# Patient Record
Sex: FEMALE | Race: WHITE | Employment: UNEMPLOYED | URBAN - METROPOLITAN AREA
[De-identification: names, ages, dates, MRNs, and addresses within clinical notes are randomized per-mention and may not be internally consistent; named-entity substitution may affect disease eponyms.]

---

## 2017-03-17 ENCOUNTER — HOSPITAL ENCOUNTER (EMERGENCY)
Facility: CLINIC | Age: 47
Discharge: LEFT BEFORE DISCHARGE | End: 2017-03-17
Admitting: FAMILY MEDICINE
Payer: OTHER GOVERNMENT

## 2017-03-17 VITALS
TEMPERATURE: 97.2 F | SYSTOLIC BLOOD PRESSURE: 109 MMHG | HEIGHT: 64 IN | WEIGHT: 130 LBS | OXYGEN SATURATION: 100 % | DIASTOLIC BLOOD PRESSURE: 72 MMHG | BODY MASS INDEX: 22.2 KG/M2 | RESPIRATION RATE: 20 BRPM

## 2017-03-17 PROCEDURE — 40000268 ZZH STATISTIC NO CHARGES

## 2017-07-19 ENCOUNTER — OFFICE VISIT (OUTPATIENT)
Dept: BEHAVIORAL HEALTH | Facility: CLINIC | Age: 47
End: 2017-07-19
Payer: OTHER GOVERNMENT

## 2017-07-19 DIAGNOSIS — F33.0 MAJOR DEPRESSIVE DISORDER, RECURRENT EPISODE, MILD (H): ICD-10-CM

## 2017-07-19 DIAGNOSIS — F43.10 PTSD (POST-TRAUMATIC STRESS DISORDER): Primary | ICD-10-CM

## 2017-07-19 PROCEDURE — 90791 PSYCH DIAGNOSTIC EVALUATION: CPT | Performed by: MARRIAGE & FAMILY THERAPIST

## 2017-07-19 ASSESSMENT — ANXIETY QUESTIONNAIRES
GAD7 TOTAL SCORE: 17
1. FEELING NERVOUS, ANXIOUS, OR ON EDGE: NEARLY EVERY DAY
5. BEING SO RESTLESS THAT IT IS HARD TO SIT STILL: SEVERAL DAYS
3. WORRYING TOO MUCH ABOUT DIFFERENT THINGS: NEARLY EVERY DAY
6. BECOMING EASILY ANNOYED OR IRRITABLE: MORE THAN HALF THE DAYS
2. NOT BEING ABLE TO STOP OR CONTROL WORRYING: NEARLY EVERY DAY
7. FEELING AFRAID AS IF SOMETHING AWFUL MIGHT HAPPEN: NEARLY EVERY DAY

## 2017-07-19 ASSESSMENT — PATIENT HEALTH QUESTIONNAIRE - PHQ9: 5. POOR APPETITE OR OVEREATING: MORE THAN HALF THE DAYS
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
## 2017-07-19 NOTE — MR AVS SNAPSHOT
"              After Visit Summary   2017    Magnolia Leyva    MRN: [id number]           Patient Information     Date Of Birth          1970        Visit Information        Provider Department      2017 2:00 PM Ketty Celis LMFT [facility]        Today's Diagnoses     PTSD (post-traumatic stress disorder)    -  1    Major depressive disorder, recurrent episode, mild (H)           Follow-ups after your visit        Who to contact     If you have questions or need follow up information about today's clinic visit or your schedule please contact [facility] directly at [phone].  Normal or non-critical lab and imaging results will be communicated to you by [email or web address], letter or phone within 4 business days after the clinic has received the results. If you do not hear from us within 7 days, please contact the clinic through [email or web address] or phone. If you have a critical or abnormal lab result, we will notify you by phone as soon as possible.  Submit refill requests through Kymab or call your pharmacy and they will forward the refill request to us. Please allow 3 business days for your refill to be completed.          Additional Information About Your Visit        MyChart Information     Kymab lets you send messages to your doctor, view your test results, renew your prescriptions, schedule appointments and more. To sign up, go to [email or web address] . Click on \"Log in\" on the left side of the screen, which will take you to the Welcome page. Then click on \"Sign up Now\" on the right side of the page.     You will be asked to enter the access code listed below, as well as some personal information. Please follow the directions to create your username and password.     Your access code is: [email or web address]  Expires: 10/19/2017  1:20 PM     Your access code will  in 90 days. If you need help or a new code, please call your [facility] or " [phone].        Care EveryWhere ID     This is your Care EveryWhere ID. This could be used by other organizations to access your [facility] medical records  [id number]         Blood Pressure from Last 3 Encounters:   07/25/17 110/66   07/21/17 112/60   03/17/17 109/72    Weight from Last 3 Encounters:   07/25/17 58.5 kg (129 lb)   07/21/17 59.9 kg (132 lb)   03/17/17 59 kg (130 lb)              Today, you had the following     No orders found for display       Primary Care Provider Office Phone # Fax #    Yudelka ANNETTA [facility] [phone]       [facility]  [facility]   [place] [address]        Equal Access to Services     VIDA LOPEZ : Hadii aurora moreirao Sogladis, waaxda luqadaha, qaybta kaalmada adeegyada, bernadette philip hayrusty castillo . So [facility] [phone].    ATENCIÓN: Si habla español, tiene a delcid disposición servicios gratuitos de asistencia lingüística. Llame al [phone].    We comply with applicable federal civil rights laws and [place] laws. We do not discriminate on the basis of race, color, national origin, age, disability sex, sexual orientation or gender identity.            Thank you!     Thank you for choosing [facility]  for your care. Our goal is always to provide you with excellent care. Hearing back from our patients is one way we can continue to improve our services. Please take a few minutes to complete the written survey that you may receive in the mail after your visit with us. Thank you!             Your Updated Medication List - Protect others around you: Learn how to safely use, store and throw away your medicines at www.disposemymeds.org.          This list is accurate as of: 7/19/17 11:59 PM.  Always use your most recent med list.                   Brand Name Dispense Instructions for use Diagnosis    ABILIFY 2 MG tablet   Generic drug:  ARIPiprazole      Take 15 mg by mouth daily        gabapentin 600 MG  tablet    NEURONTIN     Take 600 mg by mouth 3 times daily        ibuprofen 200 MG tablet    ADVIL/MOTRIN     Take 3 tablets (600 mg) by mouth every 6 hours as needed for pain        lamoTRIgine 200 MG tablet    LaMICtal     Take 200 mg by mouth daily        levothyroxine 100 MCG tablet    SYNTHROID/LEVOTHROID     Take 100 mcg by mouth daily        multivitamin, therapeutic with minerals Tabs tablet     100 tablet    Take 1 tablet by mouth daily    Substance abuse

## 2017-07-20 ASSESSMENT — PATIENT HEALTH QUESTIONNAIRE - PHQ9: SUM OF ALL RESPONSES TO PHQ QUESTIONS 1-9: 14

## 2017-07-20 ASSESSMENT — ANXIETY QUESTIONNAIRES: GAD7 TOTAL SCORE: 17

## 2017-07-21 ENCOUNTER — OFFICE VISIT (OUTPATIENT)
Dept: FAMILY MEDICINE | Facility: CLINIC | Age: 47
End: 2017-07-21

## 2017-07-21 VITALS
SYSTOLIC BLOOD PRESSURE: 112 MMHG | TEMPERATURE: 98.2 F | DIASTOLIC BLOOD PRESSURE: 60 MMHG | HEART RATE: 80 BPM | BODY MASS INDEX: 22.66 KG/M2 | WEIGHT: 132 LBS

## 2017-07-21 DIAGNOSIS — Z91.89 HX OF DRUG OVERDOSE: ICD-10-CM

## 2017-07-21 DIAGNOSIS — R41.840 DIFFICULTY CONCENTRATING: ICD-10-CM

## 2017-07-21 DIAGNOSIS — F43.23 ADJUSTMENT DISORDER WITH MIXED ANXIETY AND DEPRESSED MOOD: Primary | ICD-10-CM

## 2017-07-21 DIAGNOSIS — Z31.9 PATIENT DESIRES PREGNANCY: ICD-10-CM

## 2017-07-21 PROCEDURE — 99214 OFFICE O/P EST MOD 30 MIN: CPT | Performed by: NURSE PRACTITIONER

## 2017-07-21 NOTE — NURSING NOTE
"Chief Complaint   Patient presents with     Referral     asking for referral regarding ADD       Initial There were no vitals taken for this visit. Estimated body mass index is 22.31 kg/(m^2) as calculated from the following:    Height as of 3/17/17: 5' 4\" (1.626 m).    Weight as of 3/17/17: 130 lb (59 kg).  Medication Reconciliation: complete  "
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
## 2017-07-21 NOTE — MR AVS SNAPSHOT
After Visit Summary   7/21/2017    Magnolia Leyva    MRN: [id number]           Patient Information     Date Of Birth          1970        Visit Information        Provider Department      7/21/2017 11:15 AM Yudelka Hartman APRN [facility]        Today's Diagnoses     Adjustment disorder with mixed anxiety and depressed mood    -  1    Hx of drug overdose        Patient desires pregnancy        Difficulty concentrating           Follow-ups after your visit        Additional Services     INFERTILITY/AI REFERRAL       Your provider has referred you to: [facility] [place] [phone]   [email or web address]    Please be aware that coverage of these services is subject to the terms and limitations of your health insurance plan.  Call member services at your health plan with any benefit or coverage questions.      Please bring the following with you to your appointment:    (1) Any X-Rays, CTs or MRIs which have been performed.  Contact the facility where they were done to arrange for  prior to your scheduled appointment.    (2) List of current medications   (3) This referral request   (4) Any documents/labs given to you for this referral            MENTAL HEALTH REFERRAL       Your provider has referred you to: [facility]: [facility]  [phone]   [email or web address]   *Referral from [facility] Primary Care Provider required - Consultation Model - medication management & future refills will be returned to [facility] PCP upon completion of evaluation  *Please call to schedule an appointment.    All scheduling is subject to the client's specific insurance plan & benefits, provider/location availability, and provider clinical specialities.  Please arrive 15 minutes early for your first appointment and bring your completed  paperwork.    Please be aware that coverage of these services is subject to the terms and limitations of your health insurance plan.  Call member services at your health plan with any benefit or coverage questions.            MENTAL HEALTH REFERRAL       Your provider has referred you to: [facility]: [facility] - Counseling (Individual/Couples/Family) - Would like a female    All scheduling is subject to the client's specific insurance plan & benefits, provider/location availability, and provider clinical specialities.  Please arrive 15 minutes early for your first appointment and bring your completed paperwork.    Please be aware that coverage of these services is subject to the terms and limitations of your health insurance plan.  Call member services at your health plan with any benefit or coverage questions.            MENTAL HEALTH REFERRAL       Your provider has referred you to: [facility]    All scheduling is subject to the client's specific insurance plan & benefits, provider/location availability, and provider clinical specialities.  Please arrive 15 minutes early for your first appointment and bring your completed paperwork.    Please be aware that coverage of these services is subject to the terms and limitations of your health insurance plan.  Call member services at your health plan with any benefit or coverage questions.                  Who to contact     If you have questions or need follow up information about today's clinic visit or your schedule please contact [facility] directly at [phone].  Normal or non-critical lab and imaging results will be communicated to you by MyChart, letter or phone within 4 business days after the clinic has received the results. If you do not hear from us within 7 days, please contact the clinic through MyChart or phone. If you have a critical or abnormal lab result, we will notify you by phone as soon as possible.  Submit refill  "requests through [employer] or call your pharmacy and they will forward the refill request to us. Please allow 3 business days for your refill to be completed.          Additional Information About Your Visit        The [employer] Information     [employer] lets you send messages to your doctor, view your test results, renew your prescriptions, schedule appointments and more. To sign up, go to [email or web address] . Click on \"Log in\" on the left side of the screen, which will take you to the Welcome page. Then click on \"Sign up Now\" on the right side of the page.     You will be asked to enter the access code listed below, as well as some personal information. Please follow the directions to create your username and password.     Your access code is: [email or web address]  Expires: 10/19/2017  1:20 PM     Your access code will  in 90 days. If you need help or a new code, please call your [facility] clinic or [phone].        Care EveryWhere ID     This is your Care EveryWhere ID. This could be used by other organizations to access your [facility] medical records  [id number]        Your Vitals Were     Pulse Temperature BMI (Body Mass Index)             80 98.2  F (36.8  C) (Tympanic) 22.66 kg/m2          Blood Pressure from Last 3 Encounters:   17 112/60   17 109/72   11/10/16 102/58    Weight from Last 3 Encounters:   17 132 lb (59.9 kg)   17 130 lb (59 kg)   11/10/16 139 lb 11.2 oz (63.4 kg)              We Performed the Following     INFERTILITY/AI REFERRAL     MENTAL HEALTH REFERRAL     MENTAL HEALTH REFERRAL     MENTAL HEALTH REFERRAL        Primary Care Provider Office Phone # Fax #    ANNETTA Palmer [phone]        [facility]  [address] [place] [address]        Equal Access to Services     VIDA LOPEZ AH: Noel rodriguez Sogladis, waaxda luqadaha, qaybta kaalmada adeegyaciro, bernadette snow. So [facility] " [phone].    ATENCIÓN: Si judy artis, tiene a delcid disposición servicios gratuitos de asistencia lingüística. Bryan quinonez [phone].    We comply with applicable federal civil rights laws and [place] laws. We do not discriminate on the basis of race, color, national origin, age, disability sex, sexual orientation or gender identity.            Thank you!     Thank you for choosing [facility]  for your care. Our goal is always to provide you with excellent care. Hearing back from our patients is one way we can continue to improve our services. Please take a few minutes to complete the written survey that you may receive in the mail after your visit with us. Thank you!             Your Updated Medication List - Protect others around you: Learn how to safely use, store and throw away your medicines at www.disposemymeds.org.          This list is accurate as of: 7/21/17  1:20 PM.  Always use your most recent med list.                   Brand Name Dispense Instructions for use Diagnosis    ABILIFY 2 MG tablet   Generic drug:  ARIPiprazole      Take 15 mg by mouth daily        gabapentin 600 MG tablet    NEURONTIN     Take 600 mg by mouth 3 times daily        ibuprofen 200 MG tablet    ADVIL/MOTRIN     Take 3 tablets (600 mg) by mouth every 6 hours as needed for pain        lamoTRIgine 200 MG tablet    LaMICtal     Take 200 mg by mouth daily        levothyroxine 100 MCG tablet    SYNTHROID/LEVOTHROID     Take 100 mcg by mouth daily        multivitamin, therapeutic with minerals Tabs tablet     100 tablet    Take 1 tablet by mouth daily    Substance abuse

## 2017-07-24 NOTE — PROGRESS NOTES
"  SUBJECTIVE:                                                    Magnolia Leyva is a 47 year old female who presents to clinic today for the following health issues:      Concern - Fatigue- trouble staying awake  Onset: about 3 months    Description:   Having a hard time staying awake on her way home after working her 3rd shift job    Intensity: 0/10    Progression of Symptoms:  same and constant    Accompanying Signs & Symptoms:  Pt states she is coherent at the time, has had to pull over to sleep which does not really help    Previous history of similar problem:   none    Precipitating factors:   Worsened by: nothing    Alleviating factors:  Improved by: nothing    Therapies Tried and outcome: caffeine, sour candy, corn nuts      Patient decline hx of Drug use, hx of Stimulant use, denies alcohol use, drinks Socially.  She was seen by her Provider on 7/22, notes was reviewed.    PROBLEMS TO ADD ON...    Problem list and histories reviewed & adjusted, as indicated.  Additional history: as documented    Patient Active Problem List   Diagnosis     Adjustment disorder with mixed anxiety and depressed mood     Acquired hypothyroidism     CARDIOVASCULAR SCREENING; LDL GOAL LESS THAN 130     Bilateral carpal tunnel syndrome     Carpal tunnel syndrome of left wrist     Valium overdose     Overdose of Valium, intentional self-harm, subsequent encounter     Past Surgical History:   Procedure Laterality Date     ORTHOPEDIC SURGERY         Social History   Substance Use Topics     Smoking status: Never Smoker     Smokeless tobacco: Never Used     Alcohol use 0.0 oz/week     0 Standard drinks or equivalent per week      Comment: \"once every three months\"     Family History   Problem Relation Age of Onset     Depression/Anxiety Mother      Alcohol/Drug Father      Pancreatic CA         Current Outpatient Prescriptions   Medication Sig Dispense Refill     gabapentin (NEURONTIN) 600 MG tablet Take 600 mg by mouth " "3 times daily       levothyroxine (SYNTHROID,LEVOTHROID) 100 MCG tablet Take 100 mcg by mouth daily       lamoTRIgine (LAMICTAL) 200 MG tablet Take 200 mg by mouth daily       ARIPiprazole (ABILIFY) 2 MG tablet Take 15 mg by mouth daily        multivitamin, therapeutic with minerals (MULTI-VITAMIN) TABS Take 1 tablet by mouth daily 100 tablet 3     ibuprofen (ADVIL,MOTRIN) 200 MG tablet Take 3 tablets (600 mg) by mouth every 6 hours as needed for pain (Patient not taking: Reported on 7/25/2017)       Allergies   Allergen Reactions     Zoloft [Sertraline] Rash         Reviewed and updated as needed this visit by clinical staff     Reviewed and updated as needed this visit by Provider         ROS:  Constitutional, HEENT, cardiovascular, pulmonary, gi and gu systems are negative, except as otherwise noted.      OBJECTIVE:   /66  Pulse 82  Temp 99.4  F (37.4  C) (Temporal)  Resp 12  Ht 5' 4\" (1.626 m)  Wt 129 lb (58.5 kg)  BMI 22.14 kg/m2  Body mass index is 22.14 kg/(m^2).  GENERAL: healthy, alert and no distress  NECK: no adenopathy,   RESP: lungs clear to auscultation - no rales, rhonchi or wheezes  CV: regular rate and rhythm, normal S1 S2, no murmur,  No peripheral edema and peripheral pulses strong  ABDOMEN: soft, nontender,   MS: no gross musculoskeletal defects noted, no edema  NEURO: Normal strength and tone, mentation intact and speech normal  PSYCH: mentation appears normal, affect normal/bright      ASSESSMENT/PLAN:       ICD-10-CM    1. Excessive sleepiness G47.10 SLEEP EVALUATION & MANAGEMENT REFERRAL - ADULT   2. Insomnia, unspecified type G47.00      Patient was concerned with execsive sleepiness after work and wanted to start medication.  After reviewing her chart and past hx, I decline medication at this time.    I would like her focus on getting enough sleep after work and evaluated by sleep specialist, if this is going to be her new schedule, she needs to understand how to adjust with the " timing.    She was also advised to make appointment for her ADD/ADHD test. She said she has already set up appointment with Psychiatrist and Therapist and did not have time to set up appointment for ADD/ADHD testing.    I asked patient why she did not discuss this important concern at her clinic visit last week, she said, she wanted to focus on other things!    Patient was surprised when I mentioned her Drug screen test was positive for stimuulants last year, [place] was reviewed and no red flags were seen.    Emphasized strongly not to drive if sleepy, and if feel sleepy when start driving, need to pull over and take a nap, advised if she can, to take a few days off to get the testing and appointment taken care of and don't risk driving when tired.    The patient understood the rational for the diagnosis and treatment plan.   All questions were answered to best of my ability and the patient's satisfaction.  Risks, benefits and alternatives of treatments discussed. Plan agreed on.    Will call, return to clinic, or go to ED if worsening or symptoms not improving as discussed.  See patient instructions.       Patient Instructions   Do Not Drive if Sleepy, can take Nap before driving.  If feel sleepy while driving, need to pull over and rest.   Need to have sleep referral for further evaluation.  Needs to follow up with ADD/ADHD Testing.    What is Insomnia?  Do you have trouble falling asleep? Do you wake up often during the night? Or, maybe you wake up too early in the morning. You may be suffering from insomnia. Talk to your health care provider if it lasts longer than a few weeks and you feel tired most of the time.    What causes insomnia?  Some common causes of insomnia are:    Medical problems such as pain, depression, medication side effects, or trouble breathing    Circadian rhythm disorder, a shift in the body s normal 24-hour activity cycle    Lifestyle factors such as a changing sleep schedule, lack of  exercise, or too much caffeine    Sleep settings such as a poor mattress, noise, or a room that s too hot or too cold    Stress such as problems at work, money worries, or family events  Talk to your health care provider  Describe your sleeping problems to your health care provider. Try to keep a daily sleep diary for a couple weeks. Write down the time you go to bed, the time you wake up, and anything that seems to affect your sleep. Your health care provider can work with you to develop a treatment plan. You may need to learn good sleeping habits and make some lifestyle changes. If you have any medical problems, these may need to be treated first.  Date Last Reviewed: 7/18/2015 2000-2017 [employer]. [address] [place] [address], [place] [address]. All rights reserved. This information is not intended as a substitute for professional medical care. Always follow your healthcare professional's instructions.        Insomnia  Insomnia is repeated difficulty going to sleep or staying asleep, or both. Whether you have insomnia is not defined by a specific amount of sleep. Different people need different amounts of sleep, and you may need more or less sleep at different times of your life.  There are 3 major types of insomnia:  short-term, chronic, and  other.   Short-term, or acute insomnia lasts less than 3 months.  The symptoms are temporary and can be linked directly to a stressor, such as the death of a loved one, financial problems, or a new physical problem.  Short-term insomnia stops when the stressor resolves or the person adapts to its presence.  Chronic insomnia occurs at least 3 times a week and lasts longer than 3 months.  Chronic insomnia can occur when either the cause of the sleeping problem is not clear, or the insomnia does not get better when the stressor is resolved. A number of other criteria are also used to make the diagnosis of chronic insomnia.    Other insomnia  is the third type of  insomnia-related sleep disorders.  This description applies to people who have problems getting to sleep or staying asleep, but do not meet all of the factors that describe either short-term or chronic insomnia.    Many things cause insomnia. Different people may have different causes. It can be from an underlying medical or psychological condition, or lifestyle. It can also be primary insomnia, which means no cause can be found.  Causes of insomnia include:    Chronic medical problems- heart disease, gastrointestinal problems, hormonal changes, breathing problems    Anxiety    Stress    Depression    Pain    Work schedule    Sleep apnea    Illegal drugs    Certain medicines  Many different medidcines can affect your sleep, such as stimulants, caffeine, alcohol, some decongestants, and diet pills. Other medicines may include some types of blood pressure pills, steroids, asthma medicines, antihistamines, antidepressants, seizure medicines and statins. Not all of these will affect your sleep, and they shouldn t be stopped without talking to your doctor.  Symptoms of insomnia can include:    Lying awake for long periods at night before falling asleep    Waking up several times during the night    Waking up early in the morning and not being able to get back to sleep    Feeling tired and not refreshed by sleep    Not being able to function properly during the day and finding it hard to concentrate    Irritability    Tiredness and fatigue during the day  Home care  1. Review your medicines with your doctor or pharmacist to find out if they can cause insomnia. Not all medicines will affect your sleep, but they shouldn't be stopped without reviewing them with your doctor. There may be serious side effects and consequences from suddenly stopping your medicines. Not taking them may cause strokes, heart attacks, and many other problems.  2. Caffeine, smoking and alcohol also affect sleep. Limit your daily use and do not use  these before bedtime. Alcohol may make you sleepy at first, but as its effects wear off, you may awaken a few hours later and have trouble returning to sleep.  3. Do not exercise, eat or drink large amounts of liquid within 2 hours of your bedtime.  4. Improve your sleep habits. Have a fixed bed and wake-up time. Try to keep noise, light and heat in your bedroom at a comfortable level. Try using earplugs or eyeshades if needed.   5. Avoid watching TV in bed.  6. If you do not fall asleep within 30 minutes, try to relax by reading or listening to soft music.  7. Limit daytime napping to one 30 minute period, early in the day.  8. Get regular exercise. Find other ways to lessen your stress level.  9. If a medicine was prescribed to help reset your sleep patterns, take it as directed. Sleeping pills are intended for short-term use, only. If taken for too long, the effect wears off while the risk of physical addiction and psychological dependence increases.  Sleep diary  If the cause isn t obvious and it is not improving, try keeping a  sleep diary  for a couple of weeks. Include in it:    The time you go to bed    How long it takes to fall asleep    How many times you wake up    What time you wake up    Your meal times and what you eat    What time you drink alcohol    Your exercise habits and times  Follow-up care  Follow up with your healthcare provider, or as advised. If X-rays or CT scans were done, you will be notified if there is a change in the reading, especially if it affects treatment.  Call 911  Call 911 if any of these occur:    Trouble breathing    Confusion or trouble waking    Fainting or loss of consciousness    Rapid heart rate    New chest, arm, shoulder, neck or upper back pain    Trouble with speech or vision, weakness of an arm or leg    Trouble walking or talking, loss of balance, numbness or weakness in one side of your body, facial droop  When to seek medical advice  Call your healthcare  provider right away if any of these occur:    Extreme restlessness or irritability    Confusion or hallucinations (seeing or hearing things that are not there)    Anxiety, depression    Several days without sleeping  Date Last Reviewed: 11/19/2015 2000-2017 [employer]. [address] [place] [address], [place] [address]. All rights reserved. This information is not intended as a substitute for professional medical care. Always follow your healthcare professional's instructions.            Sheela Crawford MD  [facility]    Time: I spent 35 minutes of face- face time with patient greater than one half devoted to coordination of care for diagnosis and plan above.

## 2017-07-25 ENCOUNTER — OFFICE VISIT (OUTPATIENT)
Dept: FAMILY MEDICINE | Facility: OTHER | Age: 47
End: 2017-07-25

## 2017-07-25 VITALS
BODY MASS INDEX: 22.02 KG/M2 | HEART RATE: 82 BPM | RESPIRATION RATE: 12 BRPM | HEIGHT: 64 IN | DIASTOLIC BLOOD PRESSURE: 66 MMHG | WEIGHT: 129 LBS | SYSTOLIC BLOOD PRESSURE: 110 MMHG | TEMPERATURE: 99.4 F

## 2017-07-25 DIAGNOSIS — G47.00 INSOMNIA, UNSPECIFIED TYPE: ICD-10-CM

## 2017-07-25 DIAGNOSIS — G47.10 EXCESSIVE SLEEPINESS: Primary | ICD-10-CM

## 2017-07-25 PROCEDURE — 99214 OFFICE O/P EST MOD 30 MIN: CPT | Performed by: FAMILY MEDICINE

## 2017-07-25 ASSESSMENT — ANXIETY QUESTIONNAIRES
GAD7 TOTAL SCORE: 14
7. FEELING AFRAID AS IF SOMETHING AWFUL MIGHT HAPPEN: SEVERAL DAYS
3. WORRYING TOO MUCH ABOUT DIFFERENT THINGS: NEARLY EVERY DAY
5. BEING SO RESTLESS THAT IT IS HARD TO SIT STILL: SEVERAL DAYS
2. NOT BEING ABLE TO STOP OR CONTROL WORRYING: NEARLY EVERY DAY
1. FEELING NERVOUS, ANXIOUS, OR ON EDGE: NEARLY EVERY DAY
IF YOU CHECKED OFF ANY PROBLEMS ON THIS QUESTIONNAIRE, HOW DIFFICULT HAVE THESE PROBLEMS MADE IT FOR YOU TO DO YOUR WORK, TAKE CARE OF THINGS AT HOME, OR GET ALONG WITH OTHER PEOPLE: VERY DIFFICULT
6. BECOMING EASILY ANNOYED OR IRRITABLE: SEVERAL DAYS

## 2017-07-25 ASSESSMENT — PATIENT HEALTH QUESTIONNAIRE - PHQ9: 5. POOR APPETITE OR OVEREATING: MORE THAN HALF THE DAYS

## 2017-07-25 ASSESSMENT — PAIN SCALES - GENERAL: PAINLEVEL: NO PAIN (0)

## 2017-07-25 NOTE — MR AVS SNAPSHOT
After Visit Summary   7/25/2017    Magnolia Leyva    MRN: [id number]           Patient Information     Date Of Birth          1970        Visit Information        Provider Department      7/25/2017 10:50 AM Sheela Crawford MD [facility]'s Diagnoses     Excessive sleepiness    -  1    Insomnia, unspecified type          Care Instructions    Do Not Drive if Sleepy, can take Nap before driving.  If feel sleepy while driving, need to pull over and rest.   Need to have sleep referral for further evaluation.  Needs to follow up with ADD/ADHD Testing.    What is Insomnia?  Do you have trouble falling asleep? Do you wake up often during the night? Or, maybe you wake up too early in the morning. You may be suffering from insomnia. Talk to your health care provider if it lasts longer than a few weeks and you feel tired most of the time.    What causes insomnia?  Some common causes of insomnia are:    Medical problems such as pain, depression, medication side effects, or trouble breathing    Circadian rhythm disorder, a shift in the body s normal 24-hour activity cycle    Lifestyle factors such as a changing sleep schedule, lack of exercise, or too much caffeine    Sleep settings such as a poor mattress, noise, or a room that s too hot or too cold    Stress such as problems at work, money worries, or family events  Talk to your health care provider  Describe your sleeping problems to your health care provider. Try to keep a daily sleep diary for a couple weeks. Write down the time you go to bed, the time you wake up, and anything that seems to affect your sleep. Your health care provider can work with you to develop a treatment plan. You may need to learn good sleeping habits and make some lifestyle changes. If you have any medical problems, these may need to be treated first.  Date Last Reviewed: 7/18/2015 2000-2017 [employer] [address]  [place] [address]. All rights reserved. This information is not intended as a substitute for professional medical care. Always follow your healthcare professional's instructions.        Insomnia  Insomnia is repeated difficulty going to sleep or staying asleep, or both. Whether you have insomnia is not defined by a specific amount of sleep. Different people need different amounts of sleep, and you may need more or less sleep at different times of your life.  There are 3 major types of insomnia:  short-term, chronic, and  other.   Short-term, or acute insomnia lasts less than 3 months.  The symptoms are temporary and can be linked directly to a stressor, such as the death of a loved one, financial problems, or a new physical problem.  Short-term insomnia stops when the stressor resolves or the person adapts to its presence.  Chronic insomnia occurs at least 3 times a week and lasts longer than 3 months.  Chronic insomnia can occur when either the cause of the sleeping problem is not clear, or the insomnia does not get better when the stressor is resolved. A number of other criteria are also used to make the diagnosis of chronic insomnia.    Other insomnia  is the third type of insomnia-related sleep disorders.  This description applies to people who have problems getting to sleep or staying asleep, but do not meet all of the factors that describe either short-term or chronic insomnia.    Many things cause insomnia. Different people may have different causes. It can be from an underlying medical or psychological condition, or lifestyle. It can also be primary insomnia, which means no cause can be found.  Causes of insomnia include:    Chronic medical problems- heart disease, gastrointestinal problems, hormonal changes, breathing problems    Anxiety    Stress    Depression    Pain    Work schedule    Sleep apnea    Illegal drugs    Certain medicines  Many different medidcines can affect your sleep,  such as stimulants, caffeine, alcohol, some decongestants, and diet pills. Other medicines may include some types of blood pressure pills, steroids, asthma medicines, antihistamines, antidepressants, seizure medicines and statins. Not all of these will affect your sleep, and they shouldn t be stopped without talking to your doctor.  Symptoms of insomnia can include:    Lying awake for long periods at night before falling asleep    Waking up several times during the night    Waking up early in the morning and not being able to get back to sleep    Feeling tired and not refreshed by sleep    Not being able to function properly during the day and finding it hard to concentrate    Irritability    Tiredness and fatigue during the day  Home care  1. Review your medicines with your doctor or pharmacist to find out if they can cause insomnia. Not all medicines will affect your sleep, but they shouldn't be stopped without reviewing them with your doctor. There may be serious side effects and consequences from suddenly stopping your medicines. Not taking them may cause strokes, heart attacks, and many other problems.  2. Caffeine, smoking and alcohol also affect sleep. Limit your daily use and do not use these before bedtime. Alcohol may make you sleepy at first, but as its effects wear off, you may awaken a few hours later and have trouble returning to sleep.  3. Do not exercise, eat or drink large amounts of liquid within 2 hours of your bedtime.  4. Improve your sleep habits. Have a fixed bed and wake-up time. Try to keep noise, light and heat in your bedroom at a comfortable level. Try using earplugs or eyeshades if needed.   5. Avoid watching TV in bed.  6. If you do not fall asleep within 30 minutes, try to relax by reading or listening to soft music.  7. Limit daytime napping to one 30 minute period, early in the day.  8. Get regular exercise. Find other ways to lessen your stress level.  9. If a medicine was  prescribed to help reset your sleep patterns, take it as directed. Sleeping pills are intended for short-term use, only. If taken for too long, the effect wears off while the risk of physical addiction and psychological dependence increases.  Sleep diary  If the cause isn t obvious and it is not improving, try keeping a  sleep diary  for a couple of weeks. Include in it:    The time you go to bed    How long it takes to fall asleep    How many times you wake up    What time you wake up    Your meal times and what you eat    What time you drink alcohol    Your exercise habits and times  Follow-up care  Follow up with your healthcare provider, or as advised. If X-rays or CT scans were done, you will be notified if there is a change in the reading, especially if it affects treatment.  Call 911  Call 911 if any of these occur:    Trouble breathing    Confusion or trouble waking    Fainting or loss of consciousness    Rapid heart rate    New chest, arm, shoulder, neck or upper back pain    Trouble with speech or vision, weakness of an arm or leg    Trouble walking or talking, loss of balance, numbness or weakness in one side of your body, facial droop  When to seek medical advice  Call your healthcare provider right away if any of these occur:    Extreme restlessness or irritability    Confusion or hallucinations (seeing or hearing things that are not there)    Anxiety, depression    Several days without sleeping  Date Last Reviewed: 11/19/2015 2000-2017 [employer]. [address] [place] [address]. All rights reserved. This information is not intended as a substitute for professional medical care. Always follow your healthcare professional's instructions.                Follow-ups after your visit        Additional Services     SLEEP EVALUATION & MANAGEMENT REFERRAL - ADULT       Please be aware that coverage of these services is subject to the terms and limitations of your health insurance  plan.  Call member services at your health plan with any benefit or coverage questions.      Please set up Appointment with in the next 2 to 3 days.  Thank you.  Electronically signed:  Sheela Crawford M.D.      Please bring the following to your appointment:    >>   List of current medications   >>   This referral request   >>   Any documents/labs given to you for this referral    [facility]   [phone]  (Age 13 if over 100 lbs and up)                  Your next 10 appointments already scheduled     Aug 01, 2017  1:15 PM CDT   New Visit with ANNETTA Sandhu [facility] ([facility])    [address] [address] [phone]            Aug 07, 2017  2:00 PM CDT   New Visit with JULISSA Haro   [facility] ([facility])    [address] [address] [phone]              Future tests that were ordered for you today     Open Future Orders        Priority Expected Expires Ordered    SLEEP EVALUATION & MANAGEMENT REFERRAL - ADULT Routine  7/25/2018 7/25/2017            Who to contact     If you have questions or need follow up information about today's clinic visit or your schedule please contact [facility] directly at [phone].  Normal or non-critical lab and imaging results will be communicated to you by MyChart, letter or phone within 4 business days after the clinic has received the results. If you do not hear from us within 7 days, please contact the clinic through [employer] or phone. If you have a critical or abnormal lab result, we will notify you by phone as soon as possible.  Submit refill requests through [employer] or call your pharmacy and they will forward the refill request to us. Please allow 3 business days for your refill to be completed.          Additional Information About Your Visit        [employer] Information      "[employer] lets you send messages to your doctor, view your test results, renew your prescriptions, schedule appointments and more. To sign up, go to [email or web address] . Click on \"Log in\" on the left side of the screen, which will take you to the Welcome page. Then click on \"Sign up Now\" on the right side of the page.     You will be asked to enter the access code listed below, as well as some personal information. Please follow the directions to create your username and password.     Your access code is: [email or web address]  Expires: 10/19/2017  1:20 PM     Your access code will  in 90 days. If you need help or a new code, please call your [facility] clinic or [phone].        Care EveryWhere ID     This is your [facility] EveryWhere ID. This could be used by other organizations to access your [facility] medical records  [id number]        Your Vitals Were     Pulse Temperature Respirations Height BMI (Body Mass Index)       82 99.4  F (37.4  C) (Temporal) 12 5' 4\" (1.626 m) 22.14 kg/m2        Blood Pressure from Last 3 Encounters:   17 110/66   17 112/60   17 109/72    Weight from Last 3 Encounters:   17 129 lb (58.5 kg)   17 132 lb (59.9 kg)   17 130 lb (59 kg)              We Performed the Following     DEPRESSION ACTION PLAN (DAP)        Primary Care Provider Office Phone # Fax #    ANNETTA Palmer [phone]       [facility]  [facility]   [place] [address]        Equal Access to Services     [facility] : Hadii aurora Cuellar, waakthyda luqadaha, qaybta kaalmada leah, bernadette snow. So [facility] [phone].    ATENCIÓN: Si habla español, tiene a delcid disposición servicios gratuitos de asistencia lingüística. Llame al [phone].    We comply with applicable federal civil rights laws and [place] laws. We do not discriminate on the basis of race, color, national origin, age, disability sex, " sexual orientation or gender identity.            Thank you!     Thank you for choosing [facility]  for your care. Our goal is always to provide you with excellent care. Hearing back from our patients is one way we can continue to improve our services. Please take a few minutes to complete the written survey that you may receive in the mail after your visit with us. Thank you!             Your Updated Medication List - Protect others around you: Learn how to safely use, store and throw away your medicines at www.disposemymeds.org.          This list is accurate as of: 7/25/17 11:38 AM.  Always use your most recent med list.                   Brand Name Dispense Instructions for use Diagnosis    ABILIFY 2 MG tablet   Generic drug:  ARIPiprazole      Take 15 mg by mouth daily        gabapentin 600 MG tablet    NEURONTIN     Take 600 mg by mouth 3 times daily        ibuprofen 200 MG tablet    ADVIL/MOTRIN     Take 3 tablets (600 mg) by mouth every 6 hours as needed for pain        lamoTRIgine 200 MG tablet    LaMICtal     Take 200 mg by mouth daily        levothyroxine 100 MCG tablet    SYNTHROID/LEVOTHROID     Take 100 mcg by mouth daily        multivitamin, therapeutic with minerals Tabs tablet     100 tablet    Take 1 tablet by mouth daily    Substance abuse

## 2017-07-25 NOTE — NURSING NOTE
"Chief Complaint   Patient presents with     Fatigue     Panel Management     pap, mychart, samia, dap        Initial /66  Pulse 82  Temp 99.4  F (37.4  C) (Temporal)  Resp 12  Ht 5' 4\" (1.626 m)  Wt 129 lb (58.5 kg)  BMI 22.14 kg/m2 Estimated body mass index is 22.14 kg/(m^2) as calculated from the following:    Height as of this encounter: 5' 4\" (1.626 m).    Weight as of this encounter: 129 lb (58.5 kg).  Medication Reconciliation: complete     Belén Haney CMA (AAMA)      "

## 2017-07-25 NOTE — PATIENT INSTRUCTIONS
Do Not Drive if Sleepy, can take Nap before driving.  If feel sleepy while driving, need to pull over and rest.   Need to have sleep referral for further evaluation.  Needs to follow up with ADD/ADHD Testing.    What is Insomnia?  Do you have trouble falling asleep? Do you wake up often during the night? Or, maybe you wake up too early in the morning. You may be suffering from insomnia. Talk to your health care provider if it lasts longer than a few weeks and you feel tired most of the time.    What causes insomnia?  Some common causes of insomnia are:    Medical problems such as pain, depression, medication side effects, or trouble breathing    Circadian rhythm disorder, a shift in the body s normal 24-hour activity cycle    Lifestyle factors such as a changing sleep schedule, lack of exercise, or too much caffeine    Sleep settings such as a poor mattress, noise, or a room that s too hot or too cold    Stress such as problems at work, money worries, or family events  Talk to your health care provider  Describe your sleeping problems to your health care provider. Try to keep a daily sleep diary for a couple weeks. Write down the time you go to bed, the time you wake up, and anything that seems to affect your sleep. Your health care provider can work with you to develop a treatment plan. You may need to learn good sleeping habits and make some lifestyle changes. If you have any medical problems, these may need to be treated first.  Date Last Reviewed: 7/18/2015 2000-2017 [employer]. [address] [place], [place] [id number]. All rights reserved. This information is not intended as a substitute for professional medical care. Always follow your healthcare professional's instructions.        Insomnia  Insomnia is repeated difficulty going to sleep or staying asleep, or both. Whether you have insomnia is not defined by a specific amount of sleep. Different people need different amounts of sleep, and  you may need more or less sleep at different times of your life.  There are 3 major types of insomnia:  short-term, chronic, and  other.   Short-term, or acute insomnia lasts less than 3 months.  The symptoms are temporary and can be linked directly to a stressor, such as the death of a loved one, financial problems, or a new physical problem.  Short-term insomnia stops when the stressor resolves or the person adapts to its presence.  Chronic insomnia occurs at least 3 times a week and lasts longer than 3 months.  Chronic insomnia can occur when either the cause of the sleeping problem is not clear, or the insomnia does not get better when the stressor is resolved. A number of other criteria are also used to make the diagnosis of chronic insomnia.    Other insomnia  is the third type of insomnia-related sleep disorders.  This description applies to people who have problems getting to sleep or staying asleep, but do not meet all of the factors that describe either short-term or chronic insomnia.    Many things cause insomnia. Different people may have different causes. It can be from an underlying medical or psychological condition, or lifestyle. It can also be primary insomnia, which means no cause can be found.  Causes of insomnia include:    Chronic medical problems- heart disease, gastrointestinal problems, hormonal changes, breathing problems    Anxiety    Stress    Depression    Pain    Work schedule    Sleep apnea    Illegal drugs    Certain medicines  Many different medidcines can affect your sleep, such as stimulants, caffeine, alcohol, some decongestants, and diet pills. Other medicines may include some types of blood pressure pills, steroids, asthma medicines, antihistamines, antidepressants, seizure medicines and statins. Not all of these will affect your sleep, and they shouldn t be stopped without talking to your doctor.  Symptoms of insomnia can include:    Lying awake for long periods at night before  falling asleep    Waking up several times during the night    Waking up early in the morning and not being able to get back to sleep    Feeling tired and not refreshed by sleep    Not being able to function properly during the day and finding it hard to concentrate    Irritability    Tiredness and fatigue during the day  Home care  1. Review your medicines with your doctor or pharmacist to find out if they can cause insomnia. Not all medicines will affect your sleep, but they shouldn't be stopped without reviewing them with your doctor. There may be serious side effects and consequences from suddenly stopping your medicines. Not taking them may cause strokes, heart attacks, and many other problems.  2. Caffeine, smoking and alcohol also affect sleep. Limit your daily use and do not use these before bedtime. Alcohol may make you sleepy at first, but as its effects wear off, you may awaken a few hours later and have trouble returning to sleep.  3. Do not exercise, eat or drink large amounts of liquid within 2 hours of your bedtime.  4. Improve your sleep habits. Have a fixed bed and wake-up time. Try to keep noise, light and heat in your bedroom at a comfortable level. Try using earplugs or eyeshades if needed.   5. Avoid watching TV in bed.  6. If you do not fall asleep within 30 minutes, try to relax by reading or listening to soft music.  7. Limit daytime napping to one 30 minute period, early in the day.  8. Get regular exercise. Find other ways to lessen your stress level.  9. If a medicine was prescribed to help reset your sleep patterns, take it as directed. Sleeping pills are intended for short-term use, only. If taken for too long, the effect wears off while the risk of physical addiction and psychological dependence increases.  Sleep diary  If the cause isn t obvious and it is not improving, try keeping a  sleep diary  for a couple of weeks. Include in it:    The time you go to bed    How long it takes to  fall asleep    How many times you wake up    What time you wake up    Your meal times and what you eat    What time you drink alcohol    Your exercise habits and times  Follow-up care  Follow up with your healthcare provider, or as advised. If X-rays or CT scans were done, you will be notified if there is a change in the reading, especially if it affects treatment.  Call 911  Call 911 if any of these occur:    Trouble breathing    Confusion or trouble waking    Fainting or loss of consciousness    Rapid heart rate    New chest, arm, shoulder, neck or upper back pain    Trouble with speech or vision, weakness of an arm or leg    Trouble walking or talking, loss of balance, numbness or weakness in one side of your body, facial droop  When to seek medical advice  Call your healthcare provider right away if any of these occur:    Extreme restlessness or irritability    Confusion or hallucinations (seeing or hearing things that are not there)    Anxiety, depression    Several days without sleeping  Date Last Reviewed: 11/19/2015 2000-2017 [employer]. [address] [place] [address], [place] [address]. All rights reserved. This information is not intended as a substitute for professional medical care. Always follow your healthcare professional's instructions.

## 2017-07-25 NOTE — LETTER
My Depression Action Plan  Name: Magnolia Leyva   Date of Birth 1970  Date: 7/24/2017    My doctor: Yudelka Hartman   My clinic: [facility]  [address] [facility] [address] [phone]          GREEN    ZONE   Good Control    What it looks like:     Things are going generally well. You have normal up s and down s. You may even feel depressed from time to time, but bad moods usually last less than a day.   What you need to do:  1. Continue to care for yourself (see self care plan)  2. Check your depression survival kit and update it as needed  3. Follow your physician s recommendations including any medication.  4. Do not stop taking medication unless you consult with your physician first.           YELLOW         ZONE Getting Worse    What it looks like:     Depression is starting to interfere with your life.     It may be hard to get out of bed; you may be starting to isolate yourself from others.    Symptoms of depression are starting to last most all day and this has happened for several days.     You may have suicidal thoughts but they are not constant.   What you need to do:     1. Call your care team, your response to treatment will improve if you keep your care team informed of your progress. Yellow periods are signs an adjustment may need to be made.     2. Continue your self-care, even if you have to fake it!    3. Talk to someone in your support network    4. Open up your depression survival kit           RED    ZONE Medical Alert - Get Help    What it looks like:     Depression is seriously interfering with your life.     You may experience these or other symptoms: You can t get out of bed most days, can t work or engage in other necessary activities, you have trouble taking care of basic hygiene, or basic responsibilities, thoughts of suicide or death that will not go away, self-injurious behavior.     What you need to do:  1. Call your  care team and request a same-day appointment. If they are not available (weekends or after hours) call your local crisis line, emergency room or 911.      Electronically signed by: Shanna Josue, July 24, 2017    Depression Self Care Plan / Survival Kit    Self-Care for Depression  Here s the deal. Your body and mind are really not as separate as most people think.  What you do and think affects how you feel and how you feel influences what you do and think. This means if you do things that people who feel good do, it will help you feel better.  Sometimes this is all it takes.  There is also a place for medication and therapy depending on how severe your depression is, so be sure to consult with your medical provider and/ or Behavioral Health Consultant if your symptoms are worsening or not improving.     In order to better manage my stress, I will:    Exercise  Get some form of exercise, every day. This will help reduce pain and release endorphins, the  feel good  chemicals in your brain. This is almost as good as taking antidepressants!  This is not the same as joining a gym and then never going! (they count on that by the way ) It can be as simple as just going for a walk or doing some gardening, anything that will get you moving.      Hygiene   Maintain good hygiene (Get out of bed in the morning, Make your bed, Brush your teeth, Take a shower, and Get dressed like you were going to work, even if you are unemployed).  If your clothes don't fit try to get ones that do.    Diet  I will strive to eat foods that are good for me, drink plenty of water, and avoid excessive sugar, caffeine, alcohol, and other mood-altering substances.  Some foods that are helpful in depression are: complex carbohydrates, B vitamins, flaxseed, fish or fish oil, fresh fruits and vegetables.    Psychotherapy  I agree to participate in Individual Therapy (if recommended).    Medication  If prescribed medications, I agree to take them.   Missing doses can result in serious side effects.  I understand that drinking alcohol, or other illicit drug use, may cause potential side effects.  I will not stop my medication abruptly without first discussing it with my provider.    Staying Connected With Others  I will stay in touch with my friends, family members, and my primary care provider/team.    Use your imagination  Be creative.  We all have a creative side; it doesn t matter if it s oil painting, sand castles, or mud pies! This will also kick up the endorphins.    Witness Beauty  (AKA stop and smell the roses) Take a look outside, even in mid-winter. Notice colors, textures. Watch the squirrels and birds.     Service to others  Be of service to others.  There is always someone else in need.  By helping others we can  get out of ourselves  and remember the really important things.  This also provides opportunities for practicing all the other parts of the program.    Humor  Laugh and be silly!  Adjust your TV habits for less news and crime-drama and more comedy.    Control your stress  Try breathing deep, massage therapy, biofeedback, and meditation. Find time to relax each day.     My support system    Clinic Contact:  Phone number:    Contact 1:  Phone number:    Contact 2:  Phone number:    Anabaptism/:  Phone number:    Therapist:  Phone number:    Local crisis center:    Phone number:    Other community support:  Phone number:

## 2017-07-26 ASSESSMENT — ANXIETY QUESTIONNAIRES: GAD7 TOTAL SCORE: 14

## 2017-07-26 ASSESSMENT — PATIENT HEALTH QUESTIONNAIRE - PHQ9: SUM OF ALL RESPONSES TO PHQ QUESTIONS 1-9: 13

## 2017-08-07 PROBLEM — F43.10 PTSD (POST-TRAUMATIC STRESS DISORDER): Status: ACTIVE | Noted: 2017-07-19

## 2017-08-07 PROBLEM — F43.10 PTSD (POST-TRAUMATIC STRESS DISORDER): Status: ACTIVE | Noted: 2017-08-07

## 2017-08-07 PROBLEM — F33.0 MAJOR DEPRESSIVE DISORDER, RECURRENT EPISODE, MILD (H): Status: ACTIVE | Noted: 2017-07-19

## 2017-08-07 PROBLEM — F33.0 MAJOR DEPRESSIVE DISORDER, RECURRENT EPISODE, MILD (H): Status: ACTIVE | Noted: 2017-08-07

## 2017-08-11 ENCOUNTER — TRANSFERRED RECORDS (OUTPATIENT)
Dept: HEALTH INFORMATION MANAGEMENT | Facility: CLINIC | Age: 47
End: 2017-08-11

## 2017-10-05 DIAGNOSIS — E03.9 ACQUIRED HYPOTHYROIDISM: ICD-10-CM

## 2017-10-05 LAB — TSH SERPL DL<=0.005 MIU/L-ACNC: 3.57 MU/L (ref 0.4–4)

## 2017-10-05 PROCEDURE — 84443 ASSAY THYROID STIM HORMONE: CPT | Performed by: FAMILY MEDICINE

## 2017-10-05 PROCEDURE — 36415 COLL VENOUS BLD VENIPUNCTURE: CPT | Performed by: FAMILY MEDICINE

## 2017-10-06 ENCOUNTER — TELEPHONE (OUTPATIENT)
Dept: FAMILY MEDICINE | Facility: CLINIC | Age: 47
End: 2017-10-06

## 2017-10-06 NOTE — TELEPHONE ENCOUNTER
Reason for Call:  Request for results:    Name of test or procedure: Lab    Date of test of procedure: 10.5    Location of the test or procedure: [facility] to leave the result message on voice mail or with a family member? YES    Phone number Patient can be reached at:  Home number on file [phone] (home)    Additional comments:     Call taken on 10/6/2017 at 7:47 AM by Clemencia James

## 2017-10-06 NOTE — TELEPHONE ENCOUNTER
Please let her know the TSH is 3.57, which is within normal range. Continue same dose of levothyroxine

## 2017-10-09 ENCOUNTER — TRANSFERRED RECORDS (OUTPATIENT)
Dept: HEALTH INFORMATION MANAGEMENT | Facility: CLINIC | Age: 47
End: 2017-10-09

## 2017-11-02 ENCOUNTER — OFFICE VISIT (OUTPATIENT)
Dept: PSYCHIATRY | Facility: CLINIC | Age: 47
End: 2017-11-02
Payer: OTHER GOVERNMENT

## 2017-11-02 VITALS
DIASTOLIC BLOOD PRESSURE: 87 MMHG | WEIGHT: 135 LBS | HEART RATE: 106 BPM | BODY MASS INDEX: 23.05 KG/M2 | TEMPERATURE: 98.2 F | SYSTOLIC BLOOD PRESSURE: 131 MMHG | HEIGHT: 64 IN | RESPIRATION RATE: 16 BRPM

## 2017-11-02 DIAGNOSIS — F43.10 PTSD (POST-TRAUMATIC STRESS DISORDER): Primary | ICD-10-CM

## 2017-11-02 DIAGNOSIS — F33.1 MAJOR DEPRESSIVE DISORDER, RECURRENT EPISODE, MODERATE (H): ICD-10-CM

## 2017-11-02 DIAGNOSIS — F41.1 GAD (GENERALIZED ANXIETY DISORDER): ICD-10-CM

## 2017-11-02 DIAGNOSIS — F40.01 PANIC DISORDER WITH AGORAPHOBIA: ICD-10-CM

## 2017-11-02 PROCEDURE — 90792 PSYCH DIAG EVAL W/MED SRVCS: CPT | Performed by: CLINICAL NURSE SPECIALIST

## 2017-11-02 RX ORDER — GABAPENTIN 400 MG/1
400 CAPSULE ORAL 3 TIMES DAILY
Qty: 90 CAPSULE | Refills: 1 | Status: SHIPPED | OUTPATIENT
Start: 2017-11-02 | End: 2018-01-02 | Stop reason: DRUGHIGH

## 2017-11-02 RX ORDER — ARIPIPRAZOLE 15 MG/1
15 TABLET ORAL AT BEDTIME
Qty: 30 TABLET | Refills: 1 | Status: SHIPPED | OUTPATIENT
Start: 2017-11-02 | End: 2018-01-15 | Stop reason: DRUGHIGH

## 2017-11-02 RX ORDER — DULOXETIN HYDROCHLORIDE 30 MG/1
30 CAPSULE, DELAYED RELEASE ORAL DAILY
Qty: 30 CAPSULE | Refills: 1 | Status: SHIPPED | OUTPATIENT
Start: 2017-11-02 | End: 2018-01-02

## 2017-11-02 ASSESSMENT — ANXIETY QUESTIONNAIRES
7. FEELING AFRAID AS IF SOMETHING AWFUL MIGHT HAPPEN: NEARLY EVERY DAY
IF YOU CHECKED OFF ANY PROBLEMS ON THIS QUESTIONNAIRE, HOW DIFFICULT HAVE THESE PROBLEMS MADE IT FOR YOU TO DO YOUR WORK, TAKE CARE OF THINGS AT HOME, OR GET ALONG WITH OTHER PEOPLE: EXTREMELY DIFFICULT
3. WORRYING TOO MUCH ABOUT DIFFERENT THINGS: NEARLY EVERY DAY
6. BECOMING EASILY ANNOYED OR IRRITABLE: NEARLY EVERY DAY
2. NOT BEING ABLE TO STOP OR CONTROL WORRYING: NEARLY EVERY DAY
5. BEING SO RESTLESS THAT IT IS HARD TO SIT STILL: SEVERAL DAYS
GAD7 TOTAL SCORE: 18
1. FEELING NERVOUS, ANXIOUS, OR ON EDGE: NEARLY EVERY DAY

## 2017-11-02 ASSESSMENT — PATIENT HEALTH QUESTIONNAIRE - PHQ9
5. POOR APPETITE OR OVEREATING: MORE THAN HALF THE DAYS
SUM OF ALL RESPONSES TO PHQ QUESTIONS 1-9: 19

## 2017-11-02 NOTE — NURSING NOTE
"Chief Complaint   Patient presents with     Consult       Initial /87  Pulse 106  Temp 98.2  F (36.8  C) (Tympanic)  Resp 16  Ht 5' 4\" (1.626 m)  Wt 135 lb (61.2 kg)  LMP 10/31/2017  BMI 23.17 kg/m2 Estimated body mass index is 23.17 kg/(m^2) as calculated from the following:    Height as of this encounter: 5' 4\" (1.626 m).    Weight as of this encounter: 135 lb (61.2 kg).    Medication Reconciliation:  complete    Anastasia Tipton CMA (AAMA)  "
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
## 2017-11-02 NOTE — MR AVS SNAPSHOT
After Visit Summary   11/2/2017    Magnolia Leyva    MRN: [id number]           Patient Information     Date Of Birth          1970        Visit Information        Provider Department      11/2/2017 7:45 AM Maria Fernanda Coon APRN [facility]        Today's Diagnoses     PTSD (post-traumatic stress disorder)    -  1    Major depressive disorder, recurrent episode, moderate (H)          Care Instructions    Treatment Plan:  Continue lamotrigine (Lamictal) 200 mg daily, aripiprazole (Abilify) 15 mg daily, decrease to gabapentin 400 mg three times daily.     Begin duloxetine (Cymbalta) 30 mg daily.     Schedule trauma therapy.     Follow up in one month.     - Recommend patient discuss medications with their pharmacist. Risks and benefits of medications discussed, including side effect profile.   - Safety plan was reviewed; to the ER as needed or call after hours crisis line; [phone]  - Education and counseling was done regarding use of medications, psychotherapy options  - Call [phone] for appointment or to speak to a nurse.   -Office hours: Monday through Thursday 8:00 am to 4:30 pm; Friday 8:00 am to Noon.   - Patient was given a copy of this Treatment Plan today.           Follow-ups after your visit        Your next 10 appointments already scheduled     Nov 21, 2017  5:00 PM CST   PHYSICAL with ANNETTA Palmer [facility] ([facility])    [address] [place] [id number] [phone]              Who to contact     If you have questions or need follow up information about today's clinic visit or your schedule please contact [facility] directly at [phone].  Normal or non-critical lab and imaging results will be communicated to you by MyChart, letter or phone within 4 business days after the clinic has received the results. If you do not hear from us within 7 days, please  "contact the clinic through [email or web address] or phone. If you have a critical or abnormal lab result, we will notify you by phone as soon as possible.  Submit refill requests through [email or web address] or call your pharmacy and they will forward the refill request to us. Please allow 3 business days for your refill to be completed.          Additional Information About Your Visit        [employer] Information     [email or web address] gives you secure access to your electronic health record. If you see a primary care provider, you can also send messages to your care team and make appointments. If you have questions, please call your primary care clinic.  If you do not have a primary care provider, please call [phone] and they will assist you.        Care EveryWhere ID     This is your Care EveryWhere ID. This could be used by other organizations to access your [place] medical records  [id number]        Your Vitals Were     Pulse Temperature Respirations Height Last Period BMI (Body Mass Index)    106 98.2  F (36.8  C) (Tympanic) 16 5' 4\" (1.626 m) 10/31/2017 23.17 kg/m2       Blood Pressure from Last 3 Encounters:   11/02/17 131/87   07/25/17 110/66   07/21/17 112/60    Weight from Last 3 Encounters:   11/02/17 135 lb (61.2 kg)   07/25/17 129 lb (58.5 kg)   07/21/17 132 lb (59.9 kg)              Today, you had the following     No orders found for display         Today's Medication Changes          These changes are accurate as of: 11/2/17  9:04 AM.  If you have any questions, ask your nurse or doctor.               Start taking these medicines.        Dose/Directions    DULoxetine 30 MG EC capsule   Commonly known as:  CYMBALTA   Used for:  Major depressive disorder, recurrent episode, moderate (H), PTSD (post-traumatic stress disorder)        Dose:  30 mg   Take 1 capsule (30 mg) by mouth daily   Quantity:  30 capsule   Refills:  1       gabapentin 400 MG capsule   Commonly known as:  NEURONTIN   Used for:  PTSD (post-traumatic stress " disorder)   Replaces:  gabapentin 600 MG tablet        Dose:  400 mg   Take 1 capsule (400 mg) by mouth 3 times daily   Quantity:  90 capsule   Refills:  1         These medicines have changed or have updated prescriptions.        Dose/Directions    ARIPiprazole 15 MG tablet   Commonly known as:  ABILIFY   This may have changed:    - medication strength  - when to take this   Used for:  Major depressive disorder, recurrent episode, moderate (H)        Dose:  15 mg   Take 1 tablet (15 mg) by mouth At Bedtime   Quantity:  30 tablet   Refills:  1         Stop taking these medicines if you haven't already. Please contact your care team if you have questions.     gabapentin 600 MG tablet   Commonly known as:  NEURONTIN   Replaced by:  gabapentin 400 MG capsule                Where to get your medicines      These medications were sent to Jennifer [address] [place] [address] [place] - [address]  [address], [facility] [address]     Phone:  [phone]     ARIPiprazole 15 MG tablet    DULoxetine 30 MG EC capsule    gabapentin 400 MG capsule                Primary Care Provider Office Phone # Fax #    Yudelka Hartman, APRN [facility] [phone] [address] [facility]   [facility] [place] [address]        Equal Access to Services     [facility] : Hadii aurora rodriguez Sogladis, waaxda luzara, qaybta kaalshauna lynn, bernadette castillo . So [facility] [phone].    ATENCIÓN: Si habla español, tiene a delcid disposición servicios gratuitos de asistencia lingüística. [facility] [phone].    We comply with applicable federal civil rights laws and [place] laws. We do not discriminate on the basis of race, color, national origin, age, disability, sex, sexual orientation, or gender identity.            Thank you!     Thank you for choosing [facility]  for your care. Our goal is always to provide you with excellent care. Hearing back from our patients is one way we can continue to improve our services.  Please take a few minutes to complete the written survey that you may receive in the mail after your visit with us. Thank you!             Your Updated Medication List - Protect others around you: Learn how to safely use, store and throw away your medicines at www.disposemymeds.org.          This list is accurate as of: 11/2/17  9:04 AM.  Always use your most recent med list.                   Brand Name Dispense Instructions for use Diagnosis    ARIPiprazole 15 MG tablet    ABILIFY    30 tablet    Take 1 tablet (15 mg) by mouth At Bedtime    Major depressive disorder, recurrent episode, moderate (H)       DULoxetine 30 MG EC capsule    CYMBALTA    30 capsule    Take 1 capsule (30 mg) by mouth daily    Major depressive disorder, recurrent episode, moderate (H), PTSD (post-traumatic stress disorder)       gabapentin 400 MG capsule    NEURONTIN    90 capsule    Take 1 capsule (400 mg) by mouth 3 times daily    PTSD (post-traumatic stress disorder)       ibuprofen 200 MG tablet    ADVIL/MOTRIN     Take 3 tablets (600 mg) by mouth every 6 hours as needed for pain        lamoTRIgine 200 MG tablet    LaMICtal     Take 200 mg by mouth daily        levothyroxine 100 MCG tablet    SYNTHROID/LEVOTHROID     Take 100 mcg by mouth daily        multivitamin, therapeutic with minerals Tabs tablet     100 tablet    Take 1 tablet by mouth daily    Substance abuse

## 2017-11-03 ASSESSMENT — ANXIETY QUESTIONNAIRES: GAD7 TOTAL SCORE: 18

## 2017-11-24 DIAGNOSIS — E03.9 HYPOTHYROIDISM, UNSPECIFIED TYPE: Primary | ICD-10-CM

## 2017-11-24 RX ORDER — LEVOTHYROXINE SODIUM 100 UG/1
100 TABLET ORAL DAILY
Qty: 90 TABLET | Refills: 3 | Status: SHIPPED | OUTPATIENT
Start: 2017-11-24 | End: 2018-08-20

## 2017-11-24 NOTE — TELEPHONE ENCOUNTER
Reason for Call:  Medication or medication refill:    Do you use a [facility]?  Name of the pharmacy and phone number for the current request:  [facility] - [phone]    Name of the medication requested: levothyroxine    Other request: patient requesting medication for thyroid, she states that she called pharmacy and they don't have an order for it    Can we leave a detailed message on this number? YES    Phone number patient can be reached at: Home number on file [phone] (home)    Best Time: anytime    Call taken on 11/24/2017 at 8:35 AM by Sherie Manriquez

## 2017-12-03 ENCOUNTER — HEALTH MAINTENANCE LETTER (OUTPATIENT)
Age: 47
End: 2017-12-03

## 2017-12-14 ENCOUNTER — TELEPHONE (OUTPATIENT)
Dept: PSYCHIATRY | Facility: CLINIC | Age: 47
End: 2017-12-14

## 2017-12-14 NOTE — TELEPHONE ENCOUNTER
Writer attempted to call pt, No answer.  LVM for Pt to call writer back at [phone].    Estevan Alex RN

## 2017-12-14 NOTE — TELEPHONE ENCOUNTER
"Reason for call:  Other   Patient called regarding (reason for call): prescription  Additional comments: New med is making her \"eat everything in sight\" and \"has been edgy\".  Gabapentin was cut back but wondering if it can be increased again.  Please call.  Denied scheduling follow-up appointment.      Phone number to reach patient:  Home number on file [phone] (home)    Best Time:  anytime    Can we leave a detailed message on this number?  YES  "

## 2017-12-14 NOTE — TELEPHONE ENCOUNTER
Is she referring to the duloxetine (Cymbalta)? If so, she can discontinue and we can discuss options at an appointment. Thanks.

## 2017-12-15 NOTE — TELEPHONE ENCOUNTER
Writer attempted to call pt, No answer.  LVM for Pt to call writer back at [phone].    Estevan Alex RN

## 2017-12-15 NOTE — TELEPHONE ENCOUNTER
Writer called pt and informed to D/C Al[place]a.      Pt stated that she would.    Pt asked for a refill of her gabapentin.  Writer reviewed refills and Pt shouldn't be due until after the first of the year.      Writer asked how she has been taking her Gabapentin and Pt has been taking 1200mg three times per day.    (Note Pt was on 900mg daily previously)      Pt will be out of Gabapentin early.    Pt requesting Maria Fernanda to to increase her Gabapentin dose back to what is was previously.  Pt stated that she can't make a new Appt at this time because she started a new job in [place] and it's hard to make it to the office.    Maria Fernanda please review and advise.    Estevan Alex RN

## 2017-12-18 NOTE — TELEPHONE ENCOUNTER
Patient reported gabapentin was not effective and would need to be seen to make medication changes or adjustments as she has only been seen once.
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
## 2017-12-18 NOTE — TELEPHONE ENCOUNTER
Reason for call:  Other   Patient called regarding (reason for call): prescription  Additional comments: Patient scheduled for 1/24/17.  She needs her Gabapentin increased to her old dosage because it was decreased and she is feeling changes.      Phone number to reach patient:  Home number on file [phone] (home)    Best Time:  any    Can we leave a detailed message on this number?  YES

## 2017-12-18 NOTE — TELEPHONE ENCOUNTER
It's very difficult to effectively treat symptoms by phone and the fact that I've only met her once. I wouldn't increase the dose of gabapentin without seeing her so perhaps she would like to find a provider closer to where she lives or works.

## 2017-12-18 NOTE — TELEPHONE ENCOUNTER
Scheduling office left patient a voicemail to call back to schedule a med check with Maria Fernanda Coon, RN, MS, APRN.

## 2017-12-20 ENCOUNTER — TELEPHONE (OUTPATIENT)
Dept: FAMILY MEDICINE | Facility: CLINIC | Age: 47
End: 2017-12-20

## 2017-12-20 NOTE — TELEPHONE ENCOUNTER
"RN spoke to patient. She is home sick today and stated she's been missing work due to anxiety and just wants a \"simple dose increase.\"     RN reiterated Maria Fernanda's request for an office visit. Patient stated she \"can't miss work\" to attend appointments. Patient stated she \"can't get in till February.\" However, patient has been offered open appointments this week and next week. Patient declined those openings every time our staff have offered, including today, \"I can't miss work.\"     RN suggested that patient either schedule with Maria Fernanda or see her PCP in [place] if she is already home sick today and missing several days of work due to her anxiety.    Patient abruptly yelled \"Fuck you!\" She then hung up on this RN.     Will route to providers.   "

## 2017-12-20 NOTE — TELEPHONE ENCOUNTER
Suggesting patient meet with PCP is a reasonable option and it appears patient has made her decision.

## 2018-01-02 ENCOUNTER — TELEPHONE (OUTPATIENT)
Dept: FAMILY MEDICINE | Facility: CLINIC | Age: 48
End: 2018-01-02

## 2018-01-02 DIAGNOSIS — F43.23 ADJUSTMENT DISORDER WITH MIXED ANXIETY AND DEPRESSED MOOD: Primary | ICD-10-CM

## 2018-01-02 RX ORDER — GABAPENTIN 600 MG/1
600 TABLET ORAL 3 TIMES DAILY
Qty: 90 TABLET | Refills: 1 | Status: SHIPPED | OUTPATIENT
Start: 2018-01-02 | End: 2018-01-12

## 2018-01-02 NOTE — TELEPHONE ENCOUNTER
Reason for Call:  Same Day Appointment, Requested Provider:  Yessica Hartman NP    PCP: Yessica Hartman    Reason for visit: anxiety and depression    Duration of symptoms: ongoing    Have you been treated for this in the past? Yes    Additional comments: pt is not able to come in for an office visit but is wondering if she can do a phone visit with LB this afternoon.  She cannot see her psychiatrist for another month and really would like to speak with yessica today.  Please call to advise.  thanks    Can we leave a detailed message on this number? YES    Phone number patient can be reached at: Cell number on file:    Telephone Information:   Mobile [phone]       Best Time: any    Call taken on 1/2/2018 at 11:07 AM by Lorie Townsend

## 2018-01-02 NOTE — TELEPHONE ENCOUNTER
"I returned a call to Magnolia.  She does have an appointment January 22 with Maria Fernanda Coon for psychiatry management.  She states she is extremely anxious, and does not know really what to do until her upcoming appointment.  She apparently has recently started a job and cannot be taking time off work.  I did review her medications with her, and the fact that this is not my area of expertise.  She is on medications that I do not typically manage, and that is why I felt her needs were best met by psychiatry and put in the referral for her.  She is no longer taking Cymbalta, is on Abilify, Lamictal, gabapentin, and levothyroxine.  Her TSH was checked in October and was well within normal.  I do prescribe and manage gabapentin, and am willing to make an adjustment in that medication to see if that will help with her increased anxiety until she is seen by her provider.  I had suggested hydroxyzine, she states she has taken that and it \"knocks her out \".  Will increase her gabapentin to 600 mg 3 times daily.  She will follow-up with her  psychiatric nurse practitioner as scheduled in 3 weeks  "

## 2018-01-12 ENCOUNTER — TELEPHONE (OUTPATIENT)
Dept: FAMILY MEDICINE | Facility: CLINIC | Age: 48
End: 2018-01-12

## 2018-01-12 DIAGNOSIS — F43.23 ADJUSTMENT DISORDER WITH MIXED ANXIETY AND DEPRESSED MOOD: ICD-10-CM

## 2018-01-12 RX ORDER — GABAPENTIN 600 MG/1
600 TABLET ORAL 3 TIMES DAILY
Qty: 90 TABLET | Refills: 1 | Status: SHIPPED | OUTPATIENT
Start: 2018-01-12 | End: 2018-01-15 | Stop reason: DRUGHIGH

## 2018-01-12 NOTE — TELEPHONE ENCOUNTER
Reason for Call:  Medication or medication refill:    Do you use a [facility]?  Name of the pharmacy and phone number for the current request:  [facility] - [phone]    Name of the medication requested: Patient states she was out of town last week & left the RX (gabopentin) at a friends (elderly person who can't get out of the house so not sure if it'll be mailed to her), states insurance has approved for her to get another RX, please advise if a new RX can be sent.    Other request:     Can we leave a detailed message on this number? YES    Phone number patient can be reached at: Home number on file [phone] (home)    Best Time:     Call taken on 1/12/2018 at 7:48 AM by Clemencia James

## 2018-01-12 NOTE — TELEPHONE ENCOUNTER
Ok per Yudelka Hartman to fill this early. Message relayed to pharmacist at [facility]. She will notify patient. Sheron/MA

## 2018-01-12 NOTE — TELEPHONE ENCOUNTER
The pharmacy called and said they received this prescription but were never given the okay to fill early. She said that this has happened before with this patient so they want to make sure it is okay to fill. Please call them back as soon as possible. Their number is [phone]. Please advise.    Thank you  Rogers Davison  Patient Representative

## 2018-01-15 ENCOUNTER — OFFICE VISIT (OUTPATIENT)
Dept: PSYCHIATRY | Facility: CLINIC | Age: 48
End: 2018-01-15
Payer: OTHER GOVERNMENT

## 2018-01-15 VITALS
BODY MASS INDEX: 24 KG/M2 | SYSTOLIC BLOOD PRESSURE: 102 MMHG | HEART RATE: 88 BPM | DIASTOLIC BLOOD PRESSURE: 72 MMHG | TEMPERATURE: 98.9 F | WEIGHT: 139.8 LBS

## 2018-01-15 DIAGNOSIS — F43.10 PTSD (POST-TRAUMATIC STRESS DISORDER): Primary | ICD-10-CM

## 2018-01-15 DIAGNOSIS — F33.1 MAJOR DEPRESSIVE DISORDER, RECURRENT EPISODE, MODERATE (H): ICD-10-CM

## 2018-01-15 DIAGNOSIS — F41.1 GAD (GENERALIZED ANXIETY DISORDER): ICD-10-CM

## 2018-01-15 PROCEDURE — 99214 OFFICE O/P EST MOD 30 MIN: CPT | Performed by: CLINICAL NURSE SPECIALIST

## 2018-01-15 RX ORDER — VENLAFAXINE HYDROCHLORIDE 37.5 MG/1
CAPSULE, EXTENDED RELEASE ORAL
Qty: 46 CAPSULE | Refills: 0 | Status: SHIPPED | OUTPATIENT
Start: 2018-01-15 | End: 2018-03-02

## 2018-01-15 RX ORDER — LAMOTRIGINE 200 MG/1
200 TABLET ORAL DAILY
Qty: 60 TABLET | Refills: 3 | Status: SHIPPED | OUTPATIENT
Start: 2018-01-15 | End: 2018-06-11

## 2018-01-15 RX ORDER — ARIPIPRAZOLE 5 MG/1
TABLET ORAL
Qty: 30 TABLET | Refills: 0 | Status: SHIPPED | OUTPATIENT
Start: 2018-01-15 | End: 2018-02-05

## 2018-01-15 RX ORDER — GABAPENTIN 400 MG/1
800 CAPSULE ORAL 3 TIMES DAILY
Qty: 90 CAPSULE | Refills: 3 | Status: SHIPPED | OUTPATIENT
Start: 2018-01-15 | End: 2018-02-05 | Stop reason: DRUGHIGH

## 2018-01-15 ASSESSMENT — ANXIETY QUESTIONNAIRES
7. FEELING AFRAID AS IF SOMETHING AWFUL MIGHT HAPPEN: NEARLY EVERY DAY
4. TROUBLE RELAXING: NEARLY EVERY DAY
5. BEING SO RESTLESS THAT IT IS HARD TO SIT STILL: NEARLY EVERY DAY
2. NOT BEING ABLE TO STOP OR CONTROL WORRYING: NEARLY EVERY DAY
GAD7 TOTAL SCORE: 21
1. FEELING NERVOUS, ANXIOUS, OR ON EDGE: NEARLY EVERY DAY
6. BECOMING EASILY ANNOYED OR IRRITABLE: NEARLY EVERY DAY
3. WORRYING TOO MUCH ABOUT DIFFERENT THINGS: NEARLY EVERY DAY
IF YOU CHECKED OFF ANY PROBLEMS ON THIS QUESTIONNAIRE, HOW DIFFICULT HAVE THESE PROBLEMS MADE IT FOR YOU TO DO YOUR WORK, TAKE CARE OF THINGS AT HOME, OR GET ALONG WITH OTHER PEOPLE: VERY DIFFICULT

## 2018-01-15 ASSESSMENT — PATIENT HEALTH QUESTIONNAIRE - PHQ9: SUM OF ALL RESPONSES TO PHQ QUESTIONS 1-9: 15

## 2018-01-15 NOTE — MR AVS SNAPSHOT
After Visit Summary   1/15/2018    Magnolia Leyva    MRN: [id number]           Patient Information     Date Of Birth          1970        Visit Information        Provider Department      1/15/2018 8:15 AM Maria Fernanda Coon APRN [facility]        Today's Diagnoses     PTSD (post-traumatic stress disorder)    -  1    JULIEN (generalized anxiety disorder)        Major depressive disorder, recurrent episode, moderate (H)          Care Instructions    Treatment Plan:  Continue lamotrigine (Lamictal) 200 mg daily.     Reduce aripiprazole (Abilify) 5 mg daily for two weeks, then decrease to 2.5 mg daily for 2-4 weeks.     Increase to gabapentin 800 mg three time daily.     Begin venlafaxine (Effexor) XR 37.5 mg daily for two weeks, then increase to 75 mg daily. After taking venlafaxine (Effexor) XR 75 mg daily for two weeks, phone for increase to 150 mg daily if needed.     Follow up in one month.     - Recommend patient discuss medications with their pharmacist. Risks and benefits for medications were discussed including, but not limited to, side effects.   - Safety plan was reviewed; to the ER as needed or call after hours crisis line; [phone]  - Education and counseling was done regarding use of medications, psychotherapy options  - Call [phone] for appointment or to speak to a nurse.    -Office hours: Monday through Thursday 8:00 am to 4:30 pm.             Follow-ups after your visit        Who to contact     If you have questions or need follow up information about today's clinic visit or your schedule please contact [facility] directly at [phone].  Normal or non-critical lab and imaging results will be communicated to you by MyChart, letter or phone within 4 business days after the clinic has received the results. If you do not hear from us within 7 days, please contact the clinic through MyChart or phone. If you have a critical  or abnormal lab result, we will notify you by phone as soon as possible.  Submit refill requests through [employer] or call your pharmacy and they will forward the refill request to us. Please allow 3 business days for your refill to be completed.          Additional Information About Your Visit        Flaviarhart Information     [employer] gives you secure access to your electronic health record. If you see a primary care provider, you can also send messages to your care team and make appointments. If you have questions, please call your primary care clinic.  If you do not have a primary care provider, please call [phone] and they will assist you.        Care EveryWhere ID     This is your Care EveryWhere ID. This could be used by other organizations to access your [place] medical records  [id number]        Your Vitals Were     Pulse Temperature BMI (Body Mass Index)             88 98.9  F (37.2  C) (Tympanic) 24 kg/m2          Blood Pressure from Last 3 Encounters:   01/15/18 102/72   11/02/17 131/87   07/25/17 110/66    Weight from Last 3 Encounters:   01/15/18 139 lb 12.8 oz (63.4 kg)   11/02/17 135 lb (61.2 kg)   07/25/17 129 lb (58.5 kg)              Today, you had the following     No orders found for display         Today's Medication Changes          These changes are accurate as of: 1/15/18  9:01 AM.  If you have any questions, ask your nurse or doctor.               Start taking these medicines.        Dose/Directions    gabapentin 400 MG capsule   Commonly known as:  NEURONTIN   Used for:  PTSD (post-traumatic stress disorder), JLUIEN (generalized anxiety disorder)   Replaces:  gabapentin 600 MG tablet   Started by:  Maria Fernanda Coon APRN CNS        Dose:  800 mg   Take 2 capsules (800 mg) by mouth 3 times daily   Quantity:  90 capsule   Refills:  3       venlafaxine 37.5 MG 24 hr capsule   Commonly known as:  EFFEXOR-XR   Used for:  PTSD (post-traumatic stress disorder), JULIEN (generalized anxiety  disorder), Major depressive disorder, recurrent episode, moderate (H)   Started by:  Maria Fernanda Coon APRN CNS        Take 1 capsule daily for 14 days, then take 2 capsules daily.   Quantity:  46 capsule   Refills:  0         These medicines have changed or have updated prescriptions.        Dose/Directions    ARIPiprazole 5 MG tablet   Commonly known as:  ABILIFY   This may have changed:    - medication strength  - how much to take  - how to take this  - when to take this  - additional instructions   Used for:  JULIEN (generalized anxiety disorder), Major depressive disorder, recurrent episode, moderate (H)   Changed by:  Maria Fernanda Coon APRN CNS        Take one tab for two weeks, then decrease to half tab daily for 2-4 weeks, then discontinue   Quantity:  30 tablet   Refills:  0         Stop taking these medicines if you haven't already. Please contact your care team if you have questions.     gabapentin 600 MG tablet   Commonly known as:  NEURONTIN   Replaced by:  gabapentin 400 MG capsule   Stopped by:  Maria Fernanda Coon APRN CNS           ibuprofen 200 MG tablet   Commonly known as:  ADVIL/MOTRIN   Stopped by:  Maria Fernanda Coon APRN CNS                Where to get your medicines      These medications were sent to [address] - [address]  [address], [facility] [address]     Phone:  [phone]     ARIPiprazole 5 MG tablet    gabapentin 400 MG capsule    lamoTRIgine 200 MG tablet    venlafaxine 37.5 MG 24 hr capsule                Primary Care Provider Office Phone # Fax #    ANNETTA [facility] [phone]       [address]   [place] [address]        Equal Access to Services     [facility]: Hadii aurora rodriguez Sogladis, waaxda luqadaha, qaybta kaalmada adeegyaciro, bernadette castillo . [facility] [phone].    ATENCIÓN: Si habla español, tiene a delcid disposición servicios gratuitos de asistencia  lingüísticaJuan Manuel Adams al [phone].    We comply with applicable federal civil rights laws and [place] laws. We do not discriminate on the basis of race, color, national origin, age, disability, sex, sexual orientation, or gender identity.            Thank you!     Thank you for choosing [facility]  for your care. Our goal is always to provide you with excellent care. Hearing back from our patients is one way we can continue to improve our services. Please take a few minutes to complete the written survey that you may receive in the mail after your visit with us. Thank you!             Your Updated Medication List - Protect others around you: Learn how to safely use, store and throw away your medicines at www.disposemymeds.org.          This list is accurate as of: 1/15/18  9:01 AM.  Always use your most recent med list.                   Brand Name Dispense Instructions for use Diagnosis    ARIPiprazole 5 MG tablet    ABILIFY    30 tablet    Take one tab for two weeks, then decrease to half tab daily for 2-4 weeks, then discontinue    JULIEN (generalized anxiety disorder), Major depressive disorder, recurrent episode, moderate (H)       gabapentin 400 MG capsule    NEURONTIN    90 capsule    Take 2 capsules (800 mg) by mouth 3 times daily    PTSD (post-traumatic stress disorder), JULIEN (generalized anxiety disorder)       lamoTRIgine 200 MG tablet    LaMICtal    60 tablet    Take 1 tablet (200 mg) by mouth daily    PTSD (post-traumatic stress disorder), JULIEN (generalized anxiety disorder), Major depressive disorder, recurrent episode, moderate (H)       levothyroxine 100 MCG tablet    SYNTHROID/LEVOTHROID    90 tablet    Take 1 tablet (100 mcg) by mouth daily    Hypothyroidism, unspecified type       multivitamin, therapeutic with minerals Tabs tablet     100 tablet    Take 1 tablet by mouth daily    Substance abuse       venlafaxine 37.5 MG 24 hr capsule    EFFEXOR-XR    46 capsule    Take 1 capsule  daily for 14 days, then take 2 capsules daily.    PTSD (post-traumatic stress disorder), JULIEN (generalized anxiety disorder), Major depressive disorder, recurrent episode, moderate (H)

## 2018-01-15 NOTE — PROGRESS NOTES
Psychiatric Progress Note    Name: Magnolia Leyva  Date: 1/15/2018  Length of Visit: Spent 30 minutes face to face with this patient, where at least 50 % of this time was spent in counseling on/or about trauma.     MRN: [id number]      Current Outpatient Prescriptions   Medication Sig     gabapentin (NEURONTIN) 600 MG tablet Take 1 tablet (600 mg) by mouth 3 times daily     levothyroxine (SYNTHROID/LEVOTHROID) 100 MCG tablet Take 1 tablet (100 mcg) by mouth daily     ARIPiprazole (ABILIFY) 15 MG tablet Take 1 tablet (15 mg) by mouth At Bedtime     lamoTRIgine (LAMICTAL) 200 MG tablet Take 200 mg by mouth daily     multivitamin, therapeutic with minerals (MULTI-VITAMIN) TABS Take 1 tablet by mouth daily     No current facility-administered medications for this visit.        Therapist:  None    PHQ-9:  PHQ-9 score:    PHQ-9 SCORE 11/2/2017   Total Score -   Total Score 19       JULIEN-7:  JULIEN-7 SCORE 7/19/2017 7/25/2017 11/2/2017   Total Score - - -   Total Score 17 14 18           Interim History:  Patient returns for follow up from initial appointment 11-2-2017. Duloxetine (Cymbalta) 30 mg daily was started and  lamotrigine (Lamictal) 200 mg daily, aripiprazole (Abilify) 15 mg daily, decrease to gabapentin 400 mg three times daily were continued. Patient met with PCP and gabapentin was increased to 600 mg three times daily.     Patient reports duloxetine (Cymbalta) caused increased anxiety and weight gain. Patient reports venlafaxine (Effexor) was effective in 2010, but lost insurance and had to discontinue. Plan to try the venlafaxine (Effexor) again.  Patient reduced to aripiprazole (Abilify) 7.5 mg daily. Patient reports anxiety is primary concern. We discussed anxiety in the context of PTSD and the importance of establishing trauma therapy. Patient verbalizes understanding.       Past Medical History:   Diagnosis Date     Anxiety      Depressive disorder      Hypothyroid      Suicidal  "ideation 7/19/2015       10 point ROS is negative except for those listed above.     Vital Signs:   /72 (BP Location: Right arm, Patient Position: Sitting, Cuff Size: Adult Regular)  Pulse 88  Temp 98.9  F (37.2  C) (Tympanic)  Wt 139 lb 12.8 oz (63.4 kg)  BMI 24 kg/m2      Mental Status Assessment:  Appearance:  Well groomed     Behavior/relationship to examiner/demeanor:  Cooperative, engaged and pleasant  Motor activity:  Normal  Gait:  Normal   Speech:  Normal in volume, articulation, coherence   Mood (subjective report):  \"Not good\"  Affect (objective appearance):  Mood congruent  Thought Process (Associations):  Logical, linear and goal directed  Thought content:  No evidence of suicidal or homicidal ideation,          no overt psychosis and                    patient does not appear to be responding to internal stimuli  Oriented to person, place, date/time   Attention Span and concentration: Intact   Memory:  Short-term memory intact and Long-term memory; Intact  Language:  Fluent   Fund of Knowledge/Intelligence:  Average  Use of language: Intact   Abstraction:  Normal  Insight:  Adequate  Judgment:  Adequate for safety    DSM DIAGNOSIS:  296.32 (F33.1) Major Depressive Disorder, Recurrent Episode, Moderate _ and With anxious distress  300.01 (F41.0) Panic Disorder  300.02 (F41.1) Generalized Anxiety Disorder  309.81 (F43.10) Posttraumatic Stress Disorder (includes Posttraumatic Stress Disorder for Children 6 Years and Younger)  Without dissociative symptoms  301.83 (F60.3) Borderline Personality Disorder    Assessment:  Patient did not tolerate duloxetine (Cymbalta) and will retry venlafaxine (Effexor) which was helpful in the past until patient lost insurance. Lamotrigine (Lamictal) is helpful for irritability. Plan to continue aripiprazole (Abilify) reduction to discontinue.     Patient has not established with a trauma therapist and this will be the primary treatment. Patient is driving a very " long distance and has difficulty getting to appointments making management difficult. Patient was given resources closer to her home/work.     Medication side effects and alternatives were reviewed.     Treatment Plan:  Continue lamotrigine (Lamictal) 200 mg daily.     Reduce aripiprazole (Abilify) 5 mg daily for two weeks, then decrease to 2.5 mg daily for 2-4 weeks.     Increase to gabapentin 800 mg three time daily.     Begin venlafaxine (Effexor) XR 37.5 mg daily for two weeks, then increase to 75 mg daily. After taking venlafaxine (Effexor) XR 75 mg daily for two weeks, phone for increase to 150 mg daily if needed.     Follow up in one month.     - Recommend patient discuss medications with their pharmacist. Risks and benefits for medications were discussed including, but not limited to, side effects.   - Safety plan was reviewed; to the ER as needed or call after hours crisis line; [phone]  - Education and counseling was done regarding use of medications, psychotherapy options  - Call [phone] for appointment or to speak to a nurse.    -Office hours: Monday through Thursday 8:00 am to 4:30 pm.   - Patient received a copy of this Treatment Plan today.    Patient will continue to be seen for ongoing consultation and stabilization.      Signed:  Maria Fernanda Coon, RN, MS, CNS-BC

## 2018-01-15 NOTE — PATIENT INSTRUCTIONS
Treatment Plan:  Continue lamotrigine (Lamictal) 200 mg daily.     Reduce aripiprazole (Abilify) 5 mg daily for two weeks, then decrease to 2.5 mg daily for 2-4 weeks.     Increase to gabapentin 800 mg three time daily.     Begin venlafaxine (Effexor) XR 37.5 mg daily for two weeks, then increase to 75 mg daily. After taking venlafaxine (Effexor) XR 75 mg daily for two weeks, phone for increase to 150 mg daily if needed.     Follow up in one month.     - Recommend patient discuss medications with their pharmacist. Risks and benefits for medications were discussed including, but not limited to, side effects.   - Safety plan was reviewed; to the ER as needed or call after hours crisis line; [phone]  - Education and counseling was done regarding use of medications, psychotherapy options  - Call [phone] for appointment or to speak to a nurse.    -Office hours: Monday through Thursday 8:00 am to 4:30 pm.

## 2018-01-15 NOTE — NURSING NOTE
"Chief Complaint   Patient presents with     Recheck Medication       Initial /72 (BP Location: Right arm, Patient Position: Sitting, Cuff Size: Adult Regular)  Pulse 88  Temp 98.9  F (37.2  C) (Tympanic)  Wt 139 lb 12.8 oz (63.4 kg)  BMI 24 kg/m2 Estimated body mass index is 24 kg/(m^2) as calculated from the following:    Height as of 11/2/17: 5' 4\" (1.626 m).    Weight as of this encounter: 139 lb 12.8 oz (63.4 kg).  Medication Reconciliation: complete    "

## 2018-01-16 ASSESSMENT — ANXIETY QUESTIONNAIRES: GAD7 TOTAL SCORE: 21

## 2018-01-19 ENCOUNTER — TELEPHONE (OUTPATIENT)
Dept: PSYCHIATRY | Facility: CLINIC | Age: 48
End: 2018-01-19

## 2018-01-19 NOTE — TELEPHONE ENCOUNTER
"Reason for call:  Other   Patient called regarding (reason for call): Symptom  Additional comments: Patient saw Maria Fernanda on Monday and she increased her Gabapentin.  Patient is experiencing extreme anxiety and reports being \"a mess\".  She does not wish to schedule and appointment at this time ,but would like a call back from a nurse to discuss her symptoms.  She is wondering if she needs to increase the gabapentin further.       Phone number to reach patient:  Home number on file [phone] (home)    Best Time:  any    Can we leave a detailed message on this number?  YES  "

## 2018-01-19 NOTE — TELEPHONE ENCOUNTER
Writer attempted to call pt, No answer.  LVM for Pt to call writer back at [phone].      LVM that Gabapentin takes a while to build up in system.  Writer recommended giving the medication time to work.      Writer requested a call back if she needs to talk more.    Will forward to Maria Fernanda as FYI.      Estevan Alex RN

## 2018-01-22 NOTE — TELEPHONE ENCOUNTER
Patient had reported tolerating higher doses (1200 mg three times daily) of gabapentin in the past. Please ask that she give it more time, or she can reduce the dose back to previous. Thanks.

## 2018-01-23 NOTE — TELEPHONE ENCOUNTER
"Patient called back. Writer relayed message from provider. She reports that she's been on 800 mg for a week now. She is \"really suffering, like crawling out of (her) skin.\" Patient requested increase dose to 1200 mg TID. She doesn't think 800 mg will ever be effective and wants the higher dose, \"like (she) was on.\" Routing to provider.    "

## 2018-01-23 NOTE — TELEPHONE ENCOUNTER
Reason for call:  Other   Patient called regarding (reason for call): call back  Additional comments: Patient called back and reported that she is still feeling very uncomfortable. These symptoms are impacting her at work, and she is feeling very anxious.  She would like a call back to discuss some other options asap.       Phone number to reach patient:  Home number on file [phone] (home)    Best Time:  any    Can we leave a detailed message on this number?  YES

## 2018-01-24 NOTE — TELEPHONE ENCOUNTER
Detailed message left. Patient may call back with questions. I asked her to schedule a med check for about a month from her last visit.

## 2018-02-02 ENCOUNTER — TELEPHONE (OUTPATIENT)
Dept: PSYCHIATRY | Facility: CLINIC | Age: 48
End: 2018-02-02

## 2018-02-02 NOTE — TELEPHONE ENCOUNTER
Reason for call:  Other   Patient called regarding (reason for call): Symptoms  Additional comments: Patient is feeling very down and agitated.  She is currently on Effexor, Abilify, Gabapentin, and Lamotragine.  Maria Fernanda referred her to outside psychiatrist but she cannot get in to see them for two weeks, and she needs help now.  She mentioned she used to be on Welbutrin and could try that again, or wants to up her Gabapentin.  She was offered to schedule an appointment with Maria Fernanda, but she refused due to the inconvenience of how far away Maria Fernanda is.      Phone number to reach patient:  Home number on file [phone] (home)    Best Time:  any    Can we leave a detailed message on this number?  YES

## 2018-02-02 NOTE — TELEPHONE ENCOUNTER
RN spoke with Maria Fernanda Coon RN, MS, APRN about patient's request. Provider will not make any more medication adjustment without an appointment. She advised again that Ms. Leyva either follow up with primary care, if she doesn't feel like this clinic location in convenient, or transfer care to a new psychiatry provider. Per patient, she has an appointment in two weeks with a new psych provider. She will be advised to follow up with them with these questions. She will be advised to utilized the ER or 911 if necessary in the interim.     RN attempted outreach. Detailed voicemail left.

## 2018-02-05 ENCOUNTER — OFFICE VISIT (OUTPATIENT)
Dept: FAMILY MEDICINE | Facility: OTHER | Age: 48
End: 2018-02-05
Payer: COMMERCIAL

## 2018-02-05 VITALS
BODY MASS INDEX: 24.98 KG/M2 | HEIGHT: 63 IN | SYSTOLIC BLOOD PRESSURE: 102 MMHG | HEART RATE: 94 BPM | OXYGEN SATURATION: 99 % | RESPIRATION RATE: 18 BRPM | DIASTOLIC BLOOD PRESSURE: 80 MMHG | WEIGHT: 141 LBS | TEMPERATURE: 99.2 F

## 2018-02-05 DIAGNOSIS — F33.0 MAJOR DEPRESSIVE DISORDER, RECURRENT EPISODE, MILD (H): ICD-10-CM

## 2018-02-05 DIAGNOSIS — F43.23 ADJUSTMENT DISORDER WITH MIXED ANXIETY AND DEPRESSED MOOD: Primary | ICD-10-CM

## 2018-02-05 DIAGNOSIS — F43.10 PTSD (POST-TRAUMATIC STRESS DISORDER): ICD-10-CM

## 2018-02-05 PROCEDURE — 99214 OFFICE O/P EST MOD 30 MIN: CPT | Performed by: PHYSICIAN ASSISTANT

## 2018-02-05 RX ORDER — VENLAFAXINE HYDROCHLORIDE 150 MG/1
150 CAPSULE, EXTENDED RELEASE ORAL DAILY
Qty: 30 CAPSULE | Refills: 0 | Status: SHIPPED | OUTPATIENT
Start: 2018-02-05 | End: 2018-03-02

## 2018-02-05 RX ORDER — GABAPENTIN 400 MG/1
CAPSULE ORAL
Qty: 240 CAPSULE | Refills: 0 | Status: SHIPPED | OUTPATIENT
Start: 2018-02-05 | End: 2018-03-01

## 2018-02-05 ASSESSMENT — PATIENT HEALTH QUESTIONNAIRE - PHQ9
SUM OF ALL RESPONSES TO PHQ QUESTIONS 1-9: 17
SUM OF ALL RESPONSES TO PHQ QUESTIONS 1-9: 17

## 2018-02-05 ASSESSMENT — ANXIETY QUESTIONNAIRES
1. FEELING NERVOUS, ANXIOUS, OR ON EDGE: NEARLY EVERY DAY
3. WORRYING TOO MUCH ABOUT DIFFERENT THINGS: NEARLY EVERY DAY
GAD7 TOTAL SCORE: 19
7. FEELING AFRAID AS IF SOMETHING AWFUL MIGHT HAPPEN: MORE THAN HALF THE DAYS
6. BECOMING EASILY ANNOYED OR IRRITABLE: NEARLY EVERY DAY
GAD7 TOTAL SCORE: 19
7. FEELING AFRAID AS IF SOMETHING AWFUL MIGHT HAPPEN: MORE THAN HALF THE DAYS
GAD7 TOTAL SCORE: 19
5. BEING SO RESTLESS THAT IT IS HARD TO SIT STILL: NEARLY EVERY DAY
4. TROUBLE RELAXING: NEARLY EVERY DAY
2. NOT BEING ABLE TO STOP OR CONTROL WORRYING: MORE THAN HALF THE DAYS

## 2018-02-05 NOTE — PROGRESS NOTES
"  SUBJECTIVE:   Magnolia Leyva is a 47 year old female who presents to clinic today for the following health issues:      History of Present Illness     Depression & Anxiety Follow-up:     Depression/Anxiety:  Depression & Anxiety    Status since last visit::  Stable    Other associated symptoms of depression and anxiety::  None    Significant life event::  No    Current substance use::  None       Today's PHQ-9         PHQ-9 Total Score:     (P) 17   PHQ-9 Q9 Suicidal ideation:   (P) Not at all   Thoughts of suicide or self harm:      Self-harm Plan:        Self-harm Action:          Safety concerns for self or others:       JULIEN-7 Total Score: (P) 19      Answers for HPI/ROS submitted by the patient on 2/5/2018   JULIEN 7 TOTAL SCORE: 19  PHQ9 TOTAL SCORE: 17    Patient has a long history of major depressive disorder, anxiety, substance abuse and PTSD. She was seeing a psychiatrist in [place] for quite awhile and then transferred her care to Maria Fernanda Coon in [place]. She was started on Duloxetine in November which she did not tolerate due to increased anxiety and weight gain. This was in addition to Lamictal, Abilify, and gabapentin. She states she was previously on up to 1200 mg 3 times daily of gabapentin but this was decreased by Maria Fernanda Coon in November but has been slowly increased since then. She has also since been started on Effexor over the past month and has titrated up to 150 mg daily without noticeable benefit. She weaned off the Abilify as she can no longer afford it. Her most bothersome symptom continues to be anxiety which is severe at time, \"like I want to crawl out of my skin\". She states she has been on Wellbutrin in the past which was helpful and she also wants to increase her gabapentin. She denies any current thoughts of self harm but does have a history of Valium overodse in May 2016 with previous alcohol abuse. She states she has made a lot of changes over the past few years and " "will be re-established care for counseling/therapy in the near future.      Problem list and histories reviewed & adjusted, as indicated.  Additional history: none    ROS:  GENERAL: Denies fever, fatigue, weakness, weight gain, or weight loss.  CARDIOVASCULAR: Denies chest pain, shortness of breath, irregular heartbeats,  palpitations, or edema.  RESPIRATORY: Denies cough, hemoptysis, and shortness of breath.  NEUROLOGIC: Denies headache, fainting, dizziness, memory loss, numbness, tingling, or seizures.  PSYCHIATRIC: +Depression and anxiety. Denies thoughts of suicide.    OBJECTIVE:     /80 (BP Location: Right arm, Patient Position: Chair, Cuff Size: Adult Regular)  Pulse 94  Temp 99.2  F (37.3  C) (Temporal)  Resp 18  Ht 5' 3.39\" (1.61 m)  Wt 141 lb (64 kg)  SpO2 99%  BMI 24.67 kg/m2  Body mass index is 24.67 kg/(m^2).  GENERAL: healthy, alert and no distress  RESP: lungs clear to auscultation - no rales, rhonchi or wheezes  CV: regular rate and rhythm, normal S1 S2, no S3 or S4, no murmur, click or rub  PSYCH: mentation appears normal, affect normal/bright    ASSESSMENT/PLAN:       ICD-10-CM    1. Adjustment disorder with mixed anxiety and depressed mood F43.23 gabapentin (NEURONTIN) 400 MG capsule     venlafaxine (EFFEXOR-XR) 150 MG 24 hr capsule   2. Major depressive disorder, recurrent episode, mild (H) F33.0 venlafaxine (EFFEXOR-XR) 150 MG 24 hr capsule   3. PTSD (post-traumatic stress disorder) F43.10 venlafaxine (EFFEXOR-XR) 150 MG 24 hr capsule       I reviewed patient's history, especially over the past few years with an overdose attempt in May 2016. She has not had any suicidal ideation since that time and has tried numerous medications but anxiety still seems to be very prevalent.  I will continue with her current medications but will increase the gabapentin to 1200 mg in the morning and 800 mg in the afternoon and evening for 3-4 days then 1200 mg in the morning and evening with 800 in the " afternoon daily thereafter. She has follow up with a new psychiatrist in 2-3 weeks that is closer to her home and they can make additional changes as they see fit. I do not want to start Wellbutrin at this time as I do not want to make too many changes at once.  She denies current thoughts of self harm and I do not believe she is at any imminent risk for self harm.  I will provide medication refills for 1 month until she sees psychiatry and she will also be establishing care with a new therapist/counseling in the near future as well.        Raheel Barry PA-C  [facility]

## 2018-02-05 NOTE — MR AVS SNAPSHOT
After Visit Summary   2/5/2018    Magnolia Leyva    MRN: [id number]           Patient Information     Date Of Birth          1970        Visit Information        Provider Department      2/5/2018 6:15 PM Raheel Barry PA-C [facility]        Today's Diagnoses     Adjustment disorder with mixed anxiety and depressed mood    -  1    Major depressive disorder, recurrent episode, mild (H)        PTSD (post-traumatic stress disorder)          Care Instructions    Will refill the Effexor 150 mg.  Will increase the dose of gabapentin to 3 capsules in the morning with 2 in the afternoon and evening for 3-4 days then 3 capsules in the morning and evenings with 2 capsules in the afternoons.  You can discuss further medication changes with the new psychiatrist.           Follow-ups after your visit        Who to contact     If you have questions or need follow up information about today's clinic visit or your schedule please contact [facility] directly at [phone].  Normal or non-critical lab and imaging results will be communicated to you by [employer], letter or phone within 4 business days after the clinic has received the results. If you do not hear from us within 7 days, please contact the clinic through [employer] or phone. If you have a critical or abnormal lab result, we will notify you by phone as soon as possible.  Submit refill requests through [employer] or call your pharmacy and they will forward the refill request to us. Please allow 3 business days for your refill to be completed.          Additional Information About Your Visit        MyChart Information     [employer] gives you secure access to your electronic health record. If you see a primary care provider, you can also send messages to your care team and make appointments. If you have questions, please call your primary care clinic.  If you do not have a primary care provider, please call  "[phone] and they will assist you.        Care EveryWhere ID     This is your Care EveryWhere ID. This could be used by other organizations to access your [place] medical records  [id number]        Your Vitals Were     Pulse Temperature Respirations Height Pulse Oximetry BMI (Body Mass Index)    94 99.2  F (37.3  C) (Temporal) 18 5' 3.39\" (1.61 m) 99% 24.67 kg/m2       Blood Pressure from Last 3 Encounters:   02/05/18 102/80   01/15/18 102/72   11/02/17 131/87    Weight from Last 3 Encounters:   02/05/18 141 lb (64 kg)   01/15/18 139 lb 12.8 oz (63.4 kg)   11/02/17 135 lb (61.2 kg)              Today, you had the following     No orders found for display         Today's Medication Changes          These changes are accurate as of 2/5/18  6:40 PM.  If you have any questions, ask your nurse or doctor.               These medicines have changed or have updated prescriptions.        Dose/Directions    * gabapentin 400 MG capsule   Commonly known as:  NEURONTIN   This may have changed:  Another medication with the same name was added. Make sure you understand how and when to take each.   Used for:  PTSD (post-traumatic stress disorder), JULIEN (generalized anxiety disorder)   Changed by:  Raheel Barry PA-C        Dose:  800 mg   Take 2 capsules (800 mg) by mouth 3 times daily   Quantity:  90 capsule   Refills:  3       * gabapentin 400 MG capsule   Commonly known as:  NEURONTIN   This may have changed:  You were already taking a medication with the same name, and this prescription was added. Make sure you understand how and when to take each.   Used for:  Adjustment disorder with mixed anxiety and depressed mood   Changed by:  Raheel Barry PA-C        Take 3 capsules in the morning, 2 capsules in the afternoon, and 3 capsules in the evening   Quantity:  240 capsule   Refills:  0       * venlafaxine 37.5 MG 24 hr capsule   Commonly known as:  EFFEXOR-XR   This may have changed:  Another medication " with the same name was added. Make sure you understand how and when to take each.   Used for:  PTSD (post-traumatic stress disorder), JULIEN (generalized anxiety disorder), Major depressive disorder, recurrent episode, moderate (H)   Changed by:  Raheel Barry PA-C        Take 1 capsule daily for 14 days, then take 2 capsules daily.   Quantity:  46 capsule   Refills:  0       * venlafaxine 150 MG 24 hr capsule   Commonly known as:  EFFEXOR-XR   This may have changed:  You were already taking a medication with the same name, and this prescription was added. Make sure you understand how and when to take each.   Used for:  PTSD (post-traumatic stress disorder), Major depressive disorder, recurrent episode, mild (H), Adjustment disorder with mixed anxiety and depressed mood   Changed by:  Raheel Barry PA-C        Dose:  150 mg   Take 1 capsule (150 mg) by mouth daily   Quantity:  30 capsule   Refills:  0       * Notice:  This list has 4 medication(s) that are the same as other medications prescribed for you. Read the directions carefully, and ask your doctor or other care provider to review them with you.      Stop taking these medicines if you haven't already. Please contact your care team if you have questions.     ARIPiprazole 5 MG tablet   Commonly known as:  ABILIFY   Stopped by:  Raheel Barry PA-C                Where to get your medicines      These medications were sent to [facility] [address]  [address] [facility] [address]     Phone:  [phone]     gabapentin 400 MG capsule    venlafaxine 150 MG 24 hr capsule                Primary Care Provider Office Phone # Fax #    ANNETTA [facility] [phone] [address] [place] [address]        Equal Access to Services     [facility]: Noel Cuellar, dalia diallo, mitzi lynn, bernadette snow. So [facility]  [phone].    ATENCIÓN: Si judy artis, tiene a delcid disposición servicios gratuitos de asistencia lingüística. Bryan quinonez [phone].    We comply with applicable federal civil rights laws and [place] laws. We do not discriminate on the basis of race, color, national origin, age, disability, sex, sexual orientation, or gender identity.            Thank you!     Thank you for choosing [facility]  for your care. Our goal is always to provide you with excellent care. Hearing back from our patients is one way we can continue to improve our services. Please take a few minutes to complete the written survey that you may receive in the mail after your visit with us. Thank you!             Your Updated Medication List - Protect others around you: Learn how to safely use, store and throw away your medicines at www.disposemymeds.org.          This list is accurate as of 2/5/18  6:40 PM.  Always use your most recent med list.                   Brand Name Dispense Instructions for use Diagnosis    * gabapentin 400 MG capsule    NEURONTIN    90 capsule    Take 2 capsules (800 mg) by mouth 3 times daily    PTSD (post-traumatic stress disorder), JULIEN (generalized anxiety disorder)       * gabapentin 400 MG capsule    NEURONTIN    240 capsule    Take 3 capsules in the morning, 2 capsules in the afternoon, and 3 capsules in the evening    Adjustment disorder with mixed anxiety and depressed mood       lamoTRIgine 200 MG tablet    LaMICtal    60 tablet    Take 1 tablet (200 mg) by mouth daily    PTSD (post-traumatic stress disorder), JULIEN (generalized anxiety disorder), Major depressive disorder, recurrent episode, moderate (H)       levothyroxine 100 MCG tablet    SYNTHROID/LEVOTHROID    90 tablet    Take 1 tablet (100 mcg) by mouth daily    Hypothyroidism, unspecified type       multivitamin, therapeutic with minerals Tabs tablet     100 tablet    Take 1 tablet by mouth daily    Substance abuse       *  venlafaxine 37.5 MG 24 hr capsule    EFFEXOR-XR    46 capsule    Take 1 capsule daily for 14 days, then take 2 capsules daily.    PTSD (post-traumatic stress disorder), JULIEN (generalized anxiety disorder), Major depressive disorder, recurrent episode, moderate (H)       * venlafaxine 150 MG 24 hr capsule    EFFEXOR-XR    30 capsule    Take 1 capsule (150 mg) by mouth daily    PTSD (post-traumatic stress disorder), Major depressive disorder, recurrent episode, mild (H), Adjustment disorder with mixed anxiety and depressed mood       * Notice:  This list has 4 medication(s) that are the same as other medications prescribed for you. Read the directions carefully, and ask your doctor or other care provider to review them with you.

## 2018-02-06 ASSESSMENT — ANXIETY QUESTIONNAIRES: GAD7 TOTAL SCORE: 19

## 2018-02-06 ASSESSMENT — PATIENT HEALTH QUESTIONNAIRE - PHQ9: SUM OF ALL RESPONSES TO PHQ QUESTIONS 1-9: 17

## 2018-02-06 NOTE — NURSING NOTE
"Chief Complaint   Patient presents with     Anxiety       Initial /80 (BP Location: Right arm, Patient Position: Chair, Cuff Size: Adult Regular)  Pulse 94  Temp 99.2  F (37.3  C) (Temporal)  Resp 18  Ht 5' 3.39\" (1.61 m)  Wt 141 lb (64 kg)  SpO2 99%  BMI 24.67 kg/m2 Estimated body mass index is 24.67 kg/(m^2) as calculated from the following:    Height as of this encounter: 5' 3.39\" (1.61 m).    Weight as of this encounter: 141 lb (64 kg).  Medication Reconciliation: complete     Allegra Matt CMA      "

## 2018-02-12 ENCOUNTER — TELEPHONE (OUTPATIENT)
Dept: FAMILY MEDICINE | Facility: OTHER | Age: 48
End: 2018-02-12

## 2018-02-12 DIAGNOSIS — F33.0 MAJOR DEPRESSIVE DISORDER, RECURRENT EPISODE, MILD (H): Primary | ICD-10-CM

## 2018-02-12 DIAGNOSIS — F43.23 ADJUSTMENT DISORDER WITH MIXED ANXIETY AND DEPRESSED MOOD: ICD-10-CM

## 2018-02-12 NOTE — TELEPHONE ENCOUNTER
Reason for Call:  Medication or medication refill:    Do you use a [facility]?  Name of the pharmacy and phone number for the current request:  [facility] [place] - [phone]    Name of the medication requested: Wellbutrin    Other request: Pt was seen last week. She would like to go ahead and start this now. Please call when done, OK to leave detailed message.     Can we leave a detailed message on this number? YES    Phone number patient can be reached at: Home number on file [phone] (home)    Best Time: any     Call taken on 2/12/2018 at 11:50 AM by Hilda Cid

## 2018-02-12 NOTE — TELEPHONE ENCOUNTER
Pt called back. She states that she was told to call you if the Gabapentin increase does not work. She is not noticing a difference. She states that the Wellbutrin has worked for her in the past. She has an appt with the psychiatrist next week, but has to r/s so she does not know how far out that appt will be. She just started a ne job, so it is hard to get time off. Please call.   Thank you,  Hilda Cid- Pt Rep.

## 2018-02-12 NOTE — TELEPHONE ENCOUNTER
I would recommend she wait until she seems the new psychiatrist before making further medication changes since we just increased the gabapentin last week.    Raheel Barry PA-C

## 2018-02-13 RX ORDER — BUPROPION HYDROCHLORIDE 150 MG/1
150 TABLET ORAL EVERY MORNING
Qty: 30 TABLET | Refills: 1 | Status: SHIPPED | OUTPATIENT
Start: 2018-02-13 | End: 2018-06-11

## 2018-02-13 NOTE — TELEPHONE ENCOUNTER
Will send a 30 day supply of Wellbutrin  mg to her pharmacy in [place]. If she has any side effects, she should contact the clinic.    Raheel Barry PA-C

## 2018-03-01 DIAGNOSIS — F43.10 PTSD (POST-TRAUMATIC STRESS DISORDER): ICD-10-CM

## 2018-03-01 DIAGNOSIS — F33.0 MAJOR DEPRESSIVE DISORDER, RECURRENT EPISODE, MILD (H): ICD-10-CM

## 2018-03-01 DIAGNOSIS — F43.23 ADJUSTMENT DISORDER WITH MIXED ANXIETY AND DEPRESSED MOOD: ICD-10-CM

## 2018-03-01 RX ORDER — GABAPENTIN 400 MG/1
CAPSULE ORAL
Qty: 240 CAPSULE | Refills: 0 | Status: SHIPPED | OUTPATIENT
Start: 2018-03-01 | End: 2018-04-12

## 2018-03-01 NOTE — TELEPHONE ENCOUNTER
"gabapentin (NEURONTIN) 400 MG capsule      Last Written Prescription Date:  02/05/2018  Last Fill Quantity: 240,   # refills: 0  Last Office Visit: 02/05/2018  Future Office visit:       Routing refill request to provider for review/approval because:  Drug not on the [facility], U[facility] or [facility] refill protocol or controlled substance    Per OV 02/05/2018, \"I will provide medication refills for 1 month until she sees psychiatry and she will also be establishing care with a new therapist/counseling in the near future as well.\"     Trudi Headley, RN, BSN     "

## 2018-03-01 NOTE — TELEPHONE ENCOUNTER
Reason for Call:  Medication or medication refill:    Do you use a [facility]?  Name of the pharmacy and phone number for the current request:  [facility] [place] - [phone]    Name of the medication requested: Gabapentin     Other request: pt is out     Can we leave a detailed message on this number? YES    Phone number patient can be reached at: Home number on file [phone] (home)    Best Time: any     Call taken on 3/1/2018 at 9:23 AM by Hilda Cid

## 2018-03-02 RX ORDER — VENLAFAXINE HYDROCHLORIDE 150 MG/1
150 CAPSULE, EXTENDED RELEASE ORAL DAILY
Qty: 30 CAPSULE | Refills: 0 | Status: SHIPPED | OUTPATIENT
Start: 2018-03-02 | End: 2018-06-11

## 2018-03-02 NOTE — TELEPHONE ENCOUNTER
Pt returned call,relayed message below. Pt states scheduled with psychiatry on march 15th. Please advise

## 2018-03-02 NOTE — TELEPHONE ENCOUNTER
Called and informed patient.    Annie Lyn, [facility]  March 2, 2018

## 2018-04-12 DIAGNOSIS — F43.23 ADJUSTMENT DISORDER WITH MIXED ANXIETY AND DEPRESSED MOOD: ICD-10-CM

## 2018-04-12 RX ORDER — GABAPENTIN 400 MG/1
CAPSULE ORAL
Qty: 240 CAPSULE | Refills: 0 | Status: SHIPPED | OUTPATIENT
Start: 2018-04-12 | End: 2018-05-14

## 2018-04-12 NOTE — TELEPHONE ENCOUNTER
Reason for Call:  Medication or medication refill:    Do you use a [facility]?  Name of the pharmacy and phone number for the current request:  [facility]    Name of the medication requested: gabapentin (NEURONTIN) 400 MG capsule    Other request: pt states needs refill of medication please advise and contact pt with questions. Pt states was supposed to see psychiatrist but states psychiatrist cancelled on pt and is scheduled to see psychiatrist may 3rd so needs medication until appt.     Can we leave a detailed message on this number? YES    Phone number patient can be reached at: Home number on file [phone] (home)    Best Time: ANY    Call taken on 4/12/2018 at 12:23 PM by Marycarmen Green

## 2018-04-23 ENCOUNTER — TELEPHONE (OUTPATIENT)
Dept: PSYCHIATRY | Facility: CLINIC | Age: 48
End: 2018-04-23

## 2018-04-23 NOTE — TELEPHONE ENCOUNTER
RN left voicemail with request to call back and schedule a med check with Maria Fernanda Coon, RN, MS, APRN.

## 2018-05-07 ENCOUNTER — TELEPHONE (OUTPATIENT)
Dept: FAMILY MEDICINE | Facility: CLINIC | Age: 48
End: 2018-05-07

## 2018-05-07 NOTE — TELEPHONE ENCOUNTER
Patient is due for a PHQ-9.  Index start date:3/02/2018  Index end date:7/02/2018    Please call patient. Pt is active on [employer]. I will send PHQ-9 via [employer] and postpone encounter. Skylar Canela CMA (AAMA)

## 2018-05-10 ENCOUNTER — TELEPHONE (OUTPATIENT)
Dept: FAMILY MEDICINE | Facility: OTHER | Age: 48
End: 2018-05-10

## 2018-05-10 NOTE — TELEPHONE ENCOUNTER
Reason for Call:  Other prescription    Detailed comments: Sandra calling from [facility] [place] and wants to give an FYI that pt is getting medication gabapentin (NEURONTIN) 400 MG capsule from other doctors as well. Sandra states to check . Please advise and if any further questions contact Sandra at [phone]    Phone Number Patient can be reached at: Home number on file [phone] (home)    Best Time: ANY    Can we leave a detailed message on this number? YES    Call taken on 5/10/2018 at 9:32 AM by Marycarmen Green

## 2018-05-14 ENCOUNTER — TELEPHONE (OUTPATIENT)
Dept: FAMILY MEDICINE | Facility: OTHER | Age: 48
End: 2018-05-14

## 2018-05-14 DIAGNOSIS — F43.23 ADJUSTMENT DISORDER WITH MIXED ANXIETY AND DEPRESSED MOOD: ICD-10-CM

## 2018-05-14 RX ORDER — GABAPENTIN 400 MG/1
400 CAPSULE ORAL 3 TIMES DAILY
Qty: 90 CAPSULE | Refills: 0 | Status: SHIPPED | OUTPATIENT
Start: 2018-05-14 | End: 2018-06-11

## 2018-05-14 NOTE — TELEPHONE ENCOUNTER
Please let patient know I have sent a garcía refill (as she is due because last rx was 1 month ago from psychiatry), but in the future should follow with 1 provider for controlled substances. Should also make appointment with MARIA C to discuss further refills.     Toby Hyde PA-C  [facility]

## 2018-05-14 NOTE — TELEPHONE ENCOUNTER
[place] ([place] reviewed:  reviewed- recommend provider review  Last fourrefills for gabapentin (NEURONTIN) 400 MG capsule  04/17/2018 for  240 tabs by MARIA C filled at [facility]  03/31/2018 for  90 tabs by Ankur Royal filled at [facility] in [place]  03/08/2018 for  240 tabs by MARIA C filled at [facility]  02/28/2018 for  90 tabs by Ankur Royal filled at [facility] in [place]    [place]:  [email or web address]  Trudi Headley RN, BSN

## 2018-05-14 NOTE — TELEPHONE ENCOUNTER
Spoke to patient and advised her of message below. She was very thankful for this garcía fill. Scheduled patient with MARIA C for future refills.    Shanna Josue MA

## 2018-05-14 NOTE — TELEPHONE ENCOUNTER
Ankur Royal is NP at [facility] in [place]. Per last refill, was seeing psychiatrist on May 3rd.     Please call and clarify dosing with patient and inform should be getting from one provider. Does she want this from  or her psychiatrist? As below, we see she is only getting #90 from Ankur Royal, was a dose adjustment made at her appointment on 5/3?    Toby Hyde PA-C  [facility]

## 2018-05-14 NOTE — TELEPHONE ENCOUNTER
Patient called clinic. She takes 400mg 3x a day. Would like from [facility] not psychiatrist. No adjustments were made on 5/3 to medication.

## 2018-05-14 NOTE — TELEPHONE ENCOUNTER
[facility] phone call message- patient requests medication or medication refill:    If this is a refill request, has the caller requested the refill from the pharmacy already? No  Name of the pharmacy and phone number for the current request:  [facility]    Name of the medication requested: Gabapentin    Other request:     OK to leave the result message on voice mail or with a family member? NO  Call taken on 5/14/2018 at 8:57 AM by Lorie Mott

## 2018-05-14 NOTE — TELEPHONE ENCOUNTER
Reason for Call:  Other prescription    Detailed comments: Pt calling, stating that her rx for gabapentin was sent to the wrong pharmacy. It needs to go to [facility] in [place] [place] (the [facility] on [address]) Please advise.     Phone Number Patient can be reached at: Home number on file [phone] (home)    Best Time: any     Can we leave a detailed message on this number? YES    Call taken on 5/14/2018 at 5:25 PM by Aaliyah Melgar

## 2018-05-14 NOTE — TELEPHONE ENCOUNTER
LM for patient to return call to clinic when call is returned please see note below.     Shanna Josue MA

## 2018-05-14 NOTE — TELEPHONE ENCOUNTER
"Gabapentin    Last Written Prescription Date:  04/12/2018  Last Fill Quantity: 240,   # refills: 0  Last Office Visit: 02/05/2018  Future Office visit:       Routing refill request to provider for review/approval because:  Drug not on the [facility], UMP or  Health refill protocol or controlled substance  Per other encounter dated 05/10/2018: \"Sandra calling from [facility] and wants to give an FYI that pt is getting medication gabapentin (NEURONTIN) 400 MG capsule from other doctors as well. Sandra states to check . Please advise and if any further questions contact Sandra at [phone]\"    "

## 2018-05-15 RX ORDER — GABAPENTIN 400 MG/1
CAPSULE ORAL
Qty: 150 CAPSULE | Refills: 0 | Status: SHIPPED | OUTPATIENT
Start: 2018-05-15 | End: 2018-06-08

## 2018-05-15 NOTE — TELEPHONE ENCOUNTER
LM for patient to return phone call to clinic about message below.  Roopa Dillon CMA ([facility])

## 2018-05-15 NOTE — TELEPHONE ENCOUNTER
Patient returned call to clinic and is stating this is the right medication but is not the right dosing. She states she is to be taking it: 3 capsules in the am, 2 capsules in the afternoon and 3 in the evening. And wont have enough of the garcía fill that was prescribed 5/14 to last until the appointment. And previous Rx sent 04/12/2018- reflected the above dosing. Routing to covering provider and MARIA C.     Shanna Josue MA

## 2018-05-15 NOTE — TELEPHONE ENCOUNTER
Scheduled with MARIA C on 6/11/18 (just less than 4 weeks away)     Per previous message, says takes 1 tablet (400 mg) three times a day. I sent #90, this is a 1 month supply and should last until her appointment.       Toby Hyde PA-C  [facility]

## 2018-05-15 NOTE — TELEPHONE ENCOUNTER
I called [facility] to explain that the script they received on [place] was sent to the wrong pharmacy -  [facility]. Was closed for the day.      The RX is for Gabapentin.  ( see note )      Patient would like this resolved ASAP in the morning.      It may need to be re-faxed to [facility] on [address] - patient stated that she called [facility] and they hadn't received it.

## 2018-05-16 NOTE — TELEPHONE ENCOUNTER
[facility] pharmacy called back - when I answered the phone it was silent. Unsure what they need. Can call them later.  Allegra Matt, CMA

## 2018-05-16 NOTE — TELEPHONE ENCOUNTER
Left detailed message informing patient that [facility] [address] [place]- Rx was cancelled at [facility]. Advised her that this is the remaining amount to get her until her appointment with LEVY Josue MA

## 2018-05-30 NOTE — TELEPHONE ENCOUNTER
I have attempted to call the pt to update a PHQ-9. I left message for pt to call back. I will call back another time. Skylar Canela CMA ([facility])

## 2018-06-01 NOTE — TELEPHONE ENCOUNTER
"Pt has appt scheduled for 6/11/2018. I have put \"Give PHQ-9\" in the appt note and will postpone encounter. Skylar Canela CMA ([facility])    "

## 2018-06-06 NOTE — PROGRESS NOTES
SUBJECTIVE:   Magnolia Leyva is a 48 year old female who presents to clinic today for the following health issues:      History of Present Illness     Depression & Anxiety Follow-up:     Depression/Anxiety:  Depression & Anxiety    Status since last visit::  Stable    Other associated symptoms of depression and anxiety::  None    Significant life event::  YES    Current substance use::  None       Today's PHQ-9         PHQ-9 Total Score:         PHQ-9 Q9 Suicidal ideation:       Thoughts of suicide or self harm:      Self-harm Plan:        Self-harm Action:          Safety concerns for self or others:       JULIEN-7 Total Score: (P) 9    Answers for HPI/ROS submitted by the patient on 6/11/2018   JULIEN 7 TOTAL SCORE: 9  If you checked off any problems, how difficult have these problems made it for you to do your work, take care of things at home, or get along with other people?: Somewhat difficult  PHQ9 TOTAL SCORE: 3      She was seen by psychiatry twice and they started her on Wellbutrin and stopped the Effexor. She has been doing much better since then with stable anxiety and depression. She has had some financial difficulties lately and had to file for bankruptcy but otherwise things are going very well. She denies any thoughts of self harm. She would like to maintain her mental health care with a primary care provider here at the clinic since her symptoms are under better control. She is requesting updated labs today including her thyroid.     Problem list and histories reviewed & adjusted, as indicated.  Additional history: none    ROS:  GENERAL: Denies fever, fatigue, weakness, weight gain, or weight loss  CARDIOVASCULAR: Denies chest pain, shortness of breath, irregular heartbeats, palpitations, or edema.  RESPIRATORY: Denies cough, hemoptysis, and shortness of breath.  NEUROLOGIC: Denies headache, fainting, dizziness, memory loss, numbness, tingling, or seizures.  PSYCHIATRIC: +Stable anxiety  and depression. Denies mood swings and thoughts of suicide.    OBJECTIVE:     /68  Pulse 75  Temp 98  F (36.7  C) (Temporal)  Resp 16  Wt 140 lb (63.5 kg)  SpO2 100%  BMI 24.5 kg/m2  Body mass index is 24.5 kg/(m^2).  GENERAL: healthy, alert and no distress  RESP: lungs clear to auscultation - no rales, rhonchi or wheezes  CV: regular rate and rhythm, normal S1 S2, no S3 or S4, no murmur, click or rub  PSYCH: mentation appears normal, affect normal/bright    ASSESSMENT/PLAN:       ICD-10-CM    1. PTSD (post-traumatic stress disorder) F43.10 lamoTRIgine (LAMICTAL) 200 MG tablet     buPROPion (WELLBUTRIN XL) 300 MG 24 hr tablet     CBC with platelets     Comprehensive metabolic panel (BMP + Alb, Alk Phos, ALT, AST, Total. Bili, TP)     Lamotrigine Level   2. Major depressive disorder, recurrent episode, moderate (H) F33.1 lamoTRIgine (LAMICTAL) 200 MG tablet     buPROPion (WELLBUTRIN XL) 300 MG 24 hr tablet     CBC with platelets     Comprehensive metabolic panel (BMP + Alb, Alk Phos, ALT, AST, Total. Bili, TP)     Lamotrigine Level   3. JULIEN (generalized anxiety disorder) F41.1 lamoTRIgine (LAMICTAL) 200 MG tablet     buPROPion (WELLBUTRIN XL) 300 MG 24 hr tablet     CBC with platelets     Comprehensive metabolic panel (BMP + Alb, Alk Phos, ALT, AST, Total. Bili, TP)     Lamotrigine Level   4. Adjustment disorder with mixed anxiety and depressed mood F43.23 gabapentin (NEURONTIN) 400 MG capsule   5. Breast cancer screening Z12.31 *MA Screening Digital Bilateral   6. Acquired hypothyroidism E03.9 TSH with free T4 reflex         1-4. Anxiety and depression have been much improved since starting Wellbutrin. Will continue this along with gabapentin and Lamictal and I will take over all of her medications. Will order updated labs today and plan on follow up in 6 months. If she has any worsening symptoms before then, she will contact the clinic.    5. Updated mammogram ordered.    6. Updated TSH ordered.     She  refused a PAP today but will reschedule in the near future.     Raheel Barry PA-C  [facility]

## 2018-06-08 DIAGNOSIS — F43.23 ADJUSTMENT DISORDER WITH MIXED ANXIETY AND DEPRESSED MOOD: ICD-10-CM

## 2018-06-08 RX ORDER — GABAPENTIN 400 MG/1
CAPSULE ORAL
Qty: 150 CAPSULE | Refills: 0 | Status: SHIPPED | OUTPATIENT
Start: 2018-06-08 | End: 2018-06-11

## 2018-06-08 NOTE — TELEPHONE ENCOUNTER
gabapentin      Last Written Prescription Date:  5/15/18  Last Fill Quantity: 150,   # refills: 0  Last Office Visit: 2/5/18  Future Office visit:    Next 5 appointments (look out 90 days)     Jun 11, 2018  6:15 PM CDT   Office Visit with Raheel Barry PA-C   [facility]    [address] [place] [id number]   [phone]                   Routing refill request to provider for review/approval because:  Drug not on the [facility], [facility] or  Health refill protocol or controlled substance

## 2018-06-11 ENCOUNTER — TELEPHONE (OUTPATIENT)
Dept: FAMILY MEDICINE | Facility: OTHER | Age: 48
End: 2018-06-11

## 2018-06-11 ENCOUNTER — OFFICE VISIT (OUTPATIENT)
Dept: FAMILY MEDICINE | Facility: OTHER | Age: 48
End: 2018-06-11
Payer: COMMERCIAL

## 2018-06-11 VITALS
DIASTOLIC BLOOD PRESSURE: 68 MMHG | TEMPERATURE: 98 F | WEIGHT: 140 LBS | RESPIRATION RATE: 16 BRPM | BODY MASS INDEX: 24.5 KG/M2 | OXYGEN SATURATION: 100 % | HEART RATE: 75 BPM | SYSTOLIC BLOOD PRESSURE: 100 MMHG

## 2018-06-11 DIAGNOSIS — F41.1 GAD (GENERALIZED ANXIETY DISORDER): ICD-10-CM

## 2018-06-11 DIAGNOSIS — F43.10 PTSD (POST-TRAUMATIC STRESS DISORDER): Primary | ICD-10-CM

## 2018-06-11 DIAGNOSIS — F43.23 ADJUSTMENT DISORDER WITH MIXED ANXIETY AND DEPRESSED MOOD: ICD-10-CM

## 2018-06-11 DIAGNOSIS — Z12.39 BREAST CANCER SCREENING: ICD-10-CM

## 2018-06-11 DIAGNOSIS — F33.1 MAJOR DEPRESSIVE DISORDER, RECURRENT EPISODE, MODERATE (H): ICD-10-CM

## 2018-06-11 DIAGNOSIS — E03.9 ACQUIRED HYPOTHYROIDISM: ICD-10-CM

## 2018-06-11 DIAGNOSIS — F43.10 PTSD (POST-TRAUMATIC STRESS DISORDER): ICD-10-CM

## 2018-06-11 LAB
ERYTHROCYTE [DISTWIDTH] IN BLOOD BY AUTOMATED COUNT: 12.4 % (ref 10–15)
HCT VFR BLD AUTO: 36.9 % (ref 35–47)
HGB BLD-MCNC: 11.9 G/DL (ref 11.7–15.7)
MCH RBC QN AUTO: 31.3 PG (ref 26.5–33)
MCHC RBC AUTO-ENTMCNC: 32.2 G/DL (ref 31.5–36.5)
MCV RBC AUTO: 97 FL (ref 78–100)
PLATELET # BLD AUTO: 363 10E9/L (ref 150–450)
RBC # BLD AUTO: 3.8 10E12/L (ref 3.8–5.2)
WBC # BLD AUTO: 6.3 10E9/L (ref 4–11)

## 2018-06-11 PROCEDURE — 85027 COMPLETE CBC AUTOMATED: CPT | Performed by: PHYSICIAN ASSISTANT

## 2018-06-11 PROCEDURE — 36415 COLL VENOUS BLD VENIPUNCTURE: CPT | Performed by: PHYSICIAN ASSISTANT

## 2018-06-11 PROCEDURE — 84443 ASSAY THYROID STIM HORMONE: CPT | Performed by: PHYSICIAN ASSISTANT

## 2018-06-11 PROCEDURE — 99214 OFFICE O/P EST MOD 30 MIN: CPT | Performed by: PHYSICIAN ASSISTANT

## 2018-06-11 PROCEDURE — 99000 SPECIMEN HANDLING OFFICE-LAB: CPT | Performed by: PHYSICIAN ASSISTANT

## 2018-06-11 PROCEDURE — 80053 COMPREHEN METABOLIC PANEL: CPT | Performed by: PHYSICIAN ASSISTANT

## 2018-06-11 PROCEDURE — 80175 DRUG SCREEN QUAN LAMOTRIGINE: CPT | Mod: 90 | Performed by: PHYSICIAN ASSISTANT

## 2018-06-11 RX ORDER — BUPROPION HYDROCHLORIDE 300 MG/1
300 TABLET ORAL EVERY MORNING
Qty: 90 TABLET | Refills: 1 | Status: SHIPPED | OUTPATIENT
Start: 2018-06-11 | End: 2018-06-11

## 2018-06-11 RX ORDER — GABAPENTIN 400 MG/1
CAPSULE ORAL
Qty: 240 CAPSULE | Refills: 5 | Status: SHIPPED | OUTPATIENT
Start: 2018-06-11 | End: 2019-01-04

## 2018-06-11 RX ORDER — BUPROPION HYDROCHLORIDE 300 MG/1
300 TABLET ORAL EVERY MORNING
Qty: 90 TABLET | Refills: 1 | Status: SHIPPED | OUTPATIENT
Start: 2018-06-11 | End: 2018-08-20

## 2018-06-11 RX ORDER — LAMOTRIGINE 200 MG/1
200 TABLET ORAL DAILY
Qty: 90 TABLET | Refills: 1 | Status: SHIPPED | OUTPATIENT
Start: 2018-06-11 | End: 2018-06-11

## 2018-06-11 RX ORDER — LAMOTRIGINE 200 MG/1
200 TABLET ORAL DAILY
Qty: 90 TABLET | Refills: 1 | Status: SHIPPED | OUTPATIENT
Start: 2018-06-11 | End: 2018-08-20

## 2018-06-11 RX ORDER — GABAPENTIN 400 MG/1
CAPSULE ORAL
Qty: 240 CAPSULE | Refills: 5 | Status: SHIPPED | OUTPATIENT
Start: 2018-06-11 | End: 2018-06-11

## 2018-06-11 ASSESSMENT — ANXIETY QUESTIONNAIRES
5. BEING SO RESTLESS THAT IT IS HARD TO SIT STILL: NOT AT ALL
GAD7 TOTAL SCORE: 9
7. FEELING AFRAID AS IF SOMETHING AWFUL MIGHT HAPPEN: SEVERAL DAYS
3. WORRYING TOO MUCH ABOUT DIFFERENT THINGS: MORE THAN HALF THE DAYS
GAD7 TOTAL SCORE: 9
7. FEELING AFRAID AS IF SOMETHING AWFUL MIGHT HAPPEN: SEVERAL DAYS
4. TROUBLE RELAXING: SEVERAL DAYS
6. BECOMING EASILY ANNOYED OR IRRITABLE: SEVERAL DAYS
2. NOT BEING ABLE TO STOP OR CONTROL WORRYING: NEARLY EVERY DAY
GAD7 TOTAL SCORE: 9
1. FEELING NERVOUS, ANXIOUS, OR ON EDGE: SEVERAL DAYS

## 2018-06-11 ASSESSMENT — PATIENT HEALTH QUESTIONNAIRE - PHQ9
SUM OF ALL RESPONSES TO PHQ QUESTIONS 1-9: 3
SUM OF ALL RESPONSES TO PHQ QUESTIONS 1-9: 3
10. IF YOU CHECKED OFF ANY PROBLEMS, HOW DIFFICULT HAVE THESE PROBLEMS MADE IT FOR YOU TO DO YOUR WORK, TAKE CARE OF THINGS AT HOME, OR GET ALONG WITH OTHER PEOPLE: SOMEWHAT DIFFICULT

## 2018-06-11 NOTE — PATIENT INSTRUCTIONS
Continue current medications.  Will order updated labs today.  Schedule a PAP at your convenience. If you would like to see a female provider, that is fine.    Follow up in 6 months for a recheck.

## 2018-06-11 NOTE — NURSING NOTE
"Chief Complaint   Patient presents with     Depression     Thyroid Disease     Panel Management     pap        Initial /68  Pulse 75  Temp 98  F (36.7  C) (Temporal)  Resp 16  Wt 140 lb (63.5 kg)  SpO2 100%  BMI 24.5 kg/m2 Estimated body mass index is 24.5 kg/(m^2) as calculated from the following:    Height as of 2/5/18: 5' 3.39\" (1.61 m).    Weight as of this encounter: 140 lb (63.5 kg).  Medication Reconciliation: complete    Allegra Matt, TIP      "

## 2018-06-11 NOTE — MR AVS SNAPSHOT
After Visit Summary   6/11/2018    Magnolia Leyva    MRN: [id number]           Patient Information     Date Of Birth          1970        Visit Information        Provider Department      6/11/2018 6:15 PM Rhaeel Barry PA-C [facility]        Today's Diagnoses     PTSD (post-traumatic stress disorder)    -  1    Major depressive disorder, recurrent episode, moderate (H)        JULIEN (generalized anxiety disorder)        Breast cancer screening        Adjustment disorder with mixed anxiety and depressed mood        Acquired hypothyroidism          Care Instructions    Continue current medications.  Will order updated labs today.  Schedule a PAP at your convenience. If you would like to see a female provider, that is fine.    Follow up in 6 months for a recheck.           Follow-ups after your visit        Follow-up notes from your care team     Return in about 6 months (around 12/11/2018) for Routine Visit.      Future tests that were ordered for you today     Open Future Orders        Priority Expected Expires Ordered    *MA Screening Digital Bilateral Routine  6/11/2019 6/11/2018            Who to contact     If you have questions or need follow up information about today's clinic visit or your schedule please contact [facility] directly at [phone].  Normal or non-critical lab and imaging results will be communicated to you by MyChart, letter or phone within 4 business days after the clinic has received the results. If you do not hear from us within 7 days, please contact the clinic through MyChart or phone. If you have a critical or abnormal lab result, we will notify you by phone as soon as possible.  Submit refill requests through [employer] or call your pharmacy and they will forward the refill request to us. Please allow 3 business days for your refill to be completed.          Additional Information About Your Visit        MyChart  Information     [email or web address] gives you secure access to your electronic health record. If you see a primary care provider, you can also send messages to your care team and make appointments. If you have questions, please call your primary care clinic.  If you do not have a primary care provider, please call [phone] and they will assist you.        Care EveryWhere ID     This is your Care EveryWhere ID. This could be used by other organizations to access your [facility] medical records  [id number]        Your Vitals Were     Pulse Temperature Respirations Pulse Oximetry BMI (Body Mass Index)       75 98  F (36.7  C) (Temporal) 16 100% 24.5 kg/m2        Blood Pressure from Last 3 Encounters:   06/11/18 100/68   02/05/18 102/80   01/15/18 102/72    Weight from Last 3 Encounters:   06/11/18 140 lb (63.5 kg)   02/05/18 141 lb (64 kg)   01/15/18 139 lb 12.8 oz (63.4 kg)              We Performed the Following     CBC with platelets     Comprehensive metabolic panel (BMP + Alb, Alk Phos, ALT, AST, Total. Bili, TP)     Lamotrigine Level     TSH with free T4 reflex          Today's Medication Changes          These changes are accurate as of 6/11/18  6:46 PM.  If you have any questions, ask your nurse or doctor.               These medicines have changed or have updated prescriptions.        Dose/Directions    buPROPion 300 MG 24 hr tablet   Commonly known as:  WELLBUTRIN XL   This may have changed:    - medication strength  - how much to take   Used for:  JULIEN (generalized anxiety disorder), Major depressive disorder, recurrent episode, moderate (H), PTSD (post-traumatic stress disorder)   Changed by:  Raheel Barry PA-C        Dose:  300 mg   Take 1 tablet (300 mg) by mouth every morning   Quantity:  90 tablet   Refills:  1       gabapentin 400 MG capsule   Commonly known as:  NEURONTIN   This may have changed:  Another medication with the same name was removed. Continue taking this medication, and follow the  directions you see here.   Used for:  Adjustment disorder with mixed anxiety and depressed mood   Changed by:  Raheel Barry PA-C        Take 3 capsules in the morning, 2 capsules in the afternoon, and 3 capsules in the evening   Quantity:  240 capsule   Refills:  5         Stop taking these medicines if you haven't already. Please contact your care team if you have questions.     venlafaxine 150 MG 24 hr capsule   Commonly known as:  EFFEXOR-XR   Stopped by:  Raheel Barry PA-C                Where to get your medicines      These medications [place] [facility] [place] [address]  [address], [facility] [address]     Phone:  [phone]     buPROPion 300 MG 24 hr tablet    gabapentin 400 MG capsule    lamoTRIgine 200 MG tablet                Primary Care Provider Office Phone # Fax #    Yudelka Cohen [place], ANNETTA [facility] [phone]       [address] [place] [address]        Equal Access to Services     [facility] : Hadii aurora ku hadasho Soomaali, waaxda luqadaha, qaybta kaalmada adeegyada, waxay tedin hayscarlettn owen castillo . So [facility] [phone].    ATENCIÓN: Si laurala espnamita, tiene a delcid disposición servicios gratuitos de asistencia lingüística. Carlaame al [phone].    We comply with applicable federal civil rights laws and [place] laws. We do not discriminate on the basis of race, color, national origin, age, disability, sex, sexual orientation, or gender identity.            Thank you!     Thank you for choosing [facility]  for your care. Our goal is always to provide you with excellent care. Hearing back from our patients is one way we can continue to improve our services. Please take a few minutes to complete the written survey that you may receive in the mail after your visit with us. Thank you!             Your Updated Medication List - Protect others around you: Learn how to safely use, store and throw away your  medicines at www.disposemymeds.org.          This list is accurate as of 6/11/18  6:46 PM.  Always use your most recent med list.                   Brand Name Dispense Instructions for use Diagnosis    buPROPion 300 MG 24 hr tablet    WELLBUTRIN XL    90 tablet    Take 1 tablet (300 mg) by mouth every morning    JULIEN (generalized anxiety disorder), Major depressive disorder, recurrent episode, moderate (H), PTSD (post-traumatic stress disorder)       gabapentin 400 MG capsule    NEURONTIN    240 capsule    Take 3 capsules in the morning, 2 capsules in the afternoon, and 3 capsules in the evening    Adjustment disorder with mixed anxiety and depressed mood       lamoTRIgine 200 MG tablet    LaMICtal    90 tablet    Take 1 tablet (200 mg) by mouth daily    PTSD (post-traumatic stress disorder), JULIEN (generalized anxiety disorder), Major depressive disorder, recurrent episode, moderate (H)       levothyroxine 100 MCG tablet    SYNTHROID/LEVOTHROID    90 tablet    Take 1 tablet (100 mcg) by mouth daily    Hypothyroidism, unspecified type       multivitamin, therapeutic with minerals Tabs tablet     100 tablet    Take 1 tablet by mouth daily    Substance abuse

## 2018-06-12 LAB
ALBUMIN SERPL-MCNC: 3.6 G/DL (ref 3.4–5)
ALP SERPL-CCNC: 65 U/L (ref 40–150)
ALT SERPL W P-5'-P-CCNC: 23 U/L (ref 0–50)
ANION GAP SERPL CALCULATED.3IONS-SCNC: 6 MMOL/L (ref 3–14)
AST SERPL W P-5'-P-CCNC: 19 U/L (ref 0–45)
BILIRUB SERPL-MCNC: 0.1 MG/DL (ref 0.2–1.3)
BUN SERPL-MCNC: 9 MG/DL (ref 7–30)
CALCIUM SERPL-MCNC: 8 MG/DL (ref 8.5–10.1)
CHLORIDE SERPL-SCNC: 104 MMOL/L (ref 94–109)
CO2 SERPL-SCNC: 28 MMOL/L (ref 20–32)
CREAT SERPL-MCNC: 0.75 MG/DL (ref 0.52–1.04)
GFR SERPL CREATININE-BSD FRML MDRD: 83 ML/MIN/1.7M2
GLUCOSE SERPL-MCNC: 91 MG/DL (ref 70–99)
POTASSIUM SERPL-SCNC: 4.6 MMOL/L (ref 3.4–5.3)
PROT SERPL-MCNC: 6.5 G/DL (ref 6.8–8.8)
SODIUM SERPL-SCNC: 138 MMOL/L (ref 133–144)
TSH SERPL DL<=0.005 MIU/L-ACNC: 1.86 MU/L (ref 0.4–4)

## 2018-06-12 ASSESSMENT — ANXIETY QUESTIONNAIRES: GAD7 TOTAL SCORE: 9

## 2018-06-12 ASSESSMENT — PATIENT HEALTH QUESTIONNAIRE - PHQ9: SUM OF ALL RESPONSES TO PHQ QUESTIONS 1-9: 3

## 2018-06-12 NOTE — TELEPHONE ENCOUNTER
Patient unable to  RX at [facility], [facility] to [facility]. Please contact [facility] in AM to cancel   Wellbutrin, Gabapentin, and Lamictal RX.     ANNETTA Stern CNP

## 2018-06-13 LAB — LAMOTRIGINE SERPL-MCNC: 3.9 UG/ML (ref 2.5–15)

## 2018-06-22 ENCOUNTER — TELEPHONE (OUTPATIENT)
Dept: FAMILY MEDICINE | Facility: OTHER | Age: 48
End: 2018-06-22

## 2018-06-22 NOTE — TELEPHONE ENCOUNTER
Reason for Call:  Other medication question     Detailed comments: Pt feels that the 350 mg daily of the Wellbutrin may not be enough. She states she feels lethargic. The medication is helping, she is just wondering if the dosage needs to be bumped up? Informed her she may need an appt, but she states she just saw you last week so she requested a message be sent. Please call.     Phone Number Patient can be reached at: Home number on file [phone] (home)    Best Time: any     Can we leave a detailed message on this number? YES    Call taken on 6/22/2018 at 4:33 PM by Hilda Cid

## 2018-06-22 NOTE — TELEPHONE ENCOUNTER
I called patient and she states the Wellbutrin is working great for her but she has just noticed some slightly increased fatigue lately, kind of what she was feeling previously so is wondering if she can go up to a higher dose of Wellbutrin. I told her I do not often go above 300 mg but 450 mg can be prescribed for depression. I recommended she see how the summer goes and try to stay active and outdoors more and if symptoms are still there, can increase to 450. She is in agreement.    Raheel Barry PA-C

## 2018-07-13 ENCOUNTER — TELEPHONE (OUTPATIENT)
Dept: FAMILY MEDICINE | Facility: OTHER | Age: 48
End: 2018-07-13

## 2018-07-13 NOTE — TELEPHONE ENCOUNTER
"Appears Gabapentin was sent under [facility] 06/11/2018 due to, \"Patient unable to  RX at [facility], [facility]. Please contact [facility] in AM to cancel   Wellbutrin, Gabapentin, and Lamictal RX. Summer Lutz, APRN CNP\"    Patient was seen in clinic by JM this date. Will route to . Trudi Headley, RN, BSN     "

## 2018-07-13 NOTE — TELEPHONE ENCOUNTER
Message from pharmacy:  Attn: /Pharmacy shopping  FYI-patient has been filling gabapentin 400mg caps at multiple pharmacies from multiple prescribers.  In the past 60 days she has received 690 caps at 3 pharmacies.  If looking on  leave first name as Maryjane

## 2018-08-20 ENCOUNTER — TELEPHONE (OUTPATIENT)
Dept: FAMILY MEDICINE | Facility: OTHER | Age: 48
End: 2018-08-20

## 2018-08-20 DIAGNOSIS — F33.1 MAJOR DEPRESSIVE DISORDER, RECURRENT EPISODE, MODERATE (H): ICD-10-CM

## 2018-08-20 DIAGNOSIS — F41.1 GAD (GENERALIZED ANXIETY DISORDER): ICD-10-CM

## 2018-08-20 DIAGNOSIS — E03.9 HYPOTHYROIDISM, UNSPECIFIED TYPE: ICD-10-CM

## 2018-08-20 DIAGNOSIS — F43.10 PTSD (POST-TRAUMATIC STRESS DISORDER): ICD-10-CM

## 2018-08-20 RX ORDER — BUPROPION HYDROCHLORIDE 150 MG/1
150 TABLET ORAL EVERY MORNING
Qty: 90 TABLET | Refills: 1 | Status: SHIPPED | OUTPATIENT
Start: 2018-08-20 | End: 2018-08-24

## 2018-08-20 RX ORDER — LAMOTRIGINE 200 MG/1
200 TABLET ORAL DAILY
Qty: 90 TABLET | Refills: 1 | Status: SHIPPED | OUTPATIENT
Start: 2018-08-20 | End: 2019-01-04

## 2018-08-20 RX ORDER — BUPROPION HYDROCHLORIDE 300 MG/1
300 TABLET ORAL EVERY MORNING
Qty: 90 TABLET | Refills: 1 | Status: SHIPPED | OUTPATIENT
Start: 2018-08-20 | End: 2018-08-24

## 2018-08-20 RX ORDER — LEVOTHYROXINE SODIUM 100 UG/1
100 TABLET ORAL DAILY
Qty: 90 TABLET | Refills: 3 | Status: SHIPPED | OUTPATIENT
Start: 2018-08-20 | End: 2019-06-27

## 2018-08-20 NOTE — TELEPHONE ENCOUNTER
LM for patient to return phone call to clinic about message below.  Roopa Dillon CMA ([facility])

## 2018-08-20 NOTE — TELEPHONE ENCOUNTER
She shouldn't be on Abilify anymore as this was stopped 6 months ago. I will refill the other 3 and will increase Wellbutrin to 450 mg but not sure if they will be able to fill them all early before the end of the month since she will not be due for refills but she can try. Thanks.Needs f/u in 3 months. Aubrey for phone visit.     Raheel Barry PA-C

## 2018-08-20 NOTE — TELEPHONE ENCOUNTER
Reason for Call:  Medication or medication refill:    Do you use a [facility]?  Name of the pharmacy and phone number for the current request:  [facility] - [phone]    Name of the medication requested: Levothyroxine,Wellbutrin,Abilify,Lamotrigine    Other request: pt states is going to be losing insurance at the end of the  Month and wondering if [facility] will prescribe these scripts early before she loses insurance. Please advise   Pt wants 90 day supply. Pt also states discussed with J[facility] about increasing Wellbutrin and would like to increase dosage.    Can we leave a detailed message on this number? YES    Phone number patient can be reached at: Home number on file [phone] (home)    Best Time: ANY    Call taken on 8/20/2018 at 12:32 PM by Marycarmen Green

## 2018-08-21 NOTE — TELEPHONE ENCOUNTER
LM for patient to return phone call to clinic about message below.  Roopa Dillon CMA ([facility])

## 2018-08-21 NOTE — TELEPHONE ENCOUNTER
"Spoke to patient, she states that she never stopped taking the Abilify and that this is the medication that \"holds her together\" this was initially prescribed by a psychologist but she states that  has filled this before as well. She is requesting that he fill this again. She lost her job last week and is having a hard time. Will route to  to review     Priscilla Magallanes CMA    "

## 2018-08-21 NOTE — TELEPHONE ENCOUNTER
This was discontinued by Maria Fernanda Coon in January. What does is she currently taking?    Raheel Barry PA-C

## 2018-08-22 RX ORDER — ARIPIPRAZOLE 5 MG/1
TABLET ORAL
Qty: 90 TABLET | Refills: 1 | Status: SHIPPED | OUTPATIENT
Start: 2018-08-22 | End: 2019-02-13

## 2018-08-22 NOTE — TELEPHONE ENCOUNTER
Left message for patient to return call to clinic. When call is returned please see JMs note below.     Shanna Josue MA

## 2018-08-24 ENCOUNTER — TELEPHONE (OUTPATIENT)
Dept: FAMILY MEDICINE | Facility: OTHER | Age: 48
End: 2018-08-24

## 2018-08-24 DIAGNOSIS — F43.10 PTSD (POST-TRAUMATIC STRESS DISORDER): ICD-10-CM

## 2018-08-24 DIAGNOSIS — F41.1 GAD (GENERALIZED ANXIETY DISORDER): ICD-10-CM

## 2018-08-24 DIAGNOSIS — F33.1 MAJOR DEPRESSIVE DISORDER, RECURRENT EPISODE, MODERATE (H): ICD-10-CM

## 2018-08-24 RX ORDER — BUPROPION HYDROCHLORIDE 150 MG/1
150 TABLET ORAL EVERY MORNING
Qty: 90 TABLET | Refills: 1 | Status: SHIPPED | OUTPATIENT
Start: 2018-08-24 | End: 2019-02-13

## 2018-08-24 RX ORDER — BUPROPION HYDROCHLORIDE 300 MG/1
300 TABLET ORAL EVERY MORNING
Qty: 90 TABLET | Refills: 1 | Status: SHIPPED | OUTPATIENT
Start: 2018-08-24 | End: 2019-02-13

## 2018-08-24 NOTE — TELEPHONE ENCOUNTER
Reason for Call:  Patient is on Wellbutrin. She is almost out, the original was 30 qty sent to [facility]   Detailed comments: Wellbutrin has to be 90 day supply and sent to [facility] for her insurance to cover this.     Phone Number Patient can be reached at: Home number on file [phone] (home)    Best Time: any    Can we leave a detailed message on this number? YES    Call taken on 8/24/2018 at 8:15 AM by Anastasia Mercedes

## 2018-08-24 NOTE — TELEPHONE ENCOUNTER
Tried calling patient again. Tried both numbers listed in contacts of call. Unable to reach.   Murphy Graham MA  August 24, 2018

## 2018-08-24 NOTE — TELEPHONE ENCOUNTER
Attempted to reach patient and inform them of the message below.  Not accepting calls at this time and unable to leave a message.  Please try again later.  Mason De Oliveira, CMA

## 2018-08-24 NOTE — TELEPHONE ENCOUNTER
"Per 08/20 phone encounter, MARIA C writes: \"I will refill the other 3 and will increase Wellbutrin to 450 mg but not sure if they will be able to fill them all early before the end of the month since she will not be due for refills but she can try. Thanks.Needs f/u in 3 months. Okay for phone visit. \"  Please review/advise on 90 day med refill.  "

## 2018-08-27 NOTE — TELEPHONE ENCOUNTER
Attempted to reach patient for 3rd attempt, patients phone is not accepting calls at this time.   Sending a letter.  Alicia Walton MA

## 2018-10-23 ENCOUNTER — TELEPHONE (OUTPATIENT)
Dept: FAMILY MEDICINE | Facility: OTHER | Age: 48
End: 2018-10-23

## 2018-10-23 NOTE — TELEPHONE ENCOUNTER
See if she would be willing to do a phone visit tomorrow at 11:00 am to discuss things further.    Raheel Barry PA-C

## 2018-10-23 NOTE — TELEPHONE ENCOUNTER
Reason for Call:  Other call back and prescription    Detailed comments: Patient called clinic asking for Meir Barry to give her a call back to discuss  Recent life-altering events have increased her anxiety and depression and she's looking to make some med changes but would like a call first. Please give patient a call back.     Phone Number Patient can be reached at: Home number on file [phone] (home)    Best Time: any    Can we leave a detailed message on this number? YES    Call taken on 10/23/2018 at 8:39 AM by Rand Walton

## 2018-10-23 NOTE — TELEPHONE ENCOUNTER
Per last phone encounter 8/24/18 we sent a letter and zuly stated on 8/20/18 he wanted to see her in three months. Please review advise.   Alicia Walton MA

## 2018-10-24 ENCOUNTER — VIRTUAL VISIT (OUTPATIENT)
Dept: FAMILY MEDICINE | Facility: OTHER | Age: 48
End: 2018-10-24
Payer: OTHER GOVERNMENT

## 2018-10-24 DIAGNOSIS — F43.23 ADJUSTMENT DISORDER WITH MIXED ANXIETY AND DEPRESSED MOOD: Primary | ICD-10-CM

## 2018-10-24 DIAGNOSIS — F41.1 GAD (GENERALIZED ANXIETY DISORDER): ICD-10-CM

## 2018-10-24 DIAGNOSIS — F43.10 PTSD (POST-TRAUMATIC STRESS DISORDER): ICD-10-CM

## 2018-10-24 DIAGNOSIS — F33.1 MAJOR DEPRESSIVE DISORDER, RECURRENT EPISODE, MODERATE (H): ICD-10-CM

## 2018-10-24 PROCEDURE — 98966 PH1 ASSMT&MGMT NQHP 5-10: CPT | Performed by: PHYSICIAN ASSISTANT

## 2018-10-24 RX ORDER — ARIPIPRAZOLE 10 MG/1
10 TABLET ORAL DAILY
Qty: 30 TABLET | Refills: 1 | Status: SHIPPED | OUTPATIENT
Start: 2018-10-24 | End: 2019-01-04

## 2018-10-24 ASSESSMENT — PATIENT HEALTH QUESTIONNAIRE - PHQ9: 5. POOR APPETITE OR OVEREATING: NEARLY EVERY DAY

## 2018-10-24 ASSESSMENT — ANXIETY QUESTIONNAIRES
2. NOT BEING ABLE TO STOP OR CONTROL WORRYING: NEARLY EVERY DAY
5. BEING SO RESTLESS THAT IT IS HARD TO SIT STILL: MORE THAN HALF THE DAYS
1. FEELING NERVOUS, ANXIOUS, OR ON EDGE: NEARLY EVERY DAY
IF YOU CHECKED OFF ANY PROBLEMS ON THIS QUESTIONNAIRE, HOW DIFFICULT HAVE THESE PROBLEMS MADE IT FOR YOU TO DO YOUR WORK, TAKE CARE OF THINGS AT HOME, OR GET ALONG WITH OTHER PEOPLE: NOT DIFFICULT AT ALL
GAD7 TOTAL SCORE: 18
6. BECOMING EASILY ANNOYED OR IRRITABLE: SEVERAL DAYS
7. FEELING AFRAID AS IF SOMETHING AWFUL MIGHT HAPPEN: NEARLY EVERY DAY
3. WORRYING TOO MUCH ABOUT DIFFERENT THINGS: NEARLY EVERY DAY

## 2018-10-24 NOTE — MR AVS SNAPSHOT
After Visit Summary   10/24/2018    Magnolia Leyva    MRN: [id number]           Patient Information     Date Of Birth          1970        Visit Information        Provider Department      10/24/2018 11:00 AM Raheel Barry PA-C [facility]        Today's Diagnoses     Adjustment disorder with mixed anxiety and depressed mood    -  1    Major depressive disorder, recurrent episode, moderate (H)        JULIEN (generalized anxiety disorder)        PTSD (post-traumatic stress disorder)           Follow-ups after your visit        Follow-up notes from your care team     Return if symptoms worsen or fail to improve.      Who to contact     If you have questions or need follow up information about today's clinic visit or your schedule please contact [facility] directly at [phone].  Normal or non-critical lab and imaging results will be communicated to you by [employer], letter or phone within 4 business days after the clinic has received the results. If you do not hear from us within 7 days, please contact the clinic through [employer] or phone. If you have a critical or abnormal lab result, we will notify you by phone as soon as possible.  Submit refill requests through "[employer]" or call your pharmacy and they will forward the refill request to us. Please allow 3 business days for your refill to be completed.          Additional Information About Your Visit        MyChart Information     "[employer] gives you secure access to your electronic health record. If you see a primary care provider, you can also send messages to your care team and make appointments. If you have questions, please call your primary care clinic.  If you do not have a primary care provider, please call [phone] and they will assist you.        Care EveryWhere ID     This is your Care EveryWhere ID. This could be used by other organizations to access your [facility]  records  [id number]         Blood Pressure from Last 3 Encounters:   06/11/18 100/68   02/05/18 102/80   01/15/18 102/72    Weight from Last 3 Encounters:   06/11/18 140 lb (63.5 kg)   02/05/18 141 lb (64 kg)   01/15/18 139 lb 12.8 oz (63.4 kg)              Today, you had the following     No orders found for display         Today's Medication Changes          These changes are accurate as of 10/24/18 11:27 AM.  If you have any questions, ask your nurse or doctor.               These medicines have changed or have updated prescriptions.        Dose/Directions    * ARIPiprazole 5 MG tablet   Commonly known as:  ABILIFY   This may have changed:  Another medication with the same name was added. Make sure you understand how and when to take each.   Used for:  PTSD (post-traumatic stress disorder), Major depressive disorder, recurrent episode, moderate (H), JULIEN (generalized anxiety disorder)   Changed by:  Raheel Barry PA-C        Take 1 tablet nightly   Quantity:  90 tablet   Refills:  1       * ARIPiprazole 10 MG tablet   Commonly known as:  ABILIFY   This may have changed:  You were already taking a medication with the same name, and this prescription was added. Make sure you understand how and when to take each.   Used for:  Adjustment disorder with mixed anxiety and depressed mood, Major depressive disorder, recurrent episode, moderate (H), JULIEN (generalized anxiety disorder)   Changed by:  Raheel Barry PA-C        Dose:  10 mg   Take 1 tablet (10 mg) by mouth daily   Quantity:  30 tablet   Refills:  1       * Notice:  This list has 2 medication(s) that are the same as other medications prescribed for you. Read the directions carefully, and ask your doctor or other care provider to review them with you.         Where to get your medicines      These medications were sent to [facility] - [place], [place] - [address]  [address] [place] [address]     Phone:   [phone]     ARIPiprazole 10 MG tablet                Primary Care Provider Office Phone # Fax #    Yudelka Hartman, APRN [facility] [phone]       [address] [place] [address]        Equal Access to Services     VIDA LOPEZ : Hadii aurora ku hadsaudo Soomaali, waaxda luqadaha, qaybta kaalmada adeegyada, waxnickolas idiin hayscarlettn adeamanda craig anna . So [facility] [phone].    ATENCIÓN: Si habla español, tiene a delcid disposición servicios gratuitos de asistencia lingüística. Llame al [phone].    We comply with applicable federal civil rights laws and [place] laws. We do not discriminate on the basis of race, color, national origin, age, disability, sex, sexual orientation, or gender identity.            Thank you!     Thank you for choosing [facility]  for your care. Our goal is always to provide you with excellent care. Hearing back from our patients is one way we can continue to improve our services. Please take a few minutes to complete the written survey that you may receive in the mail after your visit with us. Thank you!             Your Updated Medication List - Protect others around you: Learn how to safely use, store and throw away your medicines at www.disposemymeds.org.          This list is accurate as of 10/24/18 11:27 AM.  Always use your most recent med list.                   Brand Name Dispense Instructions for use Diagnosis    * ARIPiprazole 5 MG tablet    ABILIFY    90 tablet    Take 1 tablet nightly    PTSD (post-traumatic stress disorder), Major depressive disorder, recurrent episode, moderate (H), JULIEN (generalized anxiety disorder)       * ARIPiprazole 10 MG tablet    ABILIFY    30 tablet    Take 1 tablet (10 mg) by mouth daily    Adjustment disorder with mixed anxiety and depressed mood, Major depressive disorder, recurrent episode, moderate (H), JULIEN (generalized anxiety disorder)       * buPROPion 150 MG 24 hr tablet    WELLBUTRIN XL    90 tablet    Take 1  tablet (150 mg) by mouth every morning Along with the 300 mg tablet    JULIEN (generalized anxiety disorder), Major depressive disorder, recurrent episode, moderate (H)       * buPROPion 300 MG 24 hr tablet    WELLBUTRIN XL    90 tablet    Take 1 tablet (300 mg) by mouth every morning Along with the 150 mg tablet    JULIEN (generalized anxiety disorder), Major depressive disorder, recurrent episode, moderate (H), PTSD (post-traumatic stress disorder)       gabapentin 400 MG capsule    NEURONTIN    240 capsule    Take 3 capsules in the morning, 2 capsules in the afternoon, and 3 capsules in the evening    Adjustment disorder with mixed anxiety and depressed mood       lamoTRIgine 200 MG tablet    LaMICtal    90 tablet    Take 1 tablet (200 mg) by mouth daily    PTSD (post-traumatic stress disorder), JULIEN (generalized anxiety disorder), Major depressive disorder, recurrent episode, moderate (H)       levothyroxine 100 MCG tablet    SYNTHROID/LEVOTHROID    90 tablet    Take 1 tablet (100 mcg) by mouth daily    Hypothyroidism, unspecified type       multivitamin, therapeutic with minerals Tabs tablet     100 tablet    Take 1 tablet by mouth daily    Substance abuse (H)       * Notice:  This list has 4 medication(s) that are the same as other medications prescribed for you. Read the directions carefully, and ask your doctor or other care provider to review them with you.

## 2018-10-24 NOTE — PROGRESS NOTES
Magnolia Lyeva complains of    Chief Complaint   Patient presents with     Depression       I have reviewed and updated the patient's Past Medical History, Social History, Family History and Medication List.    ALLERGIES  Zoloft [sertraline]    Allegra Matt CMA  (MA signature)    PHQ9 Score: 15  GAD7 Score: 18    Additional provider notes: She has had a lot of recent changes/stressors in her life including breaking up with her boyfriend of 3 years, losing her job, and moving. She now lives in [place] and states she feels happy but still has a lot of stress and anxiety. She states the increase in Wellbutrin a few months ago has made a big difference but she is wondering if anything else can be done to further help calm down her anxiety levels. She denies any thoughts of self harm. She has been noticing a difficulty time achieving orgasm over the past year or so and wonders if this can be medication related.       Assessment/Plan:    ICD-10-CM    1. Adjustment disorder with mixed anxiety and depressed mood F43.23 ARIPiprazole (ABILIFY) 10 MG tablet   2. Major depressive disorder, recurrent episode, moderate (H) F33.1 ARIPiprazole (ABILIFY) 10 MG tablet   3. JULIEN (generalized anxiety disorder) F41.1 ARIPiprazole (ABILIFY) 10 MG tablet   4. PTSD (post-traumatic stress disorder) F43.10        She is on max dose of Wellbutrin and I do not want to increase the Lamictal but I think increasing the Abilify to 10 mg daily may help keep her mood/anxiety more stable. As far as the difficulty with orgasms, I told her that this is a common side effects of psych medications although the ones that she is on are less commonly associated with sexual dysfunction compared to SNRIs and SSRIs. I told her that her anxiety could also be causing her symptoms and it may not be medication related. She is establishing care with a new primary care provider in [place] in 1 month so I recommend she discuss this further with them  when she is seen. I can fill her medication until they take over her prescriptions but encouraged her to have a PCP up there closer to her home. She is in agreement with this plan.     I have reviewed the note as documented above.  This accurately captures the substance of my conversation with the patient.  Raheel Barry PA-C    Total time of call between patient and provider was 9 minutes     Raheel Barry PA-C

## 2018-10-25 ASSESSMENT — PATIENT HEALTH QUESTIONNAIRE - PHQ9: SUM OF ALL RESPONSES TO PHQ QUESTIONS 1-9: 15

## 2018-10-25 ASSESSMENT — ANXIETY QUESTIONNAIRES: GAD7 TOTAL SCORE: 18

## 2018-11-29 LAB — PAP-ABSTRACT: NORMAL

## 2018-12-05 ENCOUNTER — TRANSFERRED RECORDS (OUTPATIENT)
Dept: HEALTH INFORMATION MANAGEMENT | Facility: CLINIC | Age: 48
End: 2018-12-05

## 2018-12-05 LAB — HPV ABSTRACT: NORMAL

## 2019-01-04 DIAGNOSIS — F33.1 MAJOR DEPRESSIVE DISORDER, RECURRENT EPISODE, MODERATE (H): ICD-10-CM

## 2019-01-04 DIAGNOSIS — F43.23 ADJUSTMENT DISORDER WITH MIXED ANXIETY AND DEPRESSED MOOD: ICD-10-CM

## 2019-01-04 DIAGNOSIS — F43.10 PTSD (POST-TRAUMATIC STRESS DISORDER): ICD-10-CM

## 2019-01-04 DIAGNOSIS — F41.1 GAD (GENERALIZED ANXIETY DISORDER): ICD-10-CM

## 2019-01-04 RX ORDER — ARIPIPRAZOLE 10 MG/1
10 TABLET ORAL DAILY
Qty: 30 TABLET | Refills: 0 | Status: SHIPPED | OUTPATIENT
Start: 2019-01-04 | End: 2019-01-30

## 2019-01-04 RX ORDER — LAMOTRIGINE 200 MG/1
200 TABLET ORAL DAILY
Qty: 30 TABLET | Refills: 0 | Status: SHIPPED | OUTPATIENT
Start: 2019-01-04 | End: 2019-03-05

## 2019-01-04 RX ORDER — GABAPENTIN 400 MG/1
CAPSULE ORAL
Qty: 240 CAPSULE | Refills: 0 | Status: SHIPPED | OUTPATIENT
Start: 2019-01-04 | End: 2019-02-13

## 2019-01-04 NOTE — TELEPHONE ENCOUNTER
Reason for Call:  Medication or medication refill:    Do you use a [facility]?  Name of the pharmacy and phone number for the current request:  [facility]    Name of the medication requested: gabapentin (NEURONTIN) 400 MG capsule, AND lamoTRIgine (LAMICTAL) 200 MG tablet, AND ARIPiprazole (ABILIFY) 10 MG tablet    Other request: pt states did move but now moved back to the area and is going to continue seeing zuly gilliland. Pt states provider was aware she moved. Please call pt with questions or once prescriptions ready     Can we leave a detailed message on this number? YES    Phone number patient can be reached at: Other phone number:  [phone]    Best Time: ANY    Call taken on 1/4/2019 at 10:36 AM by Marycarmen Green

## 2019-01-04 NOTE — TELEPHONE ENCOUNTER
Will refill for 1 month and then she needs f/u since I have not seen her in 6 months. I also read her office note when she was seen in [place] and it sounds like she wants to get pregnant so we need to discuss her medications as some of these are not safe during pregnancy.    Raheel Barry PA-C

## 2019-01-04 NOTE — TELEPHONE ENCOUNTER
Unsure if you would like to see patient again or provide refills. See below.     Alma Rosa Cole, RN, BSN

## 2019-01-07 NOTE — TELEPHONE ENCOUNTER
Called and spoke with patient regarding message below.  Patient verbalized understanding.  Scheduled patient on 1/14/18 with MARIA C Dillon CMA (AAMA)

## 2019-01-30 DIAGNOSIS — F43.23 ADJUSTMENT DISORDER WITH MIXED ANXIETY AND DEPRESSED MOOD: ICD-10-CM

## 2019-01-30 DIAGNOSIS — F33.1 MAJOR DEPRESSIVE DISORDER, RECURRENT EPISODE, MODERATE (H): ICD-10-CM

## 2019-01-30 DIAGNOSIS — F41.1 GAD (GENERALIZED ANXIETY DISORDER): ICD-10-CM

## 2019-01-30 RX ORDER — ARIPIPRAZOLE 10 MG/1
10 TABLET ORAL DAILY
Qty: 30 TABLET | Refills: 0 | Status: SHIPPED | OUTPATIENT
Start: 2019-01-30 | End: 2019-03-05

## 2019-01-30 NOTE — TELEPHONE ENCOUNTER
Short refill sent but needs f/u for further refills, especially if she is trying to get pregnant as a lot of her medications are not safe during pregnancy. Thanks.    Raheel Barry PA-C

## 2019-01-30 NOTE — LETTER
[facility]  [address]   [facility] [address]  Phone: [phone]    02/01/19    Magnolia Gonzáles [place]  [address] [place] [address] [address]  [facility] [address]      Dear Magnolia,    We recently received a refill request for Abilify, we have sent a short refill for you but need to see you in clinic for a follow up appointment.    Please call us at [phone] to assist with scheduling an appointment or if you have any further questions or concerns.        Sincerely,      [facility]

## 2019-01-31 NOTE — TELEPHONE ENCOUNTER
Attempted to contact patient - phone number listed is no longer in service. [employer] message sent.  Allegra Matt, CMA

## 2019-02-11 DIAGNOSIS — F43.23 ADJUSTMENT DISORDER WITH MIXED ANXIETY AND DEPRESSED MOOD: ICD-10-CM

## 2019-02-11 RX ORDER — GABAPENTIN 400 MG/1
CAPSULE ORAL
Qty: 240 CAPSULE | Refills: 0 | OUTPATIENT
Start: 2019-02-11

## 2019-02-11 NOTE — TELEPHONE ENCOUNTER
Requested Prescriptions   Pending Prescriptions Disp Refills     gabapentin (NEURONTIN) 400 MG capsule 240 capsule 0     Sig: Take 3 capsules in the morning, 2 capsules in the afternoon, and 3 capsules in the evening    There is no refill protocol information for this order        gabapentin (NEURONTIN) 400 MG capsule      Last Written Prescription Date:  01/04/2019  Last Fill Quantity: 240,   # refills: 0  Last Office Visit: 10/24/2018  Future Office visit:       Routing refill request to provider for review/approval because:  Drug not on the G, P or [facility] refill protocol or controlled substance  Trudi Headley RN, BSN

## 2019-02-11 NOTE — LETTER
[facility]  [address]   [facility] [address]  Phone: [phone]    02/12/19    Magnolia Gonzáles [place]  [address] [place] [address] [address]  [facility] [address]      Dear Magnolia,     We have been trying to reach you via phone and states that the number has been changed or disconnected. We have received a refill request for your gabapentin and we need to see you in clinic for an office visit.     Please call us at [phone] to assist with scheduling.      Sincerely,      [facility]

## 2019-02-12 NOTE — TELEPHONE ENCOUNTER
Phone number listed has been changed or disconnected, "[employer] message sent.  Theresa Hartman MA

## 2019-02-13 ENCOUNTER — TELEPHONE (OUTPATIENT)
Dept: FAMILY MEDICINE | Facility: OTHER | Age: 49
End: 2019-02-13

## 2019-02-13 DIAGNOSIS — F33.1 MAJOR DEPRESSIVE DISORDER, RECURRENT EPISODE, MODERATE (H): ICD-10-CM

## 2019-02-13 DIAGNOSIS — F41.1 GAD (GENERALIZED ANXIETY DISORDER): ICD-10-CM

## 2019-02-13 DIAGNOSIS — F43.23 ADJUSTMENT DISORDER WITH MIXED ANXIETY AND DEPRESSED MOOD: ICD-10-CM

## 2019-02-13 DIAGNOSIS — F43.10 PTSD (POST-TRAUMATIC STRESS DISORDER): ICD-10-CM

## 2019-02-13 RX ORDER — BUPROPION HYDROCHLORIDE 300 MG/1
TABLET ORAL
Qty: 30 TABLET | Refills: 0 | OUTPATIENT
Start: 2019-02-13

## 2019-02-13 RX ORDER — BUPROPION HYDROCHLORIDE 300 MG/1
300 TABLET ORAL EVERY MORNING
Qty: 30 TABLET | Refills: 0 | Status: SHIPPED | OUTPATIENT
Start: 2019-02-13 | End: 2019-03-05

## 2019-02-13 RX ORDER — GABAPENTIN 400 MG/1
CAPSULE ORAL
Qty: 240 CAPSULE | Refills: 0 | Status: SHIPPED | OUTPATIENT
Start: 2019-02-13 | End: 2019-03-05

## 2019-02-13 RX ORDER — BUPROPION HYDROCHLORIDE 150 MG/1
150 TABLET ORAL EVERY MORNING
Qty: 30 TABLET | Refills: 0 | Status: SHIPPED | OUTPATIENT
Start: 2019-02-13 | End: 2019-03-05

## 2019-02-13 NOTE — TELEPHONE ENCOUNTER
"Magnolia Leyva is a 48 year old female who calls with back pain.    NURSING ASSESSMENT:  Description: Patient is having a \"flare\" of her low back pain  Onset/duration:  Yesterday  Precip. factors:  History of three herniated discs. 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   Improves/worsens symptoms:  ibuprofen  Last exam/Treatment:  On file  Allergies:   Allergies   Allergen Reactions     Zoloft [Sertraline] Rash       RECOMMENDED DISPOSITION:  See in 24 hours - Patient declines and appointment and is looking for medication over the phone. Advised a visit is necessary - she will be seen in [place] at 11. Advised she is due for a routine follow up with .   Will comply with recommendation: yes     If further questions/concerns or if Sx do not improve, worsen or new Sx develop, call your PCP or [facility] as soon as possible.    NOTES:  Disposition was determined by the first positive assessment question, therefore all previous assessment questions were negative.     Guideline used:  Telephone Triage Protocols for Nurses, Fifth Edition, Celine Cole, EDITH, BSN      "

## 2019-02-13 NOTE — TELEPHONE ENCOUNTER
Reason for Call:  Same Day Appointment, Requested Provider:  REID Arriaga     PCP: Raheel Barry    Reason for visit: low back pain, flare    Duration of symptoms: a few days    Have you been treated for this in the past? No    Additional comments: asking if Meir Barry would see her this am, as she has to be at work by 3:00.    Can we leave a detailed message on this number? YES    Phone number patient can be reached at: Cell number on file:    Telephone Information:   Mobile [phone]       Best Time: any time    Call taken on 2/13/2019 at 8:08 AM by Marina Carbajal

## 2019-02-13 NOTE — TELEPHONE ENCOUNTER
Patient called and is completely out of this medication and would like it filled today along with the Wellbutrin. Please call her once these have been sent to pharmacy so she can pick them up.

## 2019-02-13 NOTE — TELEPHONE ENCOUNTER
Meir -  She has an appt. On Monday with [facility] and is wondering if you can refill her Gabapentin and Wellbuterin as long as she has this appt.  She uses [facility] in [place].

## 2019-02-13 NOTE — TELEPHONE ENCOUNTER
"Requested Prescriptions   Pending Prescriptions Disp Refills     buPROPion (WELLBUTRIN XL) 300 MG 24 hr tablet [Pharmacy Med Name: BUPROPION XL 300MG TABLETS] 30 tablet 0     Sig: TAKE ONE TABLET BY MOUTH EVERY MORNING ALONG WITH A 150MG TABLET    [facility] Protocol Failed - 2/13/2019  9:24 AM       Failed - PHQ-9 score less than 5 in past 6 months    Please review last PHQ-9 score.   PHQ-9 SCORE 2/5/2018 6/11/2018 10/24/2018   PHQ-9 Total Score - - -   PHQ-9 Total Score MyChart 17 (Moderately severe depression) 3 (Minimal depression) -   PHQ-9 Total Score 17 3 15          Passed - Medication is Bupropion    If the medication is Bupropion (Wellbutrin), and the patient is taking for smoking cessation; OK to refill.         Passed - Medication is active on med list       Passed - Patient is age 18 or older       Passed - No active pregnancy on record       Passed - No positive pregnancy test in last 12 months       Passed - Recent (6 mo) or future (30 days) visit within the authorizing provider's specialty    Patient had office visit in the last 6 months or has a visit in the next 30 days with authorizing provider or within the authorizing provider's specialty.  See \"Patient Info\" tab in inbasket, or \"Choose Columns\" in Meds & Orders section of the refill encounter.            buPROPion (WELLBUTRIN XL) 300 MG 24 hr tablet  Patient transferred care to [facility].    buPROPion (WELLBUTRIN XL) 150 MG 24 hr tablet  Patient transferred care to [facility].    Trudi Headley RN, BSN       "

## 2019-02-14 NOTE — TELEPHONE ENCOUNTER
Reason for Call:  Other prescription    Detailed comments: pt had rx refilled yesterday and when she called the pharmacy her gabapentin wasn't there. She is out and needs it today, she works at [employer]. Please advise.     Phone Number Patient can be reached at: [phone]    Best Time: any     Can we leave a detailed message on this number? YES    Call taken on 2/14/2019 at 11:21 AM by Aaliyah Melgar

## 2019-02-14 NOTE — TELEPHONE ENCOUNTER
[facility] calling for a verbal on the Gabapentin. I gave them the verbal and they will get it ready for patient.  Allegra Matt, CMA

## 2019-02-25 ENCOUNTER — TELEPHONE (OUTPATIENT)
Dept: FAMILY MEDICINE | Facility: OTHER | Age: 49
End: 2019-02-25

## 2019-02-25 NOTE — TELEPHONE ENCOUNTER
Please abstract the following data from this visit with this patient into the appropriate field in Epic:    Pap smear done on this date: 11/29/2018 (approximately), by this group: [facility] , results in care everywhere.    Narcisa Holm     Panel Management Review      Patient has the following on her problem list:     Depression / Dysthymia review    Measure:  Needs PHQ-9 score of 4 or less during index window.  Administer PHQ-9 and if score is 5 or more, send encounter to provider for next steps.        PHQ-9 SCORE 2/5/2018 6/11/2018 10/24/2018   PHQ-9 Total Score - - -   PHQ-9 Total Score MyChart 17 (Moderately severe depression) 3 (Minimal depression) -   PHQ-9 Total Score 17 3 15       If PHQ-9 recheck is 5 or more, route to provider for next steps.    Patient is due for:  PHQ9      Composite cancer screening  Chart review shows that this patient is due/due soon for the following Pap Smear  Summary:    Patient is due/failing the following:   PAP    Action needed:   Patient needs office visit for PAP.    Type of outreach:    none per care everywhere UTD    Questions for provider review:    None                                                                                                                                    Narcisa Holm       Chart routed to Care Team .

## 2019-03-01 NOTE — PROGRESS NOTES
"  SUBJECTIVE:   Magnolia Leyva is a 48 year old female who presents to clinic today for the following health issues:      History of Present Illness     Depression & Anxiety Follow-up:     Depression/Anxiety:  Depression & Anxiety    Status since last visit::  Improved    Other associated symptoms of depression and anxiety::  None    Significant life event::  YES    Current substance use::  None       Today's PHQ-9         PHQ-9 Total Score:     (P) 2   PHQ-9 Q9 Suicidal ideation:   (P) Not at all   Thoughts of suicide or self harm:      Self-harm Plan:        Self-harm Action:          Safety concerns for self or others:       JULIEN-7 Total Score: (P) 4    Diet:  Regular (no restrictions)  Frequency of exercise:  1 day/week  Duration of exercise:  15-30 minutes  Taking medications regularly:  Yes  Medication side effects:  None  Additional concerns today:  No    Hypothyroidism Follow-up      Since last visit, patient describes the following symptoms: Weight stable, no hair loss, no skin changes, no constipation, no loose stools    Problem list and histories reviewed & adjusted, as indicated.  Additional history: as documented        Patient Active Problem List   Diagnosis     Adjustment disorder with mixed anxiety and depressed mood     Acquired hypothyroidism     Bilateral carpal tunnel syndrome     Carpal tunnel syndrome of left wrist     Valium overdose     Overdose of Valium, intentional self-harm, subsequent encounter     Major depressive disorder, recurrent episode, mild (H)     PTSD (post-traumatic stress disorder)     Past Surgical History:   Procedure Laterality Date     ORTHOPEDIC SURGERY         Social History     Tobacco Use     Smoking status: Never Smoker     Smokeless tobacco: Never Used   Substance Use Topics     Alcohol use: Yes     Alcohol/week: 0.0 oz     Comment: \"once every three months\"     Family History   Problem Relation Age of Onset     Depression/Anxiety Mother      " "Alcohol/Drug Father         Pancreatic CA         Current Outpatient Medications   Medication Sig Dispense Refill     ARIPiprazole (ABILIFY) 10 MG tablet Take 1 tablet (10 mg) by mouth daily 90 tablet 1     buPROPion (WELLBUTRIN XL) 150 MG 24 hr tablet Take 1 tablet (150 mg) by mouth every morning Along with the 300 mg tablet 90 tablet 1     buPROPion (WELLBUTRIN XL) 300 MG 24 hr tablet Take 1 tablet (300 mg) by mouth every morning Along with the 150 mg tablet 90 tablet 1     gabapentin (NEURONTIN) 400 MG capsule Take 3 capsules in the morning, 2 capsules in the afternoon, and 3 capsules in the evening 240 capsule 5     lamoTRIgine (LAMICTAL) 200 MG tablet Take 1 tablet (200 mg) by mouth daily 90 tablet 1     levothyroxine (SYNTHROID/LEVOTHROID) 100 MCG tablet Take 1 tablet (100 mcg) by mouth daily 90 tablet 3     multivitamin, therapeutic with minerals (MULTI-VITAMIN) TABS Take 1 tablet by mouth daily 100 tablet 3     Allergies   Allergen Reactions     Zoloft [Sertraline] Rash     BP Readings from Last 3 Encounters:   03/05/19 110/74   06/11/18 100/68   02/05/18 102/80    Wt Readings from Last 3 Encounters:   03/05/19 67.1 kg (148 lb)   06/11/18 63.5 kg (140 lb)   02/05/18 64 kg (141 lb)                  Labs reviewed in EPIC    ROS:  Constitutional, HEENT, cardiovascular, pulmonary, GI, , musculoskeletal, neuro, skin, endocrine and psych systems are negative, except as otherwise noted.    OBJECTIVE:     /74   Pulse 90   Temp 97.8  F (36.6  C) (Temporal)   Resp 16   Ht 1.61 m (5' 3.39\")   Wt 67.1 kg (148 lb)   LMP 02/28/2019 (Exact Date)   SpO2 100%   Breastfeeding? No   BMI 25.90 kg/m    Body mass index is 25.9 kg/m .   Physical Exam   Constitutional: She appears well-developed and well-nourished.   HENT:   Head: Normocephalic and atraumatic.   Neck: No thyromegaly present.   Cardiovascular: Normal rate, regular rhythm and normal heart sounds. Exam reveals no gallop and no friction rub.   No " murmur heard.  Pulmonary/Chest: Effort normal and breath sounds normal.   Psychiatric: She has a normal mood and affect. Her behavior is normal. Judgment and thought content normal.         Diagnostic Test Results:  none     ASSESSMENT/PLAN:     Problem List Items Addressed This Visit     Adjustment disorder with mixed anxiety and depressed mood    Relevant Medications    ARIPiprazole (ABILIFY) 10 MG tablet    gabapentin (NEURONTIN) 400 MG capsule    Acquired hypothyroidism     No current symptoms  Recheck TSH. Normal range  Continue levothyroxine at the same dose . Refill meds asneeded         Major depressive disorder, recurrent episode, mild (H)     Symptoms in remission. Well controlled on the current dose of lamictal, wellbutrin -450mg daily and abilify.   Check CBC and chemistries today  Refill meds  Recheck in 3 months         Relevant Medications    buPROPion (WELLBUTRIN XL) 300 MG 24 hr tablet    buPROPion (WELLBUTRIN XL) 150 MG 24 hr tablet    PTSD (post-traumatic stress disorder)    Relevant Medications    lamoTRIgine (LAMICTAL) 200 MG tablet    buPROPion (WELLBUTRIN XL) 300 MG 24 hr tablet    buPROPion (WELLBUTRIN XL) 150 MG 24 hr tablet    gabapentin (NEURONTIN) 400 MG capsule    Other Relevant Orders    CBC with platelets and differential (Completed)    Comprehensive metabolic panel (Completed)    TSH with free T4 reflex (Completed)      Other Visit Diagnoses     JULIEN (generalized anxiety disorder)    -  Primary    Relevant Medications    lamoTRIgine (LAMICTAL) 200 MG tablet    buPROPion (WELLBUTRIN XL) 300 MG 24 hr tablet    buPROPion (WELLBUTRIN XL) 150 MG 24 hr tablet    ARIPiprazole (ABILIFY) 10 MG tablet    gabapentin (NEURONTIN) 400 MG capsule    Other Relevant Orders    CBC with platelets and differential (Completed)    Comprehensive metabolic panel (Completed)    TSH with free T4 reflex (Completed)    Need for prophylactic vaccination and inoculation against influenza        Major depressive  disorder, recurrent episode, moderate (H)        Relevant Medications    lamoTRIgine (LAMICTAL) 200 MG tablet    buPROPion (WELLBUTRIN XL) 300 MG 24 hr tablet    buPROPion (WELLBUTRIN XL) 150 MG 24 hr tablet    ARIPiprazole (ABILIFY) 10 MG tablet    Hypothyroidism, unspecified type                 Olga Lidia Abdalla MD  [facility]  Answers for HPI/ROS submitted by the patient on 3/5/2019   Chronic problems general questions HPI Form  JULIEN 7 TOTAL SCORE: 4  If you checked off any problems, how difficult have these problems made it for you to do your work, take care of things at home, or get along with other people?: Somewhat difficult  PHQ9 TOTAL SCORE: 2

## 2019-03-05 ENCOUNTER — OFFICE VISIT (OUTPATIENT)
Dept: FAMILY MEDICINE | Facility: OTHER | Age: 49
End: 2019-03-05
Payer: OTHER GOVERNMENT

## 2019-03-05 VITALS
BODY MASS INDEX: 26.22 KG/M2 | HEIGHT: 63 IN | TEMPERATURE: 97.8 F | RESPIRATION RATE: 16 BRPM | SYSTOLIC BLOOD PRESSURE: 110 MMHG | DIASTOLIC BLOOD PRESSURE: 74 MMHG | WEIGHT: 148 LBS | OXYGEN SATURATION: 100 % | HEART RATE: 90 BPM

## 2019-03-05 DIAGNOSIS — E03.9 HYPOTHYROIDISM, UNSPECIFIED TYPE: ICD-10-CM

## 2019-03-05 DIAGNOSIS — F33.0 MAJOR DEPRESSIVE DISORDER, RECURRENT EPISODE, MILD (H): ICD-10-CM

## 2019-03-05 DIAGNOSIS — F41.1 GAD (GENERALIZED ANXIETY DISORDER): Primary | ICD-10-CM

## 2019-03-05 DIAGNOSIS — F33.1 MAJOR DEPRESSIVE DISORDER, RECURRENT EPISODE, MODERATE (H): ICD-10-CM

## 2019-03-05 DIAGNOSIS — E03.9 ACQUIRED HYPOTHYROIDISM: ICD-10-CM

## 2019-03-05 DIAGNOSIS — F43.10 PTSD (POST-TRAUMATIC STRESS DISORDER): ICD-10-CM

## 2019-03-05 DIAGNOSIS — F43.23 ADJUSTMENT DISORDER WITH MIXED ANXIETY AND DEPRESSED MOOD: ICD-10-CM

## 2019-03-05 DIAGNOSIS — Z23 NEED FOR PROPHYLACTIC VACCINATION AND INOCULATION AGAINST INFLUENZA: ICD-10-CM

## 2019-03-05 LAB
ALBUMIN SERPL-MCNC: 4 G/DL (ref 3.4–5)
ALP SERPL-CCNC: 60 U/L (ref 40–150)
ALT SERPL W P-5'-P-CCNC: 23 U/L (ref 0–50)
ANION GAP SERPL CALCULATED.3IONS-SCNC: 7 MMOL/L (ref 3–14)
AST SERPL W P-5'-P-CCNC: 17 U/L (ref 0–45)
BASOPHILS # BLD AUTO: 0 10E9/L (ref 0–0.2)
BASOPHILS NFR BLD AUTO: 0.5 %
BILIRUB SERPL-MCNC: 0.5 MG/DL (ref 0.2–1.3)
BUN SERPL-MCNC: 18 MG/DL (ref 7–30)
CALCIUM SERPL-MCNC: 8.2 MG/DL (ref 8.5–10.1)
CHLORIDE SERPL-SCNC: 105 MMOL/L (ref 94–109)
CO2 SERPL-SCNC: 27 MMOL/L (ref 20–32)
CREAT SERPL-MCNC: 0.66 MG/DL (ref 0.52–1.04)
DIFFERENTIAL METHOD BLD: ABNORMAL
EOSINOPHIL # BLD AUTO: 0.3 10E9/L (ref 0–0.7)
EOSINOPHIL NFR BLD AUTO: 3.8 %
ERYTHROCYTE [DISTWIDTH] IN BLOOD BY AUTOMATED COUNT: 12.9 % (ref 10–15)
GFR SERPL CREATININE-BSD FRML MDRD: >90 ML/MIN/{1.73_M2}
GLUCOSE SERPL-MCNC: 109 MG/DL (ref 70–99)
HCT VFR BLD AUTO: 40.4 % (ref 35–47)
HGB BLD-MCNC: 12.8 G/DL (ref 11.7–15.7)
LYMPHOCYTES # BLD AUTO: 1.8 10E9/L (ref 0.8–5.3)
LYMPHOCYTES NFR BLD AUTO: 24 %
MCH RBC QN AUTO: 31.9 PG (ref 26.5–33)
MCHC RBC AUTO-ENTMCNC: 31.7 G/DL (ref 31.5–36.5)
MCV RBC AUTO: 101 FL (ref 78–100)
MONOCYTES # BLD AUTO: 0.8 10E9/L (ref 0–1.3)
MONOCYTES NFR BLD AUTO: 10.8 %
NEUTROPHILS # BLD AUTO: 4.5 10E9/L (ref 1.6–8.3)
NEUTROPHILS NFR BLD AUTO: 60.9 %
PLATELET # BLD AUTO: 419 10E9/L (ref 150–450)
POTASSIUM SERPL-SCNC: 4.6 MMOL/L (ref 3.4–5.3)
PROT SERPL-MCNC: 7 G/DL (ref 6.8–8.8)
RBC # BLD AUTO: 4.01 10E12/L (ref 3.8–5.2)
SODIUM SERPL-SCNC: 139 MMOL/L (ref 133–144)
TSH SERPL DL<=0.005 MIU/L-ACNC: 1.29 MU/L (ref 0.4–4)
WBC # BLD AUTO: 7.4 10E9/L (ref 4–11)

## 2019-03-05 PROCEDURE — 80053 COMPREHEN METABOLIC PANEL: CPT | Performed by: FAMILY MEDICINE

## 2019-03-05 PROCEDURE — 99214 OFFICE O/P EST MOD 30 MIN: CPT | Performed by: FAMILY MEDICINE

## 2019-03-05 PROCEDURE — 80050 GENERAL HEALTH PANEL: CPT | Performed by: FAMILY MEDICINE

## 2019-03-05 RX ORDER — GABAPENTIN 400 MG/1
CAPSULE ORAL
Qty: 240 CAPSULE | Refills: 5 | Status: SHIPPED | OUTPATIENT
Start: 2019-03-05 | End: 2019-06-27

## 2019-03-05 RX ORDER — BUPROPION HYDROCHLORIDE 150 MG/1
150 TABLET ORAL EVERY MORNING
Qty: 90 TABLET | Refills: 1 | Status: SHIPPED | OUTPATIENT
Start: 2019-03-05 | End: 2019-06-27

## 2019-03-05 RX ORDER — ARIPIPRAZOLE 10 MG/1
10 TABLET ORAL DAILY
Qty: 90 TABLET | Refills: 1 | Status: SHIPPED | OUTPATIENT
Start: 2019-03-05 | End: 2019-06-27

## 2019-03-05 RX ORDER — BUPROPION HYDROCHLORIDE 300 MG/1
300 TABLET ORAL EVERY MORNING
Qty: 90 TABLET | Refills: 1 | Status: SHIPPED | OUTPATIENT
Start: 2019-03-05 | End: 2019-06-27

## 2019-03-05 RX ORDER — LAMOTRIGINE 200 MG/1
200 TABLET ORAL DAILY
Qty: 90 TABLET | Refills: 1 | Status: SHIPPED | OUTPATIENT
Start: 2019-03-05 | End: 2019-06-27

## 2019-03-05 ASSESSMENT — PAIN SCALES - GENERAL: PAINLEVEL: NO PAIN (0)

## 2019-03-05 ASSESSMENT — PATIENT HEALTH QUESTIONNAIRE - PHQ9
SUM OF ALL RESPONSES TO PHQ QUESTIONS 1-9: 2
SUM OF ALL RESPONSES TO PHQ QUESTIONS 1-9: 2
10. IF YOU CHECKED OFF ANY PROBLEMS, HOW DIFFICULT HAVE THESE PROBLEMS MADE IT FOR YOU TO DO YOUR WORK, TAKE CARE OF THINGS AT HOME, OR GET ALONG WITH OTHER PEOPLE: SOMEWHAT DIFFICULT

## 2019-03-05 ASSESSMENT — ANXIETY QUESTIONNAIRES
1. FEELING NERVOUS, ANXIOUS, OR ON EDGE: MORE THAN HALF THE DAYS
5. BEING SO RESTLESS THAT IT IS HARD TO SIT STILL: NOT AT ALL
3. WORRYING TOO MUCH ABOUT DIFFERENT THINGS: SEVERAL DAYS
GAD7 TOTAL SCORE: 4
4. TROUBLE RELAXING: NOT AT ALL
GAD7 TOTAL SCORE: 4
2. NOT BEING ABLE TO STOP OR CONTROL WORRYING: SEVERAL DAYS
7. FEELING AFRAID AS IF SOMETHING AWFUL MIGHT HAPPEN: NOT AT ALL
GAD7 TOTAL SCORE: 4
7. FEELING AFRAID AS IF SOMETHING AWFUL MIGHT HAPPEN: NOT AT ALL
6. BECOMING EASILY ANNOYED OR IRRITABLE: NOT AT ALL

## 2019-03-05 ASSESSMENT — MIFFLIN-ST. JEOR: SCORE: 1281.57

## 2019-03-06 ASSESSMENT — PATIENT HEALTH QUESTIONNAIRE - PHQ9: SUM OF ALL RESPONSES TO PHQ QUESTIONS 1-9: 2

## 2019-03-06 ASSESSMENT — ANXIETY QUESTIONNAIRES: GAD7 TOTAL SCORE: 4

## 2019-03-06 NOTE — ASSESSMENT & PLAN NOTE
Symptoms in remission. Well controlled on the current dose of lamictal, wellbutrin -450mg daily and abilify.   Check CBC and chemistries today  Refill meds  Recheck in 3 months

## 2019-03-06 NOTE — ASSESSMENT & PLAN NOTE
No current symptoms  Recheck TSH. Normal range  Continue levothyroxine at the same dose . Refill meds asneeded

## 2019-03-11 DIAGNOSIS — F43.10 PTSD (POST-TRAUMATIC STRESS DISORDER): ICD-10-CM

## 2019-03-11 DIAGNOSIS — F43.23 ADJUSTMENT DISORDER WITH MIXED ANXIETY AND DEPRESSED MOOD: ICD-10-CM

## 2019-03-11 DIAGNOSIS — F41.1 GAD (GENERALIZED ANXIETY DISORDER): ICD-10-CM

## 2019-03-11 DIAGNOSIS — F33.1 MAJOR DEPRESSIVE DISORDER, RECURRENT EPISODE, MODERATE (H): ICD-10-CM

## 2019-03-11 RX ORDER — BUPROPION HYDROCHLORIDE 300 MG/1
300 TABLET ORAL EVERY MORNING
Qty: 90 TABLET | Refills: 1 | Status: CANCELLED | OUTPATIENT
Start: 2019-03-11

## 2019-03-11 RX ORDER — GABAPENTIN 400 MG/1
CAPSULE ORAL
Qty: 240 CAPSULE | Refills: 5 | Status: CANCELLED | OUTPATIENT
Start: 2019-03-11

## 2019-03-11 RX ORDER — ARIPIPRAZOLE 10 MG/1
10 TABLET ORAL DAILY
Qty: 90 TABLET | Refills: 1 | Status: CANCELLED | OUTPATIENT
Start: 2019-03-11

## 2019-03-11 RX ORDER — BUPROPION HYDROCHLORIDE 150 MG/1
150 TABLET ORAL EVERY MORNING
Qty: 90 TABLET | Refills: 1 | Status: CANCELLED | OUTPATIENT
Start: 2019-03-11

## 2019-03-11 NOTE — TELEPHONE ENCOUNTER
Left message asking pt to return call. Barbara in [place] is going to have the prescriptions transferred from [facility].    Sera Dickinson, RN, BSN

## 2019-03-11 NOTE — TELEPHONE ENCOUNTER
These were sent to [facility] pharmacy on 03/05. Can you send these to [facility] and we can cancel them at ?

## 2019-03-11 NOTE — TELEPHONE ENCOUNTER
Reason for Call:  Medication or medication refill:    Do you use a [facility]?  Name of the pharmacy and phone number for the current request:  [facility]    Name of the medication requested: Abilify gabapentin and Wellbutrin    Other request: will need refill. States pharmacy faxed also    Can we leave a detailed message on this number? YES    Phone number patient can be reached at: Home number on file [phone] (home)    Best Time: any    Call taken on 3/11/2019 at 12:24 PM by Taylor Owusu

## 2019-06-27 ENCOUNTER — TELEPHONE (OUTPATIENT)
Dept: FAMILY MEDICINE | Facility: OTHER | Age: 49
End: 2019-06-27

## 2019-06-27 DIAGNOSIS — E03.9 HYPOTHYROIDISM, UNSPECIFIED TYPE: ICD-10-CM

## 2019-06-27 DIAGNOSIS — F43.10 PTSD (POST-TRAUMATIC STRESS DISORDER): ICD-10-CM

## 2019-06-27 DIAGNOSIS — F33.1 MAJOR DEPRESSIVE DISORDER, RECURRENT EPISODE, MODERATE (H): ICD-10-CM

## 2019-06-27 DIAGNOSIS — F43.23 ADJUSTMENT DISORDER WITH MIXED ANXIETY AND DEPRESSED MOOD: ICD-10-CM

## 2019-06-27 DIAGNOSIS — F41.1 GAD (GENERALIZED ANXIETY DISORDER): ICD-10-CM

## 2019-06-27 RX ORDER — BUPROPION HYDROCHLORIDE 300 MG/1
300 TABLET ORAL EVERY MORNING
Qty: 90 TABLET | Refills: 0 | Status: SHIPPED | OUTPATIENT
Start: 2019-06-27 | End: 2019-06-27

## 2019-06-27 RX ORDER — BUPROPION HYDROCHLORIDE 150 MG/1
150 TABLET ORAL EVERY MORNING
Qty: 90 TABLET | Refills: 0 | Status: SHIPPED | OUTPATIENT
Start: 2019-06-27 | End: 2019-06-27

## 2019-06-27 RX ORDER — GABAPENTIN 400 MG/1
CAPSULE ORAL
Qty: 240 CAPSULE | Refills: 0 | Status: SHIPPED | OUTPATIENT
Start: 2019-06-27 | End: 2019-11-13

## 2019-06-27 RX ORDER — BUPROPION HYDROCHLORIDE 300 MG/1
300 TABLET ORAL EVERY MORNING
Qty: 90 TABLET | Refills: 0 | Status: SHIPPED | OUTPATIENT
Start: 2019-06-27 | End: 2019-11-13

## 2019-06-27 RX ORDER — ARIPIPRAZOLE 10 MG/1
10 TABLET ORAL DAILY
Qty: 30 TABLET | Refills: 0 | Status: SHIPPED | OUTPATIENT
Start: 2019-06-27 | End: 2019-11-13

## 2019-06-27 RX ORDER — LEVOTHYROXINE SODIUM 100 UG/1
100 TABLET ORAL DAILY
Qty: 90 TABLET | Refills: 0 | Status: SHIPPED | OUTPATIENT
Start: 2019-06-27 | End: 2019-06-27

## 2019-06-27 RX ORDER — BUPROPION HYDROCHLORIDE 150 MG/1
150 TABLET ORAL EVERY MORNING
Qty: 90 TABLET | Refills: 0 | Status: SHIPPED | OUTPATIENT
Start: 2019-06-27 | End: 2019-11-13

## 2019-06-27 RX ORDER — LEVOTHYROXINE SODIUM 100 UG/1
100 TABLET ORAL DAILY
Qty: 90 TABLET | Refills: 0 | Status: SHIPPED | OUTPATIENT
Start: 2019-06-27 | End: 2019-09-23

## 2019-06-27 RX ORDER — LAMOTRIGINE 200 MG/1
200 TABLET ORAL DAILY
Qty: 30 TABLET | Refills: 0 | Status: SHIPPED | OUTPATIENT
Start: 2019-06-27 | End: 2019-11-13

## 2019-06-27 NOTE — TELEPHONE ENCOUNTER
Prescription approved per [facility] Refill Protocol.switching to mail order  Re-remaining to provider to review as not on protocol.      Carlos Miles RN, BSN

## 2019-06-27 NOTE — TELEPHONE ENCOUNTER
Reason for Call:  Medication or medication refill:    Do you use a [facility]?  Name of the pharmacy and phone number for the current request:  meds by mail     Name of the medication requested: Abilify lamotrigine gabapentin levothyroxine bupropion    Other request: will need refill    Can we leave a detailed message on this number? YES    Phone number patient can be reached at: Home number on file [phone] (home)    Best Time: any    Call taken on 6/27/2019 at 11:01 AM by Taylor Owusu

## 2019-06-27 NOTE — TELEPHONE ENCOUNTER
Left message for patient to return call. Did not leave detailed message as there was not a well defined voicemail. Please see message below and assist in scheduling OV  Priscilla Magallanes CMA

## 2019-09-23 DIAGNOSIS — E03.9 HYPOTHYROIDISM, UNSPECIFIED TYPE: ICD-10-CM

## 2019-09-24 RX ORDER — LEVOTHYROXINE SODIUM 100 UG/1
TABLET ORAL
Qty: 90 TABLET | Refills: 1 | Status: SHIPPED | OUTPATIENT
Start: 2019-09-24 | End: 2020-04-03

## 2019-09-24 NOTE — TELEPHONE ENCOUNTER
Pending Prescriptions:                       Disp   Refills    levothyroxine (SYNTHROID/LEVOTHROID) 100 *30 tab*4            Sig: TAKE ONE TABLET BY MOUTH ONCE DAILY    Prescription approved per [facility] Refill Protocol.      Jo Ann Sandoval, RN, BSN

## 2019-11-12 NOTE — PROGRESS NOTES
"Subjective     Magnolia Leyva is a 49 year old female who presents to clinic today for the following health issues:    History of Present Illness        Mental Health Follow-up:  Patient presents to follow-up on Depression & Anxiety.Patient's depression since last visit has been:  No change  The patient is not having other symptoms associated with depression.  Patient's anxiety since last visit has been:  No change  The patient is not having other symptoms associated with anxiety.  Any significant life events: No  Patient is feeling anxious or having panic attacks.  Patient has no concerns about alcohol or drug use.     Social History  Tobacco Use    Smoking status: Never Smoker    Smokeless tobacco: Never Used  Alcohol use: Yes    Alcohol/week: 0.0 standard drinks    Comment: \"once every three months\"  Drug use: No      Today's PHQ-9         PHQ-9 Total Score:     (P) 4   PHQ-9 Q9 Thoughts of better off dead/self-harm past 2 weeks :   (P) Not at all   Thoughts of suicide or self harm:      Self-harm Plan:        Self-harm Action:          Safety concerns for self or others:           Hypothyroidism:     Since last visit, patient describes the following symptoms::  Anxiety, Depression and Fatigue    She eats 0-1 servings of fruits and vegetables daily.She consumes 1 sweetened beverage(s) daily.She is missing 1 dose(s) of medications per week.  She is not taking prescribed medications regularly due to remembering to take.       Answers for HPI/ROS submitted by the patient on 11/13/2019   Chronic problems general questions HPI Form  If you checked off any problems, how difficult have these problems made it for you to do your work, take care of things at home, or get along with other people?: Not difficult at all  PHQ9 TOTAL SCORE: 4  JULIEN 7 TOTAL SCORE: 10    Feels like her mood is relatively stable, she is happy with the regimen of medication. She does feel fatigued but just attributes that to her " "thyroid and her mood.  She denies any side effects on the medication.      She has chronic back pain. She has taken naproxen 1 tablet most days for the pain.  She doesn't want to have an injection, did have one in the past.  She tried cyclobenzaprine but that didn't help she is wondering about any other options for muscle relaxer.     Patient Active Problem List   Diagnosis     Adjustment disorder with mixed anxiety and depressed mood     Acquired hypothyroidism     Bilateral carpal tunnel syndrome     Carpal tunnel syndrome of left wrist     Valium overdose     Overdose of Valium, intentional self-harm, subsequent encounter     Major depressive disorder, recurrent episode, mild (H)     PTSD (post-traumatic stress disorder)     Past Surgical History:   Procedure Laterality Date     ORTHOPEDIC SURGERY         Social History     Tobacco Use     Smoking status: Never Smoker     Smokeless tobacco: Never Used   Substance Use Topics     Alcohol use: Yes     Alcohol/week: 0.0 standard drinks     Comment: \"once every three months\"     Family History   Problem Relation Age of Onset     Depression/Anxiety Mother      Alcohol/Drug Father         Pancreatic CA         Current Outpatient Medications   Medication Sig Dispense Refill     ARIPiprazole (ABILIFY) 10 MG tablet Take 1.5 tablets (15 mg) by mouth daily 135 tablet 1     buPROPion (WELLBUTRIN XL) 150 MG 24 hr tablet Take 1 tablet (150 mg) by mouth every morning Along with the 300 mg tablet 90 tablet 1     buPROPion (WELLBUTRIN XL) 300 MG 24 hr tablet Take 1 tablet (300 mg) by mouth every morning Along with the 150 mg tablet 90 tablet 1     gabapentin (NEURONTIN) 400 MG capsule Take 3 capsules in the morning, 2 capsules in the afternoon, and 3 capsules in the evening 240 capsule 1     lamoTRIgine (LAMICTAL) 200 MG tablet Take 1 tablet (200 mg) by mouth daily 90 tablet 1     levothyroxine (SYNTHROID/LEVOTHROID) 100 MCG tablet TAKE ONE TABLET BY MOUTH ONCE DAILY 90 tablet 1 " "    methocarbamol (ROBAXIN) 500 MG tablet Take 1 tablet (500 mg) by mouth 3 times daily 90 tablet 1     multivitamin, therapeutic with minerals (MULTI-VITAMIN) TABS Take 1 tablet by mouth daily 100 tablet 3     naproxen (NAPROSYN) 500 MG tablet TAKE ONE TABLET BY MOUTH TWO TIMES A DAY WITH MEALS 180 tablet 2     Allergies   Allergen Reactions     Zoloft [Sertraline] Rash       Reviewed and updated as needed this visit by Provider         Review of Systems   ROS COMP: Constitutional, HEENT, cardiovascular, pulmonary, GI, , musculoskeletal, neuro, skin, endocrine and psych systems are negative, except as otherwise noted.      Objective    /76   Pulse 94   Temp 98.5  F (36.9  C) (Temporal)   Resp 14   Ht 1.605 m (5' 3.19\")   LMP 11/08/2019 (Exact Date)   SpO2 100%   BMI 26.06 kg/m    Body mass index is 26.06 kg/m .  Physical Exam   GENERAL: healthy, alert and no distress  RESP: lungs clear to auscultation - no rales, rhonchi or wheezes  CV: regular rate and rhythm, normal S1 S2, no S3 or S4, no murmur, click or rub, no peripheral edema and peripheral pulses strong  MS: no gross musculoskeletal defects noted, no edema  SKIN: no suspicious lesions or rashes  NEURO: Normal strength and tone, mentation intact and speech normal  PSYCH: mentation appears normal, affect normal/bright    Diagnostic Test Results:  Labs reviewed in Epic  Results for orders placed or performed in visit on 11/13/19 (from the past 24 hour(s))   TSH with free T4 reflex   Result Value Ref Range    TSH 2.73 0.40 - 4.00 mU/L   CBC with platelets differential   Result Value Ref Range    WBC 6.2 4.0 - 11.0 10e9/L    RBC Count 3.96 3.8 - 5.2 10e12/L    Hemoglobin 12.3 11.7 - 15.7 g/dL    Hematocrit 37.8 35.0 - 47.0 %    MCV 96 78 - 100 fl    MCH 31.1 26.5 - 33.0 pg    MCHC 32.5 31.5 - 36.5 g/dL    RDW 12.4 10.0 - 15.0 %    Platelet Count 404 150 - 450 10e9/L    % Neutrophils 52.3 %    % Lymphocytes 31.8 %    % Monocytes 12.1 %    % " Eosinophils 3.2 %    % Basophils 0.6 %    Absolute Neutrophil 3.2 1.6 - 8.3 10e9/L    Absolute Lymphocytes 2.0 0.8 - 5.3 10e9/L    Absolute Monocytes 0.8 0.0 - 1.3 10e9/L    Absolute Eosinophils 0.2 0.0 - 0.7 10e9/L    Absolute Basophils 0.0 0.0 - 0.2 10e9/L    Diff Method Automated Method    Basic metabolic panel   Result Value Ref Range    Sodium 139 133 - 144 mmol/L    Potassium 3.9 3.4 - 5.3 mmol/L    Chloride 103 94 - 109 mmol/L    Carbon Dioxide 31 20 - 32 mmol/L    Anion Gap 5 3 - 14 mmol/L    Glucose 64 (L) 70 - 99 mg/dL    Urea Nitrogen 12 7 - 30 mg/dL    Creatinine 0.67 0.52 - 1.04 mg/dL    GFR Estimate >90 >60 mL/min/[1.73_m2]    GFR Estimate If Black >90 >60 mL/min/[1.73_m2]    Calcium 8.6 8.5 - 10.1 mg/dL           Assessment & Plan       ICD-10-CM    1. Major depressive disorder, recurrent episode, mild (H) F33.0    2. PTSD (post-traumatic stress disorder) F43.10 lamoTRIgine (LAMICTAL) 200 MG tablet     gabapentin (NEURONTIN) 400 MG capsule     buPROPion (WELLBUTRIN XL) 300 MG 24 hr tablet   3. JULIEN (generalized anxiety disorder) F41.1 lamoTRIgine (LAMICTAL) 200 MG tablet     gabapentin (NEURONTIN) 400 MG capsule     buPROPion (WELLBUTRIN XL) 300 MG 24 hr tablet     buPROPion (WELLBUTRIN XL) 150 MG 24 hr tablet     ARIPiprazole (ABILIFY) 10 MG tablet     Lamotrigine Level     CBC with platelets differential     Basic metabolic panel   4. Major depressive disorder, recurrent episode, moderate (H) F33.1 lamoTRIgine (LAMICTAL) 200 MG tablet     buPROPion (WELLBUTRIN XL) 300 MG 24 hr tablet     buPROPion (WELLBUTRIN XL) 150 MG 24 hr tablet     ARIPiprazole (ABILIFY) 10 MG tablet     Lamotrigine Level     CBC with platelets differential     Basic metabolic panel   5. Adjustment disorder with mixed anxiety and depressed mood F43.23 gabapentin (NEURONTIN) 400 MG capsule     ARIPiprazole (ABILIFY) 10 MG tablet   6. Hypothyroidism, unspecified type E03.9 TSH with free T4 reflex   7. Chronic midline low back pain  without sciatica M54.5 naproxen (NAPROSYN) 500 MG tablet    G89.29 methocarbamol (ROBAXIN) 500 MG tablet   8. Visit for screening mammogram Z12.31 *MA Screening Digital Bilateral          Repeated patient's labs to rule out other causes for her fatigue such as hypothyroidism but luckily this was normal and will maintain her on current dose of levothyroxine.  No anemia concerns.  Her kidney function is stable, glucose was slightly low but patient was asymptomatic.  Her lamictal level is pending.     She has been doing well with the medication admits her depression could be better.  Will increase dose of Abilify to 15 mg daily.  Recheck in 4 weeks, sooner if any concerns.     Return in about 4 weeks (around 12/11/2019) for Medication Re-check.    Options for treatment and follow-up care were reviewed with the patient and/or guardian. Patient and/or guardian engaged in the decision making process and verbalized understanding of the options discussed and agreed with the final plan.    Simran Barreto PA-C  [facility]

## 2019-11-13 ENCOUNTER — OFFICE VISIT (OUTPATIENT)
Dept: FAMILY MEDICINE | Facility: OTHER | Age: 49
End: 2019-11-13
Payer: OTHER GOVERNMENT

## 2019-11-13 VITALS
DIASTOLIC BLOOD PRESSURE: 76 MMHG | BODY MASS INDEX: 26.06 KG/M2 | RESPIRATION RATE: 14 BRPM | HEIGHT: 63 IN | TEMPERATURE: 98.5 F | SYSTOLIC BLOOD PRESSURE: 114 MMHG | HEART RATE: 94 BPM | OXYGEN SATURATION: 100 %

## 2019-11-13 DIAGNOSIS — F33.1 MAJOR DEPRESSIVE DISORDER, RECURRENT EPISODE, MODERATE (H): ICD-10-CM

## 2019-11-13 DIAGNOSIS — F41.1 GAD (GENERALIZED ANXIETY DISORDER): ICD-10-CM

## 2019-11-13 DIAGNOSIS — Z12.31 VISIT FOR SCREENING MAMMOGRAM: ICD-10-CM

## 2019-11-13 DIAGNOSIS — F43.10 PTSD (POST-TRAUMATIC STRESS DISORDER): ICD-10-CM

## 2019-11-13 DIAGNOSIS — F43.23 ADJUSTMENT DISORDER WITH MIXED ANXIETY AND DEPRESSED MOOD: ICD-10-CM

## 2019-11-13 DIAGNOSIS — G89.29 CHRONIC MIDLINE LOW BACK PAIN WITHOUT SCIATICA: ICD-10-CM

## 2019-11-13 DIAGNOSIS — M54.50 CHRONIC MIDLINE LOW BACK PAIN WITHOUT SCIATICA: ICD-10-CM

## 2019-11-13 DIAGNOSIS — E03.9 HYPOTHYROIDISM, UNSPECIFIED TYPE: ICD-10-CM

## 2019-11-13 DIAGNOSIS — F33.0 MAJOR DEPRESSIVE DISORDER, RECURRENT EPISODE, MILD (H): Primary | ICD-10-CM

## 2019-11-13 LAB
ANION GAP SERPL CALCULATED.3IONS-SCNC: 5 MMOL/L (ref 3–14)
BASOPHILS # BLD AUTO: 0 10E9/L (ref 0–0.2)
BASOPHILS NFR BLD AUTO: 0.6 %
BUN SERPL-MCNC: 12 MG/DL (ref 7–30)
CALCIUM SERPL-MCNC: 8.6 MG/DL (ref 8.5–10.1)
CHLORIDE SERPL-SCNC: 103 MMOL/L (ref 94–109)
CO2 SERPL-SCNC: 31 MMOL/L (ref 20–32)
CREAT SERPL-MCNC: 0.67 MG/DL (ref 0.52–1.04)
DIFFERENTIAL METHOD BLD: NORMAL
EOSINOPHIL # BLD AUTO: 0.2 10E9/L (ref 0–0.7)
EOSINOPHIL NFR BLD AUTO: 3.2 %
ERYTHROCYTE [DISTWIDTH] IN BLOOD BY AUTOMATED COUNT: 12.4 % (ref 10–15)
GFR SERPL CREATININE-BSD FRML MDRD: >90 ML/MIN/{1.73_M2}
GLUCOSE SERPL-MCNC: 64 MG/DL (ref 70–99)
HCT VFR BLD AUTO: 37.8 % (ref 35–47)
HGB BLD-MCNC: 12.3 G/DL (ref 11.7–15.7)
LYMPHOCYTES # BLD AUTO: 2 10E9/L (ref 0.8–5.3)
LYMPHOCYTES NFR BLD AUTO: 31.8 %
MCH RBC QN AUTO: 31.1 PG (ref 26.5–33)
MCHC RBC AUTO-ENTMCNC: 32.5 G/DL (ref 31.5–36.5)
MCV RBC AUTO: 96 FL (ref 78–100)
MONOCYTES # BLD AUTO: 0.8 10E9/L (ref 0–1.3)
MONOCYTES NFR BLD AUTO: 12.1 %
NEUTROPHILS # BLD AUTO: 3.2 10E9/L (ref 1.6–8.3)
NEUTROPHILS NFR BLD AUTO: 52.3 %
PLATELET # BLD AUTO: 404 10E9/L (ref 150–450)
POTASSIUM SERPL-SCNC: 3.9 MMOL/L (ref 3.4–5.3)
RBC # BLD AUTO: 3.96 10E12/L (ref 3.8–5.2)
SODIUM SERPL-SCNC: 139 MMOL/L (ref 133–144)
TSH SERPL DL<=0.005 MIU/L-ACNC: 2.73 MU/L (ref 0.4–4)
WBC # BLD AUTO: 6.2 10E9/L (ref 4–11)

## 2019-11-13 PROCEDURE — 99214 OFFICE O/P EST MOD 30 MIN: CPT | Performed by: PHYSICIAN ASSISTANT

## 2019-11-13 PROCEDURE — 99000 SPECIMEN HANDLING OFFICE-LAB: CPT | Performed by: PHYSICIAN ASSISTANT

## 2019-11-13 PROCEDURE — 36415 COLL VENOUS BLD VENIPUNCTURE: CPT | Performed by: PHYSICIAN ASSISTANT

## 2019-11-13 PROCEDURE — 80048 BASIC METABOLIC PNL TOTAL CA: CPT | Performed by: PHYSICIAN ASSISTANT

## 2019-11-13 PROCEDURE — 80175 DRUG SCREEN QUAN LAMOTRIGINE: CPT | Mod: 90 | Performed by: PHYSICIAN ASSISTANT

## 2019-11-13 PROCEDURE — 85025 COMPLETE CBC W/AUTO DIFF WBC: CPT | Performed by: PHYSICIAN ASSISTANT

## 2019-11-13 PROCEDURE — 84443 ASSAY THYROID STIM HORMONE: CPT | Performed by: PHYSICIAN ASSISTANT

## 2019-11-13 RX ORDER — ARIPIPRAZOLE 10 MG/1
15 TABLET ORAL DAILY
Qty: 135 TABLET | Refills: 1 | Status: SHIPPED | OUTPATIENT
Start: 2019-11-13 | End: 2020-04-03 | Stop reason: DRUGHIGH

## 2019-11-13 RX ORDER — NAPROXEN 500 MG/1
TABLET ORAL
Refills: 2 | COMMUNITY
Start: 2019-02-13 | End: 2019-11-13

## 2019-11-13 RX ORDER — METHOCARBAMOL 500 MG/1
500 TABLET, FILM COATED ORAL 3 TIMES DAILY
Qty: 90 TABLET | Refills: 1 | Status: SHIPPED | OUTPATIENT
Start: 2019-11-13 | End: 2021-08-02

## 2019-11-13 RX ORDER — BUPROPION HYDROCHLORIDE 300 MG/1
300 TABLET ORAL EVERY MORNING
Qty: 90 TABLET | Refills: 1 | Status: SHIPPED | OUTPATIENT
Start: 2019-11-13 | End: 2020-06-08

## 2019-11-13 RX ORDER — BUPROPION HYDROCHLORIDE 150 MG/1
150 TABLET ORAL EVERY MORNING
Qty: 90 TABLET | Refills: 1 | Status: SHIPPED | OUTPATIENT
Start: 2019-11-13 | End: 2020-06-08

## 2019-11-13 RX ORDER — GABAPENTIN 400 MG/1
CAPSULE ORAL
Qty: 240 CAPSULE | Refills: 1 | Status: SHIPPED | OUTPATIENT
Start: 2019-11-13 | End: 2020-03-10

## 2019-11-13 RX ORDER — NAPROXEN 500 MG/1
TABLET ORAL
Qty: 180 TABLET | Refills: 2 | Status: SHIPPED | OUTPATIENT
Start: 2019-11-13 | End: 2022-06-27

## 2019-11-13 RX ORDER — LAMOTRIGINE 200 MG/1
200 TABLET ORAL DAILY
Qty: 90 TABLET | Refills: 1 | Status: SHIPPED | OUTPATIENT
Start: 2019-11-13 | End: 2020-04-03

## 2019-11-13 ASSESSMENT — ANXIETY QUESTIONNAIRES
GAD7 TOTAL SCORE: 10
7. FEELING AFRAID AS IF SOMETHING AWFUL MIGHT HAPPEN: SEVERAL DAYS
2. NOT BEING ABLE TO STOP OR CONTROL WORRYING: MORE THAN HALF THE DAYS
6. BECOMING EASILY ANNOYED OR IRRITABLE: SEVERAL DAYS
5. BEING SO RESTLESS THAT IT IS HARD TO SIT STILL: SEVERAL DAYS
GAD7 TOTAL SCORE: 10
3. WORRYING TOO MUCH ABOUT DIFFERENT THINGS: MORE THAN HALF THE DAYS
7. FEELING AFRAID AS IF SOMETHING AWFUL MIGHT HAPPEN: SEVERAL DAYS
4. TROUBLE RELAXING: SEVERAL DAYS
GAD7 TOTAL SCORE: 10
1. FEELING NERVOUS, ANXIOUS, OR ON EDGE: MORE THAN HALF THE DAYS

## 2019-11-13 ASSESSMENT — PATIENT HEALTH QUESTIONNAIRE - PHQ9
SUM OF ALL RESPONSES TO PHQ QUESTIONS 1-9: 4
SUM OF ALL RESPONSES TO PHQ QUESTIONS 1-9: 4
10. IF YOU CHECKED OFF ANY PROBLEMS, HOW DIFFICULT HAVE THESE PROBLEMS MADE IT FOR YOU TO DO YOUR WORK, TAKE CARE OF THINGS AT HOME, OR GET ALONG WITH OTHER PEOPLE: NOT DIFFICULT AT ALL

## 2019-11-13 NOTE — PATIENT INSTRUCTIONS
Mood:  - Continue Wellbutrin and Lamotrigine as normal.  - Start taking 1.5 tablets of the Abilify - 15 mg daily total.     Wrist:  - Start wearing brace at night.   - Ice the wrist in the evening.   - if getting worse we should see Ortho.     Back:  - Continue the Naproxen, make sure you're taking with food.   - Try Robaxin/Methocarbamol - this is a different Muscle relaxer, still do not drive on medication, and can be taken up to 3 times per day.     Bunion:  - [facility] Foot assessment  - Dr. Nunn inserts as well.

## 2019-11-14 ASSESSMENT — PATIENT HEALTH QUESTIONNAIRE - PHQ9: SUM OF ALL RESPONSES TO PHQ QUESTIONS 1-9: 4

## 2019-11-14 ASSESSMENT — ANXIETY QUESTIONNAIRES: GAD7 TOTAL SCORE: 10

## 2019-11-15 ENCOUNTER — TELEPHONE (OUTPATIENT)
Dept: FAMILY MEDICINE | Facility: OTHER | Age: 49
End: 2019-11-15

## 2019-11-15 LAB — LAMOTRIGINE SERPL-MCNC: 2.9 UG/ML (ref 2.5–15)

## 2019-11-15 NOTE — TELEPHONE ENCOUNTER
Reason for Call:  Request for results:    Name of test or procedure: lab results from 11/13/2019    Date of test of procedure: 11/13/19    Location of the test or procedure: [facility] to leave the result message on voice mail or with a family member? YES    Phone number Patient can be reached at:  Home number on file [phone] (home)    Additional comments:     Patient is aware that not all results are back yet. Would like a call as soon as they are.    Call taken on 11/15/2019 at 10:54 AM by Jeaneth Tran

## 2019-12-13 ENCOUNTER — TELEPHONE (OUTPATIENT)
Dept: FAMILY MEDICINE | Facility: OTHER | Age: 49
End: 2019-12-13

## 2019-12-13 NOTE — TELEPHONE ENCOUNTER
Left message for patient to return call. When call is returned, please assist with scheduling patient an OV.  Theresa Hartman MA
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
## 2020-01-13 ENCOUNTER — ANCILLARY PROCEDURE (OUTPATIENT)
Dept: GENERAL RADIOLOGY | Facility: OTHER | Age: 50
End: 2020-01-13
Attending: PHYSICIAN ASSISTANT
Payer: OTHER GOVERNMENT

## 2020-01-13 ENCOUNTER — OFFICE VISIT (OUTPATIENT)
Dept: FAMILY MEDICINE | Facility: OTHER | Age: 50
End: 2020-01-13
Payer: OTHER GOVERNMENT

## 2020-01-13 VITALS
WEIGHT: 143.2 LBS | DIASTOLIC BLOOD PRESSURE: 80 MMHG | HEIGHT: 63 IN | RESPIRATION RATE: 16 BRPM | BODY MASS INDEX: 25.37 KG/M2 | OXYGEN SATURATION: 99 % | SYSTOLIC BLOOD PRESSURE: 126 MMHG | HEART RATE: 94 BPM

## 2020-01-13 DIAGNOSIS — G47.00 INSOMNIA, UNSPECIFIED TYPE: ICD-10-CM

## 2020-01-13 DIAGNOSIS — G47.19 EXCESSIVE DAYTIME SLEEPINESS: ICD-10-CM

## 2020-01-13 DIAGNOSIS — G47.00 FREQUENT NOCTURNAL AWAKENING: ICD-10-CM

## 2020-01-13 DIAGNOSIS — M25.521 RIGHT ELBOW PAIN: ICD-10-CM

## 2020-01-13 DIAGNOSIS — M25.521 RIGHT ELBOW PAIN: Primary | ICD-10-CM

## 2020-01-13 PROCEDURE — 99214 OFFICE O/P EST MOD 30 MIN: CPT | Performed by: PHYSICIAN ASSISTANT

## 2020-01-13 PROCEDURE — 73070 X-RAY EXAM OF ELBOW: CPT | Mod: RT

## 2020-01-13 RX ORDER — ZOLPIDEM TARTRATE 5 MG/1
5 TABLET ORAL
Qty: 30 TABLET | Refills: 0 | Status: SHIPPED | OUTPATIENT
Start: 2020-01-13 | End: 2020-02-05

## 2020-01-13 ASSESSMENT — ANXIETY QUESTIONNAIRES
GAD7 TOTAL SCORE: 15
7. FEELING AFRAID AS IF SOMETHING AWFUL MIGHT HAPPEN: MORE THAN HALF THE DAYS
GAD7 TOTAL SCORE: 15
6. BECOMING EASILY ANNOYED OR IRRITABLE: SEVERAL DAYS
3. WORRYING TOO MUCH ABOUT DIFFERENT THINGS: NEARLY EVERY DAY
4. TROUBLE RELAXING: MORE THAN HALF THE DAYS
7. FEELING AFRAID AS IF SOMETHING AWFUL MIGHT HAPPEN: MORE THAN HALF THE DAYS
1. FEELING NERVOUS, ANXIOUS, OR ON EDGE: NEARLY EVERY DAY
GAD7 TOTAL SCORE: 15
2. NOT BEING ABLE TO STOP OR CONTROL WORRYING: NEARLY EVERY DAY
5. BEING SO RESTLESS THAT IT IS HARD TO SIT STILL: SEVERAL DAYS

## 2020-01-13 ASSESSMENT — PATIENT HEALTH QUESTIONNAIRE - PHQ9
SUM OF ALL RESPONSES TO PHQ QUESTIONS 1-9: 7
10. IF YOU CHECKED OFF ANY PROBLEMS, HOW DIFFICULT HAVE THESE PROBLEMS MADE IT FOR YOU TO DO YOUR WORK, TAKE CARE OF THINGS AT HOME, OR GET ALONG WITH OTHER PEOPLE: SOMEWHAT DIFFICULT
SUM OF ALL RESPONSES TO PHQ QUESTIONS 1-9: 7

## 2020-01-13 ASSESSMENT — MIFFLIN-ST. JEOR: SCORE: 1251.68

## 2020-01-13 NOTE — PATIENT INSTRUCTIONS
Elbow:  - We will call with x-ray results.   - Depending on results will have her either see Ortho or Sports medicine  - Ibuprofen 800 mg every 6-8 hours  - Ice over the area.   - Elbow brace if needed.     Sleep: [id number]  - We will get you scheduled with Sleep study for as soon as possible.   - Start Ambien, see risks below.   - Get at least 8 hours of sleep or 8  Hour window when taking the medication.   Patient Education     Zolpidem tablets  Brand Name: Ambien  What is this medicine?  ZOLPIDEM (zole PI dem) is used to treat insomnia. This medicine helps you to fall asleep and sleep through the night.  How should I use this medicine?  Take this medicine by mouth with a glass of water. Follow the directions on the prescription label. It is better to take this medicine on an empty stomach and only when you are ready for bed. Do not take your medicine more often than directed. If you have been taking this medicine for several weeks and suddenly stop taking it, you may get unpleasant withdrawal symptoms. Your doctor or health care professional may want to gradually reduce the dose. Do not stop taking this medicine on your own. Always follow your doctor or health care professional's advice.  A special MedGuide will be given to you by the pharmacist with each prescription and refill. Be sure to read this information carefully each time.  Talk to your pediatrician regarding the use of this medicine in children. Special care may be needed.  What side effects may I notice from receiving this medicine?  Side effects that you should report to your doctor or health care professional as soon as possible:    allergic reactions like skin rash, itching or hives, swelling of the face, lips, or tongue    breathing problems    changes in vision    confusion    depressed mood or other changes in moods or emotions    feeling faint or lightheaded, falls    hallucinations    loss of balance or coordination    loss of  memory    numbness or tingling of the tongue    restlessness, excitability, or feelings of anxiety or agitation    signs and symptoms of liver injury like dark yellow or brown urine; general ill feeling or flu-like symptoms; light-colored stools; loss of appetite; nausea; right upper belly pain; unusually weak or tired; yellowing of the eyes or skin    suicidal thoughts    unusual activities while not fully awake like driving, eating, making phone calls, or sexual activity  Side effects that usually do not require medical attention (report to your doctor or health care professional if they continue or are bothersome):    dizziness    drowsiness the day after you take this medicine    headache  What may interact with this medicine?      alcohol    antihistamines for allergy, cough and cold    certain medicines for anxiety or sleep    certain medicines for depression, like amitriptyline, fluoxetine, sertraline    certain medicines for fungal infections like ketoconazole and itraconazole    certain medicines for seizures like phenobarbital, primidone    ciprofloxacin    dietary supplements for sleep, like valerian or kava kava    general anesthetics like halothane, isoflurane, methoxyflurane, propofol    local anesthetics like lidocaine, pramoxine, tetracaine    medicines that relax muscles for surgery    narcotic medicines for pain    phenothiazines like chlorpromazine, mesoridazine, prochlorperazine, thioridazine    rifampin  What if I miss a dose?  This does not apply. This medicine should only be taken immediately before going to sleep. Do not take double or extra doses.  Where should I keep my medicine?  Keep out of the reach of children. This medicine can be abused. Keep your medicine in a safe place to protect it from theft. Do not share this medicine with anyone. Selling or giving away this medicine is dangerous and against the law.  This medicine may cause accidental overdose and death if taken by other  "adults, children, or pets. Mix any unused medicine with a substance like cat litter or coffee grounds. Then throw the medicine away in a sealed container like a sealed bag or a coffee can with a lid. Do not use the medicine after the expiration date.  Store at room temperature between 20 and 25 degrees C (68 and 77 degrees F).  What should I tell my health care provider before I take this medicine?  They need to know if you have any of these conditions:    depression    history of drug abuse or addiction    if you often drink alcohol    liver disease    lung or breathing disease    myasthenia gravis    sleep apnea    sleep-walking, driving, eating or other activity while not fully awake after taking a sleep medicine    suicidal thoughts, plans, or attempt; a previous suicide attempt by you or a family member    an unusual or allergic reaction to zolpidem, other medicines, foods, dyes, or preservatives    pregnant or trying to get pregnant    breast-feeding  What should I watch for while using this medicine?  Visit your doctor or health care professional for regular checks on your progress. Keep a regular sleep schedule by going to bed at about the same time each night. Avoid caffeine-containing drinks in the evening hours. When sleep medicines are used every night for more than a few weeks, they may stop working. Talk to your doctor if you still have trouble sleeping.  After taking this medicine for sleep, you may get up out of bed while not being fully awake and do an activity that you do not know you are doing. The next morning, you may have no memory of the event. Activities such as driving a car (\"sleep-driving\"), making and eating food, talking on the phone, sexual activity, and sleep-walking have been reported. Serious injuries or death have occurred in rare cases. Stop the medicine. Call your doctor right away if you find out you have done any of these activities. Do not take this medicine if you have used " alcohol that evening or before bed or taken another medicine for sleep. The risk of doing these sleep-related activities will be increased.  Wait for at least 8 hours after you take a dose before driving or doing other activities that require full mental alertness. Do not take this medicine unless you are able to stay in bed for a full night (7 to 8 hours) before you must be active again. You may have a decrease in mental alertness the day after use, even if you feel that you are fully awake. Tell your doctor if you will need to perform activities requiring full alertness, such as driving, the next day. Do not stand or sit up quickly after taking this medicine, especially if you are an older patient. This reduces the risk of dizzy or fainting spells.  If you or your family notice any changes in your behavior, such as new or worsening depression, thoughts of harming yourself, anxiety, other unusual or disturbing thoughts, or memory loss, call your doctor right away.  After you stop taking this medicine, you may have trouble falling asleep. This is called rebound insomnia. This problem usually goes away on its own after 1 or 2 nights.  NOTE:This sheet is a summary. It may not cover all possible information. If you have questions about this medicine, talk to your doctor, pharmacist, or health care provider. Copyright  2019 [employer]

## 2020-01-14 ENCOUNTER — OFFICE VISIT (OUTPATIENT)
Dept: ORTHOPEDICS | Facility: CLINIC | Age: 50
End: 2020-01-14
Payer: OTHER GOVERNMENT

## 2020-01-14 VITALS
BODY MASS INDEX: 25.37 KG/M2 | HEIGHT: 63 IN | SYSTOLIC BLOOD PRESSURE: 110 MMHG | WEIGHT: 143.2 LBS | DIASTOLIC BLOOD PRESSURE: 66 MMHG

## 2020-01-14 DIAGNOSIS — M77.01 MEDIAL EPICONDYLITIS OF RIGHT ELBOW: ICD-10-CM

## 2020-01-14 DIAGNOSIS — M77.11 LATERAL EPICONDYLITIS OF RIGHT ELBOW: Primary | ICD-10-CM

## 2020-01-14 PROCEDURE — 99243 OFF/OP CNSLTJ NEW/EST LOW 30: CPT | Performed by: PHYSICAL MEDICINE & REHABILITATION

## 2020-01-14 ASSESSMENT — PATIENT HEALTH QUESTIONNAIRE - PHQ9: SUM OF ALL RESPONSES TO PHQ QUESTIONS 1-9: 7

## 2020-01-14 ASSESSMENT — ANXIETY QUESTIONNAIRES: GAD7 TOTAL SCORE: 15

## 2020-01-14 ASSESSMENT — MIFFLIN-ST. JEOR: SCORE: 1251.69

## 2020-01-14 NOTE — LETTER
1/14/2020         RE: Magnolia Gonzáles  [address] [place] [address]        Dear Colleague,    Thank you for referring your patient, Magnolia Gonzáles, to the [facility]. Please see a copy of my visit note below.    Sports Medicine Clinic Visit    PCP: Raheel Barry    CC: Patient presents with:  Right Elbow - Pain      HPI:  Magnolia Gonzáles is a 49 year old female who is seen in consultation at the request of Simran Barreto PA-C.   She notes right elbow pain that began in October when she slipped in her socks and fell down wooden stairs onto her right side. She notes the pain has been worsening over the past month. She notes pain over the medial and lateral elbow.  She rates the pain at a  8/10 at its worst and a 5/10 currently.  Symptoms are relieved with nothing. Symptoms are worsened by lifting, gripping, pulling. She endorses numbness, tingling and weakness.   She denies swelling, popping and grinding.  Other treatment has included cold compresses and ibuprofen.       Review of Systems:  Musculoskeletal: as above  Remainder of review of systems is negative including constitutional, eyes, ENT, CV, pulmonary, GI, , endocrine, skin, hematologic, and neurologic except as noted in HPI or medical history.    History reviewed. No pertinent past surgical/medical/family/social history other than as mentioned in HPI.    Patient Active Problem List   Diagnosis     Adjustment disorder with mixed anxiety and depressed mood     Acquired hypothyroidism     Bilateral carpal tunnel syndrome     Carpal tunnel syndrome of left wrist     Valium overdose     Overdose of Valium, intentional self-harm, subsequent encounter     Major depressive disorder, recurrent episode, mild (H)     PTSD (post-traumatic stress disorder)     Past Medical History:   Diagnosis Date     Anxiety      Depressive disorder      Hypothyroid      Suicidal ideation 7/19/2015     Past Surgical History:  "  Procedure Laterality Date     ORTHOPEDIC SURGERY       Family History   Problem Relation Age of Onset     Depression/Anxiety Mother      Alcohol/Drug Father         Pancreatic CA     Social History     Socioeconomic History     Marital status: Single     Spouse name: Not on file     Number of children: Not on file     Years of education: Not on file     Highest education level: Not on file   Occupational History     Not on file   Social Needs     Financial resource strain: Not on file     Food insecurity:     Worry: Not on file     Inability: Not on file     Transportation needs:     Medical: Not on file     Non-medical: Not on file   Tobacco Use     Smoking status: Never Smoker     Smokeless tobacco: Never Used   Substance and Sexual Activity     Alcohol use: Yes     Alcohol/week: 0.0 standard drinks     Comment: \"once every three months\"     Drug use: No     Sexual activity: Yes     Partners: Male     Birth control/protection: None   Lifestyle     Physical activity:     Days per week: Not on file     Minutes per session: Not on file     Stress: Not on file   Relationships     Social connections:     Talks on phone: Not on file     Gets together: Not on file     Attends Hoahaoism service: Not on file     Active member of club or organization: Not on file     Attends meetings of clubs or organizations: Not on file     Relationship status: Not on file     Intimate partner violence:     Fear of current or ex partner: Not on file     Emotionally abused: Not on file     Physically abused: Not on file     Forced sexual activity: Not on file   Other Topics Concern     Parent/sibling w/ CABG, MI or angioplasty before 65F 55M? Not Asked   Social History Narrative     Not on file       She works doing  - she lifts up to 30 lbs repetitively    Current Outpatient Medications   Medication     ARIPiprazole (ABILIFY) 10 MG tablet     buPROPion (WELLBUTRIN XL) 150 MG 24 hr tablet     buPROPion (WELLBUTRIN XL) 300 MG " "24 hr tablet     gabapentin (NEURONTIN) 400 MG capsule     lamoTRIgine (LAMICTAL) 200 MG tablet     levothyroxine (SYNTHROID/LEVOTHROID) 100 MCG tablet     methocarbamol (ROBAXIN) 500 MG tablet     multivitamin, therapeutic with minerals (MULTI-VITAMIN) TABS     naproxen (NAPROSYN) 500 MG tablet     order for DME     zolpidem (AMBIEN) 5 MG tablet     No current facility-administered medications for this visit.      Allergies   Allergen Reactions     Zoloft [Sertraline] Rash         Objective:  /66   Ht 1.605 m (5' 3.19\")   Wt 65 kg (143 lb 3.2 oz)   BMI 25.21 kg/m       General: Alert and in no distress    Head: Normocephalic, atraumatic  Eyes: no scleral icterus or conjunctival erythema   Oropharynx:  Mucous membranes moist  Skin: no erythema, petechiae, or jaundice  CV: regular rhythm by palpation, 2+ distal pulses  Resp: normal respiratory effort without conversational dyspnea   Psych: normal mood and affect    Neuro: Motor strength and sensation as noted below    Musculoskeletal:    Bilateral Elbow exam:    Inspection:     no ecchymosis, edema, or effusion  -Prominent right medial epicondyle    Palpation:  -Tender over the right medial and lateral epicondyle    ROM:      full with elbow flexion and extension, forearm supination and pronation, wrist flexion and extension bilaterally    Strength:   Elbow flexion 5/5 bilaterally  Elbow extension 5/5 bilaterally  Forearm supination 5/5 bilaterally  Forearm pronation 5/5 bilaterally  Wrist extension 5/5 bilaterally  Wrist flexion 5/5 bilaterally   strength 5/5 bilaterally  Finger abduction 5/5 bilaterally    Sensation: Intact to light touch in the bilateral upper limbs      Radiology:  Independent visualization of images performed.    Recent Results (from the past 744 hour(s))   XR Elbow Right 2 Views    Narrative    ELBOW RIGHT TWO VIEWS January 13, 2020 9:58 AM     HISTORY: Trauma for more than two months. Right elbow pain.      Impression    " IMPRESSION: Unremarkable exam.    REJI WATSON MD       Assessment:  1. Lateral epicondylitis of right elbow    2. Medial epicondylitis of right elbow        Plan:  Discussed the assessment with the patient and developed a plan together:  -Suspect she has inflammation at the tendon insertions at both the medial and lateral epicondyles.  Ulnar neuropathy is less likely (at least based on presentation today).    -Forearm strap as needed for comfort and support.  Dispensed today.  -Rehab: Occupational Therapy: [facility]. Printed order provided  -Ice or heat 15-20 minutes as needed (Avoid sleeping on a heating pad or ice)  -Patient's preferred over the counter medication as directed on packaging as needed for pain or soreness.  -Over the counter lidocaine cream as needed (i.e. Aspercreme or generic equivalent)  -Provided home exercises. Please do multiple times per day.  -Avoid aggravating activities.  -Work restrictions:  Avoid repetitive aggravating activities of the right arm.  No lifting over 20 pounds.  Letter provided.    Follow Up:  1 month or sooner if symptoms fail to improve or worsen. Please call with any questions or concerns.       Anastasia White MD, [facility] Sports Medicine  [facility]    Again, thank you for allowing me to participate in the care of your patient.        Sincerely,        Fern White MD

## 2020-01-14 NOTE — PATIENT INSTRUCTIONS
Today's Plan of Care:  -Forearm strap as needed  -Rehab: Occupational Therapy: [facility]. Printed order provided  -Ice or heat 15-20 minutes as needed (Avoid sleeping on a heating pad or ice)  -Patient's preferred over the counter medication as directed on packaging as needed for pain or soreness.  -Over the counter lidocaine cream as needed (i.e. Aspercreme or generic equivalent)  -Provided home exercises. Please do multiple times per day.  -Avoid aggravating activities.  -Work restrictions:  Seen for right elbow pain.  Avoid repetitive aggravating activities of the right arm.  No lifting over 20 pounds    Follow Up:  1 month or sooner if symptoms fail to improve or worsen. Please call with any questions or concerns.

## 2020-01-14 NOTE — LETTER
January 14, 2020      Magnolia Gonzáles  [address] [place] [address]        To Whom It May Concern:    Magnolia Gonzáles  was seen on 1/14/2020 for right elbow pain. She may return to work on the next scheduled work date with the following restrictions:    -Avoid repetitive aggravating activities of the right arm.    -No lifting over 20 pounds       She will be seen in follow up in 1 month for re-evaluation. Thank you for your help in advance.       Sincerely,        Fern White MD

## 2020-01-14 NOTE — PROGRESS NOTES
Sports Medicine Clinic Visit    PCP: Raheel Barry    CC: Patient presents with:  Right Elbow - Pain      HPI:  Magnolia Gonzáles is a 49 year old female who is seen in consultation at the request of Simran Barreto PA-C.   She notes right elbow pain that began in October when she slipped in her socks and fell down wooden stairs onto her right side. She notes the pain has been worsening over the past month. She notes pain over the medial and lateral elbow.  She rates the pain at a  8/10 at its worst and a 5/10 currently.  Symptoms are relieved with nothing. Symptoms are worsened by lifting, gripping, pulling. She endorses numbness, tingling and weakness.   She denies swelling, popping and grinding.  Other treatment has included cold compresses and ibuprofen.       Review of Systems:  Musculoskeletal: as above  Remainder of review of systems is negative including constitutional, eyes, ENT, CV, pulmonary, GI, , endocrine, skin, hematologic, and neurologic except as noted in HPI or medical history.    History reviewed. No pertinent past surgical/medical/family/social history other than as mentioned in HPI.    Patient Active Problem List   Diagnosis     Adjustment disorder with mixed anxiety and depressed mood     Acquired hypothyroidism     Bilateral carpal tunnel syndrome     Carpal tunnel syndrome of left wrist     Valium overdose     Overdose of Valium, intentional self-harm, subsequent encounter     Major depressive disorder, recurrent episode, mild (H)     PTSD (post-traumatic stress disorder)     Past Medical History:   Diagnosis Date     Anxiety      Depressive disorder      Hypothyroid      Suicidal ideation 7/19/2015     Past Surgical History:   Procedure Laterality Date     ORTHOPEDIC SURGERY       Family History   Problem Relation Age of Onset     Depression/Anxiety Mother      Alcohol/Drug Father         Pancreatic CA     Social History     Socioeconomic History     Marital status: Single      "Spouse name: Not on file     Number of children: Not on file     Years of education: Not on file     Highest education level: Not on file   Occupational History     Not on file   Social Needs     Financial resource strain: Not on file     Food insecurity:     Worry: Not on file     Inability: Not on file     Transportation needs:     Medical: Not on file     Non-medical: Not on file   Tobacco Use     Smoking status: Never Smoker     Smokeless tobacco: Never Used   Substance and Sexual Activity     Alcohol use: Yes     Alcohol/week: 0.0 standard drinks     Comment: \"once every three months\"     Drug use: No     Sexual activity: Yes     Partners: Male     Birth control/protection: None   Lifestyle     Physical activity:     Days per week: Not on file     Minutes per session: Not on file     Stress: Not on file   Relationships     Social connections:     Talks on phone: Not on file     Gets together: Not on file     Attends Nondenominational service: Not on file     Active member of club or organization: Not on file     Attends meetings of clubs or organizations: Not on file     Relationship status: Not on file     Intimate partner violence:     Fear of current or ex partner: Not on file     Emotionally abused: Not on file     Physically abused: Not on file     Forced sexual activity: Not on file   Other Topics Concern     Parent/sibling w/ CABG, MI or angioplasty before 65F 55M? Not Asked   Social History Narrative     Not on file       She works doing  - she lifts up to 30 lbs repetitively    Current Outpatient Medications   Medication     ARIPiprazole (ABILIFY) 10 MG tablet     buPROPion (WELLBUTRIN XL) 150 MG 24 hr tablet     buPROPion (WELLBUTRIN XL) 300 MG 24 hr tablet     gabapentin (NEURONTIN) 400 MG capsule     lamoTRIgine (LAMICTAL) 200 MG tablet     levothyroxine (SYNTHROID/LEVOTHROID) 100 MCG tablet     methocarbamol (ROBAXIN) 500 MG tablet     multivitamin, therapeutic with minerals (MULTI-VITAMIN) " "TABS     naproxen (NAPROSYN) 500 MG tablet     order for DME     zolpidem (AMBIEN) 5 MG tablet     No current facility-administered medications for this visit.      Allergies   Allergen Reactions     Zoloft [Sertraline] Rash         Objective:  /66   Ht 1.605 m (5' 3.19\")   Wt 65 kg (143 lb 3.2 oz)   BMI 25.21 kg/m      General: Alert and in no distress    Head: Normocephalic, atraumatic  Eyes: no scleral icterus or conjunctival erythema   Oropharynx:  Mucous membranes moist  Skin: no erythema, petechiae, or jaundice  CV: regular rhythm by palpation, 2+ distal pulses  Resp: normal respiratory effort without conversational dyspnea   Psych: normal mood and affect    Neuro: Motor strength and sensation as noted below    Musculoskeletal:    Bilateral Elbow exam:    Inspection:     no ecchymosis, edema, or effusion  -Prominent right medial epicondyle    Palpation:  -Tender over the right medial and lateral epicondyle    ROM:      full with elbow flexion and extension, forearm supination and pronation, wrist flexion and extension bilaterally    Strength:   Elbow flexion 5/5 bilaterally  Elbow extension 5/5 bilaterally  Forearm supination 5/5 bilaterally  Forearm pronation 5/5 bilaterally  Wrist extension 5/5 bilaterally  Wrist flexion 5/5 bilaterally   strength 5/5 bilaterally  Finger abduction 5/5 bilaterally    Sensation: Intact to light touch in the bilateral upper limbs      Radiology:  Independent visualization of images performed.    Recent Results (from the past 744 hour(s))   XR Elbow Right 2 Views    Narrative    ELBOW RIGHT TWO VIEWS January 13, 2020 9:58 AM     HISTORY: Trauma for more than two months. Right elbow pain.      Impression    IMPRESSION: Unremarkable exam.    REJI WATSON MD       Assessment:  1. Lateral epicondylitis of right elbow    2. Medial epicondylitis of right elbow        Plan:  Discussed the assessment with the patient and developed a plan together:  -Suspect she has " inflammation at the tendon insertions at both the medial and lateral epicondyles.  Ulnar neuropathy is less likely (at least based on presentation today).    -Forearm strap as needed for comfort and support.  Dispensed today.  -Rehab: Occupational Therapy: [facility]. Printed order provided  -Ice or heat 15-20 minutes as needed (Avoid sleeping on a heating pad or ice)  -Patient's preferred over the counter medication as directed on packaging as needed for pain or soreness.  -Over the counter lidocaine cream as needed (i.e. Aspercreme or generic equivalent)  -Provided home exercises. Please do multiple times per day.  -Avoid aggravating activities.  -Work restrictions:  Avoid repetitive aggravating activities of the right arm.  No lifting over 20 pounds.  Letter provided.    Follow Up:  1 month or sooner if symptoms fail to improve or worsen. Please call with any questions or concerns.       Anastasia White MD, [facility] Sports Medicine  [facility]

## 2020-01-16 ENCOUNTER — TELEPHONE (OUTPATIENT)
Dept: ORTHOPEDICS | Facility: OTHER | Age: 50
End: 2020-01-16

## 2020-01-16 NOTE — LETTER
January 14, 2020      Magnolia Gonzáles  [address] [place] [address]        To Whom It May Concern:    Magnolia Gonzáles  was seen on 1/14/2020 for right elbow pain. She may return to work on the next scheduled work date with no restrictions:          Sincerely,      Electronically signed by  Fern White MD

## 2020-01-16 NOTE — TELEPHONE ENCOUNTER
Reason for Call:  Other appointment    Detailed comments: Patient calling to get a workability letter stating that she has no restrictions due to her work. She will need it today or tomorrow at the latest. Would like to pick it up at [facility] if possible.     Phone Number Patient can be reached at: Home number on file [phone] (home)    Best Time: any    Can we leave a detailed message on this number? YES    Call taken on 1/16/2020 at 9:52 AM by Annie Giron

## 2020-01-17 NOTE — TELEPHONE ENCOUNTER
staff at [facility] will leave at the .     Mana Jo M.Ed., LAT, ATC  Clinic Coordinator for Dr. Anastasia White

## 2020-02-04 ENCOUNTER — TELEPHONE (OUTPATIENT)
Dept: FAMILY MEDICINE | Facility: OTHER | Age: 50
End: 2020-02-04

## 2020-02-04 DIAGNOSIS — G47.00 FREQUENT NOCTURNAL AWAKENING: ICD-10-CM

## 2020-02-04 DIAGNOSIS — G47.19 EXCESSIVE DAYTIME SLEEPINESS: ICD-10-CM

## 2020-02-04 DIAGNOSIS — G47.00 INSOMNIA, UNSPECIFIED TYPE: ICD-10-CM

## 2020-02-04 NOTE — TELEPHONE ENCOUNTER
Prior Authorization Retail Medication Request    Medication/Dose: ARIPiprazole (ABILIFY) 10 MG tablet  ICD code (if different than what is on RX):    Previously Tried and Failed:    Rationale:      Insurance Name:    Insurance ID:        Pharmacy Information (if different than what is on RX)  Name:    Phone:

## 2020-02-04 NOTE — TELEPHONE ENCOUNTER
Patient called to update on the sleep study. Is not able to get in until 2/20. Was prescribed Ambien and it is not working. Wondering if there is something different or up the dose.    [phone]  Please call to update - ok to leave message.     Relay message to ES.

## 2020-02-04 NOTE — TELEPHONE ENCOUNTER
Pending Prescriptions:                       Disp   Refills    zolpidem (AMBIEN) 5 MG tablet              30 tab*0        Sig: Take 1 tablet (5 mg) by mouth nightly as needed for           sleep    Last Written Prescription Date:  1/13/2020  Last Fill Quantity: 30,  # refills: 0   Last office visit: 1/13/2020 with prescribing provider:     Future Office Visit:      Routing refill request to provider for review/approval because:  Drug not on the [facility] refill protocol     Sera Dickinson, MSN, RN

## 2020-02-05 RX ORDER — ZOLPIDEM TARTRATE 5 MG/1
10 TABLET ORAL
Qty: 30 TABLET | Refills: 0 | Status: SHIPPED | OUTPATIENT
Start: 2020-02-05 | End: 2020-04-03 | Stop reason: DRUGHIGH

## 2020-02-07 NOTE — TELEPHONE ENCOUNTER
Central Prior Authorization Team   Phone: [phone]      PA Initiation    Medication: ARIPiprazole (ABILIFY) 10 MG tablet-Initiated  Insurance Company: Other (see comments)Comment:  [facility] [phone]  Pharmacy Filling the Rx: [id number] - [place], [place] - [address]  Filling Pharmacy Phone: [phone]  Filling Pharmacy Fax:    Start Date: 2/7/2020

## 2020-02-10 NOTE — TELEPHONE ENCOUNTER
Prior Authorization Approval    Authorization Effective Date: 2/10/2020  Authorization Expiration Date: 2/10/2021  Medication: ARIPiprazole (ABILIFY) 10 MG tablet-APPROVED  Approved Dose/Quantity:   Reference #:     Insurance Company: Other (see comments)Comment:  [facility] [phone]  Expected CoPay:       CoPay Card Available:      Foundation Assistance Needed:    Which Pharmacy is filling the prescription (Not needed for infusion/clinic administered): [id number] - [facility] [address]  Pharmacy Notified: Yes  Patient Notified: No    Pharmacy will notify patient when medication is ready.

## 2020-02-25 ENCOUNTER — OFFICE VISIT (OUTPATIENT)
Dept: FAMILY MEDICINE | Facility: OTHER | Age: 50
End: 2020-02-25
Attending: NURSE PRACTITIONER
Payer: OTHER GOVERNMENT

## 2020-02-25 VITALS
OXYGEN SATURATION: 98 % | RESPIRATION RATE: 16 BRPM | HEART RATE: 90 BPM | DIASTOLIC BLOOD PRESSURE: 70 MMHG | SYSTOLIC BLOOD PRESSURE: 106 MMHG | TEMPERATURE: 97.9 F

## 2020-02-25 DIAGNOSIS — J04.0 LARYNGITIS: Primary | ICD-10-CM

## 2020-02-25 PROCEDURE — 99213 OFFICE O/P EST LOW 20 MIN: CPT | Performed by: NURSE PRACTITIONER

## 2020-02-25 RX ORDER — OSELTAMIVIR PHOSPHATE 75 MG/1
75 CAPSULE ORAL
COMMUNITY
Start: 2020-02-21 | End: 2020-04-03

## 2020-02-25 ASSESSMENT — PAIN SCALES - GENERAL: PAINLEVEL: SEVERE PAIN (6)

## 2020-02-26 NOTE — PATIENT INSTRUCTIONS
Patient Education     Laryngitis    Laryngitis is a swelling of the tissues around the vocal cords. Symptoms include a hoarse (scratchy) voice. Or your voice may be gone for a few days or longer. This may be caused by a viral illness, such as a head or chest cold. It may also be due to overuse and strain of your voice. Smoking, drinking alcohol, acid reflux, allergies, or inhaling harsh chemicals may also lead to symptoms. This condition will usually go away in 1-2 weeks.  Home care    Rest your voice until it recovers. Talk as little as possible. If your symptoms are severe, rest at home for a day or so.    Moist air may help your symptoms. Try breathing cool steam from a humidifier or vaporizer. Or breathe air from a steamy shower.    Drink plenty of fluids to stay well hydrated.    Do not smoke  Follow-up care  Follow up with your healthcare provider or this facility if you are not better after 1 week. If your hoarse voice lasts more than 2 weeks, you may need to see an otolaryngologist. This is a doctor who treats diseases and disorders of the ear, nose, and throat (ENT). Seeing this doctor is especially important if you have a history of alcohol or tobacco use.  When to seek medical advice  Contact your healthcare provider if you have any of the following:    Symptoms that get worse    Severe pain with swallowing    Trouble opening your mouth    Neck swelling, neck pain, or trouble moving your neck    Noisy breathing or trouble breathing    Fever of 100.4 F (38. C) or higher, or as directed by your healthcare provider    Drooling    Symptoms do not go away in 2 weeks  Date Last Reviewed: 11/1/2017 2000-2019 [employer]. [address] [place] [address], [place] [address]. All rights reserved. This information is not intended as a substitute for professional medical care. Always follow your healthcare professional's instructions.

## 2020-02-26 NOTE — PROGRESS NOTES
HPI:    Magnolia Gonzáles is a 49 year old female who presents to clinic today for throat concerns.  She reports that she was initially seen on February 19 for a cough and diagnosed with influenza A on February 21.  She is been taking Tamiflu for symptomatic management.  She continues with a mild cough.  Sore throat continues but she has not had any further fevers.  Over the last 3 days she has had problems speaking.  Today she is completely lost her voice.  She is been taking ibuprofen and cough drops for symptomatic management.    Past Medical History:   Diagnosis Date     Anxiety      Depressive disorder      Hypothyroid      Suicidal ideation 7/19/2015         Current Outpatient Medications   Medication Sig Dispense Refill     ARIPiprazole (ABILIFY) 15 MG tablet TAKE 1 TABLET BY MOUTH EVERY MORNING 135 tablet 0     buPROPion (WELLBUTRIN XL) 150 MG 24 hr tablet Take 1 tablet (150 mg) by mouth every morning Along with the 300 mg tablet 90 tablet 1     buPROPion (WELLBUTRIN XL) 300 MG 24 hr tablet Take 1 tablet (300 mg) by mouth every morning Along with the 150 mg tablet 90 tablet 1     gabapentin (NEURONTIN) 400 MG capsule Take 3 capsules in the morning, 2 capsules in the afternoon, and 3 capsules in the evening 240 capsule 1     lamoTRIgine (LAMICTAL) 200 MG tablet Take 1 tablet (200 mg) by mouth daily 90 tablet 1     levothyroxine (SYNTHROID/LEVOTHROID) 100 MCG tablet TAKE ONE TABLET BY MOUTH ONCE DAILY 90 tablet 1     methocarbamol (ROBAXIN) 500 MG tablet Take 1 tablet (500 mg) by mouth 3 times daily 90 tablet 1     multivitamin, therapeutic with minerals (MULTI-VITAMIN) TABS Take 1 tablet by mouth daily 100 tablet 3     naproxen (NAPROSYN) 500 MG tablet TAKE ONE TABLET BY MOUTH TWO TIMES A DAY WITH MEALS 180 tablet 2     order for DME Equipment being ordered: arm strap 1 Device 0     oseltamivir (TAMIFLU) 75 MG capsule Take 75 mg by mouth       zolpidem (AMBIEN) 5 MG tablet Take 2 tablets (10 mg) by mouth  nightly as needed for sleep 30 tablet 0     ARIPiprazole (ABILIFY) 10 MG tablet Take 1.5 tablets (15 mg) by mouth daily 135 tablet 1       Allergies   Allergen Reactions     Zoloft [Sertraline] Rash       ROS:  Pertinent positives and negatives are noted in HPI.    EXAM:  /70   Pulse 90   Temp 97.9  F (36.6  C) (Oral)   Resp 16   SpO2 98%   General appearance: well appearing female, in no acute distress  Head: normocephalic, atraumatic  Ears: TM's with cone of light, no erythema, canals clear bilaterally  Eyes: conjunctivae normal  Oropharynx: moist mucous membranes, posterior pharynx without erythema, exudates or petechiae, no post, talks in a whisper nasal drip seen  Neck: supple without adenopathy  Respiratory: clear to auscultation bilaterally  Psychological: normal affect, alert and pleasant      ASSESSMENT AND PLAN:    1. Laryngitis        Laryngitis based on history, symptoms and exam.  Discussed that this is likely viral in nature.  Recommended symptomatic management and voice rest.  Follow-up as needed.    ANNETTA Carbone CNP..................2/25/2020 6:15 PM      This document was prepared using voice generated software.  While every attempt was made for accuracy, grammatical errors may exist.

## 2020-02-26 NOTE — NURSING NOTE
"Chief Complaint   Patient presents with     Hoarse     lost voice 3 days ago     Pharyngitis      x 1 week.  Does not want swabbed       Initial /70   Pulse 90   Temp 97.9  F (36.6  C) (Oral)   Resp 16   SpO2 98%  Estimated body mass index is 25.21 kg/m  as calculated from the following:    Height as of 1/14/20: 1.605 m (5' 3.19\").    Weight as of 1/14/20: 65 kg (143 lb 3.2 oz).  Medication Reconciliation: complete    Lillian Maharaj LPN  "

## 2020-03-02 ENCOUNTER — HEALTH MAINTENANCE LETTER (OUTPATIENT)
Age: 50
End: 2020-03-02

## 2020-03-09 DIAGNOSIS — F41.1 GAD (GENERALIZED ANXIETY DISORDER): ICD-10-CM

## 2020-03-09 DIAGNOSIS — F43.10 PTSD (POST-TRAUMATIC STRESS DISORDER): ICD-10-CM

## 2020-03-09 DIAGNOSIS — F33.1 MAJOR DEPRESSIVE DISORDER, RECURRENT EPISODE, MODERATE (H): ICD-10-CM

## 2020-03-09 DIAGNOSIS — E03.9 HYPOTHYROIDISM, UNSPECIFIED TYPE: ICD-10-CM

## 2020-03-09 DIAGNOSIS — F43.23 ADJUSTMENT DISORDER WITH MIXED ANXIETY AND DEPRESSED MOOD: ICD-10-CM

## 2020-03-09 RX ORDER — BUPROPION HYDROCHLORIDE 150 MG/1
150 TABLET ORAL EVERY MORNING
Qty: 90 TABLET | Refills: 1 | Status: CANCELLED | OUTPATIENT
Start: 2020-03-09

## 2020-03-09 RX ORDER — BUPROPION HYDROCHLORIDE 300 MG/1
300 TABLET ORAL EVERY MORNING
Qty: 90 TABLET | Refills: 1 | Status: CANCELLED | OUTPATIENT
Start: 2020-03-09

## 2020-03-09 RX ORDER — LEVOTHYROXINE SODIUM 100 UG/1
100 TABLET ORAL DAILY
Qty: 90 TABLET | Refills: 1 | Status: CANCELLED | OUTPATIENT
Start: 2020-03-09

## 2020-03-09 NOTE — TELEPHONE ENCOUNTER
Patient calling to check on status of refill. Please call her at [phone]    States she has been off them a week    Sleep study not scheduled. Still have looked into establishing care.     When talking to patient about refills, she hung up on me.

## 2020-03-09 NOTE — TELEPHONE ENCOUNTER
Patient calling requesting status of this message. It was sent to the RN refill pool to review/advise. Patient wants a call back once this is done.

## 2020-03-09 NOTE — TELEPHONE ENCOUNTER
How long has she been off medications?  If it has been a while she should slowly titrate back onto medications.   When is her sleep assessment?    Simran Barreto PA-C

## 2020-03-09 NOTE — TELEPHONE ENCOUNTER
Reason for call:  Medication  Reason for Call:  Medication or medication refill:    Do you use a [facility]?  Name of the pharmacy and phone number for the current request:  [facility]    Name of the medication requested: Gabapentin, Levothyroxine, Welbutrin 150 and 300    Other request: please send ASAP, patient is missing work due to not having meds.     Can we leave a detailed message on this number? YES    Phone number patient can be reached at: Cell number on file:    Telephone Information:   Mobile [phone]       Best Time: Any    Patient is living in [place].     Call taken on 3/9/2020 at 8:23 AM by Hilda Farias

## 2020-03-09 NOTE — TELEPHONE ENCOUNTER
Barbara called to check on the status of this refill. Informed that it has not been reviewed as of yet but they should be hearing back from clinic today.

## 2020-03-09 NOTE — TELEPHONE ENCOUNTER
Pending Prescriptions:                       Disp   Refills    buPROPion (WELLBUTRIN XL) 150 MG 24 hr ta*90 tab*1            Sig: Take 1 tablet (150 mg) by mouth every morning           Along with the 300 mg tablet    buPROPion (WELLBUTRIN XL) 300 MG 24 hr ta*90 tab*1            Sig: Take 1 tablet (300 mg) by mouth every morning           Along with the 150 mg tablet    PHQ 3/5/2019 11/13/2019 1/13/2020   PHQ-9 Total Score 2 4 7   Q9: Thoughts of better off dead/self-harm past 2 weeks Not at all Not at all Not at all     Routing refill request to provider for review/approval because:  Labs out of range:  PHQ9        gabapentin (NEURONTIN) 400 MG capsule     240 ca*1            Sig: Take 3 capsules in the morning, 2 capsules in the           afternoon, and 3 capsules in the evening    Last Written Prescription Date:  11/13/2019  Last Fill Quantity: 240,  # refills: 1   Last office visit: 2/25/2020 with prescribing provider:     Future Office Visit:        levothyroxine (SYNTHROID/LEVOTHROID) 100 *90 tab*1            Sig: Take 1 tablet (100 mcg) by mouth daily    Prescription approved per [facility] Refill Protocol.    Srea Dickinson, MSN, RN

## 2020-03-10 RX ORDER — GABAPENTIN 400 MG/1
CAPSULE ORAL
Qty: 240 CAPSULE | Refills: 0 | Status: SHIPPED | OUTPATIENT
Start: 2020-03-10 | End: 2020-04-03

## 2020-03-10 NOTE — TELEPHONE ENCOUNTER
Left message for patient to return call. Please inform her that she has refills ready for  at the pharmacy for her for the Wellbutrin 150 and 300mg and the Levothyroxine. She is overdue for a follow up on the Gabapentin, please assist with scheduling an appointment.   Theresa Hartman MA

## 2020-03-10 NOTE — TELEPHONE ENCOUNTER
Patient is calling stated that she lives 3 hours away, no longer in [place] and the [facility] in [place] will not transfer the refill from [facility]. She is very frustrated. She is trying to establish care with another doctor where she lives and is asking if there is anyway she can get a refill on the gabapentin to get her by till then. Patient did not have a time frame.     Aristeo Tran,

## 2020-03-10 NOTE — TELEPHONE ENCOUNTER
Can we please call pharmacy to see if there are refills on her medication.  Looks like they were filled in November for 6 months, she shouldn't be due yet.  But she should be way over due for Gabapentin.     Simran Barreto PA-C

## 2020-03-10 NOTE — TELEPHONE ENCOUNTER
Spoke to pharmacy.   Levothyroxine is ready to , she has no more remaining refills.   Wellbutrin both strengths it is ready to . She has 4 more remaining refills(30 day supply).   Gabapentin: pharmacy is awaiting authorization for this.     Theresa Hartman MA

## 2020-03-10 NOTE — TELEPHONE ENCOUNTER
Left message for patient. These are ready at the [facility] in [place] for , see my previous message below about speaking to pharmacy. Will send message for ES to review about Gabapentin.     Theresa Hartman MA

## 2020-03-31 NOTE — PATIENT INSTRUCTIONS
Refill sent   Will refill the Effexor 150 mg.  Will increase the dose of gabapentin to 3 capsules in the morning with 2 in the afternoon and evening for 3-4 days then 3 capsules in the morning and evenings with 2 capsules in the afternoons.  You can discuss further medication changes with the new psychiatrist.

## 2020-04-03 ENCOUNTER — VIRTUAL VISIT (OUTPATIENT)
Dept: FAMILY MEDICINE | Facility: OTHER | Age: 50
End: 2020-04-03

## 2020-04-03 DIAGNOSIS — E03.9 HYPOTHYROIDISM, UNSPECIFIED TYPE: ICD-10-CM

## 2020-04-03 DIAGNOSIS — F43.10 PTSD (POST-TRAUMATIC STRESS DISORDER): ICD-10-CM

## 2020-04-03 DIAGNOSIS — F43.23 ADJUSTMENT DISORDER WITH MIXED ANXIETY AND DEPRESSED MOOD: ICD-10-CM

## 2020-04-03 DIAGNOSIS — F33.0 MAJOR DEPRESSIVE DISORDER, RECURRENT EPISODE, MILD (H): ICD-10-CM

## 2020-04-03 DIAGNOSIS — G47.00 FREQUENT NOCTURNAL AWAKENING: ICD-10-CM

## 2020-04-03 DIAGNOSIS — G47.00 INSOMNIA, UNSPECIFIED TYPE: ICD-10-CM

## 2020-04-03 DIAGNOSIS — F33.1 MAJOR DEPRESSIVE DISORDER, RECURRENT EPISODE, MODERATE (H): ICD-10-CM

## 2020-04-03 DIAGNOSIS — F41.1 GAD (GENERALIZED ANXIETY DISORDER): ICD-10-CM

## 2020-04-03 DIAGNOSIS — G47.19 EXCESSIVE DAYTIME SLEEPINESS: ICD-10-CM

## 2020-04-03 PROCEDURE — 99214 OFFICE O/P EST MOD 30 MIN: CPT | Mod: TEL | Performed by: PHYSICIAN ASSISTANT

## 2020-04-03 RX ORDER — ZOLPIDEM TARTRATE 10 MG/1
10 TABLET ORAL
Qty: 30 TABLET | Refills: 2 | Status: SHIPPED | OUTPATIENT
Start: 2020-04-03 | End: 2020-07-29

## 2020-04-03 RX ORDER — LAMOTRIGINE 200 MG/1
200 TABLET ORAL DAILY
Qty: 90 TABLET | Refills: 1 | Status: SHIPPED | OUTPATIENT
Start: 2020-04-03 | End: 2020-09-14

## 2020-04-03 RX ORDER — LEVOTHYROXINE SODIUM 100 UG/1
100 TABLET ORAL DAILY
Qty: 90 TABLET | Refills: 1 | Status: SHIPPED | OUTPATIENT
Start: 2020-04-03 | End: 2021-06-08

## 2020-04-03 RX ORDER — ARIPIPRAZOLE 15 MG/1
15 TABLET ORAL EVERY MORNING
Qty: 135 TABLET | Refills: 0 | Status: CANCELLED | OUTPATIENT
Start: 2020-04-03

## 2020-04-03 RX ORDER — ZOLPIDEM TARTRATE 5 MG/1
10 TABLET ORAL
Qty: 30 TABLET | Refills: 0 | Status: CANCELLED | OUTPATIENT
Start: 2020-04-03

## 2020-04-03 RX ORDER — GABAPENTIN 400 MG/1
CAPSULE ORAL
Qty: 240 CAPSULE | Refills: 2 | Status: SHIPPED | OUTPATIENT
Start: 2020-04-03 | End: 2020-07-07

## 2020-04-03 ASSESSMENT — ANXIETY QUESTIONNAIRES
3. WORRYING TOO MUCH ABOUT DIFFERENT THINGS: SEVERAL DAYS
5. BEING SO RESTLESS THAT IT IS HARD TO SIT STILL: SEVERAL DAYS
7. FEELING AFRAID AS IF SOMETHING AWFUL MIGHT HAPPEN: SEVERAL DAYS
2. NOT BEING ABLE TO STOP OR CONTROL WORRYING: SEVERAL DAYS
IF YOU CHECKED OFF ANY PROBLEMS ON THIS QUESTIONNAIRE, HOW DIFFICULT HAVE THESE PROBLEMS MADE IT FOR YOU TO DO YOUR WORK, TAKE CARE OF THINGS AT HOME, OR GET ALONG WITH OTHER PEOPLE: NOT DIFFICULT AT ALL
1. FEELING NERVOUS, ANXIOUS, OR ON EDGE: NEARLY EVERY DAY
GAD7 TOTAL SCORE: 9
6. BECOMING EASILY ANNOYED OR IRRITABLE: SEVERAL DAYS

## 2020-04-03 ASSESSMENT — PATIENT HEALTH QUESTIONNAIRE - PHQ9
5. POOR APPETITE OR OVEREATING: SEVERAL DAYS
SUM OF ALL RESPONSES TO PHQ QUESTIONS 1-9: 8

## 2020-04-03 NOTE — PROGRESS NOTES
"Subjective     Magnolia Gonzáles is a 50 year old female who is being evaluated via a billable telephone visit.      The patient has been notified of following:     \"This telephone visit will be conducted via a call between you and your physician/provider. We have found that certain health care needs can be provided without the need for a physical exam.  This service lets us provide the care you need with a short phone conversation.  If a prescription is necessary we can send it directly to your pharmacy.  If lab work is needed we can place an order for that and you can then stop by our lab to have the test done at a later time.    If during the course of the call the physician/provider feels a telephone visit is not appropriate, you will not be charged for this service.\"     Patient has given verbal consent for Telephone visit?  Yes    Magnolia Gonzáles complains of   Chief Complaint   Patient presents with     Recheck Medication       ALLERGIES  Zoloft [sertraline]    Needing a refill for Ambien, she has been out for awhile and has only been taking it prn not daily. She had to cancel sleep study because she relocated. Has not completed it, and she hasn't been able to establish somewhere else because of the current situation with COVID    Depression and Anxiety Follow-Up    How are you doing with your depression since your last visit? No change    How are you doing with your anxiety since your last visit?  No change    Are you having other symptoms that might be associated with depression or anxiety? No    Have you had a significant life event? No     Do you have any concerns with your use of alcohol or other drugs? No     Things are going well.  She has relocated to [place], hasn't established yet with new PCP because of the current situation. Unable to get sleep study done.     She has been taking Zolpidem 10 mg once nightly as needed not on a regular basis.  Really seems to be helping.  Still " "occasionally has the day time fatigue and inability to stay asleep all night long.     Her mood is otherwise stable, doing well for the most part.  Is happy with her current regimen.     Social History     Tobacco Use     Smoking status: Never Smoker     Smokeless tobacco: Never Used   Substance Use Topics     Alcohol use: Yes     Alcohol/week: 0.0 standard drinks     Comment: \"once every three months\"     Drug use: No     PHQ 11/13/2019 1/13/2020 4/3/2020   PHQ-9 Total Score 4 7 8   Q9: Thoughts of better off dead/self-harm past 2 weeks Not at all Not at all Not at all     JULIEN-7 SCORE 11/13/2019 1/13/2020 4/3/2020   Total Score - - -   Total Score 10 (moderate anxiety) 15 (severe anxiety) -   Total Score 10 15 9     Hypothyroidism Follow-up    Since last visit, patient describes the following symptoms: anxiety and fatigue      Current Outpatient Medications   Medication Sig Dispense Refill     ARIPiprazole (ABILIFY) 15 MG tablet TAKE 1 TABLET BY MOUTH EVERY MORNING 135 tablet 0     buPROPion (WELLBUTRIN XL) 150 MG 24 hr tablet Take 1 tablet (150 mg) by mouth every morning Along with the 300 mg tablet 90 tablet 1     buPROPion (WELLBUTRIN XL) 300 MG 24 hr tablet Take 1 tablet (300 mg) by mouth every morning Along with the 150 mg tablet 90 tablet 1     gabapentin (NEURONTIN) 400 MG capsule Take 3 capsules in the morning, 2 capsules in the afternoon, and 3 capsules in the evening 240 capsule 2     lamoTRIgine (LAMICTAL) 200 MG tablet Take 1 tablet (200 mg) by mouth daily 90 tablet 1     levothyroxine (SYNTHROID/LEVOTHROID) 100 MCG tablet Take 1 tablet (100 mcg) by mouth daily 90 tablet 1     methocarbamol (ROBAXIN) 500 MG tablet Take 1 tablet (500 mg) by mouth 3 times daily 90 tablet 1     multivitamin, therapeutic with minerals (MULTI-VITAMIN) TABS Take 1 tablet by mouth daily 100 tablet 3     naproxen (NAPROSYN) 500 MG tablet TAKE ONE TABLET BY MOUTH TWO TIMES A DAY WITH MEALS 180 tablet 2     zolpidem (AMBIEN) 10 " MG tablet Take 1 tablet (10 mg) by mouth nightly as needed for sleep 30 tablet 2     order for DME Equipment being ordered: arm strap (Patient not taking: Reported on 4/3/2020) 1 Device 0     Allergies   Allergen Reactions     Zoloft [Sertraline] Rash       Reviewed and updated as needed this visit by Provider  Tobacco  Allergies  Meds  Problems  Med Hx  Surg Hx  Fam Hx         Review of Systems   ROS COMP: Constitutional, HEENT, cardiovascular, pulmonary, GI, , musculoskeletal, neuro, skin, endocrine and psych systems are negative, except as otherwise noted.       Objective   Reported vitals:  There were no vitals taken for this visit.   healthy, alert and no distress  Psych: Alert and oriented times 3; coherent speech, normal   rate and volume, able to articulate logical thoughts, able   to abstract reason, no tangential thoughts, no hallucinations   or delusions  Her affect is normal     Diagnostic Test Results:  Labs reviewed in Epic        Assessment/Plan:  1. Adjustment disorder with mixed anxiety and depressed mood  2. PTSD (post-traumatic stress disorder)  3. JULIEN (generalized anxiety disorder)  4. Major depressive disorder, recurrent episode, mild (H)  Refilled her gabapentin with enough refills for total 3 months of medication.  Reviewed  as noted below and no concerns.   Refilled her Lamictal for 6 months, has been stable and last labs were updated.   Mood is stable.   - gabapentin (NEURONTIN) 400 MG capsule; Take 3 capsules in the morning, 2 capsules in the afternoon, and 3 capsules in the evening  Dispense: 240 capsule; Refill: 2  - lamoTRIgine (LAMICTAL) 200 MG tablet; Take 1 tablet (200 mg) by mouth daily  Dispense: 90 tablet; Refill: 1    5. Hypothyroidism, unspecified type  Refilled for 6 months, labs from 11/2019 were stable.   - levothyroxine (SYNTHROID/LEVOTHROID) 100 MCG tablet; Take 1 tablet (100 mcg) by mouth daily  Dispense: 90 tablet; Refill: 1    6. Insomnia, unspecified type  7.  Frequent nocturnal awakening  8. Excessive daytime sleepiness  Reviewed  as noted below.  No concerns.  Changed Rx for 10 mg tablets versus 5 mg tablets.  She can use these PRN and given enough for at minimum 3 months until she gets established with new PCP where she moved to.   Discussed that some of her daytime somnolence as well as insomnia could be related to her medications - Increased abilify from 10 up to 15 mg, Lamotrigine, Gabapentin.  She can try to adjust her gabapentin dosing for AM and afternoon doses to see if reducing the dose helps.  For her Abilify we discussed could try taking at night.   - zolpidem (AMBIEN) 10 MG tablet; Take 1 tablet (10 mg) by mouth nightly as needed for sleep  Dispense: 30 tablet; Refill: 2    Return in about 3 months (around 7/3/2020) for Medication Re-check if she doesn't have new PCP by then. .     reviewed 04/03/20.  Gabapentin Rx refilled monthly and appropriately.  Has had 2 Rx for Zolpidem appropriately and as prescribed.  No concerns on  review 04/03/20.     Phone call duration:  12:15 minutes    Options for treatment and follow-up care were reviewed with the patient and/or guardian. Patient and/or guardian engaged in the decision making process and verbalized understanding of the options discussed and agreed with the final plan.    Simran Barreto PA-C

## 2020-04-04 ASSESSMENT — ANXIETY QUESTIONNAIRES: GAD7 TOTAL SCORE: 9

## 2020-04-10 NOTE — TELEPHONE ENCOUNTER
New pt was seen in the er for stones would like to be seen    Patient is returning your call, patient doesn't know anything about her prescription being sent to the wrong pharmacy she states she didn't get the correct amount on her medication. She does want6 her medication to go to [facility] [address]. Patient states please leave her a detailed message.    Thank you Lynn

## 2020-04-15 ENCOUNTER — TELEPHONE (OUTPATIENT)
Dept: FAMILY MEDICINE | Facility: OTHER | Age: 50
End: 2020-04-15

## 2020-04-15 NOTE — TELEPHONE ENCOUNTER
Reason for call:  Patient reporting a symptom    Symptom or request: lethargic    Duration (how long have symptoms been present): ongoing    Have you been treated for this before? No    Additional comments: patient declined phone visit    Phone Number patient can be reached at:  [phone]    Best Time:      Can we leave a detailed message on this number:  NO    Call taken on 4/15/2020 at 9:14 AM by Lorie Mott

## 2020-04-16 NOTE — PROGRESS NOTES
"Magnolia Gonzáles is a 50 year old female who is being evaluated via a billable telephone visit.      The patient has been notified of following:     \"This telephone visit will be conducted via a call between you and your physician/provider. We have found that certain health care needs can be provided without the need for a physical exam.  This service lets us provide the care you need with a short phone conversation.  If a prescription is necessary we can send it directly to your pharmacy.  If lab work is needed we can place an order for that and you can then stop by our lab to have the test done at a later time.    Telephone visits are billed at different rates depending on your insurance coverage. During this emergency period, for some insurers they may be billed the same as an in-person visit.  Please reach out to your insurance provider with any questions.    If during the course of the call the physician/provider feels a telephone visit is not appropriate, you will not be charged for this service.\"    Patient has given verbal consent for Telephone visit?  {YES-NO  Default Yes:4444::\"Yes\"}    How would you like to obtain your AVS? {AVS Preference:701212}    Subjective     Magnolia Gonzáles is a 50 year old female who presents to clinic today for the following health issues:    {SUPERLIST (Optional):537822}  {PEDS Chronic and Acute Problems (Optional):132951}     {additonal problems for provider to add (Optional):717083}    {HIST REVIEW/ LINKS 2 (Optional):638939}    Reviewed and updated as needed this visit by Provider         Review of Systems   {ROS COMP (Optional):345213}       Objective   Reported vitals:  There were no vitals taken for this visit.   {GENERAL APPEARANCE:50::\"healthy\",\"alert\",\"no distress\"}  PSYCH: Alert and oriented times 3; coherent speech, normal   rate and volume, able to articulate logical thoughts, able   to abstract reason, no tangential thoughts, no hallucinations   or " "delusions  Her affect is { :3928856::\"normal\"}  RESP: No cough, no audible wheezing, able to talk in full sentences  Remainder of exam unable to be completed due to telephone visits    {Diagnostic Test Results (Optional):850790::\"Diagnostic Test Results:\",\"Labs reviewed in Epic\"}        Assessment/Plan:  {Diagnosis, Associated Orders and Comment:691543}    No follow-ups on file.      Phone call duration:  *** minutes    {signature options:984166}    "

## 2020-04-16 NOTE — TELEPHONE ENCOUNTER
I spoke with patient.   She had a visit with ES on 4/3 - she continued to feel lethargic and fatigued.   She would like to discuss other options for her.   Recheck appointment made for tomorrow with RIGO.     Alma Rosa Cole, RN, BSN

## 2020-04-16 NOTE — PROGRESS NOTES
"Magnolia Gonzáles is a 50 year old female who is being evaluated via a billable telephone visit.      The patient has been notified of following:     \"This telephone visit will be conducted via a call between you and your physician/provider. We have found that certain health care needs can be provided without the need for a physical exam.  This service lets us provide the care you need with a short phone conversation.  If a prescription is necessary we can send it directly to your pharmacy.  If lab work is needed we can place an order for that and you can then stop by our lab to have the test done at a later time.    Telephone visits are billed at different rates depending on your insurance coverage. During this emergency period, for some insurers they may be billed the same as an in-person visit.  Please reach out to your insurance provider with any questions.    If during the course of the call the physician/provider feels a telephone visit is not appropriate, you will not be charged for this service.\"    Patient has given verbal consent for Telephone visit?  Yes    How would you like to obtain your AVS? Shaina Tubbs     Magnolia Gonzáles is a 50 year old female who presents to clinic today for the following health issues:    Depression Followup    How are you doing with your depression since your last visit? No change    Are you having other symptoms that might be associated with depression? No    Have you had a significant life event?  No     Are you feeling anxious or having panic attacks?   Yes:  Has not had one in awhile but is still feeling anxious but states is learning to live with it    Do you have any concerns with your use of alcohol or other drugs? No    Social History     Tobacco Use     Smoking status: Never Smoker     Smokeless tobacco: Never Used   Substance Use Topics     Alcohol use: Yes     Alcohol/week: 0.0 standard drinks     Comment: \"once every three months\"     Drug use: " No     PHQ 1/13/2020 4/3/2020 4/17/2020   PHQ-9 Total Score 7 8 11   Q9: Thoughts of better off dead/self-harm past 2 weeks Not at all Not at all Not at all     JULIEN-7 SCORE 1/13/2020 4/3/2020 4/17/2020   Total Score - - -   Total Score 15 (severe anxiety) - -   Total Score 15 9 21     Last PHQ-9 4/17/2020   1.  Little interest or pleasure in doing things 0   2.  Feeling down, depressed, or hopeless 1   3.  Trouble falling or staying asleep, or sleeping too much 3   4.  Feeling tired or having little energy 3   5.  Poor appetite or overeating 0   6.  Feeling bad about yourself 1   7.  Trouble concentrating 3   8.  Moving slowly or restless 0   Q9: Thoughts of better off dead/self-harm past 2 weeks 0   PHQ-9 Total Score 11   Difficulty at work, home, or with people Not difficult at all     JULIEN-7  4/17/2020   1. Feeling nervous, anxious, or on edge 3   2. Not being able to stop or control worrying 3   3. Worrying too much about different things 3   4. Trouble relaxing 3   5. Being so restless that it is hard to sit still 3   6. Becoming easily annoyed or irritable 3   7. Feeling afraid, as if something awful might happen 3   JULIEN-7 Total Score 21   If you checked any problems, how difficult have they made it for you to do your work, take care of things at home, or get along with other people? Somewhat difficult     Suicide Assessment Five-step Evaluation and Treatment (SAFE-T)    Hypothyroidism Follow-up      Since last visit, patient describes the following symptoms: Weight stable, no hair loss, no skin changes, no constipation, no loose stools, anxiety, depression and fatigue    She is still battling with her daytime fatigue.  She has a very physical job therefore the fatigue is causing her issues.   She tried to reduce her gabapentin dosing but she noticed that her anxiety was worsened, she was more irritable and didn't notice it helped her daytime fatigue at all.   She is sleeping better since starting the Ambien.   "  She just isn't sure what to do next.       Patient Active Problem List   Diagnosis     Adjustment disorder with mixed anxiety and depressed mood     Acquired hypothyroidism     Bilateral carpal tunnel syndrome     Carpal tunnel syndrome of left wrist     Valium overdose     Overdose of Valium, intentional self-harm, subsequent encounter     Major depressive disorder, recurrent episode, mild (H)     PTSD (post-traumatic stress disorder)     Past Surgical History:   Procedure Laterality Date     ORTHOPEDIC SURGERY         Social History     Tobacco Use     Smoking status: Never Smoker     Smokeless tobacco: Never Used   Substance Use Topics     Alcohol use: Yes     Alcohol/week: 0.0 standard drinks     Comment: \"once every three months\"     Family History   Problem Relation Age of Onset     Depression/Anxiety Mother      Alcohol/Drug Father         Pancreatic CA         Current Outpatient Medications   Medication Sig Dispense Refill     ARIPiprazole (ABILIFY) 15 MG tablet TAKE 1 TABLET BY MOUTH EVERY MORNING 135 tablet 0     buPROPion (WELLBUTRIN XL) 150 MG 24 hr tablet Take 1 tablet (150 mg) by mouth every morning Along with the 300 mg tablet 90 tablet 1     buPROPion (WELLBUTRIN XL) 300 MG 24 hr tablet Take 1 tablet (300 mg) by mouth every morning Along with the 150 mg tablet 90 tablet 1     gabapentin (NEURONTIN) 400 MG capsule Take 3 capsules in the morning, 2 capsules in the afternoon, and 3 capsules in the evening 240 capsule 2     lamoTRIgine (LAMICTAL) 200 MG tablet Take 1 tablet (200 mg) by mouth daily 90 tablet 1     levothyroxine (SYNTHROID/LEVOTHROID) 100 MCG tablet Take 1 tablet (100 mcg) by mouth daily 90 tablet 1     multivitamin, therapeutic with minerals (MULTI-VITAMIN) TABS Take 1 tablet by mouth daily 100 tablet 3     naproxen (NAPROSYN) 500 MG tablet TAKE ONE TABLET BY MOUTH TWO TIMES A DAY WITH MEALS 180 tablet 2     methocarbamol (ROBAXIN) 500 MG tablet Take 1 tablet (500 mg) by mouth 3 times " daily (Patient not taking: Reported on 4/17/2020) 90 tablet 1     order for DME Equipment being ordered: arm strap (Patient not taking: Reported on 4/3/2020) 1 Device 0     zolpidem (AMBIEN) 10 MG tablet Take 1 tablet (10 mg) by mouth nightly as needed for sleep (Patient not taking: Reported on 4/17/2020) 30 tablet 2     Allergies   Allergen Reactions     Zoloft [Sertraline] Rash       Reviewed and updated as needed this visit by Provider  Tobacco  Allergies  Meds  Problems  Med Hx  Surg Hx  Fam Hx         Review of Systems   ROS COMP: Constitutional, HEENT, cardiovascular, pulmonary, GI, , musculoskeletal, neuro, skin, endocrine and psych systems are negative, except as otherwise noted.       Objective   Reported vitals:  There were no vitals taken for this visit.   healthy, alert and no distress  PSYCH: Alert and oriented times 3; coherent speech, normal   rate and volume, able to articulate logical thoughts, able   to abstract reason, no tangential thoughts, no hallucinations   or delusions  Her affect is normal  RESP: No cough, no audible wheezing, able to talk in full sentences  Remainder of exam unable to be completed due to telephone visits    Diagnostic Test Results:  Labs reviewed in Epic        Assessment/Plan:  1. Adjustment disorder with mixed anxiety and depressed mood  2. PTSD (post-traumatic stress disorder)  3. JULIEN (generalized anxiety disorder)  4. Major depressive disorder, recurrent episode, mild (H)  5. Insomnia, unspecified type  6. Excessive daytime sleepiness  At this time we discussed different potential causes of her fatigue:  * Insomnia treated with Ambien.   * Sleep dysfunction waiting on Sleep study for evaluation of this  * Medication - Gabapentin, Lamictal, Abilify  * Metabolic disorder, thyroid off  * other concern  - MENTAL HEALTH REFERRAL  - Adult; Psychiatry; Psychiatry; [facility]: Collaborative Care Psychiatry Service/Bridge to Long-Term Psychiatry as indicated ([phone]);  Yes; Other: Enter in Comments box; Yes; We will contact you to schedule the appointme...    - At this time she doesn't want to change her medications because she doesn't want to alter her mood because she feels like she is in a good place.   - Discussed that I would hesitate to just treat her daytime somnolence without a diagnosis as stimulant use is not FDA approved for treating medication side effects of fatigue.   - Recommended meeting with Psychiatry to determine if there are other recommendations such as adjustments to her current medication regimen which will allow her mood to be stable but also to better tolerate medication.     Return in about 4 weeks (around 5/15/2020) for If not improving, sooner if worse or new concerns.      Phone call duration:  10:10 minutes    Options for treatment and follow-up care were reviewed with the patient and/or guardian. Patient and/or guardian engaged in the decision making process and verbalized understanding of the options discussed and agreed with the final plan.    Simran Barreto PA-C

## 2020-04-17 ENCOUNTER — VIRTUAL VISIT (OUTPATIENT)
Dept: FAMILY MEDICINE | Facility: OTHER | Age: 50
End: 2020-04-17
Payer: OTHER GOVERNMENT

## 2020-04-17 ENCOUNTER — TELEPHONE (OUTPATIENT)
Dept: FAMILY MEDICINE | Facility: OTHER | Age: 50
End: 2020-04-17

## 2020-04-17 DIAGNOSIS — F41.1 GAD (GENERALIZED ANXIETY DISORDER): ICD-10-CM

## 2020-04-17 DIAGNOSIS — F43.23 ADJUSTMENT DISORDER WITH MIXED ANXIETY AND DEPRESSED MOOD: Primary | ICD-10-CM

## 2020-04-17 DIAGNOSIS — F33.0 MAJOR DEPRESSIVE DISORDER, RECURRENT EPISODE, MILD (H): ICD-10-CM

## 2020-04-17 DIAGNOSIS — G47.19 EXCESSIVE DAYTIME SLEEPINESS: ICD-10-CM

## 2020-04-17 DIAGNOSIS — G47.00 INSOMNIA, UNSPECIFIED TYPE: ICD-10-CM

## 2020-04-17 DIAGNOSIS — F43.10 PTSD (POST-TRAUMATIC STRESS DISORDER): ICD-10-CM

## 2020-04-17 PROCEDURE — 99441 ZZC PHYSICIAN TELEPHONE EVALUATION 5-10 MIN: CPT | Performed by: PHYSICIAN ASSISTANT

## 2020-04-17 ASSESSMENT — ANXIETY QUESTIONNAIRES
IF YOU CHECKED OFF ANY PROBLEMS ON THIS QUESTIONNAIRE, HOW DIFFICULT HAVE THESE PROBLEMS MADE IT FOR YOU TO DO YOUR WORK, TAKE CARE OF THINGS AT HOME, OR GET ALONG WITH OTHER PEOPLE: SOMEWHAT DIFFICULT
7. FEELING AFRAID AS IF SOMETHING AWFUL MIGHT HAPPEN: NEARLY EVERY DAY
6. BECOMING EASILY ANNOYED OR IRRITABLE: NEARLY EVERY DAY
5. BEING SO RESTLESS THAT IT IS HARD TO SIT STILL: NEARLY EVERY DAY
3. WORRYING TOO MUCH ABOUT DIFFERENT THINGS: NEARLY EVERY DAY
1. FEELING NERVOUS, ANXIOUS, OR ON EDGE: NEARLY EVERY DAY
2. NOT BEING ABLE TO STOP OR CONTROL WORRYING: NEARLY EVERY DAY
GAD7 TOTAL SCORE: 21

## 2020-04-17 ASSESSMENT — PATIENT HEALTH QUESTIONNAIRE - PHQ9
5. POOR APPETITE OR OVEREATING: NEARLY EVERY DAY
SUM OF ALL RESPONSES TO PHQ QUESTIONS 1-9: 11

## 2020-04-17 NOTE — TELEPHONE ENCOUNTER
Called patient left message to call clinic back. When patient calls back please relay message below.    Per Provider Yolie Barreto had visit today patient needs appt with Dr Kristy mata that works for patient. When patient call back please help with scheduling.    Rogers Russo MA on 4/17/2020 at 12:39 PM

## 2020-04-17 NOTE — Clinical Note
Hi Dr. Wagner.  Not sure if you can help me with this patient at all.  She has been dealing with extreme daytime fatigue.  Initially we thought if we treated her insomnia that would improve, we are still waiting on Sleep study which she obviously can't get done at this time.  We improved her sleep and her fatigue persists.  I'm concerned it is related to her medications she has been put on over the years - Gabapentin, Lamictal and Abilify.  Her insomnia may have also been a result of these medications.  But She is very hesitant to adjust medications because it works so well for her mood.  Not sure if there were adjustments you could help make to make sure her mood stays stable and improve her fatigue or if there was another option to treat her fatigue?    Let me know if you have any questions? Unfortunately I'm furloughed next week but I'll be back in the week after.     Simran Barreto PA-C

## 2020-04-18 ASSESSMENT — ANXIETY QUESTIONNAIRES: GAD7 TOTAL SCORE: 21

## 2020-04-20 NOTE — TELEPHONE ENCOUNTER
2nd message for patient to return call, when call is returned she can be scheduled with Eva Mayorga [phone].

## 2020-05-15 ENCOUNTER — VIRTUAL VISIT (OUTPATIENT)
Dept: PSYCHIATRY | Facility: CLINIC | Age: 50
End: 2020-05-15
Attending: PHYSICIAN ASSISTANT
Payer: OTHER GOVERNMENT

## 2020-05-15 DIAGNOSIS — F43.10 PTSD (POST-TRAUMATIC STRESS DISORDER): ICD-10-CM

## 2020-05-15 DIAGNOSIS — F33.0 MAJOR DEPRESSIVE DISORDER, RECURRENT EPISODE, MILD (H): Primary | ICD-10-CM

## 2020-05-15 DIAGNOSIS — G47.10 EXCESSIVE SLEEPINESS: ICD-10-CM

## 2020-05-15 DIAGNOSIS — F41.1 GAD (GENERALIZED ANXIETY DISORDER): ICD-10-CM

## 2020-05-15 PROCEDURE — 90792 PSYCH DIAG EVAL W/MED SRVCS: CPT | Mod: 95 | Performed by: NURSE PRACTITIONER

## 2020-05-15 RX ORDER — CLOMIPRAMINE HYDROCHLORIDE 25 MG/1
25 CAPSULE ORAL AT BEDTIME
Qty: 30 CAPSULE | Refills: 0 | Status: SHIPPED | OUTPATIENT
Start: 2020-05-15 | End: 2020-07-29

## 2020-05-15 ASSESSMENT — ANXIETY QUESTIONNAIRES
5. BEING SO RESTLESS THAT IT IS HARD TO SIT STILL: NEARLY EVERY DAY
2. NOT BEING ABLE TO STOP OR CONTROL WORRYING: NEARLY EVERY DAY
7. FEELING AFRAID AS IF SOMETHING AWFUL MIGHT HAPPEN: NEARLY EVERY DAY
IF YOU CHECKED OFF ANY PROBLEMS ON THIS QUESTIONNAIRE, HOW DIFFICULT HAVE THESE PROBLEMS MADE IT FOR YOU TO DO YOUR WORK, TAKE CARE OF THINGS AT HOME, OR GET ALONG WITH OTHER PEOPLE: NOT DIFFICULT AT ALL
3. WORRYING TOO MUCH ABOUT DIFFERENT THINGS: NEARLY EVERY DAY
GAD7 TOTAL SCORE: 21
1. FEELING NERVOUS, ANXIOUS, OR ON EDGE: NEARLY EVERY DAY
6. BECOMING EASILY ANNOYED OR IRRITABLE: NEARLY EVERY DAY

## 2020-05-15 ASSESSMENT — PATIENT HEALTH QUESTIONNAIRE - PHQ9: 5. POOR APPETITE OR OVEREATING: NEARLY EVERY DAY
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
## 2020-05-15 NOTE — PATIENT INSTRUCTIONS
1.  Abilify discontinued -   2. Gabapentin 1200  Mg once daily - consider splitting the dose to 400 mg three times daily , then decrease 400 mg every 2-4 weeks  3.  Continue Wellbutrin  mg daily   4.  Ambien - not taking but available if needed  5. Continue to pursue sleep study appointment - referral made   7.  Clomipramine 25 mg at bedtime  - can increase to 50 mg at bedtime in one week if no relief from tiredness and anxiety  8.  Consider Genetic testing for future medication choices  9.   Consider talk therapy with June       Continue all other medical directions per primary care provider.     Continue all other medications as reviewed per electronic medical record today.     Safety plan reviewed. To the Emergency Department as needed or call after hours crisis line at [phone] or [phone]. [place] Crisis Text Line: Text [place] to 131654  or  Suicide LifeLine Chat: judge.me.Serviceful/chat/    To schedule individual or family therapy, call [facility] at [phone].     Schedule an appointment with me in 4 weeks or sooner as needed.  Call [facility] at [phone] to schedule.    Follow up with primary care provider as planned or for acute medical concerns.    Call the psychiatric nurse line with medication questions or concerns at [phone].    edelighthart may be used to communicate with your provider, but this is not intended to be used for emergencies.    Crisis Resources:    National Suicide Prevention Lifeline: 811.606.2403 (TTY: 356.934.8755). Call anytime for help.  (www.suicidepreventionlifeline.org)  National [place] on Mental Illness (www.anne marie.org): 941.204.6812 or 960-575-2199.   Mental Health Association (www.mentalhealth.org): 669.438.6979 or 999-889-8641.  [place] Crisis Text Line: Text [place] to 229779  Suicide LifeLine Chat: judge.me.org/chat

## 2020-05-16 ASSESSMENT — ANXIETY QUESTIONNAIRES: GAD7 TOTAL SCORE: 21

## 2020-06-09 ENCOUNTER — TELEPHONE (OUTPATIENT)
Dept: BEHAVIORAL HEALTH | Facility: CLINIC | Age: 50
End: 2020-06-09

## 2020-07-06 DIAGNOSIS — F43.10 PTSD (POST-TRAUMATIC STRESS DISORDER): ICD-10-CM

## 2020-07-06 DIAGNOSIS — F43.23 ADJUSTMENT DISORDER WITH MIXED ANXIETY AND DEPRESSED MOOD: ICD-10-CM

## 2020-07-06 DIAGNOSIS — F33.0 MAJOR DEPRESSIVE DISORDER, RECURRENT EPISODE, MILD (H): ICD-10-CM

## 2020-07-06 DIAGNOSIS — F41.1 GAD (GENERALIZED ANXIETY DISORDER): ICD-10-CM

## 2020-07-06 NOTE — TELEPHONE ENCOUNTER
Reason for Call:  Medication or medication refill:    Do you use a [facility]?  Name of the pharmacy and phone number for the current request:  [facility] Address: [address] [facility], [place] [address] Phone: [phone]    Name of the medication requested: Gabapentin     Other request:     Can we leave a detailed message on this number? YES    Phone number patient can be reached at: Cell number on file:    Telephone Information:   Mobile [phone]       Best Time: any    Call taken on 7/6/2020 at 10:40 AM by Albert Godfrey

## 2020-07-06 NOTE — TELEPHONE ENCOUNTER
Routing refill request to provider for review/approval because:  Drug not on the [facility] refill protocol     Last Written Prescription Date:  4/3/20  Last Fill Quantity: 240,  # refills: 2   Last office visit: 4/17/2020 with prescribing provider:     Future Office Visit:

## 2020-07-07 RX ORDER — GABAPENTIN 400 MG/1
CAPSULE ORAL
Qty: 240 CAPSULE | Refills: 0 | Status: SHIPPED | OUTPATIENT
Start: 2020-07-07 | End: 2020-08-07

## 2020-07-07 NOTE — TELEPHONE ENCOUNTER
Patient stated she is not weaning off the medication and she is totally out.  She stated she has been taking them as instructed as 3 capsules in the am, 2 capsules in the afternoon & 3 capsules in the evening.

## 2020-07-29 ENCOUNTER — TELEPHONE (OUTPATIENT)
Dept: FAMILY MEDICINE | Facility: OTHER | Age: 50
End: 2020-07-29

## 2020-07-29 ENCOUNTER — VIRTUAL VISIT (OUTPATIENT)
Dept: FAMILY MEDICINE | Facility: OTHER | Age: 50
End: 2020-07-29
Payer: OTHER GOVERNMENT

## 2020-07-29 DIAGNOSIS — F41.1 GENERALIZED ANXIETY DISORDER: ICD-10-CM

## 2020-07-29 DIAGNOSIS — F33.0 MAJOR DEPRESSIVE DISORDER, RECURRENT EPISODE, MILD (H): Primary | ICD-10-CM

## 2020-07-29 DIAGNOSIS — F43.10 PTSD (POST-TRAUMATIC STRESS DISORDER): ICD-10-CM

## 2020-07-29 PROCEDURE — 96127 BRIEF EMOTIONAL/BEHAV ASSMT: CPT | Performed by: PHYSICIAN ASSISTANT

## 2020-07-29 PROCEDURE — 99214 OFFICE O/P EST MOD 30 MIN: CPT | Mod: 95 | Performed by: PHYSICIAN ASSISTANT

## 2020-07-29 ASSESSMENT — ANXIETY QUESTIONNAIRES
1. FEELING NERVOUS, ANXIOUS, OR ON EDGE: NEARLY EVERY DAY
6. BECOMING EASILY ANNOYED OR IRRITABLE: NEARLY EVERY DAY
IF YOU CHECKED OFF ANY PROBLEMS ON THIS QUESTIONNAIRE, HOW DIFFICULT HAVE THESE PROBLEMS MADE IT FOR YOU TO DO YOUR WORK, TAKE CARE OF THINGS AT HOME, OR GET ALONG WITH OTHER PEOPLE: NOT DIFFICULT AT ALL
GAD7 TOTAL SCORE: 21
2. NOT BEING ABLE TO STOP OR CONTROL WORRYING: NEARLY EVERY DAY
5. BEING SO RESTLESS THAT IT IS HARD TO SIT STILL: NEARLY EVERY DAY
3. WORRYING TOO MUCH ABOUT DIFFERENT THINGS: NEARLY EVERY DAY
7. FEELING AFRAID AS IF SOMETHING AWFUL MIGHT HAPPEN: NEARLY EVERY DAY

## 2020-07-29 ASSESSMENT — PATIENT HEALTH QUESTIONNAIRE - PHQ9
5. POOR APPETITE OR OVEREATING: NEARLY EVERY DAY
SUM OF ALL RESPONSES TO PHQ QUESTIONS 1-9: 22
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
## 2020-07-29 NOTE — TELEPHONE ENCOUNTER
Patient is calling in today in regards to the letter that you wrote up for her today. Patient sent the letter to her work and her work sent her a message stating that they would like some sort of accomodation under the American Disability Act. The doctor needs to specify what the accomodation's are. Can you give her a call back? Thank you

## 2020-07-29 NOTE — LETTER
[address]  [facility] [address]  Phone: [phone]    July 29, 2020        Magnolia Gonzáles  [address] [facility] [address]          To whom it may concern:    RE: Magnolia Gonzáles    Patient was seen and treated at our clinic.  Please excuse from work 07/28/2020-07/29/2020.   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  Mental health has been very well controlled in her previous position and feel that it is most beneficial that she remain in that position, concerned that the proposed position change could result in worsening mental health concerns.      Please contact me for questions or concerns.      Sincerely,        Simran Barreto PA-C

## 2020-07-29 NOTE — LETTER
[address]  [facility] [address]  Phone: [phone]    July 30, 2020        Magnolia Gonzáles  [address] [facility] [address]    To whom it may concern:    RE: Magnolia Gonzáles    Patient was seen and treated at our clinic.  Please excuse from work 07/28/2020-07/29/2020.     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  Mental health has been very well controlled in her previous position and feel that it is most beneficial that she remain in that position, concerned that the proposed position change could result in worsening mental health concerns.      Magnolia has been diagnosed with generalized anxiety disorder and major depressive disorder.  Positions that are keeping her busy, moving throughout the day, non-repetitive are best for her mental health and personal performance.  Performing a job where she is isolated and repeating the same task throughout the day will result in worsening mental health conditions that could result in loss of time at work which was not a concern when she was in her previous position.     She was not made aware of any previous concerns, mistakes or errors in her work that would allow her to correct these errors.  It is important that these be communicated with patient as she will be able to perform her job more efficiently and accurately.  If these concerns have not been brought to her attention then she has no way of correcting these work tasks in the future.       Please contact me for questions or concerns.    Sincerely,        Simran Barreto PA-C

## 2020-07-30 ENCOUNTER — TELEPHONE (OUTPATIENT)
Dept: FAMILY MEDICINE | Facility: OTHER | Age: 50
End: 2020-07-30

## 2020-07-30 ASSESSMENT — ANXIETY QUESTIONNAIRES: GAD7 TOTAL SCORE: 21

## 2020-07-30 NOTE — TELEPHONE ENCOUNTER
"Maryjane sent message to employer with the letter that was written 07/29/2020.  They responded to her with requests.     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   - She is being asked to work in injection molding.    - keeping busy and moving around throughout the entire day    She is ok with disclosing concerns with mental health.       Simran Barreto PA-C     "

## 2020-07-30 NOTE — TELEPHONE ENCOUNTER
Reason for Call:  Other call back    Detailed comments: Patient called clinic. She stated her employer is offering positions that are not suitable for patient's mental health. She wants to know what ES or a provider thinks she should do before giving her employer an answer. Patient is hoping to discuss this with someone soon because she needs to give her employer an answer.     Phone Number Patient can be reached at: Home number on file [phone] (home)    Best Time: any    Can we leave a detailed message on this number? YES    Call taken on 7/30/2020 at 4:12 PM by Rand Walton

## 2020-07-30 NOTE — TELEPHONE ENCOUNTER
Recommend she drink clear liquids, can try ginger ale, ginger candies to calm the stomach, she needs to calm her anxiety down and it will help.  Easy to digest foods.  If developing reflux then antacids can help.     For the rash recommend topical cortisone over the counter 2-3 times per day and vaseline over the area.  If not improving need to do a video visit or she needs to send pictures to see and assess.     Simran Barreto PA-C

## 2020-07-30 NOTE — TELEPHONE ENCOUNTER
Returned patient's call, could only hear noise on other end, unable to leave a message to speak to patient.   Sent ES' recs via [employer].    Jo Ann Sandoval, MICHAELN, RN, PHN

## 2020-07-30 NOTE — TELEPHONE ENCOUNTER
Patient said she just talked to Simran Barreto and she forgot to ask her if there is something she can take for her upset stomach and nausea   Also, she forgot to tell you that she has a rash caused by stress, it has been going on for 2 months  She declined setting up another phone visit

## 2020-07-31 ENCOUNTER — VIRTUAL VISIT (OUTPATIENT)
Dept: FAMILY MEDICINE | Facility: OTHER | Age: 50
End: 2020-07-31
Payer: OTHER GOVERNMENT

## 2020-07-31 DIAGNOSIS — F33.0 MAJOR DEPRESSIVE DISORDER, RECURRENT EPISODE, MILD (H): Primary | ICD-10-CM

## 2020-07-31 DIAGNOSIS — F41.1 GENERALIZED ANXIETY DISORDER: ICD-10-CM

## 2020-07-31 PROCEDURE — 99213 OFFICE O/P EST LOW 20 MIN: CPT | Mod: 95 | Performed by: STUDENT IN AN ORGANIZED HEALTH CARE EDUCATION/TRAINING PROGRAM
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
## 2020-07-31 NOTE — TELEPHONE ENCOUNTER
Tried to call patient to find more information - mailbox is full. If she does call back schedule a virtual visit to discuss as this is not enough information to help provide any kind of guidance.

## 2020-07-31 NOTE — LETTER
"[facility]  [address]  [place] [address]  Phone: [phone]    July 31, 2020      Magnolia Gonzáles  [address] [place] [id number]      To whom it may concern:    RE: Magnolia Merida was seen by me today regarding symptoms of anxiety and depression that have worsened after recently finding out that she is being moved to a different position at work. She had no prior knowledge of any job performance concerns prior to finding out that she was going to be moved to a different position.     Title I of the American Disabilities Act (ADA) of 1990 prohibits employers from discriminating against qualified individuals with disabilities including mental health conditions such as depression and anxiety. Title I of the ADA also holds employers responsible for making reasonable accommodations for employees with mental health disabilities (\"Fact Sheet\"; 1997). Dereks mental health has been stable in her current position as a  and . Azucena mental health has been stable when she works in a position where she is able to move about and perform varied work tasks throughout her workday thus catering to her mental health needs. Jobs that require repetitive tasks or standing in one location throughout the workday make her prone to rumination which is known to worsen anxiety and depression symptoms. A reasonable accommodation for Magnolia is to remain in her current position as a  and  since this position has catered to her mental health needs and provided a work environment in which her mental health symptoms have been stable. Please contact me for questions or concerns.    Sincerely,    ANNETTA Greenwood CNP    Reference  Fact Sheet: Disability Discrimination; (1997) U.[place] Equal Employment Opportunity Commission (1997) " https://www.eeoc.gov/laws/guidance/fact-sheet-disability-discrimination

## 2020-08-03 ENCOUNTER — TELEPHONE (OUTPATIENT)
Dept: FAMILY MEDICINE | Facility: OTHER | Age: 50
End: 2020-08-03

## 2020-08-03 ENCOUNTER — VIRTUAL VISIT (OUTPATIENT)
Dept: FAMILY MEDICINE | Facility: OTHER | Age: 50
End: 2020-08-03
Payer: OTHER GOVERNMENT

## 2020-08-03 DIAGNOSIS — L28.2 PRURITIC RASH: ICD-10-CM

## 2020-08-03 DIAGNOSIS — R11.0 NAUSEA: Primary | ICD-10-CM

## 2020-08-03 PROCEDURE — 99214 OFFICE O/P EST MOD 30 MIN: CPT | Mod: 95 | Performed by: PHYSICIAN ASSISTANT

## 2020-08-03 RX ORDER — ONDANSETRON 4 MG/1
4 TABLET, FILM COATED ORAL EVERY 8 HOURS PRN
Qty: 30 TABLET | Refills: 0 | Status: SHIPPED | OUTPATIENT
Start: 2020-08-03 | End: 2022-06-27

## 2020-08-03 RX ORDER — TRIAMCINOLONE ACETONIDE 1 MG/G
OINTMENT TOPICAL 2 TIMES DAILY
Qty: 15 G | Refills: 0 | Status: SHIPPED | OUTPATIENT
Start: 2020-08-03 | End: 2020-11-23

## 2020-08-03 NOTE — TELEPHONE ENCOUNTER
Kendra Josue from [email or web address] is calling to speak with either Simran Barreto or Jina Lilly regarding 3 separate letters that they received on behalf of Magnolia.  She wants to verify that the content was all entered by the Providers and nothing was altered by the patient.  Please call [phone].
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
## 2020-08-03 NOTE — TELEPHONE ENCOUNTER
Check-out Note    0  Schedule with Psychiatry for follow-up.        Has been seen for psych. Routing encounter to [facility] for scheduling.   Paula Mcdonough CMA ([facility])

## 2020-08-03 NOTE — PATIENT INSTRUCTIONS
Nausea:  - Set up with psychiatrist for re-check on medication.   - Use Zofran as needed. Monitor for side effects.     Rash/Itching:  - Increase zyrtec/cetirizine to 20 mg daily.   - Use triamcinolone ointment thin layer twice per day for up to 14 days in a row.   - Use vaseline and apply over the top of the Triamcinolone.

## 2020-08-04 NOTE — TELEPHONE ENCOUNTER
One thought is we could fax the letters directly from the clinic to her employer if that is okay with patient, but will defer to Simran's opinion on this since she is her PCP and more familiar with patient and her situation. Simran - just let me know if I can do anything to help.   Jina Lilly, CNP

## 2020-08-06 ENCOUNTER — TELEPHONE (OUTPATIENT)
Dept: FAMILY MEDICINE | Facility: OTHER | Age: 50
End: 2020-08-06

## 2020-08-07 ENCOUNTER — VIRTUAL VISIT (OUTPATIENT)
Dept: FAMILY MEDICINE | Facility: OTHER | Age: 50
End: 2020-08-07

## 2020-08-07 DIAGNOSIS — F43.10 PTSD (POST-TRAUMATIC STRESS DISORDER): ICD-10-CM

## 2020-08-07 DIAGNOSIS — F41.1 GENERALIZED ANXIETY DISORDER: ICD-10-CM

## 2020-08-07 DIAGNOSIS — F43.23 ADJUSTMENT DISORDER WITH MIXED ANXIETY AND DEPRESSED MOOD: ICD-10-CM

## 2020-08-07 DIAGNOSIS — F41.1 GAD (GENERALIZED ANXIETY DISORDER): ICD-10-CM

## 2020-08-07 DIAGNOSIS — F33.0 MAJOR DEPRESSIVE DISORDER, RECURRENT EPISODE, MILD (H): Primary | ICD-10-CM

## 2020-08-07 PROCEDURE — 99213 OFFICE O/P EST LOW 20 MIN: CPT | Mod: 95 | Performed by: PHYSICIAN ASSISTANT

## 2020-08-07 RX ORDER — GABAPENTIN 400 MG/1
CAPSULE ORAL
Qty: 240 CAPSULE | Refills: 0 | Status: SHIPPED | OUTPATIENT
Start: 2020-08-07 | End: 2020-09-14

## 2020-08-07 RX ORDER — BUPROPION HYDROCHLORIDE 300 MG/1
300 TABLET ORAL EVERY MORNING
Qty: 90 TABLET | Refills: 0 | Status: SHIPPED | OUTPATIENT
Start: 2020-08-07 | End: 2020-09-14

## 2020-08-07 RX ORDER — BUPROPION HYDROCHLORIDE 150 MG/1
150 TABLET ORAL EVERY MORNING
Qty: 90 TABLET | Refills: 0 | Status: SHIPPED | OUTPATIENT
Start: 2020-08-07 | End: 2020-09-14

## 2020-08-07 ASSESSMENT — ANXIETY QUESTIONNAIRES
6. BECOMING EASILY ANNOYED OR IRRITABLE: NEARLY EVERY DAY
1. FEELING NERVOUS, ANXIOUS, OR ON EDGE: NEARLY EVERY DAY
3. WORRYING TOO MUCH ABOUT DIFFERENT THINGS: NEARLY EVERY DAY
7. FEELING AFRAID AS IF SOMETHING AWFUL MIGHT HAPPEN: NEARLY EVERY DAY
IF YOU CHECKED OFF ANY PROBLEMS ON THIS QUESTIONNAIRE, HOW DIFFICULT HAVE THESE PROBLEMS MADE IT FOR YOU TO DO YOUR WORK, TAKE CARE OF THINGS AT HOME, OR GET ALONG WITH OTHER PEOPLE: EXTREMELY DIFFICULT
5. BEING SO RESTLESS THAT IT IS HARD TO SIT STILL: NEARLY EVERY DAY
2. NOT BEING ABLE TO STOP OR CONTROL WORRYING: NEARLY EVERY DAY
GAD7 TOTAL SCORE: 21

## 2020-08-07 ASSESSMENT — PATIENT HEALTH QUESTIONNAIRE - PHQ9
5. POOR APPETITE OR OVEREATING: NEARLY EVERY DAY
SUM OF ALL RESPONSES TO PHQ QUESTIONS 1-9: 24

## 2020-08-07 NOTE — Clinical Note
Luis Antonio Garner.  Do you mind reaching out to Magnolia to set up a follow-up appointment please.  Thanks

## 2020-08-07 NOTE — PROGRESS NOTES
"Magnolia Gonzáles is a 50 year old female who is being evaluated via a billable telephone visit.      The patient has been notified of following:     \"This telephone visit will be conducted via a call between you and your physician/provider. We have found that certain health care needs can be provided without the need for a physical exam.  This service lets us provide the care you need with a short phone conversation.  If a prescription is necessary we can send it directly to your pharmacy.  If lab work is needed we can place an order for that and you can then stop by our lab to have the test done at a later time.    Telephone visits are billed at different rates depending on your insurance coverage. During this emergency period, for some insurers they may be billed the same as an in-person visit.  Please reach out to your insurance provider with any questions.    If during the course of the call the physician/provider feels a telephone visit is not appropriate, you will not be charged for this service.\"    Patient has given verbal consent for Telephone visit?  Yes    What phone number would you like to be contacted at? [phone]    How would you like to obtain your AVS? Jaswindert    Subjective     Magnolia Gonzáles is a 50 year old female who presents via phone visit today for the following health issues:    HPI    Fatigue      Duration: 2 weeks    Description (location/character/radiation): Patient lost her job 2 days ago, she has been fatigued for 2 weeks now. States she wakes up, tries to be productive and can't do her tasks and will go back to bed.    Intensity:  mild    Accompanying signs and symptoms: Anxiety, depression       - She notes that she lost her job.   - Has been having a difficult time with fatigue.  Doesn't have motivation to finish things.   - She is wanting to take something to help her with these issues.   - She has an appointment scheduled in September with Psychiatrist to discuss " "medication management again.   - She has worsening depression and anxiety now that she is off of work, she does better in environment with others doing tasks.      Depression and Anxiety Follow-Up    How are you doing with your depression since your last visit? Worsened     How are you doing with your anxiety since your last visit?  Worsened     Are you having other symptoms that might be associated with depression or anxiety? No    Have you had a significant life event? Job Concerns patient lost her job 2 days ago    Do you have any concerns with your use of alcohol or other drugs? No    Social History     Tobacco Use     Smoking status: Never Smoker     Smokeless tobacco: Never Used   Substance Use Topics     Alcohol use: Yes     Alcohol/week: 0.0 standard drinks     Comment: \"once every three months\"     Drug use: No     PHQ 5/15/2020 7/29/2020 8/7/2020   PHQ-9 Total Score - 22 24   Q9: Thoughts of better off dead/self-harm past 2 weeks Not at all Not at all Not at all     JULIEN-7 SCORE 5/15/2020 7/29/2020 8/7/2020   Total Score - - -   Total Score - - -   Total Score 21 21 21     Last PHQ-9 8/7/2020   1.  Little interest or pleasure in doing things 3   2.  Feeling down, depressed, or hopeless 3   3.  Trouble falling or staying asleep, or sleeping too much 3   4.  Feeling tired or having little energy 3   5.  Poor appetite or overeating 3   6.  Feeling bad about yourself 3   7.  Trouble concentrating 3   8.  Moving slowly or restless 3   Q9: Thoughts of better off dead/self-harm past 2 weeks 0   PHQ-9 Total Score 24   Difficulty at work, home, or with people Extremely dIfficult     JULIEN-7  8/7/2020   1. Feeling nervous, anxious, or on edge 3   2. Not being able to stop or control worrying 3   3. Worrying too much about different things 3   4. Trouble relaxing 3   5. Being so restless that it is hard to sit still 3   6. Becoming easily annoyed or irritable 3   7. Feeling afraid, as if something awful might happen 3 " "  JULIEN-7 Total Score 21   If you checked any problems, how difficult have they made it for you to do your work, take care of things at home, or get along with other people? Extremely difficult       Suicide Assessment Five-step Evaluation and Treatment (SAFE-T)        Patient Active Problem List   Diagnosis     Adjustment disorder with mixed anxiety and depressed mood     Acquired hypothyroidism     Bilateral carpal tunnel syndrome     Carpal tunnel syndrome of left wrist     Valium overdose     Overdose of Valium, intentional self-harm, subsequent encounter     Major depressive disorder, recurrent episode, mild (H)     PTSD (post-traumatic stress disorder)     Generalized anxiety disorder     Past Surgical History:   Procedure Laterality Date     ORTHOPEDIC SURGERY         Social History     Tobacco Use     Smoking status: Never Smoker     Smokeless tobacco: Never Used   Substance Use Topics     Alcohol use: Yes     Alcohol/week: 0.0 standard drinks     Comment: \"once every three months\"     Family History   Problem Relation Age of Onset     Depression/Anxiety Mother      Alcohol/Drug Father         Pancreatic CA         Current Outpatient Medications   Medication Sig Dispense Refill     buPROPion (WELLBUTRIN XL) 150 MG 24 hr tablet Take 1 tablet (150 mg) by mouth every morning Take with 300 mg tablet for total of 450 mg daily. 90 tablet 0     buPROPion (WELLBUTRIN XL) 300 MG 24 hr tablet Take 1 tablet (300 mg) by mouth every morning Take with 150 mg tablet for total of 450 mg daily 90 tablet 0     gabapentin (NEURONTIN) 400 MG capsule TAKE 3 CAPSULES IN MORNING, TAKE 2 CAPSULES IN THE AFTERNOON, AND TAKE 3 CAPSULES IN THE EVENING 240 capsule 0     lamoTRIgine (LAMICTAL) 200 MG tablet Take 1 tablet (200 mg) by mouth daily 90 tablet 1     levothyroxine (SYNTHROID/LEVOTHROID) 100 MCG tablet Take 1 tablet (100 mcg) by mouth daily 90 tablet 1     methocarbamol (ROBAXIN) 500 MG tablet Take 1 tablet (500 mg) by mouth 3 " times daily 90 tablet 1     multivitamin, therapeutic with minerals (MULTI-VITAMIN) TABS Take 1 tablet by mouth daily 100 tablet 3     naproxen (NAPROSYN) 500 MG tablet TAKE ONE TABLET BY MOUTH TWO TIMES A DAY WITH MEALS 180 tablet 2     ondansetron (ZOFRAN) 4 MG tablet Take 1 tablet (4 mg) by mouth every 8 hours as needed for nausea 30 tablet 0     triamcinolone (KENALOG) 0.1 % external ointment Apply topically 2 times daily 15 g 0     Allergies   Allergen Reactions     Zoloft [Sertraline] Rash       Reviewed and updated as needed this visit by Provider  Tobacco  Allergies  Meds  Problems  Med Hx  Surg Hx  Fam Hx         Review of Systems   Constitutional, HEENT, cardiovascular, pulmonary, GI, , musculoskeletal, neuro, skin, endocrine and psych systems are negative, except as otherwise noted.       Objective   Reported vitals:  There were no vitals taken for this visit.   healthy, alert and no distress  PSYCH: Alert and oriented times 3; coherent speech, normal   rate and volume, able to articulate logical thoughts, able   to abstract reason, no tangential thoughts, no hallucinations   or delusions  Her affect is Flat  RESP: No cough, no audible wheezing, able to talk in full sentences  Remainder of exam unable to be completed due to telephone visits    Diagnostic Test Results:  Labs reviewed in Epic        Assessment/Plan:    1. Major depressive disorder, recurrent episode, mild (H)  - buPROPion (WELLBUTRIN XL) 150 MG 24 hr tablet; Take 1 tablet (150 mg) by mouth every morning Take with 300 mg tablet for total of 450 mg daily.  Dispense: 90 tablet; Refill: 0  - buPROPion (WELLBUTRIN XL) 300 MG 24 hr tablet; Take 1 tablet (300 mg) by mouth every morning Take with 150 mg tablet for total of 450 mg daily  Dispense: 90 tablet; Refill: 0  - gabapentin (NEURONTIN) 400 MG capsule; TAKE 3 CAPSULES IN MORNING, TAKE 2 CAPSULES IN THE AFTERNOON, AND TAKE 3 CAPSULES IN THE EVENING  Dispense: 240 capsule; Refill:  0    2. Generalized anxiety disorder  - buPROPion (WELLBUTRIN XL) 150 MG 24 hr tablet; Take 1 tablet (150 mg) by mouth every morning Take with 300 mg tablet for total of 450 mg daily.  Dispense: 90 tablet; Refill: 0  - buPROPion (WELLBUTRIN XL) 300 MG 24 hr tablet; Take 1 tablet (300 mg) by mouth every morning Take with 150 mg tablet for total of 450 mg daily  Dispense: 90 tablet; Refill: 0    3. Adjustment disorder with mixed anxiety and depressed mood  - gabapentin (NEURONTIN) 400 MG capsule; TAKE 3 CAPSULES IN MORNING, TAKE 2 CAPSULES IN THE AFTERNOON, AND TAKE 3 CAPSULES IN THE EVENING  Dispense: 240 capsule; Refill: 0    4. PTSD (post-traumatic stress disorder)  - gabapentin (NEURONTIN) 400 MG capsule; TAKE 3 CAPSULES IN MORNING, TAKE 2 CAPSULES IN THE AFTERNOON, AND TAKE 3 CAPSULES IN THE EVENING  Dispense: 240 capsule; Refill: 0    5. JULIEN (generalized anxiety disorder)  - gabapentin (NEURONTIN) 400 MG capsule; TAKE 3 CAPSULES IN MORNING, TAKE 2 CAPSULES IN THE AFTERNOON, AND TAKE 3 CAPSULES IN THE EVENING  Dispense: 240 capsule; Refill: 0    At this time I discussed with patient that her recent fatigue and lack of motivation is more likely related to her uncontrolled mental health issues. She is ok restarting Wellbutrin but she can't afford Abilify.   Encouraged her to stay focused on a task for about 30 minutes and then take a break.   Get outside and get moving to help boost her natural hormones to help with mood.   She doesn't have a job at this time and it is very important she complete her unemployment paperwork ASAP to help continue to get benefits.    She will restart Wellbutrin at 150 mg daily x 7 days then 300 mg daily x 7 days and back to 450 mg daily.      Return in about 4 weeks (around 9/4/2020) for Medication Re-check.      Phone call duration:  9:20 minutes    Options for treatment and follow-up care were reviewed with the patient and/or guardian. Patient and/or guardian engaged in the decision  making process and verbalized understanding of the options discussed and agreed with the final plan.     Simran Barreto PA-C

## 2020-08-08 ASSESSMENT — ANXIETY QUESTIONNAIRES: GAD7 TOTAL SCORE: 21

## 2020-08-11 ENCOUNTER — TELEPHONE (OUTPATIENT)
Dept: BEHAVIORAL HEALTH | Facility: CLINIC | Age: 50
End: 2020-08-11

## 2020-08-13 ENCOUNTER — TELEPHONE (OUTPATIENT)
Dept: FAMILY MEDICINE | Facility: OTHER | Age: 50
End: 2020-08-13

## 2020-08-13 ENCOUNTER — TELEPHONE (OUTPATIENT)
Dept: PSYCHIATRY | Facility: CLINIC | Age: 50
End: 2020-08-13

## 2020-08-13 DIAGNOSIS — R42 DIZZINESS: Primary | ICD-10-CM

## 2020-08-13 NOTE — TELEPHONE ENCOUNTER
Recommend appointment to discuss a medication.     Toby Hyde PA-C  [facility]

## 2020-08-13 NOTE — TELEPHONE ENCOUNTER
"Nurse Triage SBAR    Situation: Magnolia Gonzáles is calling regarding a new or worsening symptom of low energy and motivation.    Background:   Pertinent diagnoses of: MDD recurrent, PTSD, JULIEN, adjustment disorder with mixed anxiety and depressed mood  Last office visit: 5/15/20  Next office visit: 9/14/20  Missed appointments: Yes, no show 7/7     Expected Return to Clinic: 4 weeks  Summary from Assessment and Treatment Plan by HERMELINDA Vickers-BC from last office visit on 5/15:  Treatment Plan:  1.  Abilify discontinued -   2. Gabapentin 1200  Mg once daily - consider splitting the dose to 400 mg three times daily , then decrease 400 mg every 2-4 weeks  3.  Continue Wellbutrin  mg daily   4.  Ambien - not taking but available if needed  5. Continue to pursue sleep study appointment - referral made   7.  Clomipramine 25 mg at bedtime  - can increase to 50 mg at bedtime in one week if no relief from tiredness and anxiety  8.  Consider Genetic testing for future medication choices  9.   Consider talk therapy with June     Assessment:   Description: Patient reports still having low energy and that \"things aren't working\".  Onset/duration: ongoing, not improved since last visit  Precip. factors:  Patient no longer taking Wellbutrin, stopped on own. Also lost her job last week and is struggling to cope with that and having difficulty applying for unemployment.   Do symptoms interfere with responsibilities? YES--interferes with relationships and ADLs    Of note, patient had visit with PCP on 8/7/20 to discuss her fatigue. See below for treatment plan by PCP-- patient is not following this, as she reports not yet starting the Wellbutrin again.   At this time I discussed with patient that her recent fatigue and lack of motivation is more likely related to her uncontrolled mental health issues. She is ok restarting Wellbutrin but she can't afford Abilify.   Encouraged her to stay focused on a task for " about 30 minutes and then take a break.   Get outside and get moving to help boost her natural hormones to help with mood.   She doesn't have a job at this time and it is very important she complete her unemployment paperwork ASAP to help continue to get benefits.    She will restart Wellbutrin at 150 mg daily x 7 days then 300 mg daily x 7 days and back to 450 mg daily.      Risk Assessment:  Thoughts of harming self or others: NO     Medications:   Taking medication(s) as prescribed? No-- stopped Wellbutrin, patient unsure when exactly she stopped it but it has been about a few weeks.  Taking over the counter medication(s)? N/A  Any medication side effects? No significant side effects    Any barriers to taking medication(s) as prescribed?  No  Medication(s) improving/managing symptoms?  N/A  Medication reconciliation completed: Yes  Any use of alcohol or illicit substances: No    Recommendation:   Routing to provider for recommendation on medications.  Patient and/or family education completed regarding self-care, seeking support and behavioral activation.    Protocol Recommended Disposition:   consult with provider-- nursing to contact patient with response    Nurse Triage Follow Up:   Safety Plan Reviewed: Yes. Emergency Department as needed and [place] Crisis Text Line: text [place] to [phone]   Patient informed of recommendations and reasons to call back or seek care in the ED. Patient verbalized understanding and agrees with the plan.      Tisha Lora RN   Nurse Liaison  [facility]

## 2020-08-13 NOTE — TELEPHONE ENCOUNTER
Reason for Call:  Medication or medication refill:    Do you use a [facility]?  Name of the pharmacy and phone number for the current request:  [facility] - [place], [place] - [address]     Name of the medication requested: medication to help with dizziness / nausea due to vertigo      Other request: Patient was recently prescribed something ( she thinks it was the ondansetron) to help with nausea and dizziness but states that it does not help at all. Is there something else she could try? Please have covering provider review as ES will not be in until 8/24        Can we leave a detailed message on this number? YES    Phone number patient can be reached at: Home number on file [phone] (home)    Best Time: any    Call taken on 8/13/2020 at 11:43 AM by Albert Godfrey

## 2020-08-13 NOTE — TELEPHONE ENCOUNTER
Reason for call:  Other   Patient called regarding (reason for call): call back and Would like to discuss possible med changes prior to appt on 9/14.  Pt reports she is struggling and has low energy.  Additional comments: Pt would like a call back to discuss.    Phone number to reach patient:  Cell number on file:    Telephone Information:   Mobile [phone]       Best Time:  Anytime    Can we leave a detailed message on this number?  YES    Travel screening: Not Applicable

## 2020-08-14 ENCOUNTER — VIRTUAL VISIT (OUTPATIENT)
Dept: FAMILY MEDICINE | Facility: OTHER | Age: 50
End: 2020-08-14

## 2020-08-14 DIAGNOSIS — H81.10 BENIGN PAROXYSMAL POSITIONAL VERTIGO, UNSPECIFIED LATERALITY: Primary | ICD-10-CM

## 2020-08-14 DIAGNOSIS — J30.9 ALLERGIC SINUSITIS: ICD-10-CM

## 2020-08-14 PROCEDURE — 99213 OFFICE O/P EST LOW 20 MIN: CPT | Mod: 95 | Performed by: STUDENT IN AN ORGANIZED HEALTH CARE EDUCATION/TRAINING PROGRAM

## 2020-08-14 ASSESSMENT — PATIENT HEALTH QUESTIONNAIRE - PHQ9
5. POOR APPETITE OR OVEREATING: NEARLY EVERY DAY
SUM OF ALL RESPONSES TO PHQ QUESTIONS 1-9: 21

## 2020-08-14 ASSESSMENT — ANXIETY QUESTIONNAIRES
5. BEING SO RESTLESS THAT IT IS HARD TO SIT STILL: NEARLY EVERY DAY
1. FEELING NERVOUS, ANXIOUS, OR ON EDGE: NEARLY EVERY DAY
3. WORRYING TOO MUCH ABOUT DIFFERENT THINGS: NEARLY EVERY DAY
GAD7 TOTAL SCORE: 21
IF YOU CHECKED OFF ANY PROBLEMS ON THIS QUESTIONNAIRE, HOW DIFFICULT HAVE THESE PROBLEMS MADE IT FOR YOU TO DO YOUR WORK, TAKE CARE OF THINGS AT HOME, OR GET ALONG WITH OTHER PEOPLE: VERY DIFFICULT
2. NOT BEING ABLE TO STOP OR CONTROL WORRYING: NEARLY EVERY DAY
6. BECOMING EASILY ANNOYED OR IRRITABLE: NEARLY EVERY DAY
7. FEELING AFRAID AS IF SOMETHING AWFUL MIGHT HAPPEN: NEARLY EVERY DAY

## 2020-08-14 NOTE — PROGRESS NOTES
"Magnolia Gonzáles is a 50 year old female who is being evaluated via a billable video visit.      The patient has been notified of following:     \"This video visit will be conducted via a call between you and your physician/provider. We have found that certain health care needs can be provided without the need for an in-person physical exam.  This service lets us provide the care you need with a video conversation.  If a prescription is necessary we can send it directly to your pharmacy.  If lab work is needed we can place an order for that and you can then stop by our lab to have the test done at a later time.    Video visits are billed at different rates depending on your insurance coverage.  Please reach out to your insurance provider with any questions.    If during the course of the call the physician/provider feels a video visit is not appropriate, you will not be charged for this service.\"    Patient has given verbal consent for Video visit? Yes  How would you like to obtain your AVS? MyChart  If you are dropped from the video visit, the video invite should be resent to: Text to cell phone: [phone]  Will anyone else be joining your video visit? No      Subjective     Magnolia Gonzáles is a 50 year old female who presents today via video visit for the following health issues:    HPI    Dizziness  Onset: 2015 Ongoing Worse in the last year, episodes, vertigo when it first started, changed to sea sick episodes. Anxiety upset stomach is different. Waking up with it. Has lock jaw. Year round bad sinuses.    Description:   Do you feel faint:  YES  Does it feel like the surroundings (bed, room) are moving: YES  Unsteady/off balance: YES  Have you passed out or fallen: no     Intensity: moderate    Progression of Symptoms:  worsening    Accompanying Signs & Symptoms:  Heart palpitations: YES- Maybe, Patient is not certain as she has anxiety.  Nausea, vomiting: YES  Weakness in arms or legs: no " "  Fatigue: YES  Vision or speech changes: YES- Patient is uncertain  Ringing in ears (Tinnitus): no   Hearing Loss: no     History:   Head trauma/concussion hx: YES- 2015  Previous similar symptoms: YES  Recent bleeding history: no     Precipitating factors:   Worse with activity or head movement: no   Any new medications (BP?): no   Alcohol/drug abuse/withdrawal: no     Alleviating factors:   Does staying in a fixed position give relief:  YES- If she absolutely does not move her head or eyes.     Therapies Tried and outcome: Dramamine but, is unable to function as she is too tired.     Restarted Wellbutrin on 8/7/20 ramping up dose.   Anxiety has triggered dizziness in the past.   Recently is not working as she lost her job so more stress.       Video Start Time: 12:55 PM    Patient Active Problem List   Diagnosis     Adjustment disorder with mixed anxiety and depressed mood     Acquired hypothyroidism     Bilateral carpal tunnel syndrome     Carpal tunnel syndrome of left wrist     Valium overdose     Overdose of Valium, intentional self-harm, subsequent encounter     Major depressive disorder, recurrent episode, mild (H)     PTSD (post-traumatic stress disorder)     Generalized anxiety disorder     Past Surgical History:   Procedure Laterality Date     ORTHOPEDIC SURGERY         Social History     Tobacco Use     Smoking status: Never Smoker     Smokeless tobacco: Never Used   Substance Use Topics     Alcohol use: Yes     Alcohol/week: 0.0 standard drinks     Comment: \"once every three months\"     Family History   Problem Relation Age of Onset     Depression/Anxiety Mother      Alcohol/Drug Father         Pancreatic CA         Current Outpatient Medications   Medication Sig Dispense Refill     buPROPion (WELLBUTRIN XL) 150 MG 24 hr tablet Take 1 tablet (150 mg) by mouth every morning Take with 300 mg tablet for total of 450 mg daily. 90 tablet 0     buPROPion (WELLBUTRIN XL) 300 MG 24 hr tablet Take 1 tablet (300 " mg) by mouth every morning Take with 150 mg tablet for total of 450 mg daily 90 tablet 0     gabapentin (NEURONTIN) 400 MG capsule TAKE 3 CAPSULES IN MORNING, TAKE 2 CAPSULES IN THE AFTERNOON, AND TAKE 3 CAPSULES IN THE EVENING 240 capsule 0     lamoTRIgine (LAMICTAL) 200 MG tablet Take 1 tablet (200 mg) by mouth daily 90 tablet 1     levothyroxine (SYNTHROID/LEVOTHROID) 100 MCG tablet Take 1 tablet (100 mcg) by mouth daily 90 tablet 1     methocarbamol (ROBAXIN) 500 MG tablet Take 1 tablet (500 mg) by mouth 3 times daily 90 tablet 1     multivitamin, therapeutic with minerals (MULTI-VITAMIN) TABS Take 1 tablet by mouth daily 100 tablet 3     naproxen (NAPROSYN) 500 MG tablet TAKE ONE TABLET BY MOUTH TWO TIMES A DAY WITH MEALS 180 tablet 2     ondansetron (ZOFRAN) 4 MG tablet Take 1 tablet (4 mg) by mouth every 8 hours as needed for nausea 30 tablet 0     triamcinolone (KENALOG) 0.1 % external ointment Apply topically 2 times daily 15 g 0     Allergies   Allergen Reactions     Zoloft [Sertraline] Rash     Recent Labs   Lab Test 11/13/19  1331 03/05/19  1149 06/11/18  1854  05/29/16  0638  01/06/16  1109  03/24/15  1650   LDL  --   --   --   --   --   --  91  --  140*   HDL  --   --   --   --   --   --  78  --  65   TRIG  --   --   --   --   --   --  91  --  84   ALT  --  23 23  --  21   < >  --    < > 20   CR 0.67 0.66 0.75  --  0.65   < >  --    < > 0.74   GFRESTIMATED >90 >90 83  --  >90  Non  GFR Calc     < >  --    < > 85   GFRESTBLACK >90 >90 >90  --  >90  African American GFR Calc     < >  --    < > >90   GFR Calc     POTASSIUM 3.9 4.6 4.6   < > 3.3*   < >  --    < > 3.5   TSH 2.73 1.29 1.86   < >  --    < > 1.51   < > 2.61    < > = values in this interval not displayed.      BP Readings from Last 3 Encounters:   02/25/20 106/70   01/14/20 110/66   01/13/20 126/80    Wt Readings from Last 3 Encounters:   01/14/20 65 kg (143 lb 3.2 oz)   01/13/20 65 kg (143 lb 3.2 oz)   03/05/19  67.1 kg (148 lb)                    Reviewed and updated as needed this visit by Provider         Review of Systems   Constitutional, HEENT, cardiovascular, pulmonary, GI, , musculoskeletal, neuro, skin, endocrine and psych systems are negative, except as otherwise noted.      Objective           Vitals:  No vitals were obtained today due to virtual visit.    Physical Exam     GENERAL: Healthy, alert and no distress  EYES: Eyes grossly normal to inspection.  No discharge or erythema, or obvious scleral/conjunctival abnormalities.  RESP: No audible wheeze, cough, or visible cyanosis.  No visible retractions or increased work of breathing.    SKIN: Visible skin clear. No significant rash, abnormal pigmentation or lesions.  NEURO: Cranial nerves grossly intact.  Mentation and speech appropriate for age.  PSYCH: Mentation appears normal, affect normal/bright, judgement and insight intact, normal speech and appearance well-groomed.      Diagnostic Test Results:  Labs reviewed in Epic        Assessment & Plan     1. Benign paroxysmal positional vertigo, unspecified laterality  2. Allergic sinusitis  The video call dropped towards the end of the visit. I tried calling her twice to finish up the visit but she did not  the phone. I left a message asking her to call back. Before the visit dropped, I did suggest possibly trying the Epley maneuver at home as she is financially strapped and is trying to avoid any costly treatment. I also suggested working on getting better control of allergies as congestion can lead to eustachian tube dysfunction and then lead to inner ear cause for vertigo. Certainly could be anxiety as a contributing component as this is caused flares of vertigo in the past.  Her PCP recently restarted her on bupropion but she has not started taking the medication.  May be helpful to start the bupropion to potentially reduce anxiety and reduce vertigo symptoms.  She did not have any red flag symptoms  that would warrant urgent evaluation.       No follow-ups on file.    ANNETTA Greenwood CNP  [facility]      Video-Visit Details    Type of service:  Video Visit    Video End Time:1:18 PM    Originating Location (pt. Location): Home    Distant Location (provider location):  [facility]     Platform used for Video Visit: Brunilda

## 2020-08-14 NOTE — TELEPHONE ENCOUNTER
Janet Álvarez NP  Psych Rn - [facility] 16 hours ago (4:31 PM)       She should stay on the Wellbutrin and see how she does.  I have only seen her once.  Would she like to see June for talk therapy?  Also - can connect her with [facility] for behavior home health services.    Message text

## 2020-08-14 NOTE — TELEPHONE ENCOUNTER
Writer attempted to contact patient-- no answer, LVM with information from provider.  Will also send [employer] message with information.    Tisha Lora, RN    Nurse Liaison  [facility]

## 2020-08-15 ASSESSMENT — ANXIETY QUESTIONNAIRES: GAD7 TOTAL SCORE: 21

## 2020-08-17 NOTE — TELEPHONE ENCOUNTER
Second attempt to contact patient, LVM for her to call back. Please transfer to RN when patient calls.

## 2020-08-21 NOTE — TELEPHONE ENCOUNTER
Nursing is not to schedule patients.  Routing to [facility] to contact patient to offer an appointment for any day during end of the day doc time.    Tisha Lora, RN    Nurse Liaison  [facility]

## 2020-08-21 NOTE — TELEPHONE ENCOUNTER
Schedule in doc time at the end of the day   Received: Today   Message Contents   Janet Álvarez NP P Psych Rn - [facility]    Caller: Unspecified (1 week ago)               yes

## 2020-08-21 NOTE — TELEPHONE ENCOUNTER
Patient called back.     Janet: Is it still ok to schedule patient at the end of the day during doc time?    Tisha Lora, EDITH    Nurse Liaison  [facility]

## 2020-08-24 NOTE — TELEPHONE ENCOUNTER
As provider is out ill today, please call and offer appointment during end of the day doc time sometime this week.     Tisha Lora, RN    Nurse Liaison  [facility]

## 2020-09-14 ENCOUNTER — VIRTUAL VISIT (OUTPATIENT)
Dept: PSYCHIATRY | Facility: CLINIC | Age: 50
End: 2020-09-14
Payer: OTHER GOVERNMENT

## 2020-09-14 DIAGNOSIS — G47.10 EXCESSIVE SLEEPINESS: ICD-10-CM

## 2020-09-14 DIAGNOSIS — F33.1 MAJOR DEPRESSIVE DISORDER, RECURRENT EPISODE, MODERATE (H): Primary | ICD-10-CM

## 2020-09-14 DIAGNOSIS — F43.10 PTSD (POST-TRAUMATIC STRESS DISORDER): ICD-10-CM

## 2020-09-14 DIAGNOSIS — F43.23 ADJUSTMENT DISORDER WITH MIXED ANXIETY AND DEPRESSED MOOD: ICD-10-CM

## 2020-09-14 DIAGNOSIS — F41.1 GENERALIZED ANXIETY DISORDER: ICD-10-CM

## 2020-09-14 DIAGNOSIS — F33.0 MAJOR DEPRESSIVE DISORDER, RECURRENT EPISODE, MILD (H): ICD-10-CM

## 2020-09-14 DIAGNOSIS — F41.1 GAD (GENERALIZED ANXIETY DISORDER): ICD-10-CM

## 2020-09-14 PROCEDURE — 99214 OFFICE O/P EST MOD 30 MIN: CPT | Mod: 95 | Performed by: NURSE PRACTITIONER

## 2020-09-14 RX ORDER — BUPROPION HYDROCHLORIDE 150 MG/1
150 TABLET ORAL EVERY MORNING
Qty: 90 TABLET | Refills: 0 | Status: SHIPPED | OUTPATIENT
Start: 2020-09-14 | End: 2020-11-23

## 2020-09-14 RX ORDER — LAMOTRIGINE 200 MG/1
200 TABLET ORAL DAILY
Qty: 90 TABLET | Refills: 1 | Status: SHIPPED | OUTPATIENT
Start: 2020-09-14 | End: 2020-11-23

## 2020-09-14 RX ORDER — GABAPENTIN 400 MG/1
CAPSULE ORAL
Qty: 240 CAPSULE | Refills: 0 | Status: SHIPPED | OUTPATIENT
Start: 2020-09-14 | End: 2020-11-18

## 2020-09-14 RX ORDER — BUPROPION HYDROCHLORIDE 300 MG/1
300 TABLET ORAL EVERY MORNING
Qty: 90 TABLET | Refills: 0 | Status: SHIPPED | OUTPATIENT
Start: 2020-09-14 | End: 2020-11-23

## 2020-09-14 RX ORDER — METHYLPHENIDATE HYDROCHLORIDE 5 MG/1
5 TABLET ORAL 2 TIMES DAILY
Qty: 14 TABLET | Refills: 0 | Status: SHIPPED | OUTPATIENT
Start: 2020-09-14 | End: 2020-10-01 | Stop reason: DRUGHIGH

## 2020-09-14 ASSESSMENT — ANXIETY QUESTIONNAIRES
5. BEING SO RESTLESS THAT IT IS HARD TO SIT STILL: NEARLY EVERY DAY
GAD7 TOTAL SCORE: 21
IF YOU CHECKED OFF ANY PROBLEMS ON THIS QUESTIONNAIRE, HOW DIFFICULT HAVE THESE PROBLEMS MADE IT FOR YOU TO DO YOUR WORK, TAKE CARE OF THINGS AT HOME, OR GET ALONG WITH OTHER PEOPLE: SOMEWHAT DIFFICULT
3. WORRYING TOO MUCH ABOUT DIFFERENT THINGS: NEARLY EVERY DAY
1. FEELING NERVOUS, ANXIOUS, OR ON EDGE: NEARLY EVERY DAY
2. NOT BEING ABLE TO STOP OR CONTROL WORRYING: NEARLY EVERY DAY
7. FEELING AFRAID AS IF SOMETHING AWFUL MIGHT HAPPEN: NEARLY EVERY DAY
6. BECOMING EASILY ANNOYED OR IRRITABLE: NEARLY EVERY DAY

## 2020-09-14 ASSESSMENT — PATIENT HEALTH QUESTIONNAIRE - PHQ9
5. POOR APPETITE OR OVEREATING: NEARLY EVERY DAY
SUM OF ALL RESPONSES TO PHQ QUESTIONS 1-9: 21

## 2020-09-14 NOTE — PATIENT INSTRUCTIONS
1.  Continue gabapentin 1200 mg morning and night and 800 mg in the afternoon    2.  Continue Wellbutrin  mg daily    3.  Add methylphenidate 5 mg twice daily-contact me in 1 week to consider further increases in the dose    4.  Sleep study referral resent to [place] office    5.  Continue lamotrigine 200 mg daily            Continue all other medications as reviewed per electronic medical record today.     Safety plan reviewed. To the Emergency Department as needed or call after hours crisis line at [phone] or [phone]. [place] Crisis Text [place]. Text [place] to 084794 or Suicide LifeLine Chat: Lexim.org/chat/    To schedule individual or family therapy, call [facility] at [phone].    Schedule an appointment with me in 6 weeks or sooner as needed. Call [facility] at [phone] to schedule.    Follow up with primary care provider as planned or for acute medical concerns.    Call the psychiatric nurse line with medication questions or concerns at [phone].    [employer] may be used to communicate with your provider, but this is not intended to be used for emergencies.    Crisis Resources:    National Suicide Prevention Lifeline: 727.426.1707 (TTY: 844.511.2613). Call anytime for help.  (www.suicidepreventionlifeline.org)  National [place] on Mental Illness (www.anne marie.org): 544.655.5245 or 372-182-5129.   Mental Health Association (www.mentalhealth.org): 137.114.6644 or 419-965-4511.  [place] Crisis Text [place]: Text [place] to 534127  Suicide LifeLine Chat: Lexim.org/chat
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
## 2020-09-15 ASSESSMENT — ANXIETY QUESTIONNAIRES: GAD7 TOTAL SCORE: 21

## 2020-10-02 VITALS — WEIGHT: 150 LBS | BODY MASS INDEX: 25.61 KG/M2 | HEIGHT: 64 IN

## 2020-10-02 PROBLEM — F41.1 GENERALIZED ANXIETY DISORDER: Chronic | Status: ACTIVE | Noted: 2020-07-29

## 2020-10-02 PROBLEM — F33.0 MAJOR DEPRESSIVE DISORDER, RECURRENT EPISODE, MILD (H): Chronic | Status: ACTIVE | Noted: 2017-07-19

## 2020-10-02 PROBLEM — F43.10 PTSD (POST-TRAUMATIC STRESS DISORDER): Chronic | Status: ACTIVE | Noted: 2017-07-19

## 2020-10-02 ASSESSMENT — MIFFLIN-ST. JEOR: SCORE: 1290.4

## 2020-10-02 NOTE — PATIENT INSTRUCTIONS
Your BMI is Body mass index is 25.75 kg/m .  Weight management is a personal decision.  If you are interested in exploring weight loss strategies, the following discussion covers the approaches that may be successful. Body mass index (BMI) is one way to tell whether you are at a healthy weight, overweight, or obese. It measures your weight in relation to your height.  A BMI of 18.5 to 24.9 is in the healthy range. A person with a BMI of 25 to 29.9 is considered overweight, and someone with a BMI of 30 or greater is considered obese. More than two-thirds of [place] adults are considered overweight or obese.  Being overweight or obese increases the risk for further weight gain. Excess weight may lead to heart disease and diabetes.  Creating and following plans for healthy eating and physical activity may help you improve your health.  Weight control is part of healthy lifestyle and includes exercise, emotional health, and healthy eating habits. Careful eating habits lifelong are the mainstay of weight control. Though there are significant health benefits from weight loss, long-term weight loss with diet alone may be very difficult to achieve- studies show long-term success with dietary management in less than 10% of people. Attaining a healthy weight may be especially difficult to achieve in those with severe obesity. In some cases, medications, devices and surgical management might be considered.  What can you do?  If you are overweight or obese and are interested in methods for weight loss, you should discuss this with your provider.     Consider reducing daily calorie intake by 500 calories.     Keep a food journal.     Avoiding skipping meals, consider cutting portions instead.    Diet combined with exercise helps maintain muscle while optimizing fat loss. Strength training is particularly important for building and maintaining muscle mass. Exercise helps reduce stress, increase energy, and improves fitness.  Increasing exercise without diet control, however, may not burn enough calories to loose weight.       Start walking three days a week 10-20 minutes at a time    Work towards walking thirty minutes five days a week     Eventually, increase the speed of your walking for 1-2 minutes at time    In addition, we recommend that you review healthy lifestyles and methods for weight loss available through the National Institutes of Health patient information sites:  http://win.niddk.nih.gov/publications/index.htm    And look into health and wellness programs that may be available through your health insurance provider, employer, local community center, or [employer].    Weight management plan: Patient was referred to their PCP to discuss a diet and exercise plan.     MY CONTACT NUMBERS ARE:   DOCTOR : REID Nina  SLEEP CENTER :   CPAP EQUIPMENT :    IF I HAVE SLEEP APNEA.....  WHERE CAN I FIND MORE INFORMATION?    American Academy of Sleep Medicine Patient information on sleep disorders:  http://yoursleep.aasmnet.org    THINGS TO REMEMBER  In most situations, sleep apnea is a lifelong disease that must be managed with daily therapy. Untreated disease, when severe, may result in an increased risk for an array of problems from heart disease to mood changes, car accidents and shorter lifespan.    CPAP -  WHY AND HOW?  Continuous positive airway pressure, or CPAP, is the most effective treatment for obstructive sleep apnea. A decision to use CPAP is a major step forward in the pursuit of a healthier life. The successful use of CPAP will help you breathe easier, sleep better and live healthier. Using CPAP can be a positive experience if you keep these hill points in mind:  1. Commitment  CPAP is not a quick fix for your problem. It involves a long-term commitment to improve your sleep and your health.    2. Communication  Stay in close communication with both your sleep doctor and your CPAP supplier. Ask lots of questions  "and seek help when you need it.    3. Consistency  Use CPAP all night, every night and for every nap. You will receive the maximum health benefits from CPAP when you use it every time that you sleep. This will also make it easier for your body to adjust to the treatment.    4. Correction  The first machine and mask that you try may not be the best ones for you. Work with your sleep doctor and your CPAP supplier to make corrections to your equipment selection. Ask about trying a different type of machine or mask if you have ongoing problems. Make sure that your mask is a good fit and learn to use your equipment properly.    5. Challenge  Tell a family member or close friend to ask you each morning if you used your CPAP the previous night. Have someone to challenge you to give it your best effort.    6. Connection   Your adjustment to CPAP will be easier if you are able to connect with others who use the same treatment. Ask your sleep doctor if there is a support group in your area for people who have sleep apnea, or look for one on the Internet.    7. Comfort   Increase your level of comfort by using a saline spray, decongestant or heated humidifier if CPAP irritates your nose, mouth or throat. Use your unit's \"ramp\" setting to slowly get used to the air pressure level. There may be soft pads you can buy that will fit over your mask straps. Look on www.CPAP.com for accessories such as these straps, a pillow contoured for side-sleeping with CPAP, longer hoses, hose covers to reduce condensation, or stands to keep the hose out of your way.                                 8. Cleaning   Clean your mask, tubing and headgear on a regular basis. Put this time in your schedule so that you don't forget to do it. Check and replace the filters for your CPAP unit and humidifier.    9. Completion   Although you are never finished with CPAP therapy, you should reward yourself by celebrating the completion of your first month of " treatment. Expect this first month to be your hardest period of adjustment. It will involve some trial and error as you find the machine, mask and pressure settings that are right for you.    Continuation  After your first month of treatment, continue to make a daily commitment to use your CPAP all night, every night and for every nap.    CPAP-Tips to starting with success:  Begin using your CPAP for short periods of time during the day while you watch TV or read.    Use CPAP every night and for every nap. Using it less often reduces the health benefits and makes it harder for your body to get used to it.    Newer CPAP models are virtually silent; however, if you find the sound of your CPAP machine to be bothersome, place the unit under your bed to dampen the sound.     Make small adjustments to your mask, tubing, straps and headgear until you get the right fit. Tightening the mask may actually worsen the leak.  If it leaves significant marks on your face or irritates the bridge of your nose, it may not be the best mask for you.  Speak with the person who supplied the mask and consider trying other masks.    Use a saline nasal spray to ease mild nasal congestion. Neti-Pot or saline nasal rinses may also help. Nasal gel sprays can help reduce nasal dryness.  Biotene mouthwash can be helpful to protect your teeth if you experience frequent dry mouth.  Dry mouth may be a sign of air escaping out of your mouth or out of the mask in the case of a full face mask.  Speak with your provider if you expect that is the case.     Take a nasal decongestant to relieve more severe nasal or sinus congestion.  Do not use Afrin (oxymetazoline) nasal spray more than 3 days in a row.  Speak with your sleep doctor if your nasal congestion is chronic.    Use a heated humidifier that fits your CPAP model to enhance your breathing comfort. Adjust the heat setting up if you get a dry nose or throat, down if you get condensation in the hose  or mask.  Position the CPAP lower than you so that any condensation in the hose drains back into the machine rather than towards the mask.    Try a system that uses nasal pillows if traditional masks give you problems.    Clean your mask, tubing and headgear once a week. Make sure the equipment dries fully.    Regularly check and replace the filters for your CPAP unit and humidifier.    Work closely with your sleep doctor and your CPAP supplier to make sure that you have the machine, mask and air pressure setting that works best for you.    BESIDES CPAP, WHAT OTHER THERAPIES ARE THERE?    Postioning devices if you only have the problem on your back    Dental devices if your condition is mild    Nasal valves may be effective though experience is limited    Tongue Retaining Device if missing teeth precludes the use of a dental device    Weight loss if you are overweight    Surgery in limited cases where devices are not acceptable or there are problems with structures in the nose and throat  If treated with one of these alternative options, further evaluation is necessary to ensure that the therapy is effective. This may require some form of testing.     Healthy Lifestyle:  Healthy diet, exercise and Limit alcohol: Not only will excessive alcohol increase your weight over time, but it irritates the throat tissues and make them swell, shrinking the airway and causing snoring. Drinking alcohol should be limited and stopped within 3-4 hours before going to bed.   Stop smoking: (Red swollen throat, heat, nicotine), also irritates and swells the airway, among numerous other negative health consequences.    Positioning Device  This example shows a pillow that straps around the waist. It may be appropriate for those whose sleep study shows milder sleep apnea that occurs primarily when lying flat on one's back. Preliminary studies have shown benefit but effectiveness at home should be verified.    Nasal Valves               Nasal valves may not be effective if you have frequent nasal congestion or have difficulty breathing through your nose. They may be an option for mild apnea if other options are not well tolerated. The efficacy of these devices is generally less than CPAP or oral appliances.  Oral Appliance  These are examples of two of many custom-made devices that are more likely to work in mild sleep apnea  Oral appliances are dental mouth pieces that fit very much like a sports mouth guards or removable orthodontic retainers. They are used to treat snoring  And obstructive sleep apnea . The device prevents the airway from collapsing by either holding the tongue or supporting the jaw in a forward position. Since oral appliances are non-invasive and easy to use, they may be considered as an early treatment option. Oral appliance therapy (OAT) involves the customization, selection, fabrication, fitting, adjustments and long-term follow-up care of specially designed oral devices, worn during sleep, which reposition the lower jaw and tongue base forward to maintain an open airway.  Custom made oral appliances are proven to be more effective than over-the-counter devices. Therefore, the over-the-counter devices are recommended not to be used as a screening tool nor as a therapeutic option.  Who gets a dental device?  Oral appliance therapy can be used as an alternative to  CPAP therapy for the treatment of mild to moderate sleep apnea and for those patients who prefer OAT to CPAP. Oral appliance therapy is a first line therapy for the treatment of primary snoring. Additionally, OAT is an option for those that cannot tolerate CPAP as therapy or who have experienced insufficient surgical results.    Possible side effects?  Frequent but minor side effects include: excessive salivation, dry mouth, discomfort of teeth and jaw and temporary changes in the patient s bite.  Potential complications include: jaw pain, permanent occlusal  changes and TMJ symptoms.  The above mentioned side effects and complications can be recognized and managed by dentists trained in dental sleep medicine.    Finding a dentist that practices dental sleep medicine  Specific training is available through the American Academy of Dental Sleep Medicine for dentists interested in working in the field of sleep. To find a dentist who is educated in the field of sleep and the use of oral appliances, near you, visit the Web site of the American Academy of Dental Sleep Medicine; also see http://www.accpstorage.org/newOrganization/patients/oralAppliances.pdf   To search for a dentist certified in these practices:  Http://aadsm.org/FindADentist.aspx?1  Http://www.accpstorage.org/newOrganization/patients/oralAppliances.pdf    Tongue Retaining Device               Tongue Retaining Devices are devices that generally  suction cup  onto the tongue preventing it from falling back into the back of the throat during sleep.  They may be an option for people missing teeth, but can be uncomfortable. This particular device can be purchased online, but similar devices made by dentists fit more precisely and may be tolerated better. In general, they are rarely effective and often not very well tolerated.    Weight Loss:  Some patients may experience reduction or elimination of sleep apnea with weight loss.  Though there are significant health benefits from weight loss, long-term weight loss is very difficult to achieve- studies show success with dietary management in less that 10% of people.     If you are interested in dietary weight loss, you should review the options discussed at the National Institutes of Health patient information site:     Http:/www.health.nih.gov/topic/WeightLossDieting    Bariatric programs offer counseling in all methods of weight loss:    Http:/www.[facility].org/Specialties/WeightLossSurgeryandMedicalMgmt/htm    Surgery:  There are a number of surgeries that  "have been attempted to treat apnea. In general, surgical options are usually reserved for cases in which there is a physical abnormality contributing to obstruction or other treatment options are ineffective or not tolerated. Most surgical options are either unreliable or quite invasive. One of the more common procedures is:  Uvulopalatopharyngoplasty: In this procedure, the uvula (the finger-like tissue that hangs in the back of the throat), part of the soft palate (the tissue that the uvula is attached to), and sometimes the tonsils or adenoids are removed. The efficacy of this surgery is around 30-50% .  After surgery, complications may include:  Sleepiness and sleep apnea related to post-surgery medication   Swelling, infection and bleeding   A sore throat and/or difficulty swallowing   Drainage of secretions into the nose and a nasal quality to the voice. English language speech does not seem to be affected by this surgery.   Narrowing of the airway in the nose and throat (hence constricting breathing) snoring and even iatrogenically caused sleep apnea. By cutting the tissues, excess scar tissue can \"tighten\" the airway and make it even smaller than it was before UPPP.  Patients who have had the uvula removed will become unable to correctly speak Turkish or any other language that has a uvular 'r' phoneme.    Surgeries to help resolve nasal congestion may help reduce the severity of apnea slightly. Nasal congestion does not cause apnea on its own, so these surgeries are usually not performed just for ALHAJI.  They may be worth considering if the nasal congestion is significantly bothersome independent of apnea.    Dear Magnolia Gonzáles,    As we have discussed, you have been determined to have a sleep disorder which affects your ability to stay awake and alert and which puts you at risk for accidents of various types.  We are particularly concerned when people with your condition drive a car or operate heavy " machinery of any kind, because you are at risk for accidents which can cause injury or death to you or to others.  If you have had an accident or have had a close call, you know how unexpectedly and quickly you can become very drowsy or fall asleep.      We strongly advise you that, because of the sleepiness caused by your condition, you should not drive a car or operate heavy machinery until your condition has improved.  We understand that it is difficult to make that decision, but we are confident that you do not want to bring harm to yourself, your family or others.  Our goal is to provide testing and treatment which can help your condition.  We encourage you to schedule your sleep time to get adequate sleep.  However, until we can demonstrate that your sleep disorder has improved, we want you to understand that your operation of a car or heavy machinery represents a serious risk to your own safety and to the safety of others.    Thank you for your cooperation with this advice.    Sincerely,      REID Nina

## 2020-10-02 NOTE — PROGRESS NOTES
"Magnolia Gonzáles is a 50 year old female who is being evaluated via a billable video visit.      The patient has been notified of following:     \"This video visit will be conducted via a call between you and your physician/provider. We have found that certain health care needs can be provided without the need for an in-person physical exam.  This service lets us provide the care you need with a video conversation.  If a prescription is necessary we can send it directly to your pharmacy.  If lab work is needed we can place an order for that and you can then stop by our lab to have the test done at a later time.    Video visits are billed at different rates depending on your insurance coverage.  Please reach out to your insurance provider with any questions.    If during the course of the call the physician/provider feels a video visit is not appropriate, you will not be charged for this service.\"    Patient has given verbal consent for Video visit? Yes  How would you like to obtain your AVS? MyChart  If you are dropped from the video visit, the video invite should be resent to: Text to cell phone: [phone]  Will anyone else be joining your video visit? No      Charlene Nguyễn MA on 10/2/2020 at 4:36 PM    Video-Visit Details    Type of service:  Video Visit    Video Start Time: 8:14 AM  Video End Time: 8:59 AM    Originating Location (pt. Location): Home    Distant Location (provider location):  [facility]     Platform used for Video Visit: Well        Sleep Consultation:    Date on this visit: 10/5/2020    Magnolia Gonzáles is sent by Janet Álvarez for a sleep consultation regarding excessive daytime sleepiness.    Primary Physician: Simran Barreto       Chief Complaint   Patient presents with     Sleep Problem     Excessive Sleepiness, falling asleep behind the wheel.      HPI: Magnolia Gonzáles is a  year old female who complains of falling sleep " at inopportune time and daytime somnolence sleep for about 4 years, but has gotten over the last 6 months. She reports that her EDS is constant. She feels this is due to depression and her other mental health challenges. She has been on Ritalin for 3 weeks. She states Ritalin has improved motivation and mood but not with sleepiness. Her dose with increased from 5 to 10 mg 3 days ago.    She reports depression and anxiety are not well controlled yet. she is reporting to me feeling depressed. She denies suicidal ideation.    Magnolia goes to sleep 9 and 10  PM. She wakes up between 4 and 6 AM with an alarm. She falls asleep in 5 minutes.  Magnolia denies difficulty falling asleep.  She wakes up reports waking up every hour long enough to look at the clock. She reports anxiety and intrusive thoughts all night.  Magnolia wakes up to uncertain reasons.  Patient gets an average of 2-6 hours of sleep per night. She feels rested in the morning. She starts to be sleepy at 1 PM. She reports feeling better with less sleep.     Patient does use electronics in bed and watch TV in bed.     Magnolia does not do shift work.  She is currently unemployed.      Magnolia does snore every night and snoring is soft. Patient does have a regular bed partner. There is not report of gasping and choking.  She does have witnessed apneas. They never sleep separately.  Patient sleeps on her back. She has frequent morning dry mouth and morning headaches, denies restless legs. Magnolia denies any bruxism, sleep walking, dream enactment, sleep paralysis, cataplexy and hypnogogic/hypnopompic hallucinations.    Magnolia denies claustrophobia, reflux at night and heartburn.      Magnolia has gained 0-5 pounds in the last year.  Patient describes herself as a morning person.  She would prefer to go to sleep at 9:00 PM and wake up at 6:00 AM.  Patient's [place] Sleepiness score 22/24 inconsistent with severe daytime sleepiness.      Magnolia naps 7  times per week for 45-60 minutes, feels refreshed after naps, but does not last. She takes some inadvertant naps.  She admits falling asleep while driving. The most recent episode was 2 day ago.  Patient was counseled on the importance of driving while alert, to pull over if drowsy, or nap before getting into the vehicle if sleepy.  She uses no caffeine. She denies alcohol and illicit drugs.     Allergies:    Allergies   Allergen Reactions     Zoloft [Sertraline] Rash       Medications:    Current Outpatient Medications   Medication Sig Dispense Refill     buPROPion (WELLBUTRIN XL) 150 MG 24 hr tablet Take 1 tablet (150 mg) by mouth every morning Take with 300 mg tablet for total of 450 mg daily. 90 tablet 0     buPROPion (WELLBUTRIN XL) 300 MG 24 hr tablet Take 1 tablet (300 mg) by mouth every morning Take with 150 mg tablet for total of 450 mg daily 90 tablet 0     gabapentin (NEURONTIN) 400 MG capsule TAKE 3 CAPSULES IN MORNING, TAKE 2 CAPSULES IN THE AFTERNOON, AND TAKE 3 CAPSULES IN THE EVENING 240 capsule 0     lamoTRIgine (LAMICTAL) 200 MG tablet Take 1 tablet (200 mg) by mouth daily 90 tablet 1     levothyroxine (SYNTHROID/LEVOTHROID) 100 MCG tablet Take 1 tablet (100 mcg) by mouth daily 90 tablet 1     methylphenidate (RITALIN) 10 MG tablet Take 1 tablet (10 mg) by mouth 2 times daily 14 tablet 0     multivitamin, therapeutic with minerals (MULTI-VITAMIN) TABS Take 1 tablet by mouth daily 100 tablet 3     ondansetron (ZOFRAN) 4 MG tablet Take 1 tablet (4 mg) by mouth every 8 hours as needed for nausea 30 tablet 0     triamcinolone (KENALOG) 0.1 % external ointment Apply topically 2 times daily 15 g 0     methocarbamol (ROBAXIN) 500 MG tablet Take 1 tablet (500 mg) by mouth 3 times daily (Patient not taking: Reported on 9/14/2020) 90 tablet 1     naproxen (NAPROSYN) 500 MG tablet TAKE ONE TABLET BY MOUTH TWO TIMES A DAY WITH MEALS (Patient not taking: Reported on 10/2/2020) 180 tablet 2       Problem  List:  Patient Active Problem List    Diagnosis Date Noted     Generalized anxiety disorder 07/29/2020     Priority: Medium     Major depressive disorder, recurrent episode, mild (H) 07/19/2017     Priority: Medium     PTSD (post-traumatic stress disorder) 07/19/2017     Priority: Medium     Bipolar affective disorder, manic, moderate (H) 09/23/2016     Priority: Medium     Overdose of Valium, intentional self-harm, subsequent encounter 05/29/2016     Priority: Medium     Valium overdose 05/28/2016     Priority: Medium     Carpal tunnel syndrome of left wrist 01/13/2016     Priority: Medium     Adjustment disorder with mixed anxiety and depressed mood 01/06/2016     Priority: Medium     Acquired hypothyroidism 01/06/2016     Priority: Medium     Bilateral carpal tunnel syndrome 01/06/2016     Priority: Medium     Social phobia 12/26/2014     Priority: Medium     Panic disorder without agoraphobia 12/26/2014     Priority: Medium     History of alcohol dependence (H) 07/22/2014     Priority: Medium     Overview:   Binge drinking disorder. Sober since 5/2016       Nondependent alcohol abuse, episodic drinking behavior 11/17/2005     Priority: Medium        Past Medical/Surgical History:  Past Medical History:   Diagnosis Date     Anxiety      Depressive disorder      Hypothyroid      Suicidal ideation 7/19/2015     Past Surgical History:   Procedure Laterality Date     ORTHOPEDIC SURGERY         Social History:  Social History     Socioeconomic History     Marital status: Single     Spouse name: Not on file     Number of children: Not on file     Years of education: Not on file     Highest education level: Not on file   Occupational History     Not on file   Social Needs     Financial resource strain: Not on file     Food insecurity     Worry: Not on file     Inability: Not on file     Transportation needs     Medical: Not on file     Non-medical: Not on file   Tobacco Use     Smoking status: Never Smoker     Smokeless  "tobacco: Never Used   Substance and Sexual Activity     Alcohol use: Yes     Alcohol/week: 0.0 standard drinks     Comment: \"once every three months\"     Drug use: No     Sexual activity: Yes     Partners: Male     Birth control/protection: None   Lifestyle     Physical activity     Days per week: Not on file     Minutes per session: Not on file     Stress: Not on file   Relationships     Social connections     Talks on phone: Not on file     Gets together: Not on file     Attends Congregational service: Not on file     Active member of club or organization: Not on file     Attends meetings of clubs or organizations: Not on file     Relationship status: Not on file     Intimate partner violence     Fear of current or ex partner: Not on file     Emotionally abused: Not on file     Physically abused: Not on file     Forced sexual activity: Not on file   Other Topics Concern     Parent/sibling w/ CABG, MI or angioplasty before 65F 55M? Not Asked   Social History Narrative     Not on file       Family History:  Family History   Problem Relation Age of Onset     Depression/Anxiety Mother      Alcohol/Drug Father         Pancreatic CA       Review of Systems:  A complete review of systems reviewed by me is negative with the exeption of what has been mentioned in the history of present illness.    Physical Examination:  Vitals: Ht 1.626 m (5' 4\")   Wt 68 kg (150 lb)   BMI 25.75 kg/m    BMI= Body mass index is 25.75 kg/m .         [place] Total Score 10/2/2020   Total score - [place] 22       DAMIR Total Score: 17 (10/02/20 1600)    GENERAL APPEARANCE: alert and no distress  EYES: Eyes grossly normal to inspection  RESP: Breathing is non-labored.   NEURO: mentation intact and speech normal  PSYCH: anxious    Last Comprehensive Metabolic Panel:  Sodium   Date Value Ref Range Status   11/13/2019 139 133 - 144 mmol/L Final     Potassium   Date Value Ref Range Status   11/13/2019 3.9 3.4 - 5.3 mmol/L Final     Chloride   Date Value " Ref Range Status   11/13/2019 103 94 - 109 mmol/L Final     Carbon Dioxide   Date Value Ref Range Status   11/13/2019 31 20 - 32 mmol/L Final     Anion Gap   Date Value Ref Range Status   11/13/2019 5 3 - 14 mmol/L Final     Glucose   Date Value Ref Range Status   11/13/2019 64 (L) 70 - 99 mg/dL Final     Urea Nitrogen   Date Value Ref Range Status   11/13/2019 12 7 - 30 mg/dL Final     Creatinine   Date Value Ref Range Status   11/13/2019 0.67 0.52 - 1.04 mg/dL Final     GFR Estimate   Date Value Ref Range Status   11/13/2019 >90 >60 mL/min/[1.73_m2] Final     Comment:     Non  GFR Calc  Starting 12/18/2018, serum creatinine based estimated GFR (eGFR) will be   calculated using the Chronic Kidney Disease Epidemiology Collaboration   (CKD-EPI) equation.       Calcium   Date Value Ref Range Status   11/13/2019 8.6 8.5 - 10.1 mg/dL Final     TSH   Date Value Ref Range Status   11/13/2019 2.73 0.40 - 4.00 mU/L Final       Impression:  Excessive daytime sleepiness.  Differential diagnosis:  - Inadequate total sleep time likely related with reported 2-6 hours of sleep per night, we discussed insufficient sleep time.   - Sleep disruption/obstructive sleep apnea possible with snoring, witnessed apnea, excessive daytime sleepiness (ESS 22), morning headaches and elevated bicarbonate (31).  - Medications possibly causing iatrogenic sedation include Gabapentin 3200 mg daily and Lamotrigine 200 mg daily..  - Circadian misalignment likely partially related, we discussed consistent sleep times.    - Narcolepsy/ idiopathic hypersomnolence possible but difficult to evaluate with current use of sedating and stimulant medications. Would not recommend hypersomnia work-up at this time with multiple confounding sleep diagnoses possible.     Plan:  Recommend polysomnogram (using 4% desaturation/Medicare/2012 AASM 1B scoring rules) to evaluate for obstructive sleep apnea.   Actrigraphy x2 weeks followed by sleep  coaching.  Urine drug screen today.   Continue to work with Psychiatrist/Psychologist to optimize mental health treatment and to consider less sedating regimen.   Will consider hypersomnia work up after the duration and circadian timing of sleep have been optimized.  Patient was counseled not to drive until sleepiness have improved.       She will follow up with me in approximately two weeks after her sleep study has been completed to review the results and discuss plan of care.       Polysomnography reviewed.  Obstructive sleep apnea reviewed.  Complications of untreated sleep apnea were reviewed.    Carlitos Rocha PA-C    CC: Janet Álvarez

## 2020-10-05 ENCOUNTER — VIRTUAL VISIT (OUTPATIENT)
Dept: SLEEP MEDICINE | Facility: CLINIC | Age: 50
End: 2020-10-05
Attending: NURSE PRACTITIONER
Payer: OTHER GOVERNMENT

## 2020-10-05 DIAGNOSIS — G47.19 EXCESSIVE DAYTIME SLEEPINESS: ICD-10-CM

## 2020-10-05 DIAGNOSIS — G47.00 FREQUENT NOCTURNAL AWAKENING: ICD-10-CM

## 2020-10-05 DIAGNOSIS — R53.81 MALAISE AND FATIGUE: ICD-10-CM

## 2020-10-05 DIAGNOSIS — F33.1 MAJOR DEPRESSIVE DISORDER, RECURRENT EPISODE, MODERATE (H): ICD-10-CM

## 2020-10-05 DIAGNOSIS — F31.12 BIPOLAR AFFECTIVE DISORDER, MANIC, MODERATE (H): ICD-10-CM

## 2020-10-05 DIAGNOSIS — Z72.820 LACK OF ADEQUATE SLEEP: ICD-10-CM

## 2020-10-05 DIAGNOSIS — R06.89 DYSPNEA AND RESPIRATORY ABNORMALITY: Primary | ICD-10-CM

## 2020-10-05 DIAGNOSIS — G47.00 INSOMNIA, UNSPECIFIED TYPE: ICD-10-CM

## 2020-10-05 DIAGNOSIS — G47.30 SLEEP APNEA, UNSPECIFIED TYPE: ICD-10-CM

## 2020-10-05 DIAGNOSIS — R06.00 DYSPNEA AND RESPIRATORY ABNORMALITY: Primary | ICD-10-CM

## 2020-10-05 DIAGNOSIS — R53.83 MALAISE AND FATIGUE: ICD-10-CM

## 2020-10-05 PROCEDURE — 99203 OFFICE O/P NEW LOW 30 MIN: CPT | Mod: 95 | Performed by: PHYSICIAN ASSISTANT

## 2020-10-14 ENCOUNTER — TELEPHONE (OUTPATIENT)
Dept: PSYCHIATRY | Facility: CLINIC | Age: 50
End: 2020-10-14

## 2020-10-14 DIAGNOSIS — G47.10 EXCESSIVE SLEEPINESS: ICD-10-CM

## 2020-10-14 DIAGNOSIS — F33.1 MAJOR DEPRESSIVE DISORDER, RECURRENT EPISODE, MODERATE (H): ICD-10-CM

## 2020-10-14 NOTE — TELEPHONE ENCOUNTER
Pt called about the Ritalin, she doesn't think the new dosage is working. She also mentioned the Sleepy Study is on hold due to insurance issues. Pt would like to reached by nursing staff about the medication concern.

## 2020-10-16 NOTE — TELEPHONE ENCOUNTER
Patient needs to schedule appointment. Routing to [facility] for scheduling. They will send back once scheduled and nursing can pend the Ritalin.     Copied from routing comments:  Janet Álvarez NP  Psych Rn - g 17 hours ago (4:55 PM)     No Change until appointment made - then see about her interest in increasing methylphenidate to 15 mg twice daily for another two weeks.       Message text         detailed exam

## 2020-10-19 NOTE — TELEPHONE ENCOUNTER
Called patient at number call came in from and left vm., letting her know appt is needed with Janet Álvarez

## 2020-10-20 NOTE — TELEPHONE ENCOUNTER
PAtient scheduled for next available- 11/23- patient said she cannot wait this long for medication adjustment-     Please advise.

## 2020-10-20 NOTE — TELEPHONE ENCOUNTER
"I spoke to Magnolia Gonzáles. She stated she was not getting calls back, but several are documented. She then said she lives in a \"pocket\" of bad reception and sometimes doesn't get calls or voicemail.     Patient is agreeable to dose [facility]: Methylphenidate 15 mg BID for two more weeks.     Patient stated she wants Janet to know she has not scheduled her sleep study yet due to a very long wait to get a call from the schedulers and now due to insurance problems.     Patient seemed frustrated by the slow process and seems eager to see some improvement in symptoms.     Will send to Janet for signing. No 15 mg tab. Plan may prefer one 10 mg tab and one 5 mg tab vs 1.5 tabs per day of 10 mg tab for the new higher dose.   "

## 2020-10-21 RX ORDER — METHYLPHENIDATE HYDROCHLORIDE 10 MG/1
15 TABLET ORAL 2 TIMES DAILY
Qty: 45 TABLET | Refills: 0 | Status: SHIPPED | OUTPATIENT
Start: 2020-10-21 | End: 2020-11-18

## 2020-10-21 NOTE — TELEPHONE ENCOUNTER
LVM for patient informing her prescription was sent to pharmacy and to follow up with pharmacy.    Nasrin Plascencia RN on 10/21/2020 at 11:17 AM

## 2020-11-10 ENCOUNTER — TELEPHONE (OUTPATIENT)
Dept: FAMILY MEDICINE | Facility: OTHER | Age: 50
End: 2020-11-10

## 2020-11-10 NOTE — TELEPHONE ENCOUNTER
Reason for call:  Patient reporting a symptom    Symptom or request: patient has had a rash for a week around her eyes and neck upper chest    Duration (how long have symptoms been present): a week    Have you been treated for this before? Yes    Additional comments: patient states her rash is back. She is going to send a picture of it on [email or web address]. Please follow up with her.    Phone Number patient can be reached at:  Home number on file [phone] (home)    Best Time:  any    Can we leave a detailed message on this number:  YES    Call taken on 11/10/2020 at 5:21 PM by Taylor Almodovar

## 2020-11-12 ENCOUNTER — E-VISIT (OUTPATIENT)
Dept: FAMILY MEDICINE | Facility: OTHER | Age: 50
End: 2020-11-12
Payer: OTHER GOVERNMENT

## 2020-11-12 DIAGNOSIS — L30.9 DERMATITIS: Primary | ICD-10-CM

## 2020-11-12 PROCEDURE — 99421 OL DIG E/M SVC 5-10 MIN: CPT | Performed by: PHYSICIAN ASSISTANT

## 2020-11-13 RX ORDER — TRIAMCINOLONE ACETONIDE 1 MG/G
OINTMENT TOPICAL 2 TIMES DAILY
Qty: 15 G | Refills: 0 | Status: SHIPPED | OUTPATIENT
Start: 2020-11-13 | End: 2021-08-13

## 2020-11-13 RX ORDER — DIAPER,BRIEF,INFANT-TODD,DISP
EACH MISCELLANEOUS 2 TIMES DAILY
Qty: 30 G | Refills: 0 | Status: SHIPPED | OUTPATIENT
Start: 2020-11-13 | End: 2023-12-22

## 2020-11-16 ENCOUNTER — MYC MEDICAL ADVICE (OUTPATIENT)
Dept: FAMILY MEDICINE | Facility: OTHER | Age: 50
End: 2020-11-16

## 2020-11-16 DIAGNOSIS — L30.9 DERMATITIS: Primary | ICD-10-CM

## 2020-11-17 ENCOUNTER — MYC MEDICAL ADVICE (OUTPATIENT)
Dept: PSYCHIATRY | Facility: CLINIC | Age: 50
End: 2020-11-17

## 2020-11-17 ENCOUNTER — TELEPHONE (OUTPATIENT)
Dept: PSYCHIATRY | Facility: CLINIC | Age: 50
End: 2020-11-17

## 2020-11-17 DIAGNOSIS — F33.1 MAJOR DEPRESSIVE DISORDER, RECURRENT EPISODE, MODERATE (H): ICD-10-CM

## 2020-11-17 DIAGNOSIS — F43.10 PTSD (POST-TRAUMATIC STRESS DISORDER): ICD-10-CM

## 2020-11-17 DIAGNOSIS — F33.0 MAJOR DEPRESSIVE DISORDER, RECURRENT EPISODE, MILD (H): ICD-10-CM

## 2020-11-17 DIAGNOSIS — G47.10 EXCESSIVE SLEEPINESS: ICD-10-CM

## 2020-11-17 DIAGNOSIS — F41.1 GAD (GENERALIZED ANXIETY DISORDER): ICD-10-CM

## 2020-11-17 DIAGNOSIS — F43.23 ADJUSTMENT DISORDER WITH MIXED ANXIETY AND DEPRESSED MOOD: ICD-10-CM

## 2020-11-17 RX ORDER — GABAPENTIN 400 MG/1
CAPSULE ORAL
Qty: 240 CAPSULE | Refills: 0 | Status: CANCELLED | OUTPATIENT
Start: 2020-11-17

## 2020-11-17 NOTE — TELEPHONE ENCOUNTER
Reason for call:  Medication   If this is a refill request, has the caller requested the refill from the pharmacy already? Yes  Will the patient be using a [facility]? No  Name of the pharmacy and phone number for the current request:   [id number] - [place], [place] - [address] Phone:  [phone]   Fax:  [phone]            Name of the medication requested:     Other request: Pt reports her pharmacy sent a refill request for her medications and she wanted to follow-up to make sure that was received, as she would like to have meds filled today.    Phone number to reach patient:  Cell number on file:    Telephone Information:   Mobile [phone]       Best Time:  Anytimee    Can we leave a detailed message on this number?  YES    Travel screening: Not Applicable

## 2020-11-17 NOTE — TELEPHONE ENCOUNTER
Requested Prescriptions   Pending Prescriptions Disp Refills     gabapentin (NEURONTIN) 400 MG capsule 240 capsule 0     Sig: TAKE 3 CAPSULES IN MORNING, TAKE 2 CAPSULES IN THE AFTERNOON, AND TAKE 3 CAPSULES IN THE EVENING       There is no refill protocol information for this order

## 2020-11-18 RX ORDER — GABAPENTIN 400 MG/1
CAPSULE ORAL
Qty: 240 CAPSULE | Refills: 0 | Status: SHIPPED | OUTPATIENT
Start: 2020-11-18 | End: 2020-11-23

## 2020-11-18 RX ORDER — METHYLPHENIDATE HYDROCHLORIDE 10 MG/1
15 TABLET ORAL 2 TIMES DAILY
Qty: 45 TABLET | Refills: 0 | Status: SHIPPED | OUTPATIENT
Start: 2020-11-18 | End: 2020-11-23

## 2020-11-18 NOTE — TELEPHONE ENCOUNTER
Controlled Substance Refill Request for methylphenidate (RITALIN) 10 MG tablet    Last refill: 10/21/20    Last clinic visit: 9/14/20     Clinic visit frequency required: Q 6 weeks  Next appt: 11/23/20    Controlled substance agreement on file: No.    Documentation in problem list reviewed:  Yes    Processing:  Rx to be electronically transmitted to pharmacy by provider     [place] reviewed:  reviewed- no concerns

## 2020-11-18 NOTE — TELEPHONE ENCOUNTER
Patient has refills of both doses of Wellbutrin XL. She should be out of Ritalin I presume. Refill for Ritalin already pended by Tisha SHARMA and sent to [facility].     Controlled Substance Refill Request for Ritalin   Last refill: 10/26/20 for 15 day supply as sig is written currently    Last clinic visit: 9/14/20     Clinic visit frequency required: Q 2 months  Next appt: 11/23/20    Controlled substance agreement on file: No.    Documentation in problem list reviewed:  Yes    Processing:  Rx to be electronically transmitted to pharmacy by provider     [place] reviewed:  reviewed- no concerns  [place] profile:  [email or web address]

## 2020-11-23 ENCOUNTER — VIRTUAL VISIT (OUTPATIENT)
Dept: PSYCHIATRY | Facility: CLINIC | Age: 50
End: 2020-11-23
Payer: OTHER GOVERNMENT

## 2020-11-23 DIAGNOSIS — R55 BLACKOUT SPELL: Primary | ICD-10-CM

## 2020-11-23 DIAGNOSIS — F41.1 GENERALIZED ANXIETY DISORDER: ICD-10-CM

## 2020-11-23 DIAGNOSIS — F43.23 ADJUSTMENT DISORDER WITH MIXED ANXIETY AND DEPRESSED MOOD: ICD-10-CM

## 2020-11-23 DIAGNOSIS — F33.1 MAJOR DEPRESSIVE DISORDER, RECURRENT EPISODE, MODERATE (H): ICD-10-CM

## 2020-11-23 DIAGNOSIS — F43.10 PTSD (POST-TRAUMATIC STRESS DISORDER): ICD-10-CM

## 2020-11-23 PROCEDURE — 99214 OFFICE O/P EST MOD 30 MIN: CPT | Mod: 95 | Performed by: NURSE PRACTITIONER

## 2020-11-23 RX ORDER — BUPROPION HYDROCHLORIDE 300 MG/1
300 TABLET ORAL EVERY MORNING
Qty: 90 TABLET | Refills: 0 | Status: SHIPPED | OUTPATIENT
Start: 2020-11-23 | End: 2021-01-11 | Stop reason: DRUGHIGH

## 2020-11-23 RX ORDER — MULTIPLE VITAMINS W/ MINERALS TAB 9MG-400MCG
1 TAB ORAL DAILY
Qty: 100 TABLET | Refills: 3 | Status: SHIPPED | OUTPATIENT
Start: 2020-11-23 | End: 2021-06-15

## 2020-11-23 RX ORDER — GABAPENTIN 400 MG/1
CAPSULE ORAL
Qty: 240 CAPSULE | Refills: 0 | Status: SHIPPED | OUTPATIENT
Start: 2020-11-23 | End: 2021-05-27

## 2020-11-23 RX ORDER — METHYLPHENIDATE HYDROCHLORIDE 10 MG/1
15 TABLET ORAL 2 TIMES DAILY
Qty: 45 TABLET | Refills: 0 | Status: SHIPPED | OUTPATIENT
Start: 2020-11-23 | End: 2021-01-07

## 2020-11-23 RX ORDER — BUPROPION HYDROCHLORIDE 150 MG/1
150 TABLET ORAL EVERY MORNING
Qty: 90 TABLET | Refills: 0 | Status: SHIPPED | OUTPATIENT
Start: 2020-11-23 | End: 2021-01-11

## 2020-11-23 RX ORDER — LAMOTRIGINE 200 MG/1
200 TABLET ORAL DAILY
Qty: 90 TABLET | Refills: 1 | Status: SHIPPED | OUTPATIENT
Start: 2020-11-23 | End: 2021-01-11

## 2020-11-23 ASSESSMENT — PATIENT HEALTH QUESTIONNAIRE - PHQ9
SUM OF ALL RESPONSES TO PHQ QUESTIONS 1-9: 18
5. POOR APPETITE OR OVEREATING: NEARLY EVERY DAY

## 2020-11-23 ASSESSMENT — ANXIETY QUESTIONNAIRES
2. NOT BEING ABLE TO STOP OR CONTROL WORRYING: NEARLY EVERY DAY
GAD7 TOTAL SCORE: 20
3. WORRYING TOO MUCH ABOUT DIFFERENT THINGS: NEARLY EVERY DAY
7. FEELING AFRAID AS IF SOMETHING AWFUL MIGHT HAPPEN: NEARLY EVERY DAY
5. BEING SO RESTLESS THAT IT IS HARD TO SIT STILL: MORE THAN HALF THE DAYS
6. BECOMING EASILY ANNOYED OR IRRITABLE: NEARLY EVERY DAY
1. FEELING NERVOUS, ANXIOUS, OR ON EDGE: NEARLY EVERY DAY
IF YOU CHECKED OFF ANY PROBLEMS ON THIS QUESTIONNAIRE, HOW DIFFICULT HAVE THESE PROBLEMS MADE IT FOR YOU TO DO YOUR WORK, TAKE CARE OF THINGS AT HOME, OR GET ALONG WITH OTHER PEOPLE: VERY DIFFICULT
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
## 2020-11-23 NOTE — PATIENT INSTRUCTIONS
1.  Neurology referral made for concerns about blackouts    2.  Return to sleep clinic for assessment when able to    3.  Refer to June Black for talk therapy    4.  Continue Wellbutrin  mg daily-consider decreasing to 300 mg daily if anxiety continues    5.  Continue lamotrigine 200 mg daily    6.  Continue gabapentin 1200 mg in the morning, 800 mg in the afternoon, and 1200 mg at bedtime    7.  Continue methylphenidate 15 mg twice daily      Continue all other medications as reviewed per electronic medical record today.     Safety plan reviewed. To the Emergency Department as needed or call after hours crisis line at [phone] or [phone]. [place] Crisis Text Line. Text [place] to 865366 or Suicide LifeLine Chat: MetaIntell.org/chat/    To schedule individual or family therapy, call [facility] at [phone].    Schedule an appointment with me in 6 weeks or sooner as needed. Call [facility] at [phone] to schedule.    Follow up with primary care provider as planned or for acute medical concerns.    Call the psychiatric nurse line with medication questions or concerns at [phone].    MyChart may be used to communicate with your provider, but this is not intended to be used for emergencies.    Crisis Resources:    National Suicide Prevention Lifeline: 731.340.4411 (TTY: 623.557.6290). Call anytime for help.  (www.suicidepreventionlifeline.org)  National [place] on Mental Illness (www.anne marie.org): 154.373.8776 or 663-378-7788.   Mental Health Association (www.mentalhealth.org): 210.129.5938 or 021-419-9662.  [place] Crisis Text Line: Text [place] to 000030  Suicide LifeLine Chat: MetaIntell.org/chat
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
## 2020-11-24 ASSESSMENT — ANXIETY QUESTIONNAIRES: GAD7 TOTAL SCORE: 20

## 2020-12-07 ENCOUNTER — VIRTUAL VISIT (OUTPATIENT)
Dept: BEHAVIORAL HEALTH | Facility: CLINIC | Age: 50
End: 2020-12-07
Payer: OTHER GOVERNMENT

## 2020-12-07 DIAGNOSIS — F33.0 MAJOR DEPRESSIVE DISORDER, RECURRENT EPISODE, MILD (H): ICD-10-CM

## 2020-12-07 DIAGNOSIS — F43.10 PTSD (POST-TRAUMATIC STRESS DISORDER): ICD-10-CM

## 2020-12-07 DIAGNOSIS — F41.1 GENERALIZED ANXIETY DISORDER: Primary | ICD-10-CM

## 2020-12-07 PROCEDURE — 90834 PSYTX W PT 45 MINUTES: CPT | Mod: 95 | Performed by: SOCIAL WORKER

## 2020-12-07 NOTE — PROGRESS NOTES
"Magnolia Gonzáles is a 50 year old female who is being evaluated via a telephone visit.      The patient has been notified of the following:     \"We have found that certain health care needs can be provided without the need for a face to face visit.  This service lets us provide the care you need with a short phone conversation.      I will have full access to your [place] medical record during this entire phone call.   I will be taking notes for your medical record.     Since this is like an office visit, we will bill your insurance company for this service.  Please check with your medical insurance if this type of telephone visit/virtual care is covered.  You may be responsible for the cost of this service if insurance coverage is denied.      There are potential benefits and risks of telephone visits (e.g. limits to patient confidentiality) that differ from in-person visits.?  Confidentiality still applies for telephone services, and nobody will record the visit.  It is important to be in a quiet, private space that is free of distractions (including cell phone or other devices) during the visit.??     If during the course of the call I believe a telephone visit is not appropriate, you will not be charged for this service\"    Consent has been obtained for this service by care team member: yes.    Start time: 130 pm    End time: 220 pm        [facility] Primary Care: Integrated Behavioral Health     December 7, 2020      Behavioral Health Clinician Progress Note    Patient Name: Magnolia Gonzáles           Service Type: Phone Visit      Service Location:  at the patient's home        Session Length: 38 - 52      Attendees: Patient    Visit Activities (Refresh list every visit): [facility] Only    Diagnostic Assessment Date: 5/15/2020 completed by [facility]  Treatment Plan Review Date: not completed  See Flowsheets for today's PHQ-9 and JULIEN-7 " results  Previous PHQ-9:   PHQ-9 SCORE 8/14/2020 9/14/2020 11/23/2020   PHQ-9 Total Score - - -   PHQ-9 Total Score MyChart - - -   PHQ-9 Total Score 21 21 18     Previous JULIEN-7:   JULIEN-7 SCORE 8/14/2020 9/14/2020 11/23/2020   Total Score - - -   Total Score - - -   Total Score 21 21 20       SHANNAN LEVEL:  SHANNAN Score (Last Two) 7/25/2017   SHANNAN Raw Score 37   Activation Score 79.2   SHANNAN Level 4       DATA  Extended Session (60+ minutes): No  Interactive Complexity: No  Crisis: No    Treatment Objective(s) Addressed in This Session:  Target Behavior(s): disease management/lifestyle changes decrease symptoms that impair functioning    Depressed Mood: Increase interest, engagement, and pleasure in doing things  Decrease frequency and intensity of feeling down, depressed, hopeless  Improve quantity and quality of night time sleep / decrease daytime naps  Feel less tired and more energy during the day   Improve diet, appetite, mindful eating, and / or meal planning  Identify negative self-talk and behaviors: challenge core beliefs, myths, and actions  Improve concentration, focus, and mindfulness in daily activities   Feel less fidgety, restless or slow in daily activities / interpersonal interactions  Anxiety: will experience a reduction in anxiety, will develop more effective coping skills to manage anxiety symptoms, will develop healthy cognitive patterns and beliefs and will increase ability to function adaptively  Adjustment Difficulties: will develop coping/problem-solving skills to facilitate more adaptive adjustment  Relationship Problems: will address relationship difficulties in a more adaptive manner  Functional Impairment: will effectively address problems that interfere with adaptive functioning  PTSD Symptom Management  Psychological distress related to Pain  Psychological distress related to Sleep Disturbance    Current Stressors / Issues:  First session with patient. She is referred by Janet Álvarez for  therapy.  Patient reports at this time, her biggest struggle is being unemployed and living between two places. Patient is in the process of moving in with an ex partner and her mother.  Patient reports building trust in the relationship is an ongoing issue.  She also reports being unemployed for the past 6 months.  Patient reports no suicidal ideation or plan at this time. She reports a hx of two attempts - 5 yrs ago and approximately 8 years ago. Patient reports she is not at risk at this time and will go to ED if this changes. She reports she is able to talk to her partner about this. Patient will set up another appt with this writer as needed.       Progress on Treatment Objective(s) / Homework:  none established    Motivational Interviewing    MI Intervention: Expressed Empathy/Understanding, Supported Autonomy, Collaboration, Evocation, Permission to raise concern or advise, Open-ended questions, Reflections: simple and complex, Change talk (evoked) and Reframe     Change Talk Expressed by the Patient: Ability to change Reasons to change Need to change    Provider Response to Change Talk: E - Evoked more info from patient about behavior change, A - Affirmed patient's thoughts, decisions, or attempts at behavior change, R - Reflected patient's change talk and S - Summarized patient's change talk statements      Care Plan review completed: No    Medication Review:  No changes to current psychiatric medication(s)    Medication Compliance:  Yes    Changes in Health Issues:   Yes: Pain, Associated Psychological Distress  Sleep disturbance, Associated Psychological Distress    Chemical Use Review:   Substance Use: Chemical use reviewed, no active concerns identified      Tobacco Use: No current tobacco use.      Assessment: Current Emotional / Mental Status (status of significant symptoms):  Risk status (Self / Other harm or suicidal ideation)  Patient has had a history of suicide attempts: age 46 valium overdose  and age 42 asphyxiation  Patient denies current fears or concerns for personal safety.  Patient denies current or recent suicidal ideation or behaviors.  Patient denies current or recent homicidal ideation or behaviors.  Patient denies current or recent self injurious behavior or ideation.  Patient denies other safety concerns.  A safety and risk management plan has not been developed at this time, however patient was encouraged to call [place] [phone] should there be a change in any of these risk factors.    Appearance:   phone visit   Eye Contact:   phone visit   Psychomotor Behavior: phone visit   Attitude:   Cooperative  Pleasant  Orientation:   All  Speech   Rate / Production: Normal    Volume:  Normal   Mood:    Anxious  Normal  Affect:    phone visit   Thought Content:  Clear   Thought Form:  Coherent  Logical   Insight:    adequate for safety    Diagnoses:  1. Generalized anxiety disorder    2. PTSD (post-traumatic stress disorder)    3. Major depressive disorder, recurrent episode, mild (H)    continue to reassess diagnoses    Collateral Reports Completed:  Communicated with: psychiatry and PCP as needed    Plan: (Homework, other):  Patient was given information about behavioral services and encouraged to schedule a follow up appointment with the clinic [facility] as needed.  She was also given information about mental health symptoms and treatment options .  CD Recommendations: No indications of CD issues.   Davalos (Mindy),FABIANA,[facility]

## 2020-12-17 RX ORDER — METHYLPHENIDATE HYDROCHLORIDE 10 MG/1
15 TABLET ORAL 2 TIMES DAILY
Qty: 60 TABLET | Refills: 0 | Status: SHIPPED | OUTPATIENT
Start: 2020-12-17 | End: 2021-01-11

## 2020-12-17 RX ORDER — GABAPENTIN 400 MG/1
CAPSULE ORAL
Qty: 240 CAPSULE | Refills: 0 | Status: SHIPPED | OUTPATIENT
Start: 2020-12-17 | End: 2021-06-15

## 2020-12-17 NOTE — TELEPHONE ENCOUNTER
"I was not able to just refill either, because of the \"ordered at a future encounter,\" so just did new scripts.    Thanks,  Machelle"

## 2020-12-17 NOTE — TELEPHONE ENCOUNTER
"Last office visit 11/2/20  Next office visit 1/11/21    I was unable to pend orders due to Epic message: \"You are unable to pend orders. Medications are ordered in a future encounter.\"      Please refill Gabapentin and Ritalin both to last one whole month to reach next appointment if ok.         Sold Drug       Qty           Days         Prescriber     11/20/2020 Gabapentin 400 Mg Capsule  240.00 30 Vi The         11/20/2020 Methylphenidate 10 Mg Tablet  45.00 15 Vi The     10/26/2020 Methylphenidate 10 Mg Tablet  45.00 15 Vi The             "

## 2020-12-17 NOTE — TELEPHONE ENCOUNTER
Reason for Call:  Medication refill: Per Patient, have 3 days left of Gabapentin, and out of Ritalin    Do you use a [facility]? No     Name of the medication requested: Gabapentin and Ritalin    Can we leave a detailed message on this number? Yes    Phone number patient can be reached at:  [phone]    Best Time: Anytime     Call taken on 12/17/2020 at 1:01 PM by Jc Christiansen

## 2020-12-20 ENCOUNTER — HEALTH MAINTENANCE LETTER (OUTPATIENT)
Age: 50
End: 2020-12-20

## 2020-12-22 ENCOUNTER — MYC MEDICAL ADVICE (OUTPATIENT)
Dept: FAMILY MEDICINE | Facility: OTHER | Age: 50
End: 2020-12-22

## 2020-12-22 NOTE — TELEPHONE ENCOUNTER
RN TRIAGE CALL:    Patient Contact    Attempt # 1    Was call answered?  No.  Unable to leave message. Unidentified vm.  Veronica Dugan RN    Will reach out via [employer].

## 2020-12-23 ENCOUNTER — MYC MEDICAL ADVICE (OUTPATIENT)
Dept: FAMILY MEDICINE | Facility: OTHER | Age: 50
End: 2020-12-23

## 2020-12-23 NOTE — TELEPHONE ENCOUNTER
We have not talked about back pain, if she wants to see a specialist she can.  Otherwise needs a visit in clinic for this.     KATIE GarciaC

## 2020-12-23 NOTE — TELEPHONE ENCOUNTER
RN TRIAGE CALL:    Patient Contact    Attempt # 2    Was call answered?  No.  Unable to leave message. Unidentified vm.  Veronica Dugan RN

## 2020-12-28 NOTE — TELEPHONE ENCOUNTER
Patient returned call and would like to schedule with orthopedics. Transferred call to that line for scheduling.

## 2020-12-31 ENCOUNTER — OFFICE VISIT (OUTPATIENT)
Dept: ORTHOPEDICS | Facility: CLINIC | Age: 50
End: 2020-12-31
Payer: OTHER GOVERNMENT

## 2020-12-31 ENCOUNTER — ANCILLARY PROCEDURE (OUTPATIENT)
Dept: GENERAL RADIOLOGY | Facility: CLINIC | Age: 50
End: 2020-12-31
Attending: PHYSICAL MEDICINE & REHABILITATION
Payer: OTHER GOVERNMENT

## 2020-12-31 VITALS
HEIGHT: 64 IN | WEIGHT: 150 LBS | BODY MASS INDEX: 25.61 KG/M2 | SYSTOLIC BLOOD PRESSURE: 129 MMHG | DIASTOLIC BLOOD PRESSURE: 88 MMHG

## 2020-12-31 DIAGNOSIS — M54.41 ACUTE RIGHT-SIDED LOW BACK PAIN WITH RIGHT-SIDED SCIATICA: ICD-10-CM

## 2020-12-31 DIAGNOSIS — M54.41 ACUTE RIGHT-SIDED LOW BACK PAIN WITH RIGHT-SIDED SCIATICA: Primary | ICD-10-CM

## 2020-12-31 PROCEDURE — 99204 OFFICE O/P NEW MOD 45 MIN: CPT | Performed by: PHYSICAL MEDICINE & REHABILITATION

## 2020-12-31 RX ORDER — METHYLPREDNISOLONE 4 MG
TABLET, DOSE PACK ORAL
Qty: 21 TABLET | Refills: 0 | Status: SHIPPED | OUTPATIENT
Start: 2020-12-31 | End: 2021-06-11

## 2020-12-31 ASSESSMENT — MIFFLIN-ST. JEOR: SCORE: 1290.4

## 2020-12-31 ASSESSMENT — PAIN SCALES - GENERAL: PAINLEVEL: EXTREME PAIN (8)

## 2020-12-31 NOTE — PATIENT INSTRUCTIONS
-Radiographs not obtained today due to Maryjane bringing up a possible concern of pregnancy.  Would you consult with her primary to discuss this.    -Physical therapy ordered at the [facility].  Please call [phone] to schedule.  Please do 5-6 days of exercises per week between formal sessions and the home exercises they provide.  -Medrol dose pack prescribed.  -Can take methocarbamol up to 3 times a day as needed for muscle spasms/pain.  Can cause sedation.  Do not take prior to driving.    -Ice or heat 15-20 minutes as needed (Avoid sleeping on a heating pad or ice).  -Patient's preferred over the counter medication as directed on packaging as needed for pain or soreness.    -Follow up as needed if symptoms fail to improve or worsen.  Please call with questions or concerns.       Education Record    Learner:  Patient    Disease / Diagnosis:    Barriers / Limitations:  None   Comments:    Method:  Discussion   Comments:    General Topics:  Plan of care reviewed   Comments:    Outcome:  Shows understanding   Comments:

## 2020-12-31 NOTE — LETTER
12/31/2020         RE: Magnolia Gonzáles  [address] [facility] [address]        Dear Colleague,    Thank you for referring your patient, Magnolia Gonzáles, to the [facility] [place]. Please see a copy of my visit note below.    Sports Medicine Clinic Visit    PCP: Simran Barreto    CC: Patient presents with:  Lower Back - Pain  Right Arm - Pain      HPI:  Magnolia Gonzáles is a 50 year old female who is seen as a self referral.  She notes right low back and leg pain pain that began ~1 month ago with insidious acute onset.  She notes pain over the right lower back, into posterior right leg to the top of the foot. She rates the pain at a 10/10 at its worst and a 8/10 currently. Symptoms are relieved with laying down. Symptoms are worsened by standing, sitting, sleeping. She endorses numbness, tingling and weakness.  She denies swelling, bruising, popping, grinding, catching, locking and instability. Other treatment has included ibuprofen. She notes difficulty with standing, sitting, sleeping.     Also reports increased right arm pain that starts from the whole hand and radiates upward.  Rates pain 10/10.    She is currently unemployed.    Review of Systems:  Musculoskeletal: as above  Remainder of review of systems is negative including constitutional, eyes, ENT, CV, pulmonary, GI, , endocrine, skin, hematologic, and neurologic except as noted in HPI or medical history.    History reviewed. No pertinent past surgical/medical/family/social history other than as mentioned in HPI.    Patient Active Problem List   Diagnosis     Adjustment disorder with mixed anxiety and depressed mood     Acquired hypothyroidism     Bilateral carpal tunnel syndrome     Carpal tunnel syndrome of left wrist     Valium overdose     Overdose of Valium, intentional self-harm, subsequent encounter     Major depressive disorder, recurrent episode, mild (H)     PTSD (post-traumatic  "stress disorder)     Generalized anxiety disorder     History of alcohol dependence (H)     Social phobia     Panic disorder without agoraphobia     Bipolar affective disorder, manic, moderate (H)     Nondependent alcohol abuse, episodic drinking behavior     Past Medical History:   Diagnosis Date     Anxiety      Depressive disorder      Hypothyroid      Suicidal ideation 7/19/2015     Past Surgical History:   Procedure Laterality Date     ORTHOPEDIC SURGERY       Family History   Problem Relation Age of Onset     Depression/Anxiety Mother      Alcohol/Drug Father         Pancreatic CA     Social History     Socioeconomic History     Marital status: Single     Spouse name: Not on file     Number of children: Not on file     Years of education: Not on file     Highest education level: Not on file   Occupational History     Not on file   Social Needs     Financial resource strain: Not on file     Food insecurity     Worry: Not on file     Inability: Not on file     Transportation needs     Medical: Not on file     Non-medical: Not on file   Tobacco Use     Smoking status: Never Smoker     Smokeless tobacco: Never Used   Substance and Sexual Activity     Alcohol use: Yes     Alcohol/week: 0.0 standard drinks     Comment: \"once every three months\"     Drug use: No     Sexual activity: Yes     Partners: Male     Birth control/protection: None   Lifestyle     Physical activity     Days per week: Not on file     Minutes per session: Not on file     Stress: Not on file   Relationships     Social connections     Talks on phone: Not on file     Gets together: Not on file     Attends Taoism service: Not on file     Active member of club or organization: Not on file     Attends meetings of clubs or organizations: Not on file     Relationship status: Not on file     Intimate partner violence     Fear of current or ex partner: Not on file     Emotionally abused: Not on file     Physically abused: Not on file     Forced sexual " "activity: Not on file   Other Topics Concern     Parent/sibling w/ CABG, MI or angioplasty before 65F 55M? Not Asked   Social History Narrative     Not on file           Current Outpatient Medications   Medication     betamethasone valerate (VALISONE) 0.1 % external ointment     buPROPion (WELLBUTRIN XL) 150 MG 24 hr tablet     buPROPion (WELLBUTRIN XL) 300 MG 24 hr tablet     gabapentin (NEURONTIN) 400 MG capsule     gabapentin (NEURONTIN) 400 MG capsule     hydrocortisone (CORTAID) 1 % external ointment     lamoTRIgine (LAMICTAL) 200 MG tablet     levothyroxine (SYNTHROID/LEVOTHROID) 100 MCG tablet     methylphenidate (RITALIN) 10 MG tablet     methylphenidate (RITALIN) 10 MG tablet     methylPREDNISolone (MEDROL DOSEPAK) 4 MG tablet therapy pack     multivitamin w/minerals (MULTI-VITAMIN) tablet     ondansetron (ZOFRAN) 4 MG tablet     triamcinolone (KENALOG) 0.1 % external ointment     methocarbamol (ROBAXIN) 500 MG tablet     naproxen (NAPROSYN) 500 MG tablet     No current facility-administered medications for this visit.      Allergies   Allergen Reactions     Zoloft [Sertraline] Rash         Objective:  /88 (BP Location: Right arm, Patient Position: Sitting, Cuff Size: Adult Regular)   Ht 1.626 m (5' 4\")   Wt 68 kg (150 lb)   BMI 25.75 kg/m      General: Alert and in no distress    Head: Normocephalic, atraumatic  Eyes: no scleral icterus or conjunctival erythema   Skin: no erythema, petechiae, or jaundice  CV: regular rhythm by palpation, 2+ distal pulses  Resp: normal respiratory effort without conversational dyspnea   Psych: Anxious  Gait: Non-antalgic, appropriate coordination and balance   Neuro: Motor strength and sensation as noted below    Musculoskeletal:    Low back exam:    Inspection:     no visible deformity in the low back       normal skin       normal vascular       normal lymphatic       no asymmetry    Palpation:  -Tender over the lumbar spine    ROM: Full lumbar flexion, " extension, rotation, and lateral flexion.  Pain/stiffness with lumbar ROM.    Strength:  Hip flexion 5/5 bilaterally  Hip abduction 5/5 bilaterally  Hip adduction 5/5 bilaterally  Knee flexion 5/5 bilaterally  Knee extension 5/5 bilaterally  Ankle dorsiflexion 5/5 bilaterally  Ankle plantarflexion 5/5 bilaterally  Great toe extension 5/5 bilaterally  Toe flexion 5/5 bilaterally    Sensation: Altered sensation to light touch over the 1st and 2nd toes over the left foot      Assessment:  1. Acute right-sided low back pain with right-sided sciatica        Plan:  Discussed the assessment with the patient and developed a plan together:  -Radiographs not obtained today due to Maryjane bringing up a possible concern of pregnancy.  Would recommend she consult with her primary to discuss this.    -Physical therapy ordered at the [facility].  Please call [phone] to schedule.  Please do 5-6 days of exercises per week between formal sessions and the home exercises they provide.  -Medrol dose pack prescribed.  -Can take methocarbamol up to 3 times a day as needed for muscle spasms/pain.  Can cause sedation.  Do not take prior to driving.    -Ice or heat 15-20 minutes as needed (Avoid sleeping on a heating pad or ice).  -Patient's preferred over the counter medication as directed on packaging as needed for pain or soreness.    -Follow up as needed if symptoms fail to improve or worsen.  Please call with questions or concerns.        Anastasia White MD, [facility] Sports Medicine  [facility]        Again, thank you for allowing me to participate in the care of your patient.        Sincerely,        Fern White MD

## 2020-12-31 NOTE — PROGRESS NOTES
Sports Medicine Clinic Visit    PCP: Simran Barreto    CC: Patient presents with:  Lower Back - Pain  Right Arm - Pain      HPI:  Magnolia Gonzáles is a 50 year old female who is seen as a self referral.  She notes right low back and leg pain pain that began ~1 month ago with insidious acute onset.  She notes pain over the right lower back, into posterior right leg to the top of the foot. She rates the pain at a 10/10 at its worst and a 8/10 currently. Symptoms are relieved with laying down. Symptoms are worsened by standing, sitting, sleeping. She endorses numbness, tingling and weakness.  She denies swelling, bruising, popping, grinding, catching, locking and instability. Other treatment has included ibuprofen. She notes difficulty with standing, sitting, sleeping.     Also reports increased right arm pain that starts from the whole hand and radiates upward.  Rates pain 10/10.    She is currently unemployed.    Review of Systems:  Musculoskeletal: as above  Remainder of review of systems is negative including constitutional, eyes, ENT, CV, pulmonary, GI, , endocrine, skin, hematologic, and neurologic except as noted in HPI or medical history.    History reviewed. No pertinent past surgical/medical/family/social history other than as mentioned in HPI.    Patient Active Problem List   Diagnosis     Adjustment disorder with mixed anxiety and depressed mood     Acquired hypothyroidism     Bilateral carpal tunnel syndrome     Carpal tunnel syndrome of left wrist     Valium overdose     Overdose of Valium, intentional self-harm, subsequent encounter     Major depressive disorder, recurrent episode, mild (H)     PTSD (post-traumatic stress disorder)     Generalized anxiety disorder     History of alcohol dependence (H)     Social phobia     Panic disorder without agoraphobia     Bipolar affective disorder, manic, moderate (H)     Nondependent alcohol abuse, episodic drinking behavior     Past Medical History:  "  Diagnosis Date     Anxiety      Depressive disorder      Hypothyroid      Suicidal ideation 7/19/2015     Past Surgical History:   Procedure Laterality Date     ORTHOPEDIC SURGERY       Family History   Problem Relation Age of Onset     Depression/Anxiety Mother      Alcohol/Drug Father         Pancreatic CA     Social History     Socioeconomic History     Marital status: Single     Spouse name: Not on file     Number of children: Not on file     Years of education: Not on file     Highest education level: Not on file   Occupational History     Not on file   Social Needs     Financial resource strain: Not on file     Food insecurity     Worry: Not on file     Inability: Not on file     Transportation needs     Medical: Not on file     Non-medical: Not on file   Tobacco Use     Smoking status: Never Smoker     Smokeless tobacco: Never Used   Substance and Sexual Activity     Alcohol use: Yes     Alcohol/week: 0.0 standard drinks     Comment: \"once every three months\"     Drug use: No     Sexual activity: Yes     Partners: Male     Birth control/protection: None   Lifestyle     Physical activity     Days per week: Not on file     Minutes per session: Not on file     Stress: Not on file   Relationships     Social connections     Talks on phone: Not on file     Gets together: Not on file     Attends Episcopalian service: Not on file     Active member of club or organization: Not on file     Attends meetings of clubs or organizations: Not on file     Relationship status: Not on file     Intimate partner violence     Fear of current or ex partner: Not on file     Emotionally abused: Not on file     Physically abused: Not on file     Forced sexual activity: Not on file   Other Topics Concern     Parent/sibling w/ CABG, MI or angioplasty before 65F 55M? Not Asked   Social History Narrative     Not on file           Current Outpatient Medications   Medication     betamethasone valerate (VALISONE) 0.1 % external ointment     " "buPROPion (WELLBUTRIN XL) 150 MG 24 hr tablet     buPROPion (WELLBUTRIN XL) 300 MG 24 hr tablet     gabapentin (NEURONTIN) 400 MG capsule     gabapentin (NEURONTIN) 400 MG capsule     hydrocortisone (CORTAID) 1 % external ointment     lamoTRIgine (LAMICTAL) 200 MG tablet     levothyroxine (SYNTHROID/LEVOTHROID) 100 MCG tablet     methylphenidate (RITALIN) 10 MG tablet     methylphenidate (RITALIN) 10 MG tablet     methylPREDNISolone (MEDROL DOSEPAK) 4 MG tablet therapy pack     multivitamin w/minerals (MULTI-VITAMIN) tablet     ondansetron (ZOFRAN) 4 MG tablet     triamcinolone (KENALOG) 0.1 % external ointment     methocarbamol (ROBAXIN) 500 MG tablet     naproxen (NAPROSYN) 500 MG tablet     No current facility-administered medications for this visit.      Allergies   Allergen Reactions     Zoloft [Sertraline] Rash         Objective:  /88 (BP Location: Right arm, Patient Position: Sitting, Cuff Size: Adult Regular)   Ht 1.626 m (5' 4\")   Wt 68 kg (150 lb)   BMI 25.75 kg/m      General: Alert and in no distress    Head: Normocephalic, atraumatic  Eyes: no scleral icterus or conjunctival erythema   Skin: no erythema, petechiae, or jaundice  CV: regular rhythm by palpation, 2+ distal pulses  Resp: normal respiratory effort without conversational dyspnea   Psych: Anxious  Gait: Non-antalgic, appropriate coordination and balance   Neuro: Motor strength and sensation as noted below    Musculoskeletal:    Low back exam:    Inspection:     no visible deformity in the low back       normal skin       normal vascular       normal lymphatic       no asymmetry    Palpation:  -Tender over the lumbar spine    ROM: Full lumbar flexion, extension, rotation, and lateral flexion.  Pain/stiffness with lumbar ROM.    Strength:  Hip flexion 5/5 bilaterally  Hip abduction 5/5 bilaterally  Hip adduction 5/5 bilaterally  Knee flexion 5/5 bilaterally  Knee extension 5/5 bilaterally  Ankle dorsiflexion 5/5 bilaterally  Ankle " plantarflexion 5/5 bilaterally  Great toe extension 5/5 bilaterally  Toe flexion 5/5 bilaterally    Sensation: Altered sensation to light touch over the 1st and 2nd toes over the left foot      Assessment:  1. Acute right-sided low back pain with right-sided sciatica        Plan:  Discussed the assessment with the patient and developed a plan together:  -Radiographs not obtained today due to Maryjane bringing up a possible concern of pregnancy.  Would recommend she consult with her primary to discuss this.    -Physical therapy ordered at the [facility].  Please call [phone] to schedule.  Please do 5-6 days of exercises per week between formal sessions and the home exercises they provide.  -Medrol dose pack prescribed.  -Can take methocarbamol up to 3 times a day as needed for muscle spasms/pain.  Can cause sedation.  Do not take prior to driving.    -Ice or heat 15-20 minutes as needed (Avoid sleeping on a heating pad or ice).  -Patient's preferred over the counter medication as directed on packaging as needed for pain or soreness.    -Follow up as needed if symptoms fail to improve or worsen.  Please call with questions or concerns.        Anastasia White MD, [facility] Sports Medicine  [facility]

## 2021-01-04 ENCOUNTER — MYC MEDICAL ADVICE (OUTPATIENT)
Dept: FAMILY MEDICINE | Facility: OTHER | Age: 51
End: 2021-01-04

## 2021-01-04 DIAGNOSIS — F33.0 MAJOR DEPRESSIVE DISORDER, RECURRENT EPISODE, MILD (H): Primary | ICD-10-CM

## 2021-01-04 RX ORDER — MULTIVIT WITH MINERALS/LUTEIN
1 TABLET ORAL DAILY
Qty: 90 TABLET | Refills: 1 | Status: SHIPPED | OUTPATIENT
Start: 2021-01-04 | End: 2022-06-27

## 2021-01-06 ENCOUNTER — MYC MEDICAL ADVICE (OUTPATIENT)
Dept: PSYCHOLOGY | Facility: CLINIC | Age: 51
End: 2021-01-06

## 2021-01-07 NOTE — TELEPHONE ENCOUNTER
Pt has called and is out of this medication can she get this refilled?    [facility]  [facility]

## 2021-01-11 ENCOUNTER — VIRTUAL VISIT (OUTPATIENT)
Dept: PSYCHOLOGY | Facility: CLINIC | Age: 51
End: 2021-01-11
Payer: OTHER GOVERNMENT

## 2021-01-11 DIAGNOSIS — R55 BLACK-OUT (NOT AMNESIA): ICD-10-CM

## 2021-01-11 DIAGNOSIS — F90.2 ADHD (ATTENTION DEFICIT HYPERACTIVITY DISORDER), COMBINED TYPE: ICD-10-CM

## 2021-01-11 DIAGNOSIS — F41.0 PANIC DISORDER WITHOUT AGORAPHOBIA: ICD-10-CM

## 2021-01-11 DIAGNOSIS — F41.1 GENERALIZED ANXIETY DISORDER: ICD-10-CM

## 2021-01-11 DIAGNOSIS — F31.81 BIPOLAR II DISORDER MAJOR DEPRESSIVE WITH MELANCHOLIC FEATURES (H): Primary | ICD-10-CM

## 2021-01-11 PROCEDURE — 99214 OFFICE O/P EST MOD 30 MIN: CPT | Mod: 95 | Performed by: NURSE PRACTITIONER

## 2021-01-11 RX ORDER — BUPROPION HYDROCHLORIDE 150 MG/1
150 TABLET ORAL EVERY MORNING
Qty: 90 TABLET | Refills: 1 | Status: SHIPPED | OUTPATIENT
Start: 2021-01-11 | End: 2021-08-02

## 2021-01-11 RX ORDER — METHYLPHENIDATE HYDROCHLORIDE 10 MG/1
15 TABLET ORAL 2 TIMES DAILY
Qty: 60 TABLET | Refills: 0 | Status: SHIPPED | OUTPATIENT
Start: 2021-01-11 | End: 2021-06-11

## 2021-01-11 RX ORDER — LAMOTRIGINE 200 MG/1
200 TABLET ORAL DAILY
Qty: 90 TABLET | Refills: 1 | Status: SHIPPED | OUTPATIENT
Start: 2021-01-11 | End: 2021-06-09

## 2021-01-11 ASSESSMENT — ANXIETY QUESTIONNAIRES
2. NOT BEING ABLE TO STOP OR CONTROL WORRYING: NEARLY EVERY DAY
5. BEING SO RESTLESS THAT IT IS HARD TO SIT STILL: NEARLY EVERY DAY
IF YOU CHECKED OFF ANY PROBLEMS ON THIS QUESTIONNAIRE, HOW DIFFICULT HAVE THESE PROBLEMS MADE IT FOR YOU TO DO YOUR WORK, TAKE CARE OF THINGS AT HOME, OR GET ALONG WITH OTHER PEOPLE: EXTREMELY DIFFICULT
6. BECOMING EASILY ANNOYED OR IRRITABLE: NEARLY EVERY DAY
3. WORRYING TOO MUCH ABOUT DIFFERENT THINGS: NEARLY EVERY DAY
7. FEELING AFRAID AS IF SOMETHING AWFUL MIGHT HAPPEN: NEARLY EVERY DAY
GAD7 TOTAL SCORE: 21
1. FEELING NERVOUS, ANXIOUS, OR ON EDGE: NEARLY EVERY DAY

## 2021-01-11 ASSESSMENT — PATIENT HEALTH QUESTIONNAIRE - PHQ9
SUM OF ALL RESPONSES TO PHQ QUESTIONS 1-9: 17
5. POOR APPETITE OR OVEREATING: NEARLY EVERY DAY
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
## 2021-01-11 NOTE — PATIENT INSTRUCTIONS
1.  Decrease Wellbutrin XL to 150 mg daily    2.  Continue lamotrigine 200 mg daily    3.  Continue methylphenidate 15 mg twice daily    4.  Mental health referral for long-term counseling care    5.  Resubmit referral for neurology to assess for blackouts      Continue all other medications as reviewed per electronic medical record today.     Safety plan reviewed. To the Emergency Department as needed or call after hours crisis line at [phone] or [phone]. [place] Crisis Text [place]. Text [place] to 199423 or Suicide LifeLine Chat: suicideSixDoors.org/chat/    To schedule individual or family therapy, call [facility] at [phone].    Schedule an appointment with me in February or sooner as needed. Call [facility] at [phone] to schedule.    Follow up with primary care provider as planned or for acute medical concerns.    Call the psychiatric nurse line with medication questions or concerns at [phone].    Local Funeralt may be used to communicate with your provider, but this is not intended to be used for emergencies.    Crisis Resources:    National Suicide Prevention Lifeline: 824.355.7246 (TTY: 312.498.4838). Call anytime for help.  (www.suicidepreventionlifeline.org)  National [place] on Mental Illness (www.anne marie.org): 320.287.1285 or 463-629-6309.   Mental Health Association (www.mentalhealth.org): 181.518.1559 or 387-505-0855.  [place] Crisis Text [place]: Text [place] to 334584  Suicide LifeLine Chat: suicideSixDoors.org/chat

## 2021-01-11 NOTE — PROGRESS NOTES
Magnolia is a 50 year old who is being evaluated via a billable telephone visit.      What phone number would you like to be contacted at? [phone]

## 2021-01-12 ASSESSMENT — ANXIETY QUESTIONNAIRES: GAD7 TOTAL SCORE: 21

## 2021-02-28 ENCOUNTER — HEALTH MAINTENANCE LETTER (OUTPATIENT)
Age: 51
End: 2021-02-28

## 2021-03-08 ENCOUNTER — PRE VISIT (OUTPATIENT)
Dept: NEUROLOGY | Facility: CLINIC | Age: 51
End: 2021-03-08

## 2021-03-08 NOTE — TELEPHONE ENCOUNTER
FUTURE VISIT INFORMATION:    Date: 3/10/21    Time:  1300    Location: [facility]   REFERRAL INFORMATION:    Referring provider:  Janet Álvarez NP    Referring providers [facility]: [facility]     Reason for visit/diagnosis:  Black-out spells       NOTES (FOR ALL VISITS) STATUS DETAILS   OFFICE NOTE from referring provider Printed 5/15/20, 11/23/20, 1/11/21   OFFICE NOTE from other specialist Printed  MARIANELA Lilly ([facility]) 8/14/20   DISCHARGE SUMMARY from hospital       DISCHARGE REPORT from the ER     MEDICATION LIST In Epic     IMAGING  (FOR ALL VISITS)       EMG       EEG    None   MRI (HEAD, NECK, SPINE) Printed  Images in PACs CT cervical 5/13/16  CT head 5/13/16   CARDIAC STUDIES        FORMAL COGNITIVE TESTING       OTHER

## 2021-04-01 ENCOUNTER — TELEPHONE (OUTPATIENT)
Dept: FAMILY MEDICINE | Facility: OTHER | Age: 51
End: 2021-04-01

## 2021-04-01 NOTE — TELEPHONE ENCOUNTER
Reason for call:  Other   Patient called regarding (reason for call): prescription  Additional comments: ointment & cream is not helping for rash on face she is miserable and wondering what she can do? Can she take a preventative daily dose of medication?    Phone number to reach patient:  Other phone number:  [phone]    Best Time:  any    Can we leave a detailed message on this number?  YES    Travel screening: Not Applicable

## 2021-04-08 ENCOUNTER — VIRTUAL VISIT (OUTPATIENT)
Dept: PSYCHOLOGY | Facility: CLINIC | Age: 51
End: 2021-04-08
Attending: NURSE PRACTITIONER
Payer: OTHER GOVERNMENT

## 2021-04-08 DIAGNOSIS — F41.1 GENERALIZED ANXIETY DISORDER: Primary | ICD-10-CM

## 2021-04-08 DIAGNOSIS — F33.1 MODERATE EPISODE OF RECURRENT MAJOR DEPRESSIVE DISORDER (H): ICD-10-CM

## 2021-04-08 PROCEDURE — 90834 PSYTX W PT 45 MINUTES: CPT | Mod: 95 | Performed by: SOCIAL WORKER

## 2021-04-08 ASSESSMENT — COLUMBIA-SUICIDE SEVERITY RATING SCALE - C-SSRS
TOTAL  NUMBER OF INTERRUPTED ATTEMPTS PAST 3 MONTHS: NO
5. HAVE YOU STARTED TO WORK OUT OR WORKED OUT THE DETAILS OF HOW TO KILL YOURSELF? DO YOU INTEND TO CARRY OUT THIS PLAN?: NO
4. HAVE YOU HAD THESE THOUGHTS AND HAD SOME INTENTION OF ACTING ON THEM?: NO
REASONS FOR IDEATION LIFETIME: COMPLETELY TO END OR STOP THE PAIN (YOU COULDN'T GO ON LIVING WITH THE PAIN OR HOW YOU WERE FEELING)
TOTAL  NUMBER OF ABORTED OR SELF INTERRUPTED ATTEMPTS PAST 3 MONTHS: NO
TOTAL  NUMBER OF ACTUAL ATTEMPTS LIFETIME: 2
1. IN THE PAST MONTH, HAVE YOU WISHED YOU WERE DEAD OR WISHED YOU COULD GO TO SLEEP AND NOT WAKE UP?: YES
6. HAVE YOU EVER DONE ANYTHING, STARTED TO DO ANYTHING, OR PREPARED TO DO ANYTHING TO END YOUR LIFE?: NO
LETHALITY/MEDICAL DAMAGE CODE FIRST ACTUAL ATTEMPT: MODERATE PHYSICAL DAMAGE, MEDICAL ATTENTION NEEDED
4. HAVE YOU HAD THESE THOUGHTS AND HAD SOME INTENTION OF ACTING ON THEM?: YES
ATTEMPT PAST THREE MONTHS: NO
TOTAL  NUMBER OF INTERRUPTED ATTEMPTS LIFETIME: NO
1. IN THE PAST MONTH, HAVE YOU WISHED YOU WERE DEAD OR WISHED YOU COULD GO TO SLEEP AND NOT WAKE UP?: YES
LETHALITY/MEDICAL DAMAGE CODE MOST RECENT ACTUAL ATTEMPT: MODERATE PHYSICAL DAMAGE, MEDICAL ATTENTION NEEDED
5. HAVE YOU STARTED TO WORK OUT OR WORKED OUT THE DETAILS OF HOW TO KILL YOURSELF? DO YOU INTEND TO CARRY OUT THIS PLAN?: NO
TOTAL  NUMBER OF ABORTED OR SELF INTERRUPTED ATTEMPTS PAST LIFETIME: NO
ATTEMPT LIFETIME: YES
2. HAVE YOU ACTUALLY HAD ANY THOUGHTS OF KILLING YOURSELF?: NO
2. HAVE YOU ACTUALLY HAD ANY THOUGHTS OF KILLING YOURSELF LIFETIME?: YES
3. HAVE YOU BEEN THINKING ABOUT HOW YOU MIGHT KILL YOURSELF?: YES
6. HAVE YOU EVER DONE ANYTHING, STARTED TO DO ANYTHING, OR PREPARED TO DO ANYTHING TO END YOUR LIFE?: NO

## 2021-04-08 ASSESSMENT — ANXIETY QUESTIONNAIRES
5. BEING SO RESTLESS THAT IT IS HARD TO SIT STILL: NEARLY EVERY DAY
6. BECOMING EASILY ANNOYED OR IRRITABLE: NEARLY EVERY DAY
GAD7 TOTAL SCORE: 21
1. FEELING NERVOUS, ANXIOUS, OR ON EDGE: NEARLY EVERY DAY
IF YOU CHECKED OFF ANY PROBLEMS ON THIS QUESTIONNAIRE, HOW DIFFICULT HAVE THESE PROBLEMS MADE IT FOR YOU TO DO YOUR WORK, TAKE CARE OF THINGS AT HOME, OR GET ALONG WITH OTHER PEOPLE: EXTREMELY DIFFICULT
7. FEELING AFRAID AS IF SOMETHING AWFUL MIGHT HAPPEN: NEARLY EVERY DAY
2. NOT BEING ABLE TO STOP OR CONTROL WORRYING: NEARLY EVERY DAY
3. WORRYING TOO MUCH ABOUT DIFFERENT THINGS: NEARLY EVERY DAY

## 2021-04-08 ASSESSMENT — PATIENT HEALTH QUESTIONNAIRE - PHQ9
SUM OF ALL RESPONSES TO PHQ QUESTIONS 1-9: 21
5. POOR APPETITE OR OVEREATING: NEARLY EVERY DAY
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
## 2021-04-09 ENCOUNTER — TELEPHONE (OUTPATIENT)
Dept: FAMILY MEDICINE | Facility: OTHER | Age: 51
End: 2021-04-09

## 2021-04-09 ASSESSMENT — ANXIETY QUESTIONNAIRES: GAD7 TOTAL SCORE: 21

## 2021-04-09 NOTE — TELEPHONE ENCOUNTER
Patient Quality Outreach Summary      Summary:    Patient is due/failing the following:   Physical with fasting labs, mammogram, and colonoscopy     Type of outreach:    Phone, left message for patient/parent to call back.    Questions for provider review:    None                                                                                                                    Narcisa Hill CMA       Chart routed to Care Team.

## 2021-04-15 ENCOUNTER — VIRTUAL VISIT (OUTPATIENT)
Dept: PSYCHOLOGY | Facility: CLINIC | Age: 51
End: 2021-04-15
Payer: OTHER GOVERNMENT

## 2021-04-15 DIAGNOSIS — Z53.9 NO SHOW: Primary | ICD-10-CM

## 2021-04-15 NOTE — PROGRESS NOTES
Patient no-showed today's appointment; appointment was for Individual Therapy at 1 PM.  Text and Email invite sent, followed by phone call. Left .     LASHELL Mcclendon [facility]    April 15, 2021

## 2021-04-24 ENCOUNTER — HEALTH MAINTENANCE LETTER (OUTPATIENT)
Age: 51
End: 2021-04-24

## 2021-05-13 ENCOUNTER — TELEPHONE (OUTPATIENT)
Dept: ORTHOPEDICS | Facility: OTHER | Age: 51
End: 2021-05-13
Payer: OTHER GOVERNMENT

## 2021-05-13 NOTE — TELEPHONE ENCOUNTER
Patient LVM requesting a call back to discuss her right hand pain.   States she last seen Dr. White December 2020 for LBP. Her right hand has been causing her great amounts of pain and numbness to where is unable to sleep at night.     Would like to have recommendations or prescribe a medication to help her pain.     # [phone]

## 2021-05-14 ENCOUNTER — VIRTUAL VISIT (OUTPATIENT)
Dept: PSYCHOLOGY | Facility: CLINIC | Age: 51
End: 2021-05-14
Payer: OTHER GOVERNMENT

## 2021-05-14 DIAGNOSIS — F41.1 GENERALIZED ANXIETY DISORDER: ICD-10-CM

## 2021-05-14 DIAGNOSIS — F33.1 MODERATE EPISODE OF RECURRENT MAJOR DEPRESSIVE DISORDER (H): Primary | ICD-10-CM

## 2021-05-14 DIAGNOSIS — F90.2 ADHD (ATTENTION DEFICIT HYPERACTIVITY DISORDER), COMBINED TYPE: ICD-10-CM

## 2021-05-14 PROCEDURE — 90834 PSYTX W PT 45 MINUTES: CPT | Mod: 95 | Performed by: SOCIAL WORKER

## 2021-05-14 PROCEDURE — 90785 PSYTX COMPLEX INTERACTIVE: CPT | Mod: 95 | Performed by: SOCIAL WORKER

## 2021-05-14 NOTE — PROGRESS NOTES
Progress Note    Patient Name: Magnolia Gonzáles  Date: 2021         Service Type: Individual      Session Start Time: 1:09 PM  Session End Time: 1:57 PM     Session Length: 48 mins     Session #: 2    Attendees: Client attended alone    Service Modality:  Video Visit:      Provider verified identity through the following two step process.  Patient provided:  Patient  and Patient address    Telemedicine Visit: The patient's condition can be safely assessed and treated via synchronous audio and visual telemedicine encounter.      Reason for Telemedicine Visit: Patient has requested telehealth visit    Originating Site (Patient Location): Patient's home    Distant Site (Provider Location): Provider Remote Setting    Consent:  The patient/guardian has verbally consented to: the potential risks and benefits of telemedicine (video visit) versus in person care; bill my insurance or make self-payment for services provided; and responsibility for payment of non-covered services.     Patient would like the video invitation sent by:  Send to e-mail at: [email or web address]    Mode of Communication:  Video Conference via [employer]well    As the provider I attest to compliance with applicable laws and regulations related to telemedicine.     Treatment Plan Last Reviewed: n/a  PHQ-9 / JULIEN-7 : n/a    DATA  Interactive Complexity: Yes, visit entailed Interactive Complexity evidenced by:  -The need to manage maladaptive communication (related to, e.g., high anxiety, high reactivity, repeated questions, or disagreement) among participants that complicates delivery of care  Crisis: No       Progress Since Last Session (Related to Symptoms / Goals / Homework):   Symptoms: Worsening : High anxiety, sleep disruption, depressed mood    Homework: None assigned      Episode of Care Goals: Minimal progress - CONTEMPLATION (Considering change and yet undecided); Intervened by  assessing the negative and positive thinking (ambivalence) about behavior change     Current / Ongoing Stressors and Concerns:   Current Stressor: conflict with roommate, off medication for 2 weeks, homeless and living in car a week ago     Treatment Objective(s) Addressed in This Session:   use thought-stopping strategy daily to reduce intrusive thoughts  Decrease frequency and intensity of feeling down, depressed, hopeless  Improve concentration, focus, and mindfulness in daily activities      Intervention:   CBT: Provided support for patient while she processed through recent conflict with ex-roommate. Patient expressed feeling overwhelmed with her anxiety and feeling stuck about her situation. Patient reassured provider that she was currently living in a safe place with a friend. Provider suggested for patient to start returning to her routine to add normalcy back into her day. We discussed options for her to get her prescriptions from roommate or connect with her providers for refills. Patient agrees to reach out to Psychiatry to consult around medication for her ADD.   Motivational Interviewing: Utilized OARS to build insight around patient's distress and level of change patient was at, emphasized patient's ability to maintain and create personal control and choice.  Motivational Interviewing    MI Intervention: Expressed Empathy/Understanding, Supported Autonomy, Collaboration, Evocation, Permission to raise concern or advise, Open-ended questions, Reflections: simple and complex, Change talk (evoked) and Reframe     Change Talk Expressed by the Patient: Desire to change Ability to change Reasons to change Need to change Committment to change    Provider Response to Change Talk: E - Evoked more info from patient about behavior change, A - Affirmed patient's thoughts, decisions, or attempts at behavior change, R - Reflected patient's change talk and S - Summarized patient's change talk  "statements          ASSESSMENT: Current Emotional / Mental Status (status of significant symptoms):   Risk status (Self / Other harm or suicidal ideation)   Patient reports the following current fears or concerns for personal safety: Patient reports some concerns over roommate's son \"stalking\" her. Will continue to observe if she wants to pursue a restraining order. Patient endorsed she is currently in a place where she feels safe.   Patient denies current or recent suicidal ideation or behaviors.   Patient denies current or recent homicidal ideation or behaviors.   Patient denies current or recent self injurious behavior or ideation.   Patient denies other safety concerns.   Patient reports there has been a change in risk factors since their last session.  Housing instability the last two weeks, currently living with friend   Patient reports there has been no change in protective factors since their last session.     Recommended that patient call 911 or go to the local ED should there be a change in any of these risk factors.     Appearance:   Appropriate    Eye Contact:   Good    Psychomotor Behavior: Agitated    Attitude:   Cooperative    Orientation:   All   Speech    Rate / Production: Emotional Talkative    Volume:  Normal    Mood:    Anxious  Panicked   Affect:    Tearful Worrisome    Thought Content:  Rumination    Thought Form:  Circumstantial   Insight:    Fair      Medication Review:   No changes to current psychiatric medication(s)     Medication Compliance:   No , medication at ex-roommates, trouble getting access at this time     Changes in Health Issues:   None reported     Chemical Use Review:   Substance Use: Chemical use reviewed, no active concerns identified      Tobacco Use: No current tobacco use.      Diagnosis:  1. Moderate episode of recurrent major depressive disorder (H)    2. ADHD (attention deficit hyperactivity disorder), combined type    3. Generalized anxiety disorder  "       Collateral Reports Completed:   Routed note to Care Team Member(s)    PLAN: (Patient Tasks / Therapist Tasks / Other)  Patient: Will reach out to psychiatry to consult on her medication        LASHELL Mcclendon [facility]    May 14, 2021  Service Performed and Documented by LGBISHNU-   Note reviewed and clinical supervision by LASHELL Castano [facility] 5/18/2021

## 2021-05-14 NOTE — TELEPHONE ENCOUNTER
POLINA with detailed information regarding that patient would need to schedule appointment for evaluation of her current hand pain.  She has not been evaluated for this condition.    Felice Byers ATC

## 2021-06-07 ENCOUNTER — TELEPHONE (OUTPATIENT)
Dept: PSYCHIATRY | Facility: CLINIC | Age: 51
End: 2021-06-07

## 2021-06-07 ENCOUNTER — NURSE TRIAGE (OUTPATIENT)
Dept: FAMILY MEDICINE | Facility: OTHER | Age: 51
End: 2021-06-07

## 2021-06-07 DIAGNOSIS — E03.9 HYPOTHYROIDISM, UNSPECIFIED TYPE: ICD-10-CM

## 2021-06-07 NOTE — TELEPHONE ENCOUNTER
"Patient calling. She was taking a  knife out of the package and got a cut. She has been trying to treat it at home but now thinks it is infected. Patient said her finger is very red, swollen, painful and the wound is full of pus. She said her symptoms are getting worse as the days goes on. She has been trying to soak it but not having any luck with healing. She feels it is really infected and not sure what to do. No fever.     RN advised she has to be seen. No appointments this late in the day. She is going to check if the clinic near her house has an urgent care that she can go to [facility]. RN did schedule her an appointment tomorrow incase she does not find a clinic to see her tonight up where she lives. She will call back with any questions or concerns.     LEYDI Shah, RN  [facility]      Reason for Disposition    Wound looks infected    Pus or cloudy fluid draining from wound    Additional Information    Negative: Major bleeding (actively dripping or spurting) that can't be stopped    Negative: Sounds like a life-threatening emergency to the triager    Negative: Stitches and not infected    Negative: Surgical wound infection suspected (post-op)    Negative: Bright red, wide-spread, sunburn-like rash    Negative: Black (necrotic) or blisters develop in wound    Negative: Looks infected (spreading redness, red streak, pus) and fever    Negative: Patient sounds very sick or weak to the triager    Negative: Severe pain in the wound    Negative: Red streak runs from the wound    Negative: Facial wound looks infected (spreading redness)    Negative: Finger wound and entire finger swollen    Answer Assessment - Initial Assessment Questions  1. MECHANISM: \"How did the injury happen?\"       Taking a  knife out of the package  2. ONSET: \"When did the injury happen?\" (Minutes or hours ago)       1.5 weeks ago  3. LOCATION: \"What part of the finger is injured?\" \"Is the " "nail damaged?\"       End of finger  4. APPEARANCE of the INJURY: \"What does the injury look like?\"       Infected, red, swollen, full of pus, really painful  5. SEVERITY: \"Can you use the hand normally?\"  \"Can you bend your fingers into a ball and then fully open them?\"      uyes  6. SIZE: For cuts, bruises, or swelling, ask: \"How large is it?\" (e.g., inches or centimeters;  entire finger)       unsure  7. PAIN: \"Is there pain?\" If so, ask: \"How bad is the pain?\"    (e.g., Scale 1-10; or mild, moderate, severe)      moderate  8. TETANUS: For any breaks in the skin, ask: \"When was the last tetanus booster?\"      unsure  9. OTHER SYMPTOMS: \"Do you have any other symptoms?\"      Red, swollen, full of pus  10. PREGNANCY: \"Is there any chance you are pregnant?\" \"When was your last menstrual period?\"        no    Protocols used: FINGER INJURY-A-OH, WOUND INFECTION-A-OH    "

## 2021-06-07 NOTE — TELEPHONE ENCOUNTER
"Pt called stating she is \"falling apart\" and needs to see Janet.  Was unhappy to learn next appt not available until 8/2.  Would like to be seen sooner if at all possible.  Checked [facility], but no results due to her insurance.    She also requested to be re-prescribed Ritalin and has questions about how to get medical marijuana for anxiety.  Please call [phone] to discuss.  "

## 2021-06-07 NOTE — TELEPHONE ENCOUNTER
"Pt stated she's ran out of her meds (Lamotrogine and levothyroxine) about a week ago, she believed her roommate is stealing them. Pt also, stated that \"my anxiety is killing me, I need a new medicine or something my current medicine isn't working. I don't know what to do. I can't leave the house\". Coordinator encourage pt to schedule an appt pt decline.   #[phone]  "

## 2021-06-08 RX ORDER — LEVOTHYROXINE SODIUM 100 UG/1
TABLET ORAL
Qty: 30 TABLET | Refills: 0 | Status: SHIPPED | OUTPATIENT
Start: 2021-06-08 | End: 2021-09-29

## 2021-06-08 NOTE — TELEPHONE ENCOUNTER
Pending Prescriptions:                       Disp   Refills    levothyroxine (SYNTHROID/LEVOTHROID) 100 M*90 tab*0        Sig: TAKE ONE TABLET BY MOUTH ONCE DAILY    Routing refill request to provider for review/approval because:  Labs not current:    TSH   Date Value Ref Range Status   11/13/2019 2.73 0.40 - 4.00 mU/L Final      over 13 months, RN unable to provide a garcía refill.

## 2021-06-09 ENCOUNTER — MYC MEDICAL ADVICE (OUTPATIENT)
Dept: ADDICTION MEDICINE | Facility: CLINIC | Age: 51
End: 2021-06-09

## 2021-06-09 NOTE — TELEPHONE ENCOUNTER
Patient stated she did get the [employer] message.  She is going to reach out to her PCP for Ritalin as she said she doesn't see CAIO Álvarez until August.

## 2021-06-10 ENCOUNTER — NURSE TRIAGE (OUTPATIENT)
Dept: FAMILY MEDICINE | Facility: OTHER | Age: 51
End: 2021-06-10

## 2021-06-10 NOTE — TELEPHONE ENCOUNTER
Patient states she is having to have her finger tip amputated and she is having a hard time with this.  She does not want to wait until Monday to be seen.  She is scheduled tomorrow, 6/11/21, with PCP.    Reason for Disposition    Symptoms interfere with work or school    Started on anti-anxiety medication and no relief    Patient wants to be seen    Recent traumatic event (e.g., death of a loved one, job loss, victim/witness of crime)    Additional Information    Negative: Severe difficulty breathing (e.g., struggling for each breath, speaks in single words)    Negative: Bluish (or gray) lips or face    Negative: Difficult to awaken or acting confused  (e.g., disoriented, slurred speech)    Negative: Hysterical or combative behavior    Negative: Sounds like a life-threatening emergency to the triager    Negative: Chest pain    Negative: Palpitations, skipped heart beat, or rapid heart beat    Negative: Cough is main symptom    Negative: Suicide thoughts, threats, attempts, or questions    Negative: Depression is main problem or symptom (e.g., feelings of sadness or hopelessness)    Negative: Difficulty breathing and persists > 10 minutes and not relieved by reassurance provided by triager    Negative: Lightheadedness or dizziness and persists > 10 minutes and not relieved by reassurance provided by triager    Negative: Alcohol or drug abuse, known or suspected, and feeling very shaky (i.e., visible tremors of hands)    Negative: Patient sounds very sick or weak to the triager    Negative: Patient sounds very upset or troubled to the triager    Negative: Symptoms of anxiety or panic and has not been evaluated for this by physician    Negative: Significant weight loss (or gain) and not dieting    Negative: Taking thyroid medications    Negative: Caffeine abuse, known or suspected (e.g., > 2 cups of coffee/tea or > 4 cans of soda / day)    Negative: Alcohol or drug abuse, known or suspected    Negative: Taking herbal  remedies    Negative: Requesting to talk to a counselor (e.g., mental health worker, psychiatrist)    Protocols used: ANXIETY AND PANIC ATTACK-A-OH

## 2021-06-11 ENCOUNTER — VIRTUAL VISIT (OUTPATIENT)
Dept: FAMILY MEDICINE | Facility: OTHER | Age: 51
End: 2021-06-11
Payer: OTHER GOVERNMENT

## 2021-06-11 DIAGNOSIS — F33.0 MAJOR DEPRESSIVE DISORDER, RECURRENT EPISODE, MILD (H): ICD-10-CM

## 2021-06-11 DIAGNOSIS — F41.1 GENERALIZED ANXIETY DISORDER: ICD-10-CM

## 2021-06-11 DIAGNOSIS — F43.10 PTSD (POST-TRAUMATIC STRESS DISORDER): ICD-10-CM

## 2021-06-11 DIAGNOSIS — F43.23 ADJUSTMENT DISORDER WITH MIXED ANXIETY AND DEPRESSED MOOD: Primary | ICD-10-CM

## 2021-06-11 DIAGNOSIS — F40.10 SOCIAL PHOBIA: ICD-10-CM

## 2021-06-11 PROCEDURE — 99214 OFFICE O/P EST MOD 30 MIN: CPT | Mod: 95 | Performed by: PHYSICIAN ASSISTANT

## 2021-06-11 RX ORDER — CLONAZEPAM 0.5 MG/1
0.5 TABLET ORAL 2 TIMES DAILY PRN
Qty: 6 TABLET | Refills: 0 | Status: SHIPPED | OUTPATIENT
Start: 2021-06-11 | End: 2021-06-18

## 2021-06-11 ASSESSMENT — PATIENT HEALTH QUESTIONNAIRE - PHQ9
SUM OF ALL RESPONSES TO PHQ QUESTIONS 1-9: 22
5. POOR APPETITE OR OVEREATING: NEARLY EVERY DAY

## 2021-06-11 ASSESSMENT — ANXIETY QUESTIONNAIRES
3. WORRYING TOO MUCH ABOUT DIFFERENT THINGS: NEARLY EVERY DAY
GAD7 TOTAL SCORE: 21
5. BEING SO RESTLESS THAT IT IS HARD TO SIT STILL: NEARLY EVERY DAY
IF YOU CHECKED OFF ANY PROBLEMS ON THIS QUESTIONNAIRE, HOW DIFFICULT HAVE THESE PROBLEMS MADE IT FOR YOU TO DO YOUR WORK, TAKE CARE OF THINGS AT HOME, OR GET ALONG WITH OTHER PEOPLE: EXTREMELY DIFFICULT
7. FEELING AFRAID AS IF SOMETHING AWFUL MIGHT HAPPEN: NEARLY EVERY DAY
1. FEELING NERVOUS, ANXIOUS, OR ON EDGE: NEARLY EVERY DAY
2. NOT BEING ABLE TO STOP OR CONTROL WORRYING: NEARLY EVERY DAY
6. BECOMING EASILY ANNOYED OR IRRITABLE: NEARLY EVERY DAY

## 2021-06-11 NOTE — PROGRESS NOTES
Maryjane is a 51 year old who is being evaluated via a billable telephone visit.      What phone number would you like to be contacted at? [phone]  How would you like to obtain your AVS? Shaina    Assessment & Plan     Adjustment disorder with mixed anxiety and depressed mood  PTSD (post-traumatic stress disorder)  Major depressive disorder, recurrent episode, mild (H)  Generalized anxiety disorder  Social phobia  Maryjane has a lot of different complex medical and social elements in her life.  She has had issues with potential amputation coming up soon along with her continued uncontrolled mental health concerns, thyroid disorder, sleep dysfunction.  She has been kicked out of her house, homeless and living in her car, now has a new place to stay with a roommate who's ex boyfriend is stalking them and they have restraining order out.  She admits to not feeling safe but they call 911 when they feel there is any immediate danger.  She is working to get her personal belongings back from previous roommate/partner and this has resulted in her having to drop out of school and not able to file taxes because they will not give her back her items, she notes she has gone to the police and she is at the point of needing to zev her and trying to find a .  She has severe anxiety to leave the house but needs to leave the house to get a job.  Her dog has been sick and required treatment.  She is very against any hospital/inpatient setting.  She wants to do therapy and have support groups but those take time, resources and she just doesn't feel she has the ability to, she has so much on her plate right now.  Discussed her medications, she notes she has been on every SSRI/SNRI without relief or with side effects.  She can't see her psychiatrist until August.  She has lorazepam at home but doesn't take because she doesn't like how she feels on it and it makes her worried about being addicted.  Ultimately feel that today's  appointment was just to talk, we talked about taking Clonazepam over the weekend safely and do not over use and reminded of risks with these medications,  was reviewed and is as expected.  Try to lower her acute anxiety which her panic attack seemed nearly over by the time the phone conversation was over.  Her goal is to try and write down her physical symptoms and organize them by most important or pertinent to her and then work down the list.  Same with the lists she has at home for her other items in life she needs to organize, this may help her limit some of the chaos going on in her mind limiting her from accomplishing tasks and ultimately worsening her anxiety. Encouraged going to the ED if she is in acute crisis.  She declines any thoughts of suicide or self harm, says she has been down that road and will never go back.  She really lacks a lot of social supports, no family available, all those close to her have passed.   -  reviewed, as expected.   - CARE COORDINATION REFERRAL  - clonazePAM (KLONOPIN) 0.5 MG tablet; Take 1 tablet (0.5 mg) by mouth 2 times daily as needed for anxiety        Return in about 3 days (around 6/14/2021) for Recheck.    Options for treatment and follow-up care were reviewed with the patient and/or guardian. Patient and/or guardian engaged in the decision making process and verbalized understanding of the options discussed and agreed with the final plan.    Simran Barreto PA-C  [facility] JANEL Wesley is a 51 year old who presents for the following health issues     HPI     Depression and Anxiety Follow-Up    How are you doing with your depression since your last visit? Worsened     How are you doing with your anxiety since your last visit?  Worsened     Are you having other symptoms that might be associated with depression or anxiety? Yes:  panic attacks    Have you had a significant life event? OTHER: may need to have finger amputated      Do  "you have any concerns with your use of alcohol or other drugs? No     Is looking at having her finger amputated on Wednesday.  She is \"just sick\".  She can't control her anxiety anymore.  This has been going on since I have been seeing her - getting older, menopause, life events.      She has been seeing her Psychiatrist and therapist.     She says she has been on all the medications for mood and it doesn't help at all.     Nothing works for her.     Recently had a cut from a knife but now has severe infection including in the bone and joint and now may need amputation.      She says \"fuck no\" to any discussion of hospitalization.  She notes she has no family.  She had no one with her in the hospital.     She had COVID in January, won't get COVID vaccination.     She is interested in Medical Marijuana.     No thoughts of suicidal or self harm.     She notes she has sciatica, her right arm and hand are numb, she notes that she used to be a runner and she can't do this anymore.  She is supposed to have physical therapy on her back but she can't keep up with this because of everything else going on. She wakes up and she is immediately sad, she doesn't want to leave the house, her dog has been sick.      She was kicked out of her previous place, she doesn't have any of her belonging.     Social History     Tobacco Use     Smoking status: Never Smoker     Smokeless tobacco: Never Used   Substance Use Topics     Alcohol use: Yes     Alcohol/week: 0.0 standard drinks     Comment: \"once every three months\"     Drug use: No     PHQ 1/11/2021 4/8/2021 6/11/2021   PHQ-9 Total Score 17 21 22   Q9: Thoughts of better off dead/self-harm past 2 weeks Not at all Not at all Not at all     JULIEN-7 SCORE 1/11/2021 4/8/2021 6/11/2021   Total Score - - -   Total Score - - -   Total Score 21 21 21       Review of Systems   Constitutional, HEENT, msk, skin, neuro, psych systems are negative, except as otherwise noted.      Objective     "       Vitals:  No vitals were obtained today due to virtual visit.    Physical Exam   alert and moderate distress  PSYCH: Alert and oriented times 3; coherent speech, normal   rate and volume, able to articulate logical thoughts, able   to abstract reason, no tangential thoughts, no hallucinations   or delusions  Her affect is anxious, tearful, angry, sad and fearful  RESP: No cough, no audible wheezing, able to talk in full sentences  Remainder of exam unable to be completed due to telephone visits      Phone call duration: 26:56 minutes

## 2021-06-11 NOTE — Clinical Note
Can you review her chart for possible Medical Marijuana - she has a hx of PTSD, severe anxiety, unresponsive to most standard medications.     KATIE GarciaC

## 2021-06-12 ASSESSMENT — ANXIETY QUESTIONNAIRES: GAD7 TOTAL SCORE: 21

## 2021-06-14 ENCOUNTER — TELEPHONE (OUTPATIENT)
Dept: FAMILY MEDICINE | Facility: OTHER | Age: 51
End: 2021-06-14

## 2021-06-14 NOTE — TELEPHONE ENCOUNTER
Please call patient     I have reviewed chart and found him/her to be eligible for a consult for medical cannabis. Please assist patient in scheduling 60 minute visit - face-to-face ONLY.   I will also send some information for the patient to review prior [facility]     Toby Davis-BULL Brady  [facility]       Medical Cannabis - Pre-visit charting   Established Dyad 2 patient: Yes  Chart reviewed: Yes, June 14, 2021  Diagnosis for medical cannabis certification: PTSD   : Reviewed June 14, 2021 - no concerns  Curbside completed with Dyad 2 provider: YES, Simran Barreto, on June 14, 2021

## 2021-06-14 NOTE — TELEPHONE ENCOUNTER
Attempted to reach the patient with the following information.  Left message for patient to return call to clinic.     Alicia Walton MA

## 2021-06-15 ENCOUNTER — OFFICE VISIT (OUTPATIENT)
Dept: FAMILY MEDICINE | Facility: CLINIC | Age: 51
End: 2021-06-15
Payer: OTHER GOVERNMENT

## 2021-06-15 ENCOUNTER — PATIENT OUTREACH (OUTPATIENT)
Dept: CARE COORDINATION | Facility: CLINIC | Age: 51
End: 2021-06-15

## 2021-06-15 VITALS
TEMPERATURE: 98.9 F | DIASTOLIC BLOOD PRESSURE: 70 MMHG | SYSTOLIC BLOOD PRESSURE: 110 MMHG | RESPIRATION RATE: 16 BRPM | WEIGHT: 152.8 LBS | HEART RATE: 96 BPM | BODY MASS INDEX: 26.09 KG/M2 | HEIGHT: 64 IN

## 2021-06-15 DIAGNOSIS — M86.242: ICD-10-CM

## 2021-06-15 DIAGNOSIS — E03.9 ACQUIRED HYPOTHYROIDISM: Chronic | ICD-10-CM

## 2021-06-15 DIAGNOSIS — Z01.818 PREOP GENERAL PHYSICAL EXAM: Primary | ICD-10-CM

## 2021-06-15 LAB
T4 FREE SERPL-MCNC: 0.86 NG/DL (ref 0.76–1.46)
TSH SERPL DL<=0.005 MIU/L-ACNC: 9.31 MU/L (ref 0.4–4)

## 2021-06-15 PROCEDURE — 84439 ASSAY OF FREE THYROXINE: CPT | Performed by: FAMILY MEDICINE

## 2021-06-15 PROCEDURE — 36415 COLL VENOUS BLD VENIPUNCTURE: CPT | Performed by: FAMILY MEDICINE

## 2021-06-15 PROCEDURE — 84443 ASSAY THYROID STIM HORMONE: CPT | Performed by: FAMILY MEDICINE

## 2021-06-15 PROCEDURE — 93000 ELECTROCARDIOGRAM COMPLETE: CPT | Performed by: FAMILY MEDICINE

## 2021-06-15 PROCEDURE — 99214 OFFICE O/P EST MOD 30 MIN: CPT | Performed by: FAMILY MEDICINE

## 2021-06-15 ASSESSMENT — MIFFLIN-ST. JEOR: SCORE: 1298.1

## 2021-06-15 NOTE — LETTER
[facility] CARE COORDINATION  [address] [address]    June 16, 2021    Magnolia Gonzáles  [address] [address]      Dear Magnolia,    I am a clinic community health worker who works with Simran Barreto PA-C at [facility]. I have been trying to reach you recently to introduce Clinic Care Coordination and to see if there was anything I could assist you with.  Below is a description of clinic care coordination and how I can further assist you.      The clinic care coordination team is made up of a registered nurse,  and community health worker who understand the health care system. The goal of clinic care coordination is to help you manage your health and improve access to the health care system in the most efficient manner. The team can assist you in meeting your health care goals by providing education, coordinating services, strengthening the communication among your providers and supporting you with any resource needs.    Please feel free to contact me at [phone] with any questions or concerns. We are focused on providing you with the highest-quality healthcare experience possible and that all starts with you.     Sincerely,     Belén Granados  Community Health Worker   [facility]  Care Coordination  [facility], [facility] [place], [facility]  [email or web address]  [email or web address]   Office: [phone]  Fax: [phone]

## 2021-06-15 NOTE — PATIENT INSTRUCTIONS

## 2021-06-15 NOTE — LETTER
June 17, 2021      Magnolia Gonzáles  [address] [address]        Dear ,    We are writing to inform you of your test results.    Thyroid is off as expected but T4 is ok. So just resume your previous dose and take it daily   Plan to repeat your thyroid with your PCP in 6 weeks     Resulted Orders   TSH with free T4 reflex   Result Value Ref Range    TSH 9.31 (H) 0.40 - 4.00 mU/L   T4 free   Result Value Ref Range    T4 Free 0.86 0.76 - 1.46 ng/dL       If you have any questions or concerns, please call the clinic at the number listed above.       Sincerely,      Jeniffer Carroll MD/erica

## 2021-06-15 NOTE — TELEPHONE ENCOUNTER
Left message for patient to return call.   See below and assist wiht scheduling if call is returned call.     Theresa Hartman MA

## 2021-06-15 NOTE — NURSING NOTE
"Chief Complaint   Patient presents with     Pre-Op Exam       Initial /70   Pulse 96   Temp 98.9  F (37.2  C) (Tympanic)   Resp 16   Ht 1.626 m (5' 4\")   Wt 69.3 kg (152 lb 12.8 oz)   LMP 02/15/2021   BMI 26.23 kg/m   Estimated body mass index is 26.23 kg/m  as calculated from the following:    Height as of this encounter: 1.626 m (5' 4\").    Weight as of this encounter: 69.3 kg (152 lb 12.8 oz).    Patient presents to the clinic using     Health Maintenance that is potentially due pending provider review:          Is there anyone who you would like to be able to receive your results?   If yes have patient fill out ROBERTO    "

## 2021-06-15 NOTE — PROGRESS NOTES
Clinic Care Coordination Contact  [facility]/Voicemail       Clinical Data: CHW Outreach  Outreach attempted x 1. Left message on patient's voicemail with call back information and requested return call.    Plan: CHW will try to reach patient again in 1-2 business days.    Notes:  -Schedule with  (ROSALBA)  -depression, anxiety, PTSD, newly found cellulitis and needing amputation of the finger, Financial Support: Food, Housing, Medication Affordability and lost her job and was homeless, some of her belongings are still at the other individuals house and they won't give them back and Mental Wellness- Treatment Options  -see notes from visit on 6/11/21    Belén Granados  Community Health Worker   [facility]  Care Coordination  [place], Eyad Poe, Mehrdad Bustamante [place], [facility]  [email or web address]   Office: [phone]  Fax: [phone]

## 2021-06-16 ENCOUNTER — TELEPHONE (OUTPATIENT)
Dept: FAMILY MEDICINE | Facility: OTHER | Age: 51
End: 2021-06-16

## 2021-06-16 NOTE — TELEPHONE ENCOUNTER
Complex conversation that bounces around from multiple topics. Patient is asking for help from ES in regards to her current conditions and infection in her finger. Per patient ES is aware of this issue. Patient is requesting an appt with ES to be seen to find an alternative way to have treatment or a surgical procedure as recommended due to refusing to have a COVID swab prior to the procedure. Patient believes strongly that she will NOT EVER have a COVID test. She vocalizes that everyone in her household tested positive and she is absolutely sure she had COVID in Jan. She believes being tested makes her part of statistics that she does not want to be involved in.  She was being seen with [facility], who called her last night about needing this test before a procedure that was scheduled for today. Per patient she does not feel safe with the care at [facility] because they prescribed her Cephalexin, and then called the next day telling her not to take it, because they want to know what was causing her infection. She has since started taking this last night. Patient notes continued redness, swelling, and pain in her finger. Denies fever, weakness, or drainage.     Advised patient it is likely that most places will have a covid screen prior to surgical procedures. Advised patient that she should be seen in follow up due to continue infection, only an appt on Friday was available.    Routing to PCP for input.     Aaliyah Christine RN

## 2021-06-16 NOTE — PROGRESS NOTES
Clinic Care Coordination Contact  [facility]/Voicemail       Clinical Data: CHW Outreach  Outreach attempted x 2. Left message on patient's voicemail with call back information and requested return call.    Plan: CHW will send unable to contact letter with care coordinator contact information via [place]. CHW will do no further outreaches at this time.    Belén Granados  Community Health Worker   [facility]  Care Coordination  [place], Mehrdad Bustamante [place], [facility]  [email or web address]   Office: [phone]  Fax: [phone]

## 2021-06-16 NOTE — TELEPHONE ENCOUNTER
She needs to go forward with procedure or risks spread of the infection since it is already potentially causing infection of her bone which will result in potential larger amputation needed.  All locations at this time require COVID testing, it is not to be part of a statistic but to prevent an outbreak in the OR of COVID to all the staff as well as all the patient's including those who are critically ill having surgery done that would be very high risk for death if they were to contract COVID.  She needs to go forward with procedure, this is very time sensitive.  She can see another ortho if she wishes but she needs to see them ASAP.  There is nothing I can personally do for her for this infection, it needs surgical intervention.     Simran Barreto PA-C

## 2021-06-17 ENCOUNTER — MYC MEDICAL ADVICE (OUTPATIENT)
Dept: FAMILY MEDICINE | Facility: OTHER | Age: 51
End: 2021-06-17

## 2021-06-17 NOTE — TELEPHONE ENCOUNTER
LAENN for the patient to call.   Responded to [employer].   Murphy Graham RN  June 17, 2021

## 2021-06-18 ENCOUNTER — TELEPHONE (OUTPATIENT)
Dept: FAMILY MEDICINE | Facility: OTHER | Age: 51
End: 2021-06-18

## 2021-06-18 DIAGNOSIS — F43.10 PTSD (POST-TRAUMATIC STRESS DISORDER): ICD-10-CM

## 2021-06-18 DIAGNOSIS — F41.1 GENERALIZED ANXIETY DISORDER: ICD-10-CM

## 2021-06-18 RX ORDER — CLONAZEPAM 0.5 MG/1
0.5 TABLET ORAL 2 TIMES DAILY PRN
Qty: 6 TABLET | Refills: 0 | Status: SHIPPED | OUTPATIENT
Start: 2021-06-18 | End: 2021-08-02

## 2021-06-18 NOTE — TELEPHONE ENCOUNTER
Patient was informed of message.   Murphy Graham RN, BSN  [place]  June 18, 2021

## 2021-06-18 NOTE — TELEPHONE ENCOUNTER
LEANN for the patient to return call to the clinic.     Murphy Graham RN, BSN  [place]/[facility]  June 18, 2021

## 2021-06-18 NOTE — TELEPHONE ENCOUNTER
LM for the patient to return call to the clinic. Please transfer to any triage nurse.   Patient has not read [place] message.   Murphy Graahm RN, BSN  [place]/[facility]  June 18, 2021

## 2021-06-18 NOTE — TELEPHONE ENCOUNTER
Will refill Clonazepam once.     She needs to follow directions of her orthopedic providers for her finger.     Simran Barreto PA-C

## 2021-06-18 NOTE — TELEPHONE ENCOUNTER
Patient is calling and states that she had cancelled this morning's office visit and the visit was showing on the My Chart application that she had no office visits for today.  Patient stated she thought today's office visit was for a consultation on medical cannibas.  She stated that she received a notice that she missed her appointment today, but My chart stated after cancelling today's visit, it was off of the schedule, she stated she did not miss the appointment today on purpose.  Patient rescheduled office visit for consult on medical cannibas.      Patient stated she is having anxiety about her finger and the proposed surgery for amputation at this time.  Patient is requesting a refill on clonazepam.  She stated she is unsure what decisions on her finger are at this time.    T'd up medication request.    Will forward to PCP for review.

## 2021-06-19 ENCOUNTER — TRANSFERRED RECORDS (OUTPATIENT)
Dept: HEALTH INFORMATION MANAGEMENT | Facility: CLINIC | Age: 51
End: 2021-06-19

## 2021-06-20 ENCOUNTER — TRANSFERRED RECORDS (OUTPATIENT)
Dept: HEALTH INFORMATION MANAGEMENT | Facility: CLINIC | Age: 51
End: 2021-06-20

## 2021-06-21 ENCOUNTER — PATIENT OUTREACH (OUTPATIENT)
Dept: CARE COORDINATION | Facility: CLINIC | Age: 51
End: 2021-06-21

## 2021-06-21 DIAGNOSIS — Z71.89 OTHER SPECIFIED COUNSELING: Primary | Chronic | ICD-10-CM

## 2021-06-21 NOTE — PROGRESS NOTES
Clinic Care Coordination Contact  [facility]/Voicemail       Clinical Data: Care Coordinator Outreach  Outreach attempted x 1. The CHW was not able to leave a voicemail due to the voicemail box being full.  Plan: Care Coordinator will try to reach patient again in 1-2 business days.

## 2021-06-21 NOTE — LETTER
[facility] CARE COORDINATION  [address] [address]    June 22, 2021    Magnolia Gonzáles  [address] [address]      Dear Magnolia,    I am a clinic community health worker who works with Simran Barreto PA-C at [facility]. I have been trying to reach you recently to introduce Clinic Care Coordination and to see if there was anything I could assist you with.  Below is a description of clinic care coordination and how I can further assist you.      The clinic care coordination team is made up of a registered nurse,  and community health worker who understand the health care system. The goal of clinic care coordination is to help you manage your health and improve access to the health care system in the most efficient manner. The team can assist you in meeting your health care goals by providing education, coordinating services, strengthening the communication among your providers and supporting you with any resource needs.    Please feel free to contact me at [phone] with any questions or concerns. We are focused on providing you with the highest-quality healthcare experience possible and that all starts with you.     Sincerely,       WILL Gray  Clinic Care Coordination  [facility]

## 2021-06-22 ENCOUNTER — TRANSFERRED RECORDS (OUTPATIENT)
Dept: HEALTH INFORMATION MANAGEMENT | Facility: CLINIC | Age: 51
End: 2021-06-22

## 2021-06-22 NOTE — PROGRESS NOTES
Clinic Care Coordination Contact  [facility]/Voicemail       Clinical Data: Care Coordinator Outreach  Outreach attempted x 2.  Left message on patient's voicemail with call back information and requested return call.  Plan: Care Coordinator will send unable to contact letter with care coordinator contact information via [employer]. Care Coordinator will do no further outreaches at this time.

## 2021-06-24 ENCOUNTER — TELEPHONE (OUTPATIENT)
Dept: FAMILY MEDICINE | Facility: OTHER | Age: 51
End: 2021-06-24

## 2021-06-24 ENCOUNTER — TRANSFERRED RECORDS (OUTPATIENT)
Dept: HEALTH INFORMATION MANAGEMENT | Facility: CLINIC | Age: 51
End: 2021-06-24

## 2021-06-24 NOTE — TELEPHONE ENCOUNTER
Patient calling stating that she just left [facility]. She is on her way home and then will be heading back to have her finger amputated this afternoon. Patient states that she has a lot of anxiety regarding this. She was prescribed Klonopin and that makes her feel tired. She was suppose to have a visit with Toby for medical cannabis, but had to cancel due to her finger. She is wondering if there is anything else she can have instead to help her through this process?     Patient uses [facility] in [place] or will need a medication sent down to [facility].   Call patient back at [phone] or [facility] the [facility].     PLAN:   Huddle with provider.     Per Simran the patient  is complex and has said she has tried everything and doesn t tolerate it so she needs to continue to follow with psychiatry and her counselor. I don t really have much else to offer her at this point.  She was supposed to see Toby for medical cannabis evaluation.       Murphy Graham RN, BSN  [place]/[facility]  June 24, 2021

## 2021-06-24 NOTE — TELEPHONE ENCOUNTER
This nurse tried calling the patient at the number listed below. Unable to LM and the phone went right to voice mail. Sent the patient a mychart.       Murphy Graham RN, BSN  [place]  June 24, 2021

## 2021-06-30 ENCOUNTER — TELEPHONE (OUTPATIENT)
Dept: FAMILY MEDICINE | Facility: OTHER | Age: 51
End: 2021-06-30

## 2021-06-30 NOTE — TELEPHONE ENCOUNTER
I don't know what I can offer her.  She no showed twice for her medical cannabis consultation, if she were to reschedule and no show once more then she would not be able to have the evaluation with the provider I referred her to.     She has a psychiatrist she needs to be seeing to discuss medication management because she has been on most medications per her and hasn't tolerated them.     She needs to see her therapist regularly this is the most important thing as she does well with just talking with someone to help her calm down.  I highly recommend she consider inpatient treatment at this point or more intensive management for her mental health.     She needs to undergo this surgery it is only going to get worse.  She needs to proceed forward with this and stop delaying it.     Simran Barreto PA-C

## 2021-06-30 NOTE — TELEPHONE ENCOUNTER
"Patient calling and stating she is looking at having her finger amputated due to infection. She is having a lot of anxiety and doesn't know what to do? Writer offered to schedule an appointment and than patient went on about her finances and paper trail, and being in [place] and they want me down at [place]. Writer asked what can I do for you? \"I don't know what I can do for you?  \"I don't know? I'm so sorry to be venting to you.\" Informed we would send a message to her provider. Machelle Haney LPN      "

## 2021-06-30 NOTE — TELEPHONE ENCOUNTER
Tried to call pt and and VM has not been set up. Other number cannot be completed. Will try again later. Shanice KRUEGER CMA

## 2021-07-01 ENCOUNTER — MYC MEDICAL ADVICE (OUTPATIENT)
Dept: FAMILY MEDICINE | Facility: OTHER | Age: 51
End: 2021-07-01

## 2021-07-01 ENCOUNTER — TELEPHONE (OUTPATIENT)
Dept: PSYCHIATRY | Facility: CLINIC | Age: 51
End: 2021-07-01

## 2021-07-01 NOTE — TELEPHONE ENCOUNTER
Returned call to patient. She reported the following: A month ago I cut my finger down to the bone, I am have amputation after an infection occurred. My anxiety is high, I cannot control it and I haven't seen Janet for a while. Patient does not have appointment until august. on waiting list for earlier appointment.    I do not know what to do for anxiety. Ask patient about meds: For situational, my pcp gave (clonazepam) and it did not work well with me. I have no energy, sleepy, drowsy and I have no help. I need something to help me.  (gabapentin) It is something I need, Its good but it is not helping in entirety. She tried me on Ritalin for another reason and she did not give me full prescription since she had not seen me. I have worked as an  for about 15 years and I am going to loose that due to the amputation, I won't be able to type again. I usually send [employer] messages, but I cannot, I am having a hard time typing-with the finger issue. Patient said she is waiting for appt for medical cannabis to go through the amputation.    RN reminded patient to uses crisis resources, call 911 or go into the ER incase of emergencies. Patient reported that she is safe at home and she is not suicidal.     Janet NP, please review encounters from primary care from yesterday.

## 2021-07-01 NOTE — TELEPHONE ENCOUNTER
Pt called back needing to speak with RN to discuss barriers with scheduling and care. Requesting call back as soon as possible. If it goes to VM, please contact by my chart.

## 2021-07-01 NOTE — TELEPHONE ENCOUNTER
Left message for patient to return call, see ES message below. MyChart message sent to patient.   Theresa Hartman MA

## 2021-07-01 NOTE — TELEPHONE ENCOUNTER
Janet Álvarez NP  Psych Rn - [facility] 36 minutes ago (1:17 PM)     I can put her in an admin spot on July 9 at 1015 if she agrees  - I have not seen her since January!    Message text     Patient is thankful for the help and will take the July 9th appointment. Patient will appreciate it if provider and recommend any help / provide any help before the appointment next week.

## 2021-07-01 NOTE — TELEPHONE ENCOUNTER
RN returned call to patient, patient concerned that she is receiving messages about no shows and missed answered calls, but the RN who supports Janet is getting through to her.     RN spoke with pcp staff through teams earlier and provider the phone number to call patient, it appears pcp staff have still not been able to get patient. Patient concerned that she is not being helped, RN informed patient that Janet is busy and has a full schedule; RN will reach out to Janet Np through teams asking her to check encounter (done) of patient. Patient asking that she be contacted using the [phone] number.

## 2021-07-01 NOTE — TELEPHONE ENCOUNTER
Reason for Call:  Falling apart     Detailed comments: 7/1//21 Received call from patient reported she feels as if she is falling apart. She stated will be having a her finger amputated. She denies suicidal ideation and decline to go to the ER.     Phone Number Patient can be reached at: [phone]    Best Time: Now     Can we leave a detailed message on this number? Yes    Call taken on 7/1/2021 at 8:52 AM by Jc Christiansen

## 2021-07-12 ENCOUNTER — MYC MEDICAL ADVICE (OUTPATIENT)
Dept: FAMILY MEDICINE | Facility: OTHER | Age: 51
End: 2021-07-12

## 2021-07-12 ENCOUNTER — MYC MEDICAL ADVICE (OUTPATIENT)
Dept: PSYCHIATRY | Facility: CLINIC | Age: 51
End: 2021-07-12

## 2021-07-14 ENCOUNTER — TELEPHONE (OUTPATIENT)
Dept: PSYCHIATRY | Facility: CLINIC | Age: 51
End: 2021-07-14

## 2021-07-14 ENCOUNTER — MYC MEDICAL ADVICE (OUTPATIENT)
Dept: PSYCHIATRY | Facility: CLINIC | Age: 51
End: 2021-07-14

## 2021-07-14 ENCOUNTER — MYC MEDICAL ADVICE (OUTPATIENT)
Dept: BEHAVIORAL HEALTH | Facility: CLINIC | Age: 51
End: 2021-07-14

## 2021-07-14 NOTE — TELEPHONE ENCOUNTER
Pt called back very agitated and using vulgar language because she has not been able to get in tough with Janet.  She has left several messages asking to speak with her.  Please return her call as soon as possible at [phone]

## 2021-07-14 NOTE — LETTER
[facility] MENTAL HEALTH & ADDICTION [facility]  [address]  [place] [address]  Phone: [phone]       July 15, 2021      To whom it may concern,   Ms. Magnolia Gonzáles (: 1970)  is a patient who has been under my care since May 2020.  Suni carries diagnoses including Depression, Anxiety, and PTSD. I am familiar with her history and with the functional limitations imposed by her mental health and emotional related diagnoses.  Due to this emotional disability, Magnolia has certain limitations of coping.  To help alleviate these challenges and to enhance her day-to-day functionality, I have prescribed her to obtain an emotional support animal.  The presence of this animal is necessary for the emotional and mental health of Magnolia because its presence will likely mitigate the symptoms that she continues to experience.  I have not evaluated the animal in question, only the potential benefits that she may derive from them. Should you have any additional questions, please do not hesitate to contact me with Magnolia's permission.    Sincerely,    HERMELINDA Vickers-BC  Psychiatric Mental Health Nurse  Practitioner - Board Certified  [facility]

## 2021-07-14 NOTE — TELEPHONE ENCOUNTER
Patient calling to leave a message with Janet- reports she had sent her an email as well. She is wanting a letter for a therapy dog.    She is hoping for a call back right away as she reports 'it's urgent'     [phone]

## 2021-07-15 NOTE — TELEPHONE ENCOUNTER
Sent request to Triage nurse as they have the ability to print at the [facility] location.  The letter will be printed and mailed to patient.

## 2021-07-16 DIAGNOSIS — R50.9 FEVER AND CHILLS: Primary | ICD-10-CM

## 2021-07-19 ENCOUNTER — TELEPHONE (OUTPATIENT)
Dept: FAMILY MEDICINE | Facility: OTHER | Age: 51
End: 2021-07-19

## 2021-07-19 ENCOUNTER — PATIENT OUTREACH (OUTPATIENT)
Dept: CARE COORDINATION | Facility: CLINIC | Age: 51
End: 2021-07-19

## 2021-07-19 NOTE — LETTER
[facility] CARE COORDINATION  [address] [address]    July 20, 2021    Magnolia Gonzáles  [address] [address]      Dear Magnolia,    I am a clinic community health worker who works with Simran Barreto PA-C at [facility]. I have been trying to reach you recently to introduce Clinic Care Coordination and to see if there was anything I could assist you with.  Below is a description of clinic care coordination and how I can further assist you.      The clinic care coordination team is made up of a registered nurse,  and community health worker who understand the health care system. The goal of clinic care coordination is to help you manage your health and improve access to the health care system in the most efficient manner. The team can assist you in meeting your health care goals by providing education, coordinating services, strengthening the communication among your providers and supporting you with any resource needs.    Please feel free to contact me at [phone] with any questions or concerns. We are focused on providing you with the highest-quality healthcare experience possible and that all starts with you.     Sincerely,     Belén Granados  Community Health Worker   [facility]  Care Coordination  [facility], Mehrdad Bustamante [place], [facility]  [email or web address]  [email or web address]   Office: [phone]  Fax: [phone]

## 2021-07-19 NOTE — TELEPHONE ENCOUNTER
"Pt called. States she was seen in the [place] ER on 7/15/21 for an on-going osteomyelitis in her Hand. \" I need to get that finger amputated.The provider I saw was a Dr. Danny Garcia. He said he personally knew Dr. Kearney, Infectious Disease provider in [place]. He was going to call to try and get me in to see him ASAP to get things taken care of. I cannot wait for dinesh on 8/5/21. That is over 2 weeks. He sounded like I needed to get in very soon. I haven't heard anything from Dr. Kearney's office or Dr. Garcia. I have called [place] 3 times and no one helps me figure this out. I need to get a message to him. I have agoraphobia and it's hard for me to go places. I don't want to show up in [place] if Dr. Garcia isn't working. What do I do? I keep getting the run around.?\"  Pt is nearly in tears on the phone. \" RN suggested that she call [place] and ask to speak with head nurse in ER, if busy ask to speak with another an explain her situation. If they cannot tell you if Dr. Garcia is working , ask to speak with hospital admin supervisor and see how they can help you. IF Dr. Garcia is working, maybe he would be willing to speak with you on phone, and if need be, go to [place] if he needs to see your hand again. I offered to make her an appt with her PCP Simran Barreto, but she said Simran does not know that much about what is going on at the moment and she was really hoping Dr. Garcia could help get her to Dr. Kearney this week. ...........EDITH Penny    "

## 2021-07-19 NOTE — PROGRESS NOTES
Clinic Care Coordination Contact  [facility]/Voicemail       Clinical Data: CHW Outreach  Outreach attempted x 1. Left message on patient's voicemail with call back information and requested return call.    Plan: CHW will try to reach patient again in 1-2 business days.    Notes:  -schedule with RN (ROSALBA)  -patient was in the ER     Belén Granados  [facility] Health Worker   [facility]  Care Coordination  [facility], Mehrdad Bustamante [place], [facility]  [email or web address]   Office: [phone]  Fax: [phone]

## 2021-07-20 ENCOUNTER — PATIENT OUTREACH (OUTPATIENT)
Dept: FAMILY MEDICINE | Facility: CLINIC | Age: 51
End: 2021-07-20
Payer: OTHER GOVERNMENT

## 2021-07-20 NOTE — PROGRESS NOTES
Clinic Care Coordination Contact  Community Health Worker Initial Outreach            Patient accepts CC: No, The patient declined care coordination . Patient will be sent Care Coordination introduction letter for future reference.     The Clinic Community Health Worker spoke with the patient today at the request of the PCP to discuss possible Clinic Care Coordination enrollment. The service was described to the patient and immediate needs were discussed. The patient declined enrollment at this time. The PCP is encouraged to refer in the future if the patient's needs change.    The patient called the CHW back and the CHW explained Care coordination and asked if Maryjane needed any help with anything. The patient explained that she is upset and scared that she my lose her finger because she missed an appointment with a specialist and now cant get in until next month.     The patient did go to [facility] and was told that they would follow up with the specialist  But haven't yet. Maryjane wanted The CHW to follow up with the [facility] doctor but unfortunately the two companies are on different systems and the CHW tried to explain that to the patient and tried to offer talking to the RN or BISHNU CC to see what other options care coordination could help with but Maryjane was upset and ended the call.     Belén Granados  Community Health Worker   [facility]  Care Coordination  Eyad Ash Rogers, [place], [place], [facility]  [email or web address]  [email or web address]   Office: [phone]  Fax: [phone]

## 2021-07-20 NOTE — PROGRESS NOTES
Clinic Care Coordination Contact  [facility]/Voicemail       Clinical Data: CHW Outreach  Outreach attempted x 2. Left message on patient's voicemail with call back information and requested return call.    Plan: CHW will send unable to contact letter with care coordinator contact information via [employer]. CHW will do no further outreaches at this time.    Belén Granados  Community Health Worker   [facility]  Care Coordination  [place], Mehrdad Bustamante [place], [facility]  [email or web address]   Office: [phone]  Fax: [phone]

## 2021-07-23 ENCOUNTER — TELEPHONE (OUTPATIENT)
Dept: FAMILY MEDICINE | Facility: OTHER | Age: 51
End: 2021-07-23

## 2021-07-23 NOTE — TELEPHONE ENCOUNTER
She needs an office visit for this rash, I need to be able to see it in person. Again she needs to see Psychiatry for her anxiety or keep her appt that she has had in the past scheduled for Cannabis evaluation. I do not have any additional recommendations for her anxiety at this point.     Simran Barreto PA-C

## 2021-07-23 NOTE — TELEPHONE ENCOUNTER
Maryjane is wondering if the rash she has on her face and neck is from her finger infection?    She would like to try another anxiety med that is maybe a little stronger and more quantity.    Can you think of a medication she can try for the rash besides prednisone because that makes her sick.     She will use [facility] from [place].

## 2021-07-26 NOTE — TELEPHONE ENCOUNTER
My Chart message sent to patient with response from provider, copy and paste.    Magnolia Matt XRO/

## 2021-07-27 ENCOUNTER — NURSE TRIAGE (OUTPATIENT)
Dept: FAMILY MEDICINE | Facility: OTHER | Age: 51
End: 2021-07-27

## 2021-07-27 NOTE — TELEPHONE ENCOUNTER
Spoke with patient.    She was hard to understand.  States that she has vertigo and wanting to know if she can take her old ondansetron.  States that she can not get out of bed.     Advised that she needs to be seen in the ED if she is unable to get out of bed.    Carlos Miles, MICHAELN, RN, PHN  [facility] ~ Registered Nurse  Clinic Triage ~ [place] [facility] [place]  July 27, 2021      Reason for Disposition    SEVERE dizziness (vertigo) (e.g., unable to walk without assistance)    Additional Information    Negative: [1] Weakness (i.e., paralysis, loss of muscle strength) of the face, arm or leg on one side of the body AND [2] sudden onset AND [3] present now    Negative: [1] Numbness (i.e., loss of sensation) of the face, arm or leg on one side of the body AND [2] sudden onset AND [3] present now    Negative: [1] Loss of speech or garbled speech AND [2] sudden onset AND [3] present now    Negative: Difficult to awaken or acting confused (e.g., disoriented, slurred speech)    Negative: Sounds like a life-threatening emergency to the triager    Negative: Followed a head injury    Negative: Followed an ear injury    Negative: Localized weakness or numbness is main symptom    Negative: Dizziness relates to riding in a car, going to an amusement park, etc.    Negative: [1] Dizziness is main symptom AND [2] NO spinning sensation (i.e., vertigo)    Protocols used: DIZZINESS - VERTIGO-A-

## 2021-07-28 ENCOUNTER — TRANSFERRED RECORDS (OUTPATIENT)
Dept: HEALTH INFORMATION MANAGEMENT | Facility: CLINIC | Age: 51
End: 2021-07-28

## 2021-08-02 ENCOUNTER — VIRTUAL VISIT (OUTPATIENT)
Dept: PSYCHIATRY | Facility: CLINIC | Age: 51
End: 2021-08-02
Payer: OTHER GOVERNMENT

## 2021-08-02 DIAGNOSIS — F41.1 GENERALIZED ANXIETY DISORDER: ICD-10-CM

## 2021-08-02 DIAGNOSIS — F31.12 BIPOLAR AFFECTIVE DISORDER, MANIC, MODERATE (H): ICD-10-CM

## 2021-08-02 PROCEDURE — 99214 OFFICE O/P EST MOD 30 MIN: CPT | Performed by: NURSE PRACTITIONER

## 2021-08-02 RX ORDER — LAMOTRIGINE 200 MG/1
200 TABLET ORAL DAILY
Qty: 90 TABLET | Refills: 0 | Status: SHIPPED | OUTPATIENT
Start: 2021-08-02 | End: 2021-11-11

## 2021-08-02 RX ORDER — CLONAZEPAM 0.5 MG/1
0.5 TABLET ORAL DAILY PRN
Qty: 10 TABLET | Refills: 0 | Status: SHIPPED | OUTPATIENT
Start: 2021-08-02 | End: 2021-10-11

## 2021-08-02 RX ORDER — DULOXETIN HYDROCHLORIDE 60 MG/1
60 CAPSULE, DELAYED RELEASE ORAL DAILY
Qty: 30 CAPSULE | Refills: 1 | Status: SHIPPED | OUTPATIENT
Start: 2021-08-02 | End: 2021-11-11

## 2021-08-02 NOTE — PATIENT INSTRUCTIONS
1.  Add Duloxetine 60 mg daily     2.  Add Clonazepam 0.5 mg daily as needed    3.  Lamotrigine 200 mg daily    4.  See someone for counseling    5.  Next visit we will talk about adding ritalin for short term memory loss and history of dyslexia      Continue all other medications as reviewed per electronic medical record today.     Safety plan reviewed. To the Emergency Department as needed or call after hours crisis line at [phone] or [phone]. [place] Crisis Text [place]. Text [place] to 482534 or Suicide LifeLine Chat: suicideReal Time Genomics.org/chat/    To schedule individual or family therapy, call [facility] at [phone].    Schedule an appointment with me in 2-3 weeks or sooner as needed. Call [facility] at [phone] to schedule.    Follow up with primary care provider as planned or for acute medical concerns.    Call the psychiatric nurse line with medication questions or concerns at [phone].    Hershart may be used to communicate with your provider, but this is not intended to be used for emergencies.    Crisis Resources:    National Suicide Prevention Lifeline: 949.290.1557 (TTY: 826.695.7375). Call anytime for help.  (www.suicidepreventionlifeline.org)  National [place] on Mental Illness (www.anne marie.org): 250.130.9795 or 399-448-6304.   Mental Health Association (www.mentalhealth.org): 462.258.7561 or 792-416-9452.  [place] Crisis Text [place]: Text [place] to 421735  Suicide LifeLine Chat: suicideElastix Corporationline.org/chat
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
## 2021-08-11 ENCOUNTER — OFFICE VISIT (OUTPATIENT)
Dept: FAMILY MEDICINE | Facility: OTHER | Age: 51
End: 2021-08-11
Payer: OTHER GOVERNMENT

## 2021-08-11 VITALS
RESPIRATION RATE: 14 BRPM | OXYGEN SATURATION: 97 % | TEMPERATURE: 97.1 F | DIASTOLIC BLOOD PRESSURE: 70 MMHG | SYSTOLIC BLOOD PRESSURE: 126 MMHG | HEART RATE: 114 BPM

## 2021-08-11 DIAGNOSIS — E03.9 HYPOTHYROIDISM, UNSPECIFIED TYPE: Primary | ICD-10-CM

## 2021-08-11 DIAGNOSIS — R42 VERTIGO: ICD-10-CM

## 2021-08-11 LAB — TSH SERPL DL<=0.005 MIU/L-ACNC: 2.18 MU/L (ref 0.4–4)

## 2021-08-11 PROCEDURE — 99214 OFFICE O/P EST MOD 30 MIN: CPT | Performed by: PHYSICIAN ASSISTANT

## 2021-08-11 PROCEDURE — 84443 ASSAY THYROID STIM HORMONE: CPT | Performed by: PHYSICIAN ASSISTANT

## 2021-08-11 PROCEDURE — 36415 COLL VENOUS BLD VENIPUNCTURE: CPT | Performed by: PHYSICIAN ASSISTANT

## 2021-08-11 RX ORDER — MECLIZINE HYDROCHLORIDE 25 MG/1
25 TABLET ORAL 3 TIMES DAILY PRN
Qty: 24 TABLET | Refills: 0 | Status: SHIPPED | OUTPATIENT
Start: 2021-08-11 | End: 2022-06-27
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
## 2021-08-12 ENCOUNTER — MYC MEDICAL ADVICE (OUTPATIENT)
Dept: FAMILY MEDICINE | Facility: OTHER | Age: 51
End: 2021-08-12

## 2021-08-12 DIAGNOSIS — R51.9 CHRONIC NONINTRACTABLE HEADACHE, UNSPECIFIED HEADACHE TYPE: ICD-10-CM

## 2021-08-12 DIAGNOSIS — G89.29 CHRONIC NONINTRACTABLE HEADACHE, UNSPECIFIED HEADACHE TYPE: ICD-10-CM

## 2021-08-12 DIAGNOSIS — L30.9 DERMATITIS: ICD-10-CM

## 2021-08-12 DIAGNOSIS — R42 VERTIGO: Primary | ICD-10-CM

## 2021-08-13 ENCOUNTER — MYC MEDICAL ADVICE (OUTPATIENT)
Dept: FAMILY MEDICINE | Facility: OTHER | Age: 51
End: 2021-08-13

## 2021-08-13 DIAGNOSIS — L30.9 DERMATITIS: Primary | ICD-10-CM

## 2021-08-13 NOTE — TELEPHONE ENCOUNTER
Please fax imaging order to [facility] - ask patient which location and it may be dependent on which one has open sided MRI.     Simran Barreto PA-C

## 2021-09-02 ENCOUNTER — TELEPHONE (OUTPATIENT)
Dept: FAMILY MEDICINE | Facility: CLINIC | Age: 51
End: 2021-09-02

## 2021-09-02 NOTE — TELEPHONE ENCOUNTER
Reason for Call:  Other     Detailed comments: Per surgery center patient needs a covid test.    Phone Number   [facility] [phone]         Best Time:     Can we leave a detailed message on this number? YES    Call taken on 9/2/2021 at 2:20 PM by Carin Brown

## 2021-09-10 ENCOUNTER — VIRTUAL VISIT (OUTPATIENT)
Dept: SLEEP MEDICINE | Facility: CLINIC | Age: 51
End: 2021-09-10
Payer: OTHER GOVERNMENT

## 2021-09-10 DIAGNOSIS — Z72.821 POOR SLEEP HYGIENE: ICD-10-CM

## 2021-09-10 DIAGNOSIS — R29.818 SUSPECTED SLEEP APNEA: Primary | ICD-10-CM

## 2021-09-10 DIAGNOSIS — F51.04 PSYCHOPHYSIOLOGICAL INSOMNIA: ICD-10-CM

## 2021-09-10 PROCEDURE — 99214 OFFICE O/P EST MOD 30 MIN: CPT | Mod: 95 | Performed by: PHYSICIAN ASSISTANT

## 2021-09-10 RX ORDER — ZOLPIDEM TARTRATE 10 MG/1
TABLET ORAL
Qty: 1 TABLET | Refills: 0 | Status: SHIPPED | OUTPATIENT
Start: 2021-09-10 | End: 2021-11-11
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
## 2021-09-13 ENCOUNTER — FCC EXTENDED DOCUMENTATION (OUTPATIENT)
Dept: PSYCHOLOGY | Facility: CLINIC | Age: 51
End: 2021-09-13

## 2021-09-13 DIAGNOSIS — F41.1 GENERALIZED ANXIETY DISORDER: Primary | ICD-10-CM

## 2021-09-13 DIAGNOSIS — F33.1 MODERATE EPISODE OF RECURRENT MAJOR DEPRESSIVE DISORDER (H): ICD-10-CM
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
## 2021-09-29 ENCOUNTER — OFFICE VISIT (OUTPATIENT)
Dept: FAMILY MEDICINE | Facility: OTHER | Age: 51
End: 2021-09-29
Payer: OTHER GOVERNMENT

## 2021-09-29 VITALS
OXYGEN SATURATION: 97 % | SYSTOLIC BLOOD PRESSURE: 134 MMHG | TEMPERATURE: 97.9 F | HEART RATE: 100 BPM | DIASTOLIC BLOOD PRESSURE: 100 MMHG

## 2021-09-29 DIAGNOSIS — L08.9 FINGER INFECTION: ICD-10-CM

## 2021-09-29 DIAGNOSIS — F40.10 SOCIAL PHOBIA: ICD-10-CM

## 2021-09-29 DIAGNOSIS — Z01.818 PREOP GENERAL PHYSICAL EXAM: Primary | ICD-10-CM

## 2021-09-29 DIAGNOSIS — F41.1 GENERALIZED ANXIETY DISORDER: ICD-10-CM

## 2021-09-29 DIAGNOSIS — E03.9 HYPOTHYROIDISM, UNSPECIFIED TYPE: ICD-10-CM

## 2021-09-29 DIAGNOSIS — F33.0 MAJOR DEPRESSIVE DISORDER, RECURRENT EPISODE, MILD (H): ICD-10-CM

## 2021-09-29 DIAGNOSIS — F43.10 PTSD (POST-TRAUMATIC STRESS DISORDER): ICD-10-CM

## 2021-09-29 DIAGNOSIS — E03.9 ACQUIRED HYPOTHYROIDISM: ICD-10-CM

## 2021-09-29 LAB
BASOPHILS # BLD AUTO: 0.1 10E3/UL (ref 0–0.2)
BASOPHILS NFR BLD AUTO: 1 %
EOSINOPHIL # BLD AUTO: 0.6 10E3/UL (ref 0–0.7)
EOSINOPHIL NFR BLD AUTO: 10 %
ERYTHROCYTE [DISTWIDTH] IN BLOOD BY AUTOMATED COUNT: 12.6 % (ref 10–15)
HCT VFR BLD AUTO: 39.9 % (ref 35–47)
HGB BLD-MCNC: 13.2 G/DL (ref 11.7–15.7)
LYMPHOCYTES # BLD AUTO: 1.9 10E3/UL (ref 0.8–5.3)
LYMPHOCYTES NFR BLD AUTO: 30 %
MCH RBC QN AUTO: 32 PG (ref 26.5–33)
MCHC RBC AUTO-ENTMCNC: 33.1 G/DL (ref 31.5–36.5)
MCV RBC AUTO: 97 FL (ref 78–100)
MONOCYTES # BLD AUTO: 0.9 10E3/UL (ref 0–1.3)
MONOCYTES NFR BLD AUTO: 14 %
NEUTROPHILS # BLD AUTO: 2.8 10E3/UL (ref 1.6–8.3)
NEUTROPHILS NFR BLD AUTO: 45 %
PLATELET # BLD AUTO: 406 10E3/UL (ref 150–450)
RBC # BLD AUTO: 4.13 10E6/UL (ref 3.8–5.2)
WBC # BLD AUTO: 6.2 10E3/UL (ref 4–11)

## 2021-09-29 PROCEDURE — 85025 COMPLETE CBC W/AUTO DIFF WBC: CPT | Performed by: PHYSICIAN ASSISTANT

## 2021-09-29 PROCEDURE — 99214 OFFICE O/P EST MOD 30 MIN: CPT | Performed by: PHYSICIAN ASSISTANT

## 2021-09-29 PROCEDURE — 36415 COLL VENOUS BLD VENIPUNCTURE: CPT | Performed by: PHYSICIAN ASSISTANT

## 2021-09-29 RX ORDER — CHLORHEXIDINE GLUCONATE 40 MG/ML
SOLUTION TOPICAL
COMMUNITY
Start: 2021-06-10 | End: 2022-06-27

## 2021-09-29 RX ORDER — METHOCARBAMOL 500 MG/1
500 TABLET, FILM COATED ORAL
COMMUNITY
Start: 2019-11-13 | End: 2022-06-27

## 2021-09-29 RX ORDER — LEVOTHYROXINE SODIUM 100 UG/1
100 TABLET ORAL DAILY
Qty: 30 TABLET | Refills: 0 | Status: SHIPPED | OUTPATIENT
Start: 2021-09-29 | End: 2024-01-17

## 2021-09-29 RX ORDER — MELOXICAM 15 MG/1
TABLET ORAL
COMMUNITY
Start: 2021-01-07 | End: 2022-06-27

## 2021-09-29 ASSESSMENT — PAIN SCALES - GENERAL: PAINLEVEL: MODERATE PAIN (5)

## 2021-09-29 NOTE — PROGRESS NOTES
[facility]  [address]  [place] [address]  Phone: [phone]  Primary Provider: Simran Barreto  Pre-op Performing Provider: SIMRAN BARRETO    PREOPERATIVE EVALUATION:  Today's date: 9/29/2021    Magnolia Gonzáles is a 51 year old female who presents for a preoperative evaluation.    Surgical Information:  Surgery/Procedure: finger amputation, left hand middle finger  Surgery Location: [facility]  Surgeon: Tbd  Surgery Date: tbd - needs to have COVID test  Time of Surgery: tbd  Where patient plans to recover: At home alone  Fax number for surgical facility:   [facility] Fax#: [phone]  [facility] [address]  [facility] [place] Fax#: [phone]      Type of Anesthesia Anticipated: to be determined    Assessment & Plan     The proposed surgical procedure is considered INTERMEDIATE risk.    Preop general physical exam  - CBC with platelets and differential; Future  - CBC with platelets and differential    Finger infection  - CBC with platelets and differential; Future  - CBC with platelets and differential    Major depressive disorder, recurrent episode, mild (H)  PTSD (post-traumatic stress disorder)  Generalized anxiety disorder  Social phobia  Her mental health is not well managed at this time.   She continues to follow with Psychiatry   Do not feel that this inhibits her ability to go forward with procedure and if anything her mental health has limited her in going forward with procedure sooner.     Acquired hypothyroidis  Hypothyroidism, unspecified type  Stable on medication.   - levothyroxine (SYNTHROID/LEVOTHROID) 100 MCG tablet; Take 1 tablet (100 mcg) by mouth daily         Risks and Recommendations:  The patient has the following additional risks and recommendations for perioperative complications:   - No identified additional risk factors other than previously  addressed    Medication Instructions:  Patient is to take all scheduled medications on the day of surgery  EXCEPT holding any anti-inflammatories 7 days prior to procedure    RECOMMENDATION:  APPROVAL GIVEN to proceed with proposed procedure, without further diagnostic evaluation.        Subjective     HPI related to upcoming procedure: Has been dealing with joint infection of her left digit since beginning of the year.  She has been in and out of specialists and hospitals and ultimately recommendation is to have amputation.  She has been limited in going forward as she is against COVID swab unless it is oral which is very limited when it comes to presurgical clearance.  She is going to get set up with the surgeon and his team for testing but again she is made aware that the oral swab is not guaranteed.  Currently right now in the clinic we only have NP swab available.        Preop Questions 9/29/2021   1. Have you ever had a heart attack or stroke? No   2. Have you ever had surgery on your heart or blood vessels, such as a stent placement, a coronary artery bypass, or surgery on an artery in your head, neck, heart, or legs? No   3. Do you have chest pain with activity? No   4. Do you have a history of  heart failure? No   5. Do you currently have a cold, bronchitis or symptoms of other infection? No   6. Do you have a cough, shortness of breath, or wheezing? No   7. Do you or anyone in your family have previous history of blood clots? No   8. Do you or does anyone in your family have a serious bleeding problem such as prolonged bleeding following surgeries or cuts? No   9. Have you ever had problems with anemia or been told to take iron pills? No   10. Have you had any abnormal blood loss such as black, tarry or bloody stools, or abnormal vaginal bleeding? No   11. Have you ever had a blood transfusion? No   12. Are you willing to have a blood transfusion if it is medically needed before, during, or after your  surgery? NO - Does not want any blood products. She is aware of what this means and still declines transfusion if necessary.    13. Have you or any of your relatives ever had problems with anesthesia? No   14. Do you have sleep apnea, excessive snoring or daytime drowsiness? No   14a. Do you have a CPAP machine? -   15. Do you have any artifical heart valves or other implanted medical devices like a pacemaker, defibrillator, or continuous glucose monitor? No   16. Do you have artificial joints? No   17. Are you allergic to latex? No   18. Is there any chance that you may be pregnant? No     Health Care Directive:  Patient does not have a Health Care Directive or Living Will: Discussed advance care planning with patient; however, patient declined at this time.  She notes she has no one to make decisions for her but did discuss that it is important for her wishes to be written down especially since no one is available to inform them of her wishes if she were unable to relay them.     Preoperative Review of :   reviewed - controlled substances reflected in medication list.    Status of Chronic Conditions:  See problem list for active medical problems.  Problems all longstanding and stable, except as noted/documented.  See ROS for pertinent symptoms related to these conditions.    DEPRESSION - Patient has a long history of Depression of moderate severity requiring medication for control with recent symptoms being Has not been well controlled.  She does see Psychiatry and is taking her medication but this injury and multiple other social aspects have been very difficult for her.     HYPOTHYROIDISM - Patient has a longstanding history of chronic Hypothyroidism. Patient has been doing well, noting no tremor, insomnia, hair loss or changes in skin texture. Continues to take medications as directed, without adverse reactions or side effects. Last TSH   Lab Results   Component Value Date    TSH 2.18 08/11/2021   .         Review of Systems  Constitutional, neuro, ENT, endocrine, pulmonary, cardiac, gastrointestinal, genitourinary, musculoskeletal, integument and psychiatric systems are negative, except as otherwise noted.    Patient Active Problem List    Diagnosis Date Noted     Generalized anxiety disorder 07/29/2020     Priority: Medium     Major depressive disorder, recurrent episode, mild (H) 07/19/2017     Priority: Medium     PTSD (post-traumatic stress disorder) 07/19/2017     Priority: Medium     Bipolar affective disorder, manic, moderate (H) 09/23/2016     Priority: Medium     Patient refutes this. 10/5/2020       Overdose of Valium, intentional self-harm, subsequent encounter 05/29/2016     Priority: Medium     Carpal tunnel syndrome of left wrist 01/13/2016     Priority: Medium     Adjustment disorder with mixed anxiety and depressed mood 01/06/2016     Priority: Medium     Acquired hypothyroidism 01/06/2016     Priority: Medium     Bilateral carpal tunnel syndrome 01/06/2016     Priority: Medium     Social phobia 12/26/2014     Priority: Medium     Panic disorder without agoraphobia 12/26/2014     Priority: Medium     History of alcohol dependence (H) 07/22/2014     Priority: Medium     Overview:   Binge drinking disorder. Sober since 5/2016       Nondependent alcohol abuse, episodic drinking behavior 11/17/2005     Priority: Medium      Past Medical History:   Diagnosis Date     Anxiety      Depressive disorder      Hypothyroid      Suicidal ideation 7/19/2015     Past Surgical History:   Procedure Laterality Date     ORTHOPEDIC SURGERY       Current Outpatient Medications   Medication Sig Dispense Refill     betamethasone valerate (VALISONE) 0.1 % external ointment Apply topically 2 times daily 30 g 0     chlorhexidine 4 % solution        gabapentin (NEURONTIN) 400 MG capsule TAKE 3 CAPSULES IN MORNING, TAKE 2 CAPSULES IN THE AFTERNOON, AND TAKE 3 CAPSULES IN THE EVENING 240 capsule 0     hydrocortisone  "(CORTAID) 1 % external ointment Apply topically 2 times daily Use on face and around the eyes - DO NOT get in the eye 30 g 0     lamoTRIgine (LAMICTAL) 200 MG tablet Take 1 tablet (200 mg) by mouth daily 90 tablet 0     levothyroxine (SYNTHROID/LEVOTHROID) 100 MCG tablet Take 1 tablet (100 mcg) by mouth daily 30 tablet 0     meclizine (ANTIVERT) 25 MG tablet Take 1 tablet (25 mg) by mouth 3 times daily as needed for dizziness 24 tablet 0     meloxicam (MOBIC) 15 MG tablet        methocarbamol (ROBAXIN) 500 MG tablet Take 500 mg by mouth       multivitamin (CENTRUM SILVER) tablet Take 1 tablet by mouth daily 90 tablet 1     naproxen (NAPROSYN) 500 MG tablet TAKE ONE TABLET BY MOUTH TWO TIMES A DAY WITH MEALS 180 tablet 2     ondansetron (ZOFRAN) 4 MG tablet Take 1 tablet (4 mg) by mouth every 8 hours as needed for nausea 30 tablet 0     clonazePAM (KLONOPIN) 0.5 MG tablet Take 1 tablet (0.5 mg) by mouth daily as needed for anxiety (Patient not taking: Reported on 9/29/2021) 10 tablet 0     DULoxetine (CYMBALTA) 60 MG capsule Take 1 capsule (60 mg) by mouth daily (Patient not taking: Reported on 9/29/2021) 30 capsule 1     zolpidem (AMBIEN) 10 MG tablet Take tablet by mouth 15 minutes prior to sleep, for Sleep Study (Patient not taking: Reported on 9/29/2021) 1 tablet 0       Allergies   Allergen Reactions     Serotonin Reuptake Inhibitors Rash     Zoloft [Sertraline] Rash        Social History     Tobacco Use     Smoking status: Never Smoker     Smokeless tobacco: Never Used   Substance Use Topics     Alcohol use: Yes     Alcohol/week: 0.0 standard drinks     Comment: \"once every three months\"     Family History   Problem Relation Age of Onset     Depression/Anxiety Mother      Alcohol/Drug Father         Pancreatic CA     History   Drug Use No         Objective     BP (!) 134/100   Pulse 100   Temp 97.9  F (36.6  C) (Temporal)   LMP 02/28/2021 (LMP Unknown)   SpO2 97%     Physical Exam    GENERAL APPEARANCE: " healthy, alert and no distress     EYES: EOMI, PERRL     HENT: ear canals and TM's normal and nose and mouth without ulcers or lesions     NECK: no adenopathy, no asymmetry, masses, or scars and thyroid normal to palpation     RESP: lungs clear to auscultation - no rales, rhonchi or wheezes     CV: regular rates and rhythm, normal S1 S2, no S3 or S4 and no murmur, click or rub     ABDOMEN:  soft, nontender, no HSM or masses and bowel sounds normal     MS: extremities normal- no gross deformities noted, no evidence of inflammation in joints, FROM in all extremities.  Left digit DIP hypertrophy without significant erythema      SKIN: no suspicious lesions or rashes     NEURO: Normal strength and tone, sensory exam grossly normal, mentation intact and speech normal     PSYCH: affect flat, anxious and judgment and insight intact     LYMPHATICS: No cervical adenopathy    Recent Labs   Lab Test 11/13/19  1331   HGB 12.3         POTASSIUM 3.9   CR 0.67        Diagnostics:  Recent Results (from the past 24 hour(s))   CBC with platelets and differential    Collection Time: 09/29/21  8:02 AM   Result Value Ref Range    WBC Count 6.2 4.0 - 11.0 10e3/uL    RBC Count 4.13 3.80 - 5.20 10e6/uL    Hemoglobin 13.2 11.7 - 15.7 g/dL    Hematocrit 39.9 35.0 - 47.0 %    MCV 97 78 - 100 fL    MCH 32.0 26.5 - 33.0 pg    MCHC 33.1 31.5 - 36.5 g/dL    RDW 12.6 10.0 - 15.0 %    Platelet Count 406 150 - 450 10e3/uL    % Neutrophils 45 %    % Lymphocytes 30 %    % Monocytes 14 %    % Eosinophils 10 %    % Basophils 1 %    Absolute Neutrophils 2.8 1.6 - 8.3 10e3/uL    Absolute Lymphocytes 1.9 0.8 - 5.3 10e3/uL    Absolute Monocytes 0.9 0.0 - 1.3 10e3/uL    Absolute Eosinophils 0.6 0.0 - 0.7 10e3/uL    Absolute Basophils 0.1 0.0 - 0.2 10e3/uL      No EKG required, no history of coronary heart disease, significant arrhythmia, peripheral arterial disease or other structural heart disease.    Revised Cardiac Risk Index (RCRI):  The  patient has the following serious cardiovascular risks for perioperative complications:   - No serious cardiac risks = 0 points     RCRI Interpretation: 0 points: Class I (very low risk - 0.4% complication rate)           Signed Electronically by: Simran Barreto PA-C  Copy of this evaluation report is provided to requesting physician.

## 2021-09-29 NOTE — PATIENT INSTRUCTIONS

## 2021-10-03 ENCOUNTER — HEALTH MAINTENANCE LETTER (OUTPATIENT)
Age: 51
End: 2021-10-03

## 2021-10-05 DIAGNOSIS — F41.1 GENERALIZED ANXIETY DISORDER: ICD-10-CM

## 2021-10-05 DIAGNOSIS — F43.23 ADJUSTMENT DISORDER WITH MIXED ANXIETY AND DEPRESSED MOOD: ICD-10-CM

## 2021-10-05 DIAGNOSIS — F43.10 PTSD (POST-TRAUMATIC STRESS DISORDER): ICD-10-CM

## 2021-10-05 NOTE — TELEPHONE ENCOUNTER
Routing refill request to provider for review/approval because:  Drug not on the [facility] refill protocol     Cinthia Taylor RN

## 2021-10-07 RX ORDER — GABAPENTIN 400 MG/1
CAPSULE ORAL
Qty: 240 CAPSULE | Refills: 0 | OUTPATIENT
Start: 2021-10-07

## 2021-10-07 NOTE — TELEPHONE ENCOUNTER
[employer] message sent to patient with provider's questions.  Janet Álvarez NP  Psych Rn - [facility] 5 minutes ago (10:13 AM)     [place]    When did she last fill this and how is she taking this?

## 2021-10-11 ENCOUNTER — TELEPHONE (OUTPATIENT)
Dept: BEHAVIORAL HEALTH | Facility: CLINIC | Age: 51
End: 2021-10-11

## 2021-10-11 ENCOUNTER — MYC REFILL (OUTPATIENT)
Dept: PSYCHIATRY | Facility: CLINIC | Age: 51
End: 2021-10-11

## 2021-10-11 DIAGNOSIS — F41.1 GENERALIZED ANXIETY DISORDER: ICD-10-CM

## 2021-10-11 RX ORDER — CLONAZEPAM 0.5 MG/1
0.5 TABLET ORAL DAILY PRN
Qty: 10 TABLET | Refills: 0 | Status: SHIPPED | OUTPATIENT
Start: 2021-10-11 | End: 2021-11-11

## 2021-10-11 NOTE — TELEPHONE ENCOUNTER
Please see refill encounter from 10/5/2021 for follow-up on this request.    Rehana German RN on 10/11/2021 at 11:19 AM

## 2021-10-11 NOTE — LETTER
10/11/2021        RE: Magnolia Gonzáles  [address] [place] [address]        10/11/21 @ 10:15 AM - Pt called to request refill on gabapentin. Pt states she made this request last week and it still has not been refilled.         Sincerely,        [facility] Intake Generic, MD

## 2021-10-11 NOTE — TELEPHONE ENCOUNTER
"This RN completed chart review dt another encounter about this request initiated today. This med was last filled on 06/07/2021 for a 30 days supply. Pt next apt scheduled 12/02/2021.    Pt reports that she has been taking this medication as prescribed. When this RN asked her to clarify how many she takes each time she stated, \"I take 12...ok..12\" and this RN reviewed order and she stated, yeah, I take it like that.    She verbalized frustration and that she's been on the phone all morning for medical appointments, that she found out she has to get her finger amputated.    Wtr validated frustrations and reviewed that it has been several months since she filled which is concerning at her high dose of medication. She reported she had leftover supply that she had been taking.    Pt hung up the phone & ended call abruptly.    -  Fill Date ID Written Sold Drug Qty Days Prescriber Rx # Pharmacy Refill Daily Dose * Pymt Type   08/03/2021 2 08/02/2021 08/13/2021 Clonazepam 0.5 Mg Tablet  10.00 10 Vi The [id number] [facility] (9159) 0/0 1.00 [place]  06/13/2021 2 06/13/2021 06/15/2021 Oxycodone Hcl 5 Mg Tablet  6.00 2 Je Ole [id number] [facility] (9159) 0/0 22.50 [place]  06/13/2021 1 06/13/2021 06/13/2021 Oxycodone Hcl 5 Mg Tablet  6.00 1 Je Ole [id number] [facility] (2062) 0/0 45.00 MME Other [place]  06/11/2021 2 06/11/2021 06/15/2021 Clonazepam 0.5 Mg Tablet  6.00 3 Em Sco [id number] [facility] (9159) 0/0 2.00 [place]  06/07/2021 2 05/27/2021 06/15/2021 Gabapentin 400 Mg Capsule  240.00 30 Vi The [id number] [facility] (9159) 0/0  [place]  03/05/2021 2 04/03/2020 03/15/2021 Gabapentin 400 Mg Capsule  240.00 30 Em Sco [id number] [facility] (9159) 0/0  Medicaid    This RN to route to provider for consult.    Rehana German RN on 10/11/2021 at 11:27 AM    "

## 2021-10-11 NOTE — TELEPHONE ENCOUNTER
10/11/21 @ 10:15 AM - Pt called to request refill on gabapentin. Pt states she made this request last week and it still has not been refilled.

## 2021-10-11 NOTE — TELEPHONE ENCOUNTER
Date of Last Office Visit: 08/02/2021  Date of Next Office Visit: 12/02/2021  No shows since last visit: 0  Cancellations since last visit: 0    Medication requested: clonazePAM (KLONOPIN) 0.5 MG  Date last ordered: 08/02/2021 Qty: 10 Refills: 0     Review of [place] ?: yes  Medication last filled date: 08/03/2021 Qty filled: 10  Other [place] substance on [place] ?: yes  If yes, is this a new medication?: no  If yes, name of medication: n/a and date filled: n/a    Lapse in medication adherence greater than 5 days?: no-PRN  If yes, call patient and gather details: n/a  Medication refill request verified as identical to current order?: yes  Result of Last DAM, VPA, Li+ Level, CBC, or Carbamazepine Level (at or since last visit): N/A    Last visit treatment plan:     1.  Add Duloxetine 60 mg daily     2.  Add Clonazepam 0.5 mg daily as needed    3.  Lamotrigine 200 mg daily    4.  See someone for counseling    5.  Next visit we will talk about adding ritalin for short term memory loss and history of dyslexia      []Medication refilled per  Medication Refill in Ambulatory Care  policy.  [x]Medication unable to be refilled by RN due to criteria not met as indicated below:    []Eligibility - not seen in the last year   []Supervision - no future appointment   []Compliance - no shows, cancellations or lapse in therapy   []Verification - order discrepancy   [x]Controlled medication   []Medication not included in policy   []90-day supply request   [x]Other - pt requesting larger qty supply (via separate [employer] msg)

## 2021-10-12 NOTE — TELEPHONE ENCOUNTER
Janet Álvarez, NP  Psych Rn - [facility] 2 hours ago (10:09 AM)       Lets hold off for now and try to connect with her again for wellness check.    Message text      Wtr r'cd return call from pt who was screaming and swearing throughout indicating that she has been taking medications ongoing and doesn't understand why the provider won't refill. Wtr asked her if she would like this RN to see if provider would like her in an acute spot appointment which she then swore at wtr & ended the call.    This RN to route to provider for consult.    Rehana German RN on 10/12/2021 at 12:41 PM

## 2021-10-14 NOTE — TELEPHONE ENCOUNTER
10/14/21 unable to LM, [facility] for [facility] to schedule f/u with : acute with Janet COLEMAN  (there is one 11/23 @1613p)

## 2021-10-14 NOTE — TELEPHONE ENCOUNTER
Message  Received: Today  Janet Álvarez NP  P Psych Rn - [facility]  Caller: Unspecified (3 days ago, 11:21 AM)  Please check for any acute spots for her.  Willing to order gabapentin 300 mg tid  until seen.

## 2021-10-27 ENCOUNTER — MYC REFILL (OUTPATIENT)
Dept: PSYCHIATRY | Facility: CLINIC | Age: 51
End: 2021-10-27

## 2021-10-27 DIAGNOSIS — F41.1 GENERALIZED ANXIETY DISORDER: ICD-10-CM

## 2021-10-27 RX ORDER — CLONAZEPAM 0.5 MG/1
0.5 TABLET ORAL DAILY PRN
Qty: 10 TABLET | Refills: 0 | OUTPATIENT
Start: 2021-10-27

## 2021-10-28 ENCOUNTER — MYC MEDICAL ADVICE (OUTPATIENT)
Dept: FAMILY MEDICINE | Facility: OTHER | Age: 51
End: 2021-10-28

## 2021-10-28 NOTE — TELEPHONE ENCOUNTER
Routing to provider-    Are you ok with changing this to video visit?    EDITH Stevens/[facility]

## 2021-10-29 NOTE — TELEPHONE ENCOUNTER
No. This is a law thing from the state. Virtual visits were only allowed during an emergency order from the governor. This order  last summer. All visits must be in person.    Toby Hyde PA-C  [facility]

## 2021-11-01 ENCOUNTER — MYC MEDICAL ADVICE (OUTPATIENT)
Dept: SLEEP MEDICINE | Facility: CLINIC | Age: 51
End: 2021-11-01

## 2021-11-01 ENCOUNTER — VIRTUAL VISIT (OUTPATIENT)
Dept: PSYCHOLOGY | Facility: CLINIC | Age: 51
End: 2021-11-01
Payer: OTHER GOVERNMENT

## 2021-11-01 DIAGNOSIS — Z91.199 NO-SHOW FOR APPOINTMENT: Primary | ICD-10-CM

## 2021-11-11 ENCOUNTER — VIRTUAL VISIT (OUTPATIENT)
Dept: PSYCHIATRY | Facility: CLINIC | Age: 51
End: 2021-11-11
Payer: COMMERCIAL

## 2021-11-11 DIAGNOSIS — F90.8 ATTENTION DEFICIT HYPERACTIVITY DISORDER (ADHD), OTHER TYPE: ICD-10-CM

## 2021-11-11 DIAGNOSIS — F31.12 BIPOLAR AFFECTIVE DISORDER, MANIC, MODERATE (H): Primary | ICD-10-CM

## 2021-11-11 DIAGNOSIS — F41.1 GENERALIZED ANXIETY DISORDER: ICD-10-CM

## 2021-11-11 DIAGNOSIS — F43.10 PTSD (POST-TRAUMATIC STRESS DISORDER): ICD-10-CM

## 2021-11-11 PROCEDURE — 99214 OFFICE O/P EST MOD 30 MIN: CPT | Mod: 95 | Performed by: NURSE PRACTITIONER

## 2021-11-11 RX ORDER — GABAPENTIN 400 MG/1
800 CAPSULE ORAL 3 TIMES DAILY
Qty: 180 CAPSULE | Refills: 3 | Status: SHIPPED | OUTPATIENT
Start: 2021-11-11 | End: 2022-03-09

## 2021-11-11 RX ORDER — CLONAZEPAM 0.5 MG/1
0.5 TABLET ORAL DAILY PRN
Qty: 15 TABLET | Refills: 0 | Status: SHIPPED | OUTPATIENT
Start: 2021-11-11 | End: 2022-03-09

## 2021-11-11 RX ORDER — LAMOTRIGINE 200 MG/1
200 TABLET ORAL DAILY
Qty: 30 TABLET | Refills: 3 | Status: SHIPPED | OUTPATIENT
Start: 2021-11-11 | End: 2022-06-27

## 2021-11-11 RX ORDER — ATOMOXETINE 25 MG/1
25 CAPSULE ORAL DAILY
Qty: 30 CAPSULE | Refills: 1 | Status: SHIPPED | OUTPATIENT
Start: 2021-11-11 | End: 2022-01-12 | Stop reason: DRUGHIGH
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
## 2021-11-15 ENCOUNTER — TELEPHONE (OUTPATIENT)
Dept: FAMILY MEDICINE | Facility: OTHER | Age: 51
End: 2021-11-15

## 2021-11-15 NOTE — TELEPHONE ENCOUNTER
Patient Quality Outreach Summary      Summary:    Patient is due/failing the following:   Colonoscopy, Breast Cancer Screening - Mammogram and Physical     Type of outreach:    Sent SparkupReader message.    Questions for provider review:    None                                                                                                                    Narcisa Hill CMA       Chart routed to Care Team.

## 2021-11-24 ENCOUNTER — TRANSFERRED RECORDS (OUTPATIENT)
Dept: HEALTH INFORMATION MANAGEMENT | Facility: CLINIC | Age: 51
End: 2021-11-24
Payer: OTHER GOVERNMENT

## 2021-12-01 ENCOUNTER — TELEPHONE (OUTPATIENT)
Dept: BEHAVIORAL HEALTH | Facility: CLINIC | Age: 51
End: 2021-12-01
Payer: OTHER GOVERNMENT

## 2021-12-01 NOTE — TELEPHONE ENCOUNTER
[facility]  No data recorded      Social Work Care Navigator Note      Patient: Magnolia Gonzáles  Date: December 1, 2021  Preferred Name: Maryjane    Previous PHQ-9:   PHQ-9 SCORE 1/11/2021 4/8/2021 6/11/2021   PHQ-9 Total Score - - -   PHQ-9 Total Score MyChart - - -   PHQ-9 Total Score 17 21 22     Previous JULIEN-7:   JULIEN-7 SCORE 1/11/2021 4/8/2021 6/11/2021   Total Score - - -   Total Score - - -   Total Score 21 21 21     SHANNAN LEVEL:  SHANNAN Score (Last Two) 7/25/2017   SHANNAN Raw Score 37   Activation Score 79.2   SHANNAN Level 4       Preferred Contact:  No data recorded    Type of Contact Today: Phone call (patient / identified key support person reached)      Data: (subjective / Objective):    [facility] Introduction:  Hi my name is FABIANA Henderson from your [facility] primary care clinic.     I work closely with your primary care provider, Simran Barreto.     If it's ok I'd like to talk about some new services available to you, at no out of pocket cost to you.      Before we get started can you verify your insurance for me? [facility]    What social work or case management services do you receive? (If so, are you receiving ACT or TCM?).  None    Getting to Know You - Whole Person Care:  This new service is called [facility] Home [facility], which is designed to support you as a whole person beyond just your medical needs.      I'm here to be a central point of contact for your healthcare needs and to help with:    Housing    Transportation    Financial resources    Comprehensive Health needs (appointment help, medication costs, etc.)    Employment    Education    Health Insurance applications    And connecting with social supports or community resources    Out of the things I mentioned what would you find helpful?  Transportation, Financial resources, comprehensive health needs, SSDI and connecting with social supports or community resources.      Patient's psychiatrist  reached out to [facility] ([facility]) [facility] for additional case management support. Psychiatry would like patient to connect to Critical access hospital services and any additional community services to help support physical and mental health supports.    [facility] reached out to patient to discuss referral to Critical access hospital program for addition community mental health supports. Patient reports she is interested in [facility] program and long term therapy program. [facility] placed referral with [facility] at [phone].     Patient and [facility] discussed referral for [facility] services through [place] Choice referral. Patient reports she denies need for this type of service right now. Patient reports she is caring for a gentleman that recently had a stroke that might benefit from this program. [facility] let patient know she would be able to call [facility] to request additional support and information. Patient reports understanding and appreciation for resources today.     Patient reports she has upcoming court date for SSDI but would like to discuss referral for neuropsych eval for diagnosis clarification once case has closed. [facility] messaged provider today with update. Patient reports she has been working with her Critical access hospital financial  for financial assistance.    Diagnostic Assessment completed on 04/08/2021 by [facility] supervisor [facility]    Patient response to [facility] Service offering:   Not interested enrolling in [facility] services - patient not able to enroll in Behavioral Health Home ([facility]) services at this time due to insurance. [facility] messaged care team with update today.     Ariadna Blackwood [facility]  [facility] ([facility])   [facility]  [phone]  December 1, 2021  5:13 PM

## 2021-12-02 ENCOUNTER — TELEPHONE (OUTPATIENT)
Dept: BEHAVIORAL HEALTH | Facility: CLINIC | Age: 51
End: 2021-12-02
Payer: OTHER GOVERNMENT

## 2021-12-02 ENCOUNTER — VIRTUAL VISIT (OUTPATIENT)
Dept: PSYCHIATRY | Facility: CLINIC | Age: 51
End: 2021-12-02
Payer: COMMERCIAL

## 2021-12-02 DIAGNOSIS — R55 BLACKOUT SPELL: Primary | ICD-10-CM

## 2021-12-02 PROCEDURE — 99214 OFFICE O/P EST MOD 30 MIN: CPT | Mod: 95 | Performed by: NURSE PRACTITIONER

## 2021-12-02 NOTE — TELEPHONE ENCOUNTER
[facility]  [facility] [place]        Social Work Care Navigator Note      Patient: Magnolia Gonzáles  Date: December 2, 2021  Preferred Name: Maryjane    Previous PHQ-9:   PHQ-9 SCORE 1/11/2021 4/8/2021 6/11/2021   PHQ-9 Total Score - - -   PHQ-9 Total Score MyChart - - -   PHQ-9 Total Score 17 21 22     Previous JULIEN-7:   JULIEN-7 SCORE 1/11/2021 4/8/2021 6/11/2021   Total Score - - -   Total Score - - -   Total Score 21 21 21     SHANNAN LEVEL:  SHANNAN Score (Last Two) 7/25/2017   SHANNAN Raw Score 37   Activation Score 79.2   SHANNAN Level 4       Preferred Contact:  Need for : No  Preferred Contact: Cell      Type of Contact Today: Phone call (patient / identified key support person reached)      Data: (subjective / Objective):  Patient Goals  No data recorded    [facility] Service Provided:  Comprehensive Care Management: utilized the electronic medical record / patient registry to identify and support patient's health conditions / needs more effectively   Care Transitions: focused on the coordinated and seamless movement of patient between or within different levels of care or settings  Care Coordination: provided care management services/referrals necessary to ensure patient and their identified supports have access to medical, behavioral health, pharmacology and recovery support services.  Ensured that patient's care is integrated across all settings and services.   Individual and Family Support: aimed to help clients reduce barriers to achieving goals, increase health literacy and knowledge about chronic condition(s), increase self-efficacy skills, and improve health outcomes  Referral to Community and Social Support Services: Provided patient with referrals (see plan section)  Assisted in scheduling an appointment to a referral / service (see plan section)  Coordinated / Confirmed patient's appointment time or referral and transportation plan  Followed-up with patient on whether or not they  completed a referral     Data:  [facility] received phone call from [facility] at [phone]. [facility] spoke with Geno, intake with [facility], who placed patient on wait list for [facility] services. [facility] spoke with Chelsie, intake with therapy, who confirmed patient could receive telephonic services, as transportation is a barrier for patient. Therapy and [facility] intake will be reaching out to patient to let her know she is placed on wait list for [facility] and will be able to schedule telephonic therapy appts.         Plan:  [facility] updated providers today.        FABIANA Henderson  Behavioral Health Home ([facility])   [facility]  [phone]  December 2, 2021  1:37 PM
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
## 2021-12-28 NOTE — PATIENT INSTRUCTIONS
1.    Continue Strattera 25 mg daily    2.    Clonazepam 0.5 mg daily as needed  3.  Lamotrigine 200 mg daily  4.  See someone for counseling  5.    Gabapentin 800 mg 3 times daily    6.  Neurology referral to assess for blackouts    7.  Start [facility] worker services        Continue all other medications as reviewed per electronic medical record today.     Safety plan reviewed. To the Emergency Department as needed or call after hours crisis line at [phone] or [phone]. [place] Crisis [place]. Text [place] to 828907 or Suicide LifeLine Chat: suicideDraftMix.org/chat/    To schedule individual or family therapy, call [facility] at [phone].    Schedule an appointment with me in 4 weeks or sooner as needed. Call [facility] at [phone] to schedule.    Follow up with primary care provider as planned or for acute medical concerns.    Call the psychiatric nurse line with medication questions or concerns at [phone].    [employer]hart may be used to communicate with your provider, but this is not intended to be used for emergencies.    Crisis Resources:    National Suicide Prevention Lifeline: 956.857.8886 (TTY: 733.474.5901). Call anytime for help.  (www.suicidepreventionlifeline.org)  National [place] on Mental Illness (www.anne marie.org): 566.932.8246 or 796-657-8725.   Mental Health Association (www.mentalhealth.org): 172.944.1059 or 019-796-7808.  [place] Crisis [place]: Text [place] to 964575  Suicide LifeLine Chat: suicideDraftMix.org/chat

## 2021-12-28 NOTE — PATIENT INSTRUCTIONS
1.    Duloxetine discontinued-not effective      2.    Clonazepam 0.5 mg daily as needed    3.  Lamotrigine 200 mg daily    4.  See someone for counseling    5.    Gabapentin 800 mg 3 times daily    6.  Add Strattera 25 mg daily for focus and attention improvement        Continue all other medications as reviewed per electronic medical record today.     Safety plan reviewed. To the Emergency Department as needed or call after hours crisis line at [phone] or [phone]. [place] Crisis [place]. Text [place] to 017132 or Suicide LifeLine Chat: suicideJericho Ventures.org/chat/    To schedule individual or family therapy, call [facility] at [phone].    Schedule an appointment with me in 4 weeks or sooner as needed. Call [facility] at [phone] to schedule.    Follow up with primary care provider as planned or for acute medical concerns.    Call the psychiatric nurse line with medication questions or concerns at [phone].    [employer]art may be used to communicate with your provider, but this is not intended to be used for emergencies.    Crisis Resources:    National Suicide Prevention Lifeline: 357.495.3198 (TTY: 710.750.4208). Call anytime for help.  (www.suicidepreventionlifeline.org)  National [place] on Mental Illness (www.anne marie.org): 679.799.2595 or 792-142-7163.   Mental Health Association (www.mentalhealth.org): 136.231.6019 or 151-605-7934.  [place] Crisis [place]: Text [place] to 383530  Suicide LifeLine Chat: suicideJericho Ventures.org/chat       independent

## 2022-01-01 NOTE — TELEPHONE ENCOUNTER
Patient returns call, per patient she does not have good cell service for calls. I read the message below from ES and lost patient connection. Attempted call back and Left message for patient to return call to any triage RN.    Aaliyah Christine, RN     Speaking Coherently

## 2022-01-31 NOTE — PATIENT INSTRUCTIONS
Protein Powder:  Genepro, unflavored protein powder (1 scoop = 60 calories, 30 g protein) - this one is unflavored, can add to sweet or savory, warm or cold, and most concentrated with protein/serv.    Isopure Zero Carb protein powder (1 scoop = 105 calories, 25 gm protein)  Premier Protein powder (2 scoops = 150 calories, 30 gm protein)  Integrated Supplements, no artificial sugars (2 scoops = 110 Calories, 20 g protein)    Meal Replacement Shake Options:   *Protein Shake Criteria: no more than 210 Calories, at least 20 grams of protein, and less than 10 grams of sugar   [facility] smoothie (160 Calories, 20 g protein)   Premier Protein (160 Calories, 30 g protein)  Slim Fast Advanced Nutrition (180 Calories, 20 g protein)  Muscle Milk, lactose-free, 17 oz bottle (210 Calories, 30 g protein)  Integrated Supplements, no artificial sugars (110 Calories, 20 g protein)  Genepro, unflavored protein powder (60 Calories, 30 g protein)  Boost/Ensure Max (160 calories, 30 gm protein)   PrestoBox Core Power (170 calories, 26 gm protein)    Meal Replacement Bar Options:  [facility] Protein Shake (160 Calories, 15 g protein)  Quest Protein Bars (190 Calories, 20 g protein)  Built Bar (170 Calories, 15-20 g protein)  One Protein Bar (210 calories, 20 g protein)  [place] Signature Protein Bar ([employer]) (190 Calories, 21 g protein)  Pure Protein Bars (180 Calories, 21 g protein)        PLAN:    Protein powder options given to patient today.  Need to minimize fruit and add protein to morning smoothie  Restart topiramate ramp to 75mg  Bariatric labs first floor today  Need visit with RD soon  Follow up 1 month phone visit with [facility] pharmacist  Follow up 3 months return [facility] video or in person  MEDICATION STARTED AT THIS APPOINTMENT  We are starting topiramate at bedtime.  Start one tab, 25 mg, for a week. Go up to 50 mg (2 tabs) for the next week. At the third week, take   3 tabs (75 mg).  Stay at 3 tabs until you are seen  Treatment Plan:  Continue lamotrigine (Lamictal) 200 mg daily, aripiprazole (Abilify) 15 mg daily, decrease to gabapentin 400 mg three times daily.     Begin duloxetine (Cymbalta) 30 mg daily.     Schedule trauma therapy.     Follow up in one month.     - Recommend patient discuss medications with their pharmacist. Risks and benefits of medications discussed, including side effect profile.   - Safety plan was reviewed; to the ER as needed or call after hours crisis line; [phone]  - Education and counseling was done regarding use of medications, psychotherapy options  - Call [phone] for appointment or to speak to a nurse.   -Office hours: Monday through Thursday 8:00 am to 4:30 pm; Friday 8:00 am to Noon.   - Patient was given a copy of this Treatment Plan today.    again. Contact the nurse via [employer] or call [phone] if you have any questions or concerns. (Do not stop taking it if you don't think it's working. For some people it works even though they do not feel much different.)    Topiramate (Topamax) is a medication that is used most often to treat migraine headaches or for seizures. It has also been found to help with weight loss. Although it's not currently FDA approved for weight loss, it has been used safely for a number of years to help people who are carrying extra weight.     Just how topiramate helps with weight loss has not been exactly determined. However it seems to work on areas of the brain to quiet down signals related to eating.      Topiramate may make you:    >feel less interest in eating in between meals   >think less about food and eating   >find it easier to push the plate away   >find giving up pop easier    >have an easier time eating less    For some of our patients, the pills work right away. They feel and think quite differently about food. Other patients don't feel much of a change but find in fact they have lost weight! Like all weight loss medications, topiramate works best when you help it work.  This means:    1) Have less tempting high calorie (fattening) food around the house or office    2) Have lower calorie food (fruits, vegetables,low fat meats and dairy) for snacks    3) Eat out only one time or less each week.   4) Eat your meals at a table with the TV or computer off.    Side-effects. Topiramate is generally well tolerated. The main side-effects we see are:   Tingling in hands,feet, or face (usually not very troublesome)   Mental confusion and word finding trouble (about 10% of patients have this.)     Feeling sleepy or a bit dopey- this goes away very soon after starting.    One of the dangers of topiramate is the possibility of birth defects--if you get pregnant when you are on it, there is the risk that your baby will be born  with a cleft lip or palate.  If you are on topiramate and of child bearing age, you need to be on a reliable form of birth control or refrain from sexual intercourse.     Please refer to the pharmacy insert for more information on side-effects. Since many pharmacists are not familiar with the use of topiramate in weight loss, calling the clinic will get you the most accurate information on the use of this medication for weight loss.     In order to get refills of this or any medication we prescribe you must be seen in the medical weight mgmt clinic every 2-3 months.

## 2022-02-15 ENCOUNTER — TELEPHONE (OUTPATIENT)
Dept: PSYCHIATRY | Facility: CLINIC | Age: 52
End: 2022-02-15
Payer: OTHER GOVERNMENT

## 2022-02-15 NOTE — TELEPHONE ENCOUNTER
Pt called to provide the location where Janet should send her MRI referral.  Please fax to [facility] at [phone].  She also reports that the atomoxetine is not helping at all and would like to discuss this with someone.

## 2022-02-16 ENCOUNTER — MYC MEDICAL ADVICE (OUTPATIENT)
Dept: PSYCHIATRY | Facility: CLINIC | Age: 52
End: 2022-02-16
Payer: OTHER GOVERNMENT

## 2022-02-23 ENCOUNTER — TELEPHONE (OUTPATIENT)
Dept: PSYCHIATRY | Facility: CLINIC | Age: 52
End: 2022-02-23
Payer: OTHER GOVERNMENT

## 2022-02-23 NOTE — TELEPHONE ENCOUNTER
"Segundo Cardenas, [facility]     [place]    2/23/22 12:44 PM  Note     Pt called asking to talk to Karolina or someone on Janet Álvarez's team. She stated it was about her care but didn't go into specifics. I let her know Karolina would call her back when she was available she became upset and stated all she does is play \"phone tag\". Please reach out.           RN called patient, no answer, left voice message. Mailbox full.  "

## 2022-02-23 NOTE — TELEPHONE ENCOUNTER
"Pt called asking to talk to Karolina or someone on Janet Álvarez's team. She stated it was about her care but didn't go into specifics. I let her know Karolina would call her back when she was available she became upset and stated all she does is play \"phone tag\". Please reach out.   "

## 2022-02-23 NOTE — TELEPHONE ENCOUNTER
Bouchra Johnson on 2022       [facility] [place] [address] [address] [address]  Phone: [phone]  Fax: [phone]                  2022  Re: Magnolia Gonzáles  YOB: 1970     Dear Colleague,     Thank you for your referral to the [facility]UR[facility]. We have been unable to reach the patient after at least three contact attempts.       If you have any questions or concerns, please contact our office at Dept: [phone].           Sincerely,  [facility]UROLOG[facility]        Patient Name: Magnolia Gonzáles                        : 1970                  PAGE:        Janet Álvarez NP P Psych Rn - [facility]  Please inform Maryjane of this letter and urged her to contact neurology for further care regarding her cognitive concerns.     Called patient, no answer, voicemail box full.

## 2022-02-23 NOTE — TELEPHONE ENCOUNTER
"Pt called asking to talk to Karolina or someone on Janet Álvarez's team. Stated it was about her care. When I let her know Karolina would get back to her because Karolina was busy. She became flustered about playing \"phone tag\" and stopped responding during call.  "

## 2022-02-24 ENCOUNTER — TELEPHONE (OUTPATIENT)
Dept: PSYCHIATRY | Facility: CLINIC | Age: 52
End: 2022-02-24
Payer: OTHER GOVERNMENT

## 2022-02-24 NOTE — TELEPHONE ENCOUNTER
"Pt called asking to \"speak to Karolina\".  When writer attempted to clarify which Karolina she was looking for, pt became irritable about having to provide her name and further info.  While writer checked to see if nurse Karolina was available to take the call, Maryjane hung up.  "

## 2022-02-24 NOTE — TELEPHONE ENCOUNTER
RN attempted to reach patient via call. Unsuccessful. Left message with Neurology's number. RN advised patient to be close to [employer] as a message is being sent with more information. RN stated it might be easier to connect on Gen110 since there has been some phone calls back and fourth between us.     Coreen Estrada RN on 2/24/2022 at 9:31 AM

## 2022-02-24 NOTE — TELEPHONE ENCOUNTER
This was taken in another encounter dated 2/23/22   also Baileyu message sent to connect  Thanks   Coreen Estrada RN on 2/24/2022 at 9:40 AM

## 2022-03-07 ENCOUNTER — TELEPHONE (OUTPATIENT)
Dept: PSYCHIATRY | Facility: CLINIC | Age: 52
End: 2022-03-07
Payer: OTHER GOVERNMENT

## 2022-03-07 ENCOUNTER — MEDICAL CORRESPONDENCE (OUTPATIENT)
Dept: HEALTH INFORMATION MANAGEMENT | Facility: CLINIC | Age: 52
End: 2022-03-07
Payer: OTHER GOVERNMENT

## 2022-03-07 NOTE — TELEPHONE ENCOUNTER
Janet Álvarez NP  Psych Rn - [facility] 10 minutes ago (11:58 AM)     [place]    A letter is available to fax where she would like to have it sent or she can access through her EHR.    Message text        Letter added to referral and sent to [facility].

## 2022-03-07 NOTE — TELEPHONE ENCOUNTER
I have been off and on on Abilify since 2000. I request to go off it and it is not helping. It makes me feel angry-very expensive also.     I called today for the third time. I need my open [facility] referral to be sent. I need to have this MRI done for disability services before Thursday-Wednesday the latest.     RN will fax adult neurology referral placed by CAIO Gonzales. Fax sent.

## 2022-03-07 NOTE — TELEPHONE ENCOUNTER
Pt called intake and requested that Janet Álvarez send a referral for an open MRI to [facility] in [place]. Pt requested this to be done asap. Fax number is [phone]. Pt also requested a phone call back to discuss taking Abilify. Call back number is [phone]. Ok to leave a detailed message. Pt declined to schedule long-term psychiatry.

## 2022-03-07 NOTE — LETTER
[facility]  [address] [address]  Phone: [phone]     March 7, 2022    To Whom This May Concern,    Maryjane is requiring a MRI for mental status changes, unknown reason.  She  Is requiring this procedure to be an open MRI due to her high anxiety.  Please contact me with any questions with Maryjane's permission.    HERMELINDA Vickers-BC  Psychiatric Mental Health Nurse Practitioner - Board Certified

## 2022-03-08 ENCOUNTER — TELEPHONE (OUTPATIENT)
Dept: BEHAVIORAL HEALTH | Facility: CLINIC | Age: 52
End: 2022-03-08
Payer: OTHER GOVERNMENT

## 2022-03-08 ENCOUNTER — TELEPHONE (OUTPATIENT)
Dept: PSYCHIATRY | Facility: CLINIC | Age: 52
End: 2022-03-08

## 2022-03-08 DIAGNOSIS — F41.1 GENERALIZED ANXIETY DISORDER: ICD-10-CM

## 2022-03-08 DIAGNOSIS — F43.10 PTSD (POST-TRAUMATIC STRESS DISORDER): ICD-10-CM

## 2022-03-08 RX ORDER — GABAPENTIN 400 MG/1
800 CAPSULE ORAL 3 TIMES DAILY
Qty: 180 CAPSULE | Refills: 3 | Status: CANCELLED | OUTPATIENT
Start: 2022-03-08

## 2022-03-08 NOTE — TELEPHONE ENCOUNTER
Reason for Call: Order clarification and medication question    Detailed comments: 3/8/22 Received a call from the patient stated orders were written to get a MRI at [facility], [place], but [facility] would need to know what type of MRI, and Pt stated they have appointments today and tomorrow, and would like to get appointment on either of those day.  The Pt also, stated she was on Wellbutrin, but it was discontinued, but she would like to get it restarted; and that she is out of Gabapentin.     Phone Number Patient can be reached at: [phone]    Best Time: Today    Can we leave a detailed message on this number? Yes    Call taken on 3/8/2022 at 8:07 AM by Jc Christiansen

## 2022-03-08 NOTE — TELEPHONE ENCOUNTER
Date of Last Office Visit: 12/20/21  Date of Next Office Visit: 3/18/2022  No shows since last visit: none  Cancellations since last visit: none    Medication requested: Gabapentin 400 mg capsule Date last ordered: 11/11/2021 Qty: 180 Refills: 3     Review of [place] ?: yes  Medication last filled date: 1/22/2022 Qty filled: 180  Other controlled substance on [place] ?: yes  If yes, is this a new medication?: yes  If yes, name of medication: Alprazolam and date filled: 1//2022- One pill.    Lapse in medication adherence greater than 5 days?: no  If yes, call patient and gather details: no  Medication refill request verified as identical to current order?: yes  Result of Last DAM, VPA, Li+ Level, CBC, or Carbamazepine Level (at or since last visit): N/A    Last visit treatment plan: Treatment Plan:     1.    Continue Strattera 25 mg daily    2.    Clonazepam 0.5 mg daily as needed  3.  Lamotrigine 200 mg daily  4.  See someone for counseling  5.    Gabapentin 800 mg 3 times daily    6.  Neurology referral to assess for blackouts    7.  Start Critical access hospital worker services          Continue all other medications as reviewed per electronic medical record today.     Safety plan reviewed. To the Emergency Department as needed or call after hours crisis line at [phone] or [phone]. [place] Crisis Text Line. Text [place] to 037303 or Suicide LifeLine Chat: suicidepreventionlifeline.org/chat/    To schedule individual or family therapy, call [facility] at [phone].    Schedule an appointment with me in 4 weeks or sooner as needed. Call [facility] at [phone] to schedule.    Follow up with primary care provider as planned or for acute medical concerns.    Call the psychiatric nurse line with medication questions or concerns at [phone].    MyChart may be used to communicate with your provider, but this is not intended to be used for emergen      []Medication refilled per  Medication  Refill in Ambulatory Care  policy.  [x]Medication unable to be refilled by RN due to criteria not met as indicated below:    []Eligibility - not seen in the last year   []Supervision - no future appointment   []Compliance - no shows, cancellations or lapse in therapy   []Verification - order discrepancy   []Controlled medication   [x]Medication not included in policy   []90-day supply request   []Other
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
## 2022-03-08 NOTE — TELEPHONE ENCOUNTER
3/8/22: [facility] called stating that they received a referral from provider, but she did not indicate which body part/location on the body that she would like imaging to scan. RR would like a call ASAP regarding clarification as pt is scheduled for procedure. RR phone number [phone], can speak to anyone in scheduling.

## 2022-03-08 NOTE — TELEPHONE ENCOUNTER
Called patient and she declined TC therapy at this time. Patient identified several stressors in her life currently and that she doesn't to start therapy for a short time since she would then be passed on to someone else. Coordinator informed patient she can call TC back if she changes mind and wants to schedule.    Coordinator asked if patient would like to be connected to a crisis line and patient ended the call.     Referral will be closed and referral source will be updated.    Aubrie Allen  Transition Clinic Coordinator      Transition Clinic Referral   [place] Only   Limited [place] Availability     Type of Referral:        __X___Therapy    _____Therapy & Medication (Psychiatry next level of care appointment needs to be scheduled)  _____Medication Only (Psychiatry next level of care appointment needs to be scheduled)  _____Diagnostic Assessment Only        Referring Provider Name: Rehana German RN     Clinician completing the assessment. LEYDI Magana     Referring Provider: OTHER: MOUNA RN     If known, referring provider contact name: n/a; Phone Number: n/a     Reason for Transition Clinic Referral: Pt is experiencing acute stressors (see recent telephone encounters in chart)     Next Level of Care Patient Will Be Transitioned To: [facility] & Psychotherapy appt 3/18/22     Start Date for Next Level of Care Therapy (Required): 3/18/22  Provider ANGELIQUE -    Kirti Valerio LICSW      - Clinical   Location virtual     Start Date for Next Level of Care Medication (Required): 3/18/22   Provider CAIO Álvarez  Location [facility] virtual  TC Psychiatry cannot see patients who do not have active medical insurance     What Would Be Helpful from the Transition Clinic: provide support during acute crisis - pt is outside [facility] so EmPath is not accessible for her      Needs: NO     Does Patient Have Access to Technology: yes     Patient E-mail Address:  [email or web address]    Current Patient Phone Number: [phone]     Clinician Gender Preference (if applicable): NO     Cryjono German RN

## 2022-03-08 NOTE — TELEPHONE ENCOUNTER
This RN routing to [facility] RN pool & [facility] Provider as update.    Rehana German RN on 3/8/2022 at 11:32 AM

## 2022-03-08 NOTE — TELEPHONE ENCOUNTER
Patient noted the following:  I spoke with [employer] and they say they want Janet to state in the referral or letter what specifically they should be looking for. I really need to get this done tomorrow. I ti very important. I am driving right over to social security now.   Patient express thanks for the support on previous call this morning.

## 2022-03-08 NOTE — TELEPHONE ENCOUNTER
Janet Álvarez NP  Psych Rn - [facility] 9 minutes ago (2:38 PM)     [place]    Spoke to radiology scheduler to give required information.  I was informed they will contact Maryjane now to schedule a brain MRI with or without contrast.    Message text

## 2022-03-08 NOTE — TELEPHONE ENCOUNTER
This RN reviewed TC Psych schedule & next available NP opening is after pt's scheduled [facility] provider appt; recommended possible TC Therapy which RN, SN, agreed pt may find helpful.    This RN to complete referral to TC Therapy & route to team. See separate telephone encounter.    Rehana German RN on 3/8/2022 at 10:42 AM

## 2022-03-08 NOTE — TELEPHONE ENCOUNTER
Transition Clinic Referral   [place] Only   Limited [place] Availability    Type of Referral:      __X___Therapy    _____Therapy & Medication (Psychiatry next level of care appointment needs to be scheduled)  _____Medication Only (Psychiatry next level of care appointment needs to be scheduled)  _____Diagnostic Assessment Only      Referring Provider Name: Rehana German RN    Clinician completing the assessment. LEYDI Magana    Referring Provider: OTHER: MOUNA RN    If known, referring provider contact name: n/a; Phone Number: n/a    Reason for Transition Clinic Referral: Pt is experiencing acute stressors (see recent telephone encounters in chart)    Next Level of Care Patient Will Be Transitioned To: [facility] & Psychotherapy appt 3/18/22    Start Date for Next Level of Care Therapy (Required): 3/18/22  Provider ANGELIQUE -    Kirti Valerio LICSW     - Clinical   Location virtual    Start Date for Next Level of Care Medication (Required): 3/18/22   Provider CAIO Álvarez  Location [facility] virtual  TC Psychiatry cannot see patients who do not have active medical insurance    What Would Be Helpful from the Transition Clinic: provide support during acute crisis - pt is outside [facility] so Em[facility] is not accessible for her     Needs: NO    Does Patient Have Access to Technology: yes    Patient E-mail Address: [email or web address]    Current Patient Phone Number: [phone]    Clinician Gender Preference (if applicable): NO    Rehana German RN

## 2022-03-08 NOTE — TELEPHONE ENCOUNTER
Janet Álvarez, CAIO  Psych Rn - [facility] 17 minutes ago (2:02 PM)     [place]    Please get telephone number for facility so that I can talk to someone      Here is the [phone]

## 2022-03-08 NOTE — TELEPHONE ENCOUNTER
Patient calling EDITH Turcios back. Patient was given an MRI referral that does not specify what it is for. Patient needs to know what it specifically needs. Call patient back at: [phone]. Patient says she needs to get this done before tomorrow so sent high priority.

## 2022-03-20 ENCOUNTER — HEALTH MAINTENANCE LETTER (OUTPATIENT)
Age: 52
End: 2022-03-20

## 2022-03-21 ENCOUNTER — TELEPHONE (OUTPATIENT)
Dept: FAMILY MEDICINE | Facility: OTHER | Age: 52
End: 2022-03-21
Payer: OTHER GOVERNMENT

## 2022-03-21 ENCOUNTER — PATIENT OUTREACH (OUTPATIENT)
Dept: CARE COORDINATION | Facility: CLINIC | Age: 52
End: 2022-03-21
Payer: OTHER GOVERNMENT

## 2022-03-21 DIAGNOSIS — F33.0 MAJOR DEPRESSIVE DISORDER, RECURRENT EPISODE, MILD (H): ICD-10-CM

## 2022-03-21 DIAGNOSIS — F43.23 ADJUSTMENT DISORDER WITH MIXED ANXIETY AND DEPRESSED MOOD: Primary | ICD-10-CM

## 2022-03-21 DIAGNOSIS — F41.1 GENERALIZED ANXIETY DISORDER: ICD-10-CM

## 2022-03-21 DIAGNOSIS — F43.10 PTSD (POST-TRAUMATIC STRESS DISORDER): ICD-10-CM

## 2022-03-21 DIAGNOSIS — F40.10 SOCIAL PHOBIA: ICD-10-CM

## 2022-03-21 NOTE — PROGRESS NOTES
Clinic Care Coordination Contact  [facility]/Voicemail       Clinical Data: CHW Outreach  Outreach attempted x 1. Left message on patient's voicemail with call back information and requested return call.    Plan: CHW will try to reach patient again in 1-2 business days.    Notes:  -schedule with BISHNU ()  -patient is homeless    Belén Darwin  Community Health Worker   [facility]  Care Coordination  [facility]  [email or web address]   Office: [phone]

## 2022-03-21 NOTE — TELEPHONE ENCOUNTER
----- Message from FABIANA Cramer sent at 3/18/2022  8:11 AM CDT -----  Regarding: referral for clinic case management  Mrs. Barreto,     I noticed that Maryjane went to the ED this morning at [facility] and is in search of resources. I am wondering if you would be able to place a referral to clinic case management to see if they can assist her with county services, such as housing. She has been living out of her car with her dog for the last 10 days.     Thank you,     Kirti JUNG,   Behavioral Health Clinician  [facility] Services
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
## 2022-03-22 ENCOUNTER — PATIENT OUTREACH (OUTPATIENT)
Dept: FAMILY MEDICINE | Facility: CLINIC | Age: 52
End: 2022-03-22
Payer: OTHER GOVERNMENT

## 2022-03-22 DIAGNOSIS — F33.0 MAJOR DEPRESSIVE DISORDER, RECURRENT EPISODE, MILD (H): ICD-10-CM

## 2022-03-22 DIAGNOSIS — F43.10 PTSD (POST-TRAUMATIC STRESS DISORDER): ICD-10-CM

## 2022-03-22 DIAGNOSIS — F41.1 GENERALIZED ANXIETY DISORDER: ICD-10-CM

## 2022-03-22 DIAGNOSIS — F40.10 SOCIAL PHOBIA: ICD-10-CM

## 2022-03-22 DIAGNOSIS — F43.23 ADJUSTMENT DISORDER WITH MIXED ANXIETY AND DEPRESSED MOOD: ICD-10-CM
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
## 2022-03-22 NOTE — LETTER
[facility]ORD[facility] - [place] [facility]  [address] [place]   [address]  Tel. [phone]      March 22, 2022    Magnolia Gonzáles  [facility] [address]      Dear Magnolia,    I am a clinic care coordinator who works with Simran Barreto PA-C at [facility]. I wanted to thank you for spending the time to talk with me.  Below is a description of clinic care coordination and how I can further assist you.      The clinic care coordination team is made up of a registered nurse,  and community health worker who understand the health care system. The goal of clinic care coordination is to help you manage your health and improve access to the health care system in the most efficient manner. The team can assist you in meeting your health care goals by providing education, coordinating services, strengthening the communication among your providers and supporting you with any resource needs.    Please feel free to contact me at [phone] with any questions or concerns. We are focused on providing you with the highest-quality healthcare experience possible and that all starts with you.     Sincerely,     THIEN Sterling & Certified Financial   [facility] - Care Coordination  [facility]   [phone]

## 2022-04-14 ENCOUNTER — TELEPHONE (OUTPATIENT)
Dept: PSYCHIATRY | Facility: CLINIC | Age: 52
End: 2022-04-14
Payer: OTHER GOVERNMENT

## 2022-04-14 DIAGNOSIS — F90.8 ATTENTION DEFICIT HYPERACTIVITY DISORDER (ADHD), OTHER TYPE: ICD-10-CM

## 2022-04-14 DIAGNOSIS — F43.10 PTSD (POST-TRAUMATIC STRESS DISORDER): Primary | ICD-10-CM

## 2022-04-14 DIAGNOSIS — F31.12 BIPOLAR AFFECTIVE DISORDER, MANIC, MODERATE (H): ICD-10-CM

## 2022-04-14 DIAGNOSIS — Z59.00 HOMELESSNESS: ICD-10-CM

## 2022-04-14 DIAGNOSIS — F41.1 GENERALIZED ANXIETY DISORDER: ICD-10-CM

## 2022-04-14 SDOH — ECONOMIC STABILITY - HOUSING INSECURITY: HOMELESSNESS UNSPECIFIED: Z59.00

## 2022-04-14 NOTE — TELEPHONE ENCOUNTER
Medication not current on patient's med list and not found in last treatment plan as well.  Treatment Plan:     1.    Continue Strattera 25 mg daily    2.    Clonazepam 0.5 mg daily as needed  3.  Lamotrigine 200 mg daily  4.  See someone for counseling  5.    Gabapentin 800 mg 3 times daily    6.  Neurology referral to assess for blackouts    7.  Start [facility] [place] worker services

## 2022-04-14 NOTE — TELEPHONE ENCOUNTER
Pharmacy requesting refill on Bupropion  mg, Bupropion XL 300mg. I don't see this on patient med list.    Directions:  1 tablet in the morning along with 300mg tablet for total daily dose 450mg     1 tablet in the morning along with 150mg tablet for total daily dose 450mg

## 2022-04-18 ENCOUNTER — TELEPHONE (OUTPATIENT)
Dept: PSYCHIATRY | Facility: CLINIC | Age: 52
End: 2022-04-18
Payer: OTHER GOVERNMENT

## 2022-04-18 RX ORDER — ATOMOXETINE 40 MG/1
40 CAPSULE ORAL DAILY
Qty: 14 CAPSULE | Refills: 0 | Status: SHIPPED | OUTPATIENT
Start: 2022-04-18 | End: 2022-06-27

## 2022-04-18 NOTE — TELEPHONE ENCOUNTER
Reason for Call:  Other call back    Detailed comments: Pt requesting to connect with RN Karolina PICHARDO, did not elaborate on purpose of call.    Phone Number Patient can be reached at: Cell number on file:    Telephone Information:   Mobile [phone]       Best Time: any    Can we leave a detailed message on this number? YES    Call taken on 4/18/2022 at 12:40 PM by Danny Duncan
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
## 2022-04-18 NOTE — CONFIDENTIAL NOTE
Pt reportedly threatening suicide if can not get wellbutrin. The reasons I am uncomfortable prescribing wellbutrin ath this time:     She is clearly unstable and in crisis. She has not been seen by Janet in 4+ months, reportedly has had recent blackouts, has active infections of multiple types with impending finger amputation, high blood pressure yesterday, is on strattera which really should not be prescribed with wellbutrin, is threatening suicide to get on medication she has not been on in months, etc. Without evaluating this patient for a visit, I am not comfortable starting Wellbutrin and she is currently reporting she is not stable to wait for a visit or until provider return and so should be evaluated in the emergency room.     A welfare check should be called if pt continuing to threaten suicide but refusing to be evaluated in ER/call 911.     Eva Wagner, DO  Collaborative Care Psychiatry  [facility]
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
## 2022-04-18 NOTE — CONFIDENTIAL NOTE
Since pt noted she has no meds at all right now, I did refill strattera for two week supply. Looks like she would need to restart lamotrigine titration so not refilled and I am not willing to refill Klonopin or gabapentin at this time.

## 2022-04-19 ENCOUNTER — TELEPHONE (OUTPATIENT)
Dept: FAMILY MEDICINE | Facility: OTHER | Age: 52
End: 2022-04-19
Payer: OTHER GOVERNMENT

## 2022-04-19 NOTE — TELEPHONE ENCOUNTER
RN was informed that the patient returned call to the clinic. Patient requested the writer who was unavailable at the time. RN tried calling the patient back three times and was unable to reach her.     Murphy Graham, MICHAELN, RN, PHN  [facility] RN for [place]  April 19, 2022

## 2022-04-19 NOTE — TELEPHONE ENCOUNTER
Reason for Call:  Medication or medication refill:    Do you use a [facility]?  Name of the pharmacy and phone number for the current request:  [facility]     Name of the medication requested: Welbutrin XL 400mg & 150mg     Other request: Pt states she is inbetween mental health providers right now and needs these filled by her PCP. They are not on her current med list.     Pt states she is having transportation and residence issues, cannot come in for appt.     See notes from last week regarding conversations with RN and subsequent calling of 911 for welfare check.     Can we leave a detailed message on this number? YES    Phone number patient can be reached at: Cell number on file:    Telephone Information:   Mobile [phone]       Best Time:     Call taken on 4/19/2022 at 8:39 AM by Aubrie Weber

## 2022-04-19 NOTE — TELEPHONE ENCOUNTER
Patient called back to the clinic. Patient was in a calm mood. She stated that she had a ride coming to get her and was taking her to [place]. She kept asking what she needs to do from there? I informed her to go to the nearest hospital or see if they can take her to [facility]. We talked about the process of getting into in patient.     Patient's mood was very calm compared to my last conversation with her. She seems to have a trust concern with anyone besides Simran or the [facility]. She asked if she could keep calling us to keep us informed. I told her yes she can. Patient stated that the ambulance was there to pick her up.     Murphy Graham, MICHAELN, RN, PHN  [facility] RN for [place]  April 19, 2022

## 2022-04-19 NOTE — TELEPHONE ENCOUNTER
"Patient returned call and wanted to know the followin. If she can get her medications. RN explained that Simran does not feel comfortable prescribing her Wellbutrin due to the ER providers note. I explained that it appears the providers what to verify that she does not have epilepsy prior to prescribing due to seizures. The patient stated \" I have never had seizures before. This is my life and you are not listening to me. \"     2. Patient asking for clarification on what \"seeking inpatient means and where she is suppose to go.\" RN mentioned that I would need to get off the phone and do some reach prior to telling her where to go. Patient was asking what the exact plan at the inpatient service would be. Tried explaining that I am unsure what the plan would be without an evaluation. Patient was talking over the RN and swearing, then hung up.     RN huddled with Simran Barreto. Then tried calling the patient back, but was unsuccessful. Please inform the patient of the plan below:   RN sent information through [employer].     1. Can go back through the emergency room to be admitted. Sometimes there are waits for beds, which may require a longer stay at the hospital until the patient can be admitted to the facility. The location is unknown until notice is given to the hospital on where the facility with the open bed is. It could be in the [facility] area or it could be in other places throughout [place].   You can go to any emergency room.   2. [facility]- [facility]. This is an emergency room geared to help those who have mental health needs or other needs going on in their lives. They would be able to evaluate her, determine a medication plan, and possible inpatient treatment. Again, the inpatient treatment facility location will vary depending on what facility has an open bed.   ADDRESS: [facility] [address], [id number]  [place] [address]  3. Could also go to the [facility] and talk to someone in the " Human Resource area. She could request a  and/or  to help her with her situation.   4. Does she have an appointment scheduled with psychiatry?       Murphy Graham, MICHAELN, RN, PHN  [facility] RN for [place] [place]  April 19, 2022

## 2022-04-19 NOTE — TELEPHONE ENCOUNTER
Please call patient. I do really want to help her but I did review Psychiatry's note Dr Kristy is covering for [place] and she noted very specific reasons she is not refilling the Wellbutrin and I agree with those reasons, if Psychiatry is not comfortable with refilling her on these I do not feel comfortable as well.  I would highly recommend patient seeking potential inpatient options for treatment at this time, are we able to reschedule her with Psychiatry soon?    Simran Barreto PA-C

## 2022-04-19 NOTE — CONFIDENTIAL NOTE
Referral placed for Care Coordination. This was placed back on 3/22/2022, 7/16/2021, 6/21/2021, 6/11/2021 as well, unfortunately feel that the patient is likely hindering her ability to get additional assistance.       Simran Barreto PA-C

## 2022-04-19 NOTE — TELEPHONE ENCOUNTER
RN called patient but unable to leave a message as mailbox is full. Will send MyChart message to patient.   Patient was seen in ER today for anxiety and homelessness.  assisted with finding homeless shelter for patient to go to today, and ER was helping patient get access to anxiety medication. ER provider agreed with not prescribing Wellbutrin.     Shanon Epps RN on 4/19/2022 at 11:04 AM

## 2022-04-19 NOTE — TELEPHONE ENCOUNTER
"Patient calling in regards to \"needing somewhere to go and need help.\" Stating I do not want to be \"comitted\" States she is 52 years old and is in need of help and is not getting it.    [place] to [place] ED several times.    Advised patient call her Psychiatrist and she states she got rid of her and was told to get another one.     Advised patient be in contact with the  to assist in shelter. Patient states \"they are no help to me and won't do anything to help me.\"    \"All the help is out there but no one is helping me\" Patient was swearing a lot and was hard to follow. Clearly patient is very upset.    Advised providers recommendations multiple times, patient states \"I have been in the ED multiple times and they push me out, telling me they cannot help me.\"    \"I have no car and I'm homeless with only the clothes on my back, and no one is helping me, I am begging for help.\"    Patient states \"seems better that I just do a bunch of meth and drugs and drink a bunch of alcohol and die.\"    Advised numerous times for patient to call 911 and bring her to ED and request to be admitted for inpatient mental health help. Patient states \"they only bring me to [facility] which is way up [place] and it's a small hospital.\" States they push her out each time she goes. Requests for the ambulance to bring her elsewhere and they said they are one ambulance in a small area and cannot cross the county lines.     Patient ended the call by saying \"ok I will just do drugs and and get drunk and die\" and slammed the phone on writer.     Unable to do welfare check on patient as she is homeless and have no location for patient.     Bernice Robert, MICHAELN, RN  [place]  April 19, 2022        "

## 2022-04-20 ENCOUNTER — PATIENT OUTREACH (OUTPATIENT)
Dept: CARE COORDINATION | Facility: CLINIC | Age: 52
End: 2022-04-20
Payer: OTHER GOVERNMENT

## 2022-04-20 NOTE — PROGRESS NOTES
Clinic Care Coordination Contact  [facility]/Voicemail       Clinical Data: Care Coordinator Outreach  Outreach attempted x 1.  Left message on patient's voicemail with call back information and requested return call.  Plan:  Care Coordinator will try to reach patient again in 1-2 business days.    THIEN Sterling, Certified Financial SW  [facility] - Care Coordinator   4/20/2022   3:38 PM  [phone]

## 2022-04-20 NOTE — TELEPHONE ENCOUNTER
I would highly recommend the [facility] through [email or web address] they are specifically meant for patient's in acute crisis and they will be the most helpful to her.  I understand she is frustrated by all of this but it doesn't look like her Psychiatrist dropped her and I would highly recommend scheduling a follow-up.  This is not uncommon behavior for this patient unfortunately, she tends to be very worked up, swearing, she has a lot of very stressful and traumatic events happen in her life and she has a hard time trusting anyone at this point.     Again [facility] through [facility], highly recommend, may be the best option to get her the help she needs.     Simran Barreto PA-C

## 2022-04-20 NOTE — TELEPHONE ENCOUNTER
"Pt calls back. Advised her of provider message. Pt is currently at ED in [place] hoping to have sciatica pain assessed. She is very concerned about going to another ED and getting \"medicated and sent on her way\" or that she'll be \"commited\" for psychiatric care. Pulled up information and on [facility] and read to pt about their approach. Pt is amenable to going here. She states that this sounds nice and more like what she wants. She asks if there are beds? Advised her that it is an emergency care situation so she does not need to worry about beds they won't turn her away. She states that she has no one to give her a ride and no car. Discussed that the hospital she is at should have referrals or could offer assistance with this. Advised pt to go to the desk and ask for care coordination or a  and tell them that she needs assistance with a ride. Pt will do this. She asks if she should even wait at the ER she is at? Advised her that this is her choice. [facility] is at the [facility] and they have an ED there where she could get her physical pain addressed as well. Pt verbalizes understanding and is in agreement with plan. She denies further questions.     Larissa Sosa, MICHAELN, RN, PHN  Registered Nurse-Clinic Triage  [facility]  4/20/2022 at 5:03 PM    "

## 2022-04-20 NOTE — TELEPHONE ENCOUNTER
RN tried calling the patient, but was unable to reach her. Sent her a message through [employer].   If patient calls back please transfer to RN.   RN please review message below from Simran and myself.     Luis Antonio Wesley,     I just tried calling you. If you are able to please call me back at [phone] so we can talk. My name is Murphy and I spoke with you yesterday. I work closely with Simran Barreto. I see you ended up going into the hospital, which is good. We received a call that  you are waiting on an inpatient referral. These referrals come from the hospital you are at and not from [facility]. Simran encourages you to see if you can get to [facility] ED down at [facility]. She believes that this location will be able to accommodate your situation. Again, this is an emergency room. However, they will have the resources you are needing to get the help you need. Or the hospital you are curretly at will help you find resources as well.      Let me know what else I can assist you with.       Take Care,         Murphy Graham, MICHAELN, RN, PHN  [facility] RN for [place] [place]  April 20, 2022

## 2022-04-20 NOTE — TELEPHONE ENCOUNTER
Patient called and stated that she is currently being checked into the ER and is wanting a referral for inpatient care somewhere near the [facility].

## 2022-04-21 NOTE — PROGRESS NOTES
Clinic Care Coordination Contact  [facility]/Voicemail       Clinical Data: Care Coordinator Outreach  Outreach attempted x 2.  Voice mail was full and unable to leave a message.  Pt has been called several times in the last year and either declined care coordination or not answered.   Plan: Care Coordinator sent resources and contact number on 3/21/22.  Care Coordinator will do no further outreaches at this time.    THIEN Sterling, Certified Financial [facility] - Care Coordinator   4/21/2022   1:42 PM  [phone]

## 2022-04-26 ENCOUNTER — TELEPHONE (OUTPATIENT)
Dept: FAMILY MEDICINE | Facility: OTHER | Age: 52
End: 2022-04-26

## 2022-04-26 ENCOUNTER — VIRTUAL VISIT (OUTPATIENT)
Dept: FAMILY MEDICINE | Facility: CLINIC | Age: 52
End: 2022-04-26
Payer: COMMERCIAL

## 2022-04-26 DIAGNOSIS — F41.1 GENERALIZED ANXIETY DISORDER: ICD-10-CM

## 2022-04-26 DIAGNOSIS — F43.10 PTSD (POST-TRAUMATIC STRESS DISORDER): ICD-10-CM

## 2022-04-26 DIAGNOSIS — R11.0 NAUSEA: ICD-10-CM

## 2022-04-26 DIAGNOSIS — F31.12 BIPOLAR AFFECTIVE DISORDER, MANIC, MODERATE (H): ICD-10-CM

## 2022-04-26 PROCEDURE — 99214 OFFICE O/P EST MOD 30 MIN: CPT | Performed by: FAMILY MEDICINE

## 2022-04-26 PROCEDURE — 96127 BRIEF EMOTIONAL/BEHAV ASSMT: CPT | Mod: 59 | Performed by: FAMILY MEDICINE

## 2022-04-26 RX ORDER — BUPROPION HYDROCHLORIDE 150 MG/1
150 TABLET ORAL EVERY MORNING
Qty: 90 TABLET | Refills: 0 | Status: SHIPPED | OUTPATIENT
Start: 2022-04-26 | End: 2022-05-06

## 2022-04-26 RX ORDER — LAMOTRIGINE 200 MG/1
200 TABLET ORAL DAILY
Qty: 30 TABLET | Refills: 3 | Status: CANCELLED | OUTPATIENT
Start: 2022-04-26

## 2022-04-26 RX ORDER — ONDANSETRON 4 MG/1
4 TABLET, FILM COATED ORAL EVERY 8 HOURS PRN
Qty: 30 TABLET | Refills: 0 | Status: CANCELLED | OUTPATIENT
Start: 2022-04-26

## 2022-04-26 RX ORDER — CLONAZEPAM 0.5 MG/1
0.5 TABLET ORAL DAILY PRN
Qty: 7 TABLET | Refills: 0 | Status: SHIPPED | OUTPATIENT
Start: 2022-04-26 | End: 2023-02-14

## 2022-04-26 RX ORDER — METHOCARBAMOL 500 MG/1
500 TABLET, FILM COATED ORAL
Status: CANCELLED | OUTPATIENT
Start: 2022-04-26

## 2022-04-26 RX ORDER — GABAPENTIN 400 MG/1
800 CAPSULE ORAL 3 TIMES DAILY
Qty: 180 CAPSULE | Refills: 3 | Status: CANCELLED | OUTPATIENT
Start: 2022-04-26

## 2022-04-26 ASSESSMENT — ENCOUNTER SYMPTOMS: NERVOUS/ANXIOUS: 1

## 2022-04-26 ASSESSMENT — PATIENT HEALTH QUESTIONNAIRE - PHQ9: 5. POOR APPETITE OR OVEREATING: NEARLY EVERY DAY

## 2022-04-26 ASSESSMENT — ANXIETY QUESTIONNAIRES
6. BECOMING EASILY ANNOYED OR IRRITABLE: MORE THAN HALF THE DAYS
5. BEING SO RESTLESS THAT IT IS HARD TO SIT STILL: MORE THAN HALF THE DAYS
IF YOU CHECKED OFF ANY PROBLEMS ON THIS QUESTIONNAIRE, HOW DIFFICULT HAVE THESE PROBLEMS MADE IT FOR YOU TO DO YOUR WORK, TAKE CARE OF THINGS AT HOME, OR GET ALONG WITH OTHER PEOPLE: EXTREMELY DIFFICULT
7. FEELING AFRAID AS IF SOMETHING AWFUL MIGHT HAPPEN: NEARLY EVERY DAY
3. WORRYING TOO MUCH ABOUT DIFFERENT THINGS: NEARLY EVERY DAY
1. FEELING NERVOUS, ANXIOUS, OR ON EDGE: NEARLY EVERY DAY
GAD7 TOTAL SCORE: 19
2. NOT BEING ABLE TO STOP OR CONTROL WORRYING: NEARLY EVERY DAY

## 2022-04-26 NOTE — PROGRESS NOTES
"Maryjane is a 52 year old who is being evaluated via a billable telephone visit.      What phone number would you like to be contacted at? [phone]  How would you like to obtain your AVS? MyChart    Assessment & Plan     Generalized anxiety disorder  - clonazePAM (KLONOPIN) 0.5 MG tablet; Take 1 tablet (0.5 mg) by mouth daily as needed for anxiety  - buPROPion (WELLBUTRIN XL) 150 MG 24 hr tablet; Take 1 tablet (150 mg) by mouth every morning    PTSD (post-traumatic stress disorder)  Advised to keep upcoming appointment with psychiatry and therapy    Bipolar affective disorder, manic, moderate (H)  Stable.  Continue with current medications and keep upcoming behavior health appointment.        BMI:   Estimated body mass index is 26.23 kg/m  as calculated from the following:    Height as of 6/15/21: 1.626 m (5' 4\").    Weight as of 6/15/21: 69.3 kg (152 lb 12.8 oz).           No follow-ups on file.    Jose Rafael Simpson DO  [facility] ELODIA Wesley is a 52 year old who presents for the following health issues   History of Present Illness       Mental Health Follow-up:  Patient presents to follow-up on Depression & Anxiety.Patient's depression since last visit has been:  Worse  : hard to go out and do anything.  Patient's anxiety since last visit has been:  Worse    Any significant life events: housing concerns  Patient is feeling anxious or having panic attacks. (1 panic attack every other day)  Patient has no concerns about alcohol or drug use.               1. Depression f/u: Chronic condition.  Patient is currently in the process of establishing with psychiatrist next week.  Patient does not have a family.  Difficulty with living situation, difficult family support, and single for the past 2 years.  Patient's dog was taken away.  Currently going through subsidized housing.     Review of Systems   Constitutional: Negative for chills and fever.   HENT: Negative for congestion, ear pain, " hearing loss and sore throat.    Eyes: Negative for pain and visual disturbance.   Respiratory: Negative for cough and shortness of breath.    Cardiovascular: Negative for chest pain, palpitations and peripheral edema.   Gastrointestinal: Negative for abdominal pain, constipation, diarrhea, heartburn, hematochezia and nausea.   Breasts:  Negative for tenderness, breast mass and discharge.   Genitourinary: Negative for dysuria, frequency, genital sores, hematuria, pelvic pain, urgency, vaginal bleeding and vaginal discharge.   Musculoskeletal: Negative for arthralgias, joint swelling and myalgias.   Skin: Negative for rash.   Neurological: Negative for dizziness, weakness, headaches and paresthesias.   Psychiatric/Behavioral: Negative for mood changes. The patient is nervous/anxious.             Objective           Vitals:  No vitals were obtained today due to virtual visit.     healthy, alert and no distress  PSYCH: Alert and oriented times 3; coherent speech, normal   rate and volume, able to articulate logical thoughts, able   to abstract reason, no tangential thoughts, no hallucinations   or delusions  Her affect is normal  RESP: No cough, no audible wheezing, able to talk in full sentences  Remainder of exam unable to be completed due to telephone visits    Phone call duration: 20 minutes

## 2022-04-26 NOTE — TELEPHONE ENCOUNTER
Patient calling stating she is looking for refill on her wellbutrin.  has been in the ED [facility] and they had her talk to psychiatrist through zoom. Tried twice to go in patient through  [facility]- no success.  She is trying to get her wellbutrin filled. She is not suicidal-she said that you are aware of  All of this.     Please advise

## 2022-04-26 NOTE — TELEPHONE ENCOUNTER
I understand she has been doing the things we have asked the hard part is that Her case is complicated and she was seeing Psychiatry and psychiatry didn't feel it was appropriate to reinitiate the Wellbutrin for good reasons and if they don't feel comfortable I won't either.  I am not up to date on everything that has been going on anything she may have been on etc so I just recommend seeing the Psychiatrist which is just around the corner.     Simran Barreto PA-C

## 2022-04-26 NOTE — TELEPHONE ENCOUNTER
Please see previous encounters.  1. If she saw a psychiatrist recently they should be the ones prescribing her current medications. 2. A covering psychiatrist for her normal psychiatrist noted that both didn't want to restart the Wellbutrin based on current issues and concerns.  She needs to follow-up with Psychiatry regularly or find a new one to meet with regularly. I am no longer managing her psychiatric medications due to complications with poor outcomes, poor follow-up with patient and obvious poor response on medications.     Simran Barreto PA-C

## 2022-04-26 NOTE — TELEPHONE ENCOUNTER
Pt called in and have an appt with new psychiatrist next Tuesday 5/3/22 with [facility] in [place] for  New diagnosis       Also have appt on May 12th @ [facility] in [place]  With a prescribing psychiatrist.     Pt said she is homeless and in a shelter, she is wondering if up until her appt can you give her something or suggest something. She did go to the ER trying to get inpatient and they told her basically no and that is not what she needed.     She wanted this sent back to provider to see she is trying everything.

## 2022-04-27 ASSESSMENT — ENCOUNTER SYMPTOMS
HEMATURIA: 0
MYALGIAS: 0
BREAST MASS: 0
FREQUENCY: 0
CONSTIPATION: 0
PALPITATIONS: 0
SORE THROAT: 0
DIARRHEA: 0
FEVER: 0
EYE PAIN: 0
ARTHRALGIAS: 0
COUGH: 0
DYSURIA: 0
WEAKNESS: 0
SHORTNESS OF BREATH: 0
PARESTHESIAS: 0
JOINT SWELLING: 0
HEMATOCHEZIA: 0
CHILLS: 0
ABDOMINAL PAIN: 0
HEADACHES: 0
HEARTBURN: 0
DIZZINESS: 0
NAUSEA: 0

## 2022-04-27 ASSESSMENT — ANXIETY QUESTIONNAIRES: GAD7 TOTAL SCORE: 19

## 2022-04-28 ENCOUNTER — TRANSFERRED RECORDS (OUTPATIENT)
Dept: HEALTH INFORMATION MANAGEMENT | Facility: CLINIC | Age: 52
End: 2022-04-28
Payer: OTHER GOVERNMENT

## 2022-04-28 NOTE — TELEPHONE ENCOUNTER
[employer] message with provider information was read by pt.     Larissa Sosa, BSN, RN, PHN  Registered Nurse-Clinic Triage  [facility]  4/28/2022 at 10:30 AM

## 2022-04-30 ENCOUNTER — NURSE TRIAGE (OUTPATIENT)
Dept: NURSING | Facility: CLINIC | Age: 52
End: 2022-04-30
Payer: OTHER GOVERNMENT

## 2022-04-30 NOTE — CONFIDENTIAL NOTE
Patient is calling and is requesting Gabapentin from a provider she did a virtual visit with on 04/26/22.  Triager advised caller that she has three refills remaining at [facility] pharmacy.  Patient says she is homeless and was wondering if the provider could prescribe her some benadryl.  Triager advised caller that benadryl is an over the counter medication, and the provider only will address emergency situations with medications on the weekend.  Triager advised caller to call back during clinic hours.  Caller verbalized and understands directives.  COVID 19 Nurse Triage Plan/Patient Instructions    Please be aware that novel coronavirus (COVID-19) may be circulating in the community. If you develop symptoms such as fever, cough, or SOB or if you have concerns about the presence of another infection including coronavirus (COVID-19), please contact your health care provider or visit [email or web address].     Disposition/Instructions    Home care recommended. Follow home care protocol based instructions.    Thank you for taking steps to prevent the spread of this virus.  o Limit your contact with others.  o Wear a simple mask to cover your cough.  o Wash your hands well and often.    Resources    [facility]: About COVID-19: [email or web address]    CDC: What to Do If You're Sick: www.cdc.gov/coronavirus/2019-ncov/about/steps-when-sick.html    CDC: Ending Home Isolation: www.cdc.gov/coronavirus/2019-ncov/hcp/disposition-in-home-patients.html     CDC: Caring for Someone: www.cdc.gov/coronavirus/2019-ncov/if-you-are-sick/care-for-someone.html     [facility]: Interim Guidance for Hospital Discharge to Home: www.health.[facility].[place]/diseases/coronavirus/hcp/hospdischarge.pdf    [facility] clinical trials (COVID-19 research studies): clinicalaffairs.[facility]/umn-clinical-trials     Below are the COVID-19 hotlines at the [place] Department of Health ([facility]). Interpreters are available.   o For  health questions: Call 989-572-5346 or 1-731.345.5376 (7 a.m. to 7 p.m.)  o For questions about schools and childcare: Call 220-163-1378 or 1-524.220.1966 (7 a.m. to 7 p.m.)

## 2022-05-02 ENCOUNTER — TELEPHONE (OUTPATIENT)
Dept: PSYCHIATRY | Facility: CLINIC | Age: 52
End: 2022-05-02
Payer: OTHER GOVERNMENT

## 2022-05-02 NOTE — TELEPHONE ENCOUNTER
Maryjane called back to ask about this.  Please try looking for this again tomorrow and call her again.

## 2022-05-02 NOTE — TELEPHONE ENCOUNTER
"Pt reports Janet had ordered an MRI for her and \"they sent over the results Friday\" so she wants to speak with Janet as soon as possible to discuss the results.  Requests someone calls rather than messaging through [employer].    "

## 2022-05-03 NOTE — TELEPHONE ENCOUNTER
"Date of Last Office Visit:   Date of Next Office Visit: ***  No shows since last visit: ***  Cancellations since last visit: ***    Medication requested: *** Date last ordered: *** Qty: *** Refills: ***     Review of [place] ?: ***  Medication last filled date: *** Qty filled: ***  Other controlled substance on [place] ?: ***  If yes, is this a new medication?: ***  If yes, name of medication: *** and date filled: ***    Lapse in medication adherence greater than 5 days?: ***  If yes, call patient and gather details: ***  Medication refill request verified as identical to current order?: ***  Result of Last DAM, VPA, Li+ Level, CBC, or Carbamazepine Level (at or since last visit): {Blank single:15420::\"Negative\",\"Positive\",\"other\",\"N/A\"}    Last visit treatment plan: ***    []Medication refilled per  Medication Refill in Ambulatory Care  policy.  []Medication unable to be refilled by RN due to criteria not met as indicated below:    []Eligibility - not seen in the last year   []Supervision - no future appointment   []Compliance - no shows, cancellations or lapse in therapy   []Verification - order discrepancy   []Controlled medication   []Medication not included in policy   []90-day supply request   []Other    "

## 2022-05-04 DIAGNOSIS — F41.1 GENERALIZED ANXIETY DISORDER: ICD-10-CM

## 2022-05-04 NOTE — TELEPHONE ENCOUNTER
Pt called intake back and reported the results have been faxed to the correct number now. Pt would like a phone micheal back still regarding getting a medical opinon form filled out by Janet Álvarez. Pt reported she needs this form filled out for county assistance/disability. Pt reported she needs that form filled out by 5/9. Call back number is [phone]. Ok to leave a detailed message.

## 2022-05-04 NOTE — TELEPHONE ENCOUNTER
Pt called intake back to add onto note from earlier. Pt reports the form that is being faxed to Janet will be for subsidized housing as well. Pt provided with fax number [phone].

## 2022-05-04 NOTE — TELEPHONE ENCOUNTER
She lost her previous Rx that she picked up on 4-26 from [facility] and is requesting a refill. Please send a new Rx if ok. Thanks!

## 2022-05-04 NOTE — TELEPHONE ENCOUNTER
RN sent x.ai message with fax # requesting patient have MRI faxed    Coreen Estrada RN on 5/4/2022 at 1:59 PM

## 2022-05-04 NOTE — TELEPHONE ENCOUNTER
RN called patient per her request, no answer, left voice message that provider has not received the results of the MRI. Included the fax number.

## 2022-05-05 ENCOUNTER — TELEPHONE (OUTPATIENT)
Dept: PSYCHIATRY | Facility: CLINIC | Age: 52
End: 2022-05-05
Payer: OTHER GOVERNMENT

## 2022-05-05 NOTE — TELEPHONE ENCOUNTER
"RN received forms from [employer].   Forms have been scanned to HIM as well as emailed to provider. Provider updated.     Forms have been imported into the \"MEDIA\" section of her profile.     Coreen Estrada RN on 5/5/2022 at 7:24 AM      "

## 2022-05-06 ENCOUNTER — TELEPHONE (OUTPATIENT)
Dept: PSYCHIATRY | Facility: CLINIC | Age: 52
End: 2022-05-06
Payer: OTHER GOVERNMENT

## 2022-05-06 RX ORDER — BUPROPION HYDROCHLORIDE 150 MG/1
150 TABLET ORAL EVERY MORNING
Qty: 90 TABLET | Refills: 0 | Status: SHIPPED | OUTPATIENT
Start: 2022-05-06 | End: 2022-06-22

## 2022-05-06 NOTE — TELEPHONE ENCOUNTER
RECEIVED SOCIAL SECURITY MENTAL MEDICAL SOURCE STATEMENT - EMAILED TO PROVIDER AND PLACED IN CLINIC MAILBOX

## 2022-05-09 ENCOUNTER — TELEPHONE (OUTPATIENT)
Dept: PSYCHIATRY | Facility: CLINIC | Age: 52
End: 2022-05-09
Payer: OTHER GOVERNMENT

## 2022-05-15 ENCOUNTER — HEALTH MAINTENANCE LETTER (OUTPATIENT)
Age: 52
End: 2022-05-15

## 2022-05-16 ENCOUNTER — TELEPHONE (OUTPATIENT)
Dept: PSYCHIATRY | Facility: CLINIC | Age: 52
End: 2022-05-16
Payer: OTHER GOVERNMENT

## 2022-05-16 NOTE — TELEPHONE ENCOUNTER
Pt called intake back and requested to speak with nursing team. No one available at time of call. Call back number is [phone]. Pt declined to schedule an additional 60 min appointment after the [facility] appointment on June 27th.

## 2022-05-16 NOTE — TELEPHONE ENCOUNTER
Reason for call:  Other   Patient called regarding (reason for call): call back  Additional comments: Pt stated that she wanted to speak with Janet or her team, because she is still waiting to to receive her Medical Opinion Form, as well as her MRI Results.    Phone number to reach patient:  Cell number on file:    Telephone Information:   Mobile [phone]       Best Time:  Any    Can we leave a detailed message on this number?  YES    Travel screening: Not Applicable

## 2022-05-16 NOTE — TELEPHONE ENCOUNTER
RN called to update patient with Janet's recommendations of scheduling an appointment - unsuccessful. [facility].     Coreen Estrada RN on 5/16/2022 at 1:49 PM

## 2022-05-17 NOTE — TELEPHONE ENCOUNTER
Returned call to patient, no answer, left voice message.   NP Janet any updates from your end regarding the forms. Patient has appointment with you 6/27/2022.

## 2022-05-17 NOTE — TELEPHONE ENCOUNTER
Called patient back. I am homeless, I am in the shelter, no transportation, it will gave her cash and food. Need it to be sent to the [facility]. Patient reports depressed regarding loosing everything and cannot get the forms completed by provider to enable her get back on her feet. Patient pleading with provider to help with this.      Patient also needs the results or update about the MRI results.

## 2022-05-17 NOTE — TELEPHONE ENCOUNTER
Janet Álvarez, CAIO  Psych Rn - [facility] 52 minutes ago (2:54 PM)     [place]    Have [facility] send her a copy of her MRI and then assist her in getting a neurology appointment for interpretation.    Message text       RN asked about the neurology appointment and was told that provider needs to place a neurology referral to have patient seen by neurology and MRI results interpreted.

## 2022-05-17 NOTE — TELEPHONE ENCOUNTER
Janet Álvarez NP  Psych Rn - [facility] 4 minutes ago (1:37 PM)     [place]    I will be able to address  her form tomorrow.  She needs to see neurology for  interpretation of her MRI - if she has a  they can help her arrange this.  Janet    Message text       Called patient and informed her of the above. She said that the MRI cannot be interpreted by [facility] since they only do the test.

## 2022-05-18 ENCOUNTER — OFFICE VISIT (OUTPATIENT)
Dept: FAMILY MEDICINE | Facility: CLINIC | Age: 52
End: 2022-05-18
Payer: OTHER GOVERNMENT

## 2022-05-18 VITALS
HEART RATE: 100 BPM | OXYGEN SATURATION: 95 % | TEMPERATURE: 98.3 F | SYSTOLIC BLOOD PRESSURE: 120 MMHG | DIASTOLIC BLOOD PRESSURE: 84 MMHG | RESPIRATION RATE: 20 BRPM

## 2022-05-18 DIAGNOSIS — F43.10 PTSD (POST-TRAUMATIC STRESS DISORDER): Primary | Chronic | ICD-10-CM

## 2022-05-18 PROCEDURE — 99213 OFFICE O/P EST LOW 20 MIN: CPT | Performed by: NURSE PRACTITIONER

## 2022-05-18 ASSESSMENT — PAIN SCALES - GENERAL: PAINLEVEL: NO PAIN (0)

## 2022-05-18 ASSESSMENT — PATIENT HEALTH QUESTIONNAIRE - PHQ9
10. IF YOU CHECKED OFF ANY PROBLEMS, HOW DIFFICULT HAVE THESE PROBLEMS MADE IT FOR YOU TO DO YOUR WORK, TAKE CARE OF THINGS AT HOME, OR GET ALONG WITH OTHER PEOPLE: EXTREMELY DIFFICULT
SUM OF ALL RESPONSES TO PHQ QUESTIONS 1-9: 22
SUM OF ALL RESPONSES TO PHQ QUESTIONS 1-9: 22

## 2022-05-18 NOTE — PROGRESS NOTES
Assessment & Plan     ICD-10-CM    1. PTSD (post-traumatic stress disorder)  F43.10      Longstanding history of psychiatric disorders and chemical abuse.  Does have PTSD diagnosis and has used marijuana recreationally in the past for management of symptoms.  Discussed with patient risks and benefits of medical cannabis.  Patient declines assistance with [facility] social workers preferring to work with the [facility].    Reviewed psychiatry note x2    Subjective     HPI     Patient presents today for medical marijuana certification.  Follows with psychiatry for the past several years.  Diagnosed with PTSD.  Has tried multiple medications for management of symptoms.  Patient has a longstanding history of alcohol abuse.  She also has a longstanding history of frequent run-ins with the law.  Recently has been arrested for driving under the influence and domestic assault.  Has used marijuana recreationally.  Struggles with daily anxiety.  She reports that she is currently living out of a womens shelter.  She is in the process of try to get in contact with [facility]  to help navigate her housing/food needs.    Review of Systems - negative except for what's listed in the HPI      Objective    /84   Pulse 100   Temp 98.3  F (36.8  C) (Oral)   Resp 20   LMP  (Within Days)   SpO2 95%   Breastfeeding No   Physical Exam   General appearance - alert, well appearing, and in no distress  Mental status - alert, oriented to person, place, and time.  Pressured speech.  Thought process not always linear.  Frequently tangential about past struggles that she has dealt with.  Makes eye contact.  Appropriately dressed.  Occasionally tearful    Norman Brown, CNP    This note has been dictated using voice recognition software. Any grammatical or context distortions are unintentional and inherent to the software.

## 2022-05-18 NOTE — TELEPHONE ENCOUNTER
RN spoke to patient about inquiring for a  with County in earlier telephone encounter. RN placing in [employer] message with provider recommendations.     Coreen Estrada RN on 5/18/2022 at 9:59 AM

## 2022-05-18 NOTE — TELEPHONE ENCOUNTER
"RN found Neurology referral placed 12/2/21 with information below.   RN spoke with patient and updated with phone number below. She will call to schedule           RN spoke with Janet who stated she will be filling out the MEDICAL OPINION FORM today 5/18/22. Janet will not fill out the Disability form until next appointment which is schedule 6/27/22.     Patient questioned if she can have a  appointed to her. She stated she was at a women's shelter right now and she could use that extra help. RN asked if the Shelter had a . Patient reported that the  in the shelter \"is not great\".     RN recommended checking with the [facility] that she is staying.     RN routing to Janet to see if she is able to place a referral for a .     Coreen Estrada RN on 5/18/2022 at 8:48 AM    "

## 2022-05-18 NOTE — PATIENT INSTRUCTIONS
Here are the steps that will take place to help get you enrolled in the medical cannabis program through the [facility]:    1.  After your appointment today, I will finish your office visit note.    2.  I will go onto the [facility]'s website and submit your information.    3.  In 1-6 weeks, they will email you an application to the email address that you gave me today.    4.  After you complete their application and pay the enrollment fee, they will review.    5.  After your application has been reviewed and approved, the state will inform you and you can then contact any one of the dispensaries available.    6.  Call the dispensary and schedule a consultation with their pharmacist.  These consultations are oftentimes free.  Remember that the pharmacy will not accept debit/credit cards and that cash is oftentimes the only acceptable form of payment.    7.  Meet with the pharmacist and they will discuss your diagnosis and treatment options.  Follow-up with them regarding dosing and refills.    8.  If you have a condition managed by a specialist, then please also inform that specialist just prior to starting medical cannabis so they can follow-up on your condition appropriately.

## 2022-05-19 ENCOUNTER — TELEPHONE (OUTPATIENT)
Dept: NEUROLOGY | Facility: CLINIC | Age: 52
End: 2022-05-19
Payer: OTHER GOVERNMENT

## 2022-05-19 NOTE — TELEPHONE ENCOUNTER
[facility] Call Center    Phone Message    May a detailed message be left on voicemail: yes     Reason for Call: Other: pt called because she had an MRI done at [facility] and her Psychiatrist told her to call our clinic to have the MRI results read to her. Pt states she has symptoms of narcolepsy and blackout spells. Please call back to advise.    Action Taken: Message routed to:  [facility] neurology    Travel Screening: Not Applicable

## 2022-05-31 NOTE — TELEPHONE ENCOUNTER
Pt is not established with us.  We can not review MRI results with her.  She will need to call her PCP for these results.  She was called and a voice message was left with this advise.

## 2022-05-31 NOTE — TELEPHONE ENCOUNTER
[facility] Call Center    Phone Message    May a detailed message be left on voicemail:yes    Reason for Call: Other: Reason for Call: Other: pt called because she had an MRI done at [facility] and her Psychiatrist told her to call our clinic to have the MRI results read to her. Pt states she has symptoms of narcolepsy and blackout spells. Please call back to advise. Patient has called twice and no one has called her back.    The ordering Dr. Álvarez told patient to call [facility] for a DrJuan Manuel In neurology to read the finding of the MRI.      Patient requests direction of how to accomplish getting the MRI results.    Action Taken: Message routed to:  [facility]    Travel Screening: Not Applicable

## 2022-06-02 ENCOUNTER — TELEPHONE (OUTPATIENT)
Dept: PSYCHIATRY | Facility: CLINIC | Age: 52
End: 2022-06-02
Payer: OTHER GOVERNMENT

## 2022-06-02 NOTE — TELEPHONE ENCOUNTER
Reason for call:  Other   Patient called regarding (reason for call): Pt called and asked to leave a message with Janet. She stated that she is not able to access her MRI results, and the neurology department will not read them to her. PT said that she needs the results for court, and is wondering if Janet has access to them.  Additional comments: No    Phone number to reach patient:  Cell number on file:    Telephone Information:   Mobile [phone]       Best Time:  Any    Can we leave a detailed message on this number?  YES    Travel screening: Not Applicable

## 2022-06-02 NOTE — TELEPHONE ENCOUNTER
"RN spoke with patient who was upset and said \"DO I HAVE CANCER\" and reports that the neurology clinic that she called will not go over the notes as they \"are not the ones who ordered the MRI\" . She is extremely frustrated.   Patient reports that she needs these notes for an upcoming court case.     Please advise.     Coreen Estrada RN on 6/2/2022 at 3:46 PM    "

## 2022-06-02 NOTE — TELEPHONE ENCOUNTER
RN called and left message with medical records phone number [phone]     Coreen Estrada RN on 6/2/2022 at 1:14 PM

## 2022-06-03 NOTE — TELEPHONE ENCOUNTER
Sent contact information for [facility] to patient via [employer] to obtain records for court.     Maria Fernanda Reaves RN on 6/3/2022 at 12:29 PM

## 2022-06-06 NOTE — TELEPHONE ENCOUNTER
Janet Álvarez NP  Psych Rn - [facility] 25 minutes ago (12:14 PM)     [place]    I just sent him a message about her MRI results.  I am referring her to a neurologist for further assessment as well as to her primary care physician for continued assessment to determine reasons for her symptoms.  Please defer any further questions from her to our appointment on June 27.  Thank you.  Janet Byrd text

## 2022-06-06 NOTE — TELEPHONE ENCOUNTER
Reason for call:  Other   Patient called regarding (reason for call): call back  Additional comments: Regarding MRI results, pt advised she needs them for her court hearing coming up soon.     Phone number to reach patient:  Home number on file [phone] (home)    Best Time:  ASAP  Can we leave a detailed message on this number?  YES    Travel screening: Not Applicable

## 2022-06-06 NOTE — TELEPHONE ENCOUNTER
"Janet Álvarez NP  Psych Rn - [facility] 41 minutes ago (2:09 PM)     [place]    I sent information on MRI results to Maryjane- -typo with Camilo \"him\"    Message text         "

## 2022-06-06 NOTE — TELEPHONE ENCOUNTER
Returned call to patient. Patient is very frustrated as she was told by Onel that the provider who ordered the test should give her the results. Patient said that she needs at least to be told the results because she has a meeting with her  and then a hearing. Patient is asking that provider Henri either advise her of the results or allow nursing to read results. MRI results are in media. Patient would also like the results sent to her [facility] if possible.

## 2022-06-08 ENCOUNTER — TELEPHONE (OUTPATIENT)
Dept: NEUROLOGY | Facility: CLINIC | Age: 52
End: 2022-06-08

## 2022-06-13 ENCOUNTER — NURSE TRIAGE (OUTPATIENT)
Dept: NURSING | Facility: CLINIC | Age: 52
End: 2022-06-13
Payer: OTHER GOVERNMENT

## 2022-06-13 ENCOUNTER — TELEPHONE (OUTPATIENT)
Dept: SLEEP MEDICINE | Facility: CLINIC | Age: 52
End: 2022-06-13
Payer: OTHER GOVERNMENT

## 2022-06-13 ENCOUNTER — TELEPHONE (OUTPATIENT)
Dept: FAMILY MEDICINE | Facility: OTHER | Age: 52
End: 2022-06-13
Payer: OTHER GOVERNMENT

## 2022-06-13 NOTE — TELEPHONE ENCOUNTER
Triage call:     Sleep study scheduled for August 3rd- has some issues with passing out    MRI shows some changes in her white matter and has a neurologist appointment in October- MRI in her chart from outside facility     States that today she has excessive yawning since 4 am- deep yawns and she is distressed by this sensation  Makes her eyes water  Feels sleepy and had a trouble waking up and staying awake today   Tyroid issues  States she is being tested for epilepsy    Patient is distressed by her symptoms and would like provider to advise on additional guidance. Please review.     Yuli Patton RN BSN 6/13/2022 10:14 AM w       Reason for Disposition    Insomnia is an ongoing problem (> 2 weeks)    Additional Information    Negative: Difficulty breathing    Negative: Depression is suspected    Negative: Traumatic Brain Injury (TBI) is suspected    Negative: Alcohol abuse or dependence is suspected    Negative: Substance abuse or dependence suspected    Negative: Pain is causing insomnia and pain is not a chronic symptom (recurrent or ongoing AND present > 4 weeks)    Negative: Insomnia persists > 1 week and following Insomnia Care Advice    Negative: Insomnia interferes with work or school    Negative: Pain is causing insomnia and pain is a chronic symptom (recurrent or ongoing AND present > 4 weeks)    Negative: Patient wants to be seen    Negative: Awakened by jerking leg movements    Negative: Restless legs or unpleasant feeling in legs and causes insomnia    Negative: Loud snoring is an ongoing problem  (> 2 weeks)    Negative: Excessive daytime sleepiness is an ongoing problem  (> 2 weeks)    Protocols used: INSOMNIA-A-OH

## 2022-06-13 NOTE — TELEPHONE ENCOUNTER
Covid test ordered. Will see pt for [facility] if covid test negative, otherwise can r/s in 2 weeks without covid test.  Recommend steam from shower and humidifier use, adequate hydration with water to help thin mucous.    Can use zarbees cough syrup otc  If she Reason for Call:  Other call back    Detailed comments: Patient has a sleep study scheduled for 8/3. She's been having excessive yawning since 4am this morning and has been doing a lot of reading online. She's a little concerned about it and would like a call back to discuss if it is something she should be worrying about. Please advise. Thank you.    Phone Number Patient can be reached at: Home number on file [phone] (home)    Best Time: Anytime    Can we leave a detailed message on this number? YES    Call taken on 6/13/2022 at 9:30 AM by Erickson Nascimento

## 2022-06-13 NOTE — TELEPHONE ENCOUNTER
Patient Contact    Attempt # 1    Was call answered?  No.  Left message on voicemail with information to call me back.    Priscilla RODRIGUEZN, RN

## 2022-06-13 NOTE — TELEPHONE ENCOUNTER
I am happy that she is scheduled for her Sleep study, this has been long time coming for her to get this done and she is already scheduled with Neurology which is important. The most important thing now is to work on her sleep hygiene as best as possible as well as her mental health to help her as this can cause a lot of dysfunction in her sleep and we also know that uncontrolled mental health and other chronic conditions can result in brain matter changes so to keep this help she needs to work to get these conditions as controlled as possible.  Continue to see Psychiatry.     Simran Barreto PA-C

## 2022-06-13 NOTE — TELEPHONE ENCOUNTER
Spoke with patient. Reviewed MRI.  Needs to schedule follow up neuro.  pcp of needed    Carlos Miles, MICHAELN, RN, PHN  [facility] ~ Registered Nurse  Clinic Triage ~ [place] [facility] [place]  June 13, 2022

## 2022-06-14 NOTE — TELEPHONE ENCOUNTER
Note says that it is ok to leave a detailed message. Left detailed message with provider's note, verbatim. Pt to call back with any questions.     Larissa Sosa, MICHAELN, RN, PHN  Registered Nurse-Clinic Triage  [facility]  6/14/2022 at 9:55 AM

## 2022-06-21 ENCOUNTER — TELEPHONE (OUTPATIENT)
Dept: FAMILY MEDICINE | Facility: CLINIC | Age: 52
End: 2022-06-21

## 2022-06-21 DIAGNOSIS — F41.1 GENERALIZED ANXIETY DISORDER: ICD-10-CM

## 2022-06-21 NOTE — TELEPHONE ENCOUNTER
Patient last seen by Dr Tatiana laird on 4-26-22.  Wellbutrin 150 mg every morning prescribed.   Patient requesting to increase Wellbutrin stating she has been on 450 mg in the past.  Nurse does note this to be true when reviewing past med history.  Did speak with patient and informed her that she would need to contact previous provider regarding ADHD med due to that med is a controlled med.  Patient verbalized understanding and stated she would contact previous provider.  Please advise on increasing wellbutrin.  Current rx for 150 mg and pharmacy pended.        4-26-22  Assessment & Plan         Generalized anxiety disorder  - clonazePAM (KLONOPIN) 0.5 MG tablet; Take 1 tablet (0.5 mg) by mouth daily as needed for anxiety  - buPROPion (WELLBUTRIN XL) 150 MG 24 hr tablet; Take 1 tablet (150 mg) by mouth every morning     PTSD (post-traumatic stress disorder)  Advised to keep upcoming appointment with psychiatry and therapy     Bipolar affective disorder, manic, moderate (H)  Stable.  Continue with current medications and keep upcoming behavior health appointment.

## 2022-06-21 NOTE — TELEPHONE ENCOUNTER
Reason for Call:  Other     Detailed comments: Patient would like an increase of the Wellbutrin she has been on 450 mg in the past. Would also like to discuss an adhd medication that was prescribed at the [facility] and now she is being referred to another therapist and the appointment is 07/11/2022 and could the provider send a small supply to pharmacy.  Please advise.    Phone Number Patient can be reached at: Home number on file [phone] (home)    Best Time:     Can we leave a detailed message on this number? YES    Call taken on 6/21/2022 at 3:12 PM by Carin Brown

## 2022-06-22 RX ORDER — BUPROPION HYDROCHLORIDE 300 MG/1
300 TABLET ORAL EVERY MORNING
Qty: 30 TABLET | Refills: 1 | Status: SHIPPED | OUTPATIENT
Start: 2022-06-22 | End: 2022-08-09

## 2022-06-22 NOTE — TELEPHONE ENCOUNTER
This needs to be slowly tapered.  Will increase to wellbutrin to 300 mg 24 hour daily.  Patient needs to schedule a 1 month follow-up with me or her PCP to determine if this is helping her symptoms.  It is also important that she keeps her psychiatry and therapy appointments.     1. Generalized anxiety disorder  - buPROPion (WELLBUTRIN XL) 300 MG 24 hr tablet; Take 1 tablet (300 mg) by mouth every morning  Dispense: 30 tablet; Refill: 1

## 2022-06-22 NOTE — TELEPHONE ENCOUNTER
Patient returned call. Below message relayed and she verbalized understanding.      She wanted to inform us that she is being seen at [facility] 7/11/22 to establish with a new psychiatrist. She said the previous appointment was rescheduled but she is trying to get back on track.     Leora Palencia RN

## 2022-06-22 NOTE — TELEPHONE ENCOUNTER
Left message on voice mail for patient to call clinic.   [phone]    Celine Rosenbaum RN BSN  [employer]

## 2022-06-23 DIAGNOSIS — M86.8X8 OTHER OSTEOMYELITIS, OTHER SITE (H): Primary | ICD-10-CM

## 2022-06-27 ENCOUNTER — VIRTUAL VISIT (OUTPATIENT)
Dept: BEHAVIORAL HEALTH | Facility: CLINIC | Age: 52
End: 2022-06-27
Payer: OTHER GOVERNMENT

## 2022-06-27 ENCOUNTER — VIRTUAL VISIT (OUTPATIENT)
Dept: PSYCHIATRY | Facility: CLINIC | Age: 52
End: 2022-06-27
Payer: MEDICAID

## 2022-06-27 DIAGNOSIS — F41.1 GENERALIZED ANXIETY DISORDER: Primary | ICD-10-CM

## 2022-06-27 DIAGNOSIS — F43.10 PTSD (POST-TRAUMATIC STRESS DISORDER): ICD-10-CM

## 2022-06-27 DIAGNOSIS — F31.12 BIPOLAR AFFECTIVE DISORDER, MANIC, MODERATE (H): Primary | ICD-10-CM

## 2022-06-27 DIAGNOSIS — F41.1 GENERALIZED ANXIETY DISORDER: ICD-10-CM

## 2022-06-27 PROCEDURE — 99214 OFFICE O/P EST MOD 30 MIN: CPT | Mod: 95 | Performed by: NURSE PRACTITIONER

## 2022-06-27 PROCEDURE — 90832 PSYTX W PT 30 MINUTES: CPT | Mod: 95 | Performed by: COUNSELOR

## 2022-06-27 RX ORDER — LAMOTRIGINE 200 MG/1
200 TABLET ORAL DAILY
Qty: 30 TABLET | Refills: 0 | Status: SHIPPED | OUTPATIENT
Start: 2022-06-27 | End: 2023-02-14

## 2022-06-27 RX ORDER — GABAPENTIN 400 MG/1
800 CAPSULE ORAL 3 TIMES DAILY
Qty: 180 CAPSULE | Refills: 0 | Status: SHIPPED | OUTPATIENT
Start: 2022-06-27 | End: 2023-02-28

## 2022-06-27 RX ORDER — LURASIDONE HYDROCHLORIDE 20 MG/1
20 TABLET, FILM COATED ORAL DAILY
Qty: 30 TABLET | Refills: 0 | Status: SHIPPED | OUTPATIENT
Start: 2022-06-27 | End: 2023-02-14
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
## 2022-06-29 ENCOUNTER — PATIENT OUTREACH (OUTPATIENT)
Dept: CARE COORDINATION | Facility: CLINIC | Age: 52
End: 2022-06-29
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
## 2022-06-30 ENCOUNTER — PATIENT OUTREACH (OUTPATIENT)
Dept: CARE COORDINATION | Facility: CLINIC | Age: 52
End: 2022-06-30

## 2022-06-30 NOTE — PROGRESS NOTES
Clinic Care Coordination Contact  [facility]/Voicemail       Clinical Data: Care Coordinator Outreach  Outreach attempted x 2.  Left message on patient's voicemail with call back information and requested return call.  Plan:  Care Coordinator will have CHW attempt to reschedule.    THIEN Sterling  [facility] - Care Coordinator   6/30/2022   9:10 AM  [phone]

## 2022-07-01 NOTE — PROGRESS NOTES
Clinic Care Coordination Contact  Care Team Conversations    Pt left a voice mail back stating that she is feeling overwhelmed and feels she has the resources she needs.  She declined a call back.     Plan:  Will do no further outreaches at this time.     [facility] Coordinator   7/1/2022   9:35 AM  [phone]

## 2022-08-03 ENCOUNTER — THERAPY VISIT (OUTPATIENT)
Dept: SLEEP MEDICINE | Facility: CLINIC | Age: 52
End: 2022-08-03
Attending: PHYSICIAN ASSISTANT
Payer: MEDICAID

## 2022-08-03 DIAGNOSIS — R29.818 SUSPECTED SLEEP APNEA: ICD-10-CM

## 2022-08-03 DIAGNOSIS — F51.04 PSYCHOPHYSIOLOGICAL INSOMNIA: ICD-10-CM

## 2022-08-03 DIAGNOSIS — Z72.821 POOR SLEEP HYGIENE: ICD-10-CM

## 2022-08-03 PROCEDURE — 95810 POLYSOM 6/> YRS 4/> PARAM: CPT | Performed by: OTOLARYNGOLOGY

## 2022-08-09 ENCOUNTER — TELEPHONE (OUTPATIENT)
Dept: FAMILY MEDICINE | Facility: CLINIC | Age: 52
End: 2022-08-09

## 2022-08-09 DIAGNOSIS — F41.1 GENERALIZED ANXIETY DISORDER: ICD-10-CM

## 2022-08-09 NOTE — TELEPHONE ENCOUNTER
Patient requesting refill of buPROPion (WELLBUTRIN XL) 300 MG 24 hr tablet, and buPROPion (WELLBUTRIN XL) 150 MG 24 hr tablet .

## 2022-08-09 NOTE — TELEPHONE ENCOUNTER
Patient calling for med refills stating she is out of med.  Routing refill request to Dr Simpson to advise.  See telephone encounter from 6/21/22 regarding dosing and that patient was to follow up.  No follow up noted.  Please advise.      June 22, 2022    Leora Palencia RN JM    11:07 AM  Note  Patient returned call. Below message relayed and she verbalized understanding.       She wanted to inform us that she is being seen at [facility] 7/11/22 to establish with a new psychiatrist. She said the previous appointment was rescheduled but she is trying to get back on track.      EDITH Correa Julie, RN     MONO    10:00 AM  Note  Left message on voice mail for patient to call clinic.   [phone]     Celine RODRIGUEZN  [facility]                  MONO    9:59 AM    Celine Rosenbaum, EDITH attempted to contact Nivia Maryjane KADEEM (Left Message)             KN    9:29 AM  Jose Rafael Simpson DO routed this conversation to -Jose Rafael Echeverria DO         9:29 AM  Note  This needs to be slowly tapered.  Will increase to wellbutrin to 300 mg 24 hour daily.  Patient needs to schedule a 1 month follow-up with me or her PCP to determine if this is helping her symptoms.  It is also important that she keeps her psychiatry and therapy appointments.      1. Generalized anxiety disorder  - buPROPion (WELLBUTRIN XL) 300 MG 24 hr tablet; Take 1 tablet (300 mg) by mouth every morning  Dispense: 30 tablet; Refill: 1           June 21, 2022         CK    3:39 PM  You routed this conversation to Jose Rafael Simpson DO       Me     CK    3:34 PM  Note  Patient last seen by Dr Simpson virtually on 4-26-22.  Wellbutrin 150 mg every morning prescribed.   Patient requesting to increase Wellbutrin stating she has been on 450 mg in the past.  Nurse does note this to be true when reviewing past med history.  Did speak with patient and informed her that she would need to contact  previous provider regarding ADHD med due to that med is a controlled med.  Patient verbalized understanding and stated she would contact previous provider.  Please advise on increasing wellbutrin.  Current rx for 150 mg and pharmacy pended

## 2022-08-14 RX ORDER — BUPROPION HYDROCHLORIDE 300 MG/1
300 TABLET ORAL EVERY MORNING
Qty: 30 TABLET | Refills: 0 | Status: SHIPPED | OUTPATIENT
Start: 2022-08-14 | End: 2023-02-14

## 2022-08-14 RX ORDER — BUPROPION HYDROCHLORIDE 150 MG/1
150 TABLET ORAL EVERY MORNING
Refills: 0 | OUTPATIENT
Start: 2022-08-14

## 2022-08-14 NOTE — TELEPHONE ENCOUNTER
FUTURE VISIT INFORMATION      FUTURE VISIT INFORMATION:    Date: 10/17/2022    Time: 3pm    Location: [facility]  REFERRAL INFORMATION:    Referring provider:  Janet Álvarez    Referring providers clinic:  [facility]     Reason for visit/diagnosis  Blackout Spell     RECORDS REQUESTED FROM:       Clinic name Comments Records Status Imaging Status   Internal YARELIS Álvarez-12/2/2021    CT Head-5/13/2016 Epic PACS         [facility]   Scanned to Chart Requested                        10/17/2022-Request for images faxed to [facility]-MR @ 1245pm    10/18/2022-[facility] Images now In PACS-MR @ 538am

## 2022-08-16 ENCOUNTER — TELEPHONE (OUTPATIENT)
Dept: FAMILY MEDICINE | Facility: CLINIC | Age: 52
End: 2022-08-16

## 2022-08-16 DIAGNOSIS — F41.1 GENERALIZED ANXIETY DISORDER: ICD-10-CM

## 2022-08-16 DIAGNOSIS — F43.10 PTSD (POST-TRAUMATIC STRESS DISORDER): ICD-10-CM

## 2022-08-16 NOTE — TELEPHONE ENCOUNTER
Patient asking if Dr. Simpson can increase her gabapentin? Patient has taken 12 capsules daily in past and this has worked for her. Patient requesting increase until she can see her psychiatrist 8/24/22.     Patient states pregabalin causes vision blurriness.     Ok to leave detailed voicemail if patient doesn't answer.

## 2022-08-16 NOTE — TELEPHONE ENCOUNTER
Unable to find that Dr Simpson has ever ordered gabapentin for patient.  Looks like Janet Álvarez NP in psychiatry is the ordering provider.  Patient needs to contact ordering provider for refill and dosing change.  Left message on voice mail [phone] for patient to call clinic.   [phone]

## 2022-08-17 RX ORDER — GABAPENTIN 400 MG/1
800 CAPSULE ORAL 3 TIMES DAILY
Qty: 180 CAPSULE | Refills: 0 | OUTPATIENT
Start: 2022-08-17

## 2022-08-17 NOTE — TELEPHONE ENCOUNTER
Date of Last Office Visit: 6/27/2022  Date of Next Office Visit: none (scheduling, please connect with patient and schedule follow up appointment with provider)  No shows since last visit: none  Cancellations since last visit: none    Medication requested: Gabapentin 400 mg capsule Date last ordered: 6/27/2022 Qty: 180 Refills: 0     Review of [place] ?: yes  Prescriptions  Total: 21  Private Pay: 1  Showing 1-15 of 21 Items View   15 Items     of 2   Filled  Drug  QTY  Days  Prescriber     08/15/2022 Clonazepam 0.5 Mg Tablet   7.00 7 So Tit    08/10/2022 Pregabalin 75 Mg Capsule   30.00 15 Ka [place]    08/07/2022 Oxycodone Hcl (Ir) 5 Mg Tablet   6.00 1 Te Arpan    08/02/2022 Oxycodone Hcl (Ir) 5 Mg Tablet   15.00 4 Mo Gie    07/30/2022 Clonazepam 0.5 Mg Tablet   6.00 6 Am Last    07/29/2022 Gabapentin 400 Mg Capsule   180.00 30 An M,    07/22/2022 Pregabalin 75 Mg Capsule   30.00 15 Ka [place]         Lapse in medication adherence greater than 5 days?: no  If yes, call patient and gather details: no  Medication refill request verified as identical to current order?: yes  Result of Last DAM, VPA, Li+ Level, CBC, or Carbamazepine Level (at or since last visit): N/A    Last visit treatment plan: notes not complete    []Medication refilled per  Medication Refill in Ambulatory Care  policy.  [x]Medication unable to be refilled by RN due to criteria not met as indicated below:    []Eligibility - not seen in the last year   []Supervision - no future appointment   []Compliance - no shows, cancellations or lapse in therapy   []Verification - order discrepancy   [x]Controlled medication   [x]Medication not included in policy   []90-day supply request   []Other

## 2022-08-18 LAB — SLPCOMP: NORMAL

## 2022-08-18 NOTE — PROGRESS NOTES
Does Magnolia have a CPAP/Bipap?  No         [place] Sleep Scale: 22    Maryjane is a 52 year old who is being evaluated via a billable video visit.      How would you like to obtain your AVS? MyChart  If the video visit is dropped, the invitation should be resent by: Text to cell phone: [phone]  Will anyone else be joining your video visit? No              Subjective   Maryjane is a 52 year old, presenting for the following health issues:  Study Results          Objective           Vitals:  No vitals were obtained today due to virtual visit.     Physical Exam   GENERAL: Healthy, alert and no distress  EYES: Eyes grossly normal to inspection.  No discharge or erythema, or obvious scleral/conjunctival abnormalities.  RESP: No audible wheeze, cough, or visible cyanosis.  No visible retractions or increased work of breathing.    SKIN: Visible skin clear. No significant rash, abnormal pigmentation or lesions.  NEURO: Cranial nerves grossly intact.  Mentation and speech appropriate for age.  PSYCH: Mentation appears normal, affect normal/bright, judgement and insight intact, normal speech and appearance well-groomed.            Video-Visit Details    Video Start Time: 1510    Type of service:  Video Visit    Video End Time:1530    Originating Location (pt. Location): Home    Distant Location (provider location):  [facility]     Platform used for Video Visit: Appsembler  ..  Sleep Study Follow-Up Visit:    Date on this visit: 8/19/2022    Magnolia Gonzáles comes in today for follow-up of her sleep study done on 8/3/22 at the [facility] for excessive daytime sleepiness and possible sleep apnea.    Sleep latency 0 minutes without Ambien.  REM achieved.   REM latency 68 minutes.  Sleep efficiency 96.8%. Total sleep time 467.5 minutes.    Sleep architecture:  Stage 1, 1.9% (5%), stage 2, 41.8% (45-55%), stage 3, 35.7% (15-20%), stage REM, 20.5% (20-25%).  AHI was 8.5, without  desaturations. RDI 8.6.  REM RDI 20.6, consistent with moderate REM ALHAJI.  Supine RDI 8.4, consistent with mild SUPINE ALHAJI.  Periodic Limb Movement Index 0/hour.       These findings were reviewed with patient.     Past medical/surgical history, family history, social history, medications and allergies were reviewed.      Problem List:  Patient Active Problem List    Diagnosis Date Noted     Generalized anxiety disorder 07/29/2020     Priority: Medium     Major depressive disorder, recurrent episode, mild (H) 07/19/2017     Priority: Medium     PTSD (post-traumatic stress disorder) 07/19/2017     Priority: Medium     Bipolar affective disorder, manic, moderate (H) 09/23/2016     Priority: Medium     Patient refutes this. 10/5/2020       Overdose of Valium, intentional self-harm, subsequent encounter 05/29/2016     Priority: Medium     Carpal tunnel syndrome of left wrist 01/13/2016     Priority: Medium     Adjustment disorder with mixed anxiety and depressed mood 01/06/2016     Priority: Medium     Acquired hypothyroidism 01/06/2016     Priority: Medium     Bilateral carpal tunnel syndrome 01/06/2016     Priority: Medium     Social phobia 12/26/2014     Priority: Medium     Panic disorder without agoraphobia 12/26/2014     Priority: Medium     History of alcohol dependence (H) 07/22/2014     Priority: Medium     Overview:   Binge drinking disorder. Sober since 5/2016       Nondependent alcohol abuse, episodic drinking behavior 11/17/2005     Priority: Medium        Impression/Plan:    Mild sleep apnea with EDS- discussed oral appliance, cpap, no therapy. She would prefer to try a oral appliance. Referral placed.   She will follow up with me as needed.    Fifteen minutes spent with patient, all of which were spent face-to-face counseling, consulting, coordinating plan of care.          CC: Simran Barreto

## 2022-08-18 NOTE — TELEPHONE ENCOUNTER
Patient has not returned call.  Do note that there is a refill request sent to ordering provider Janet Álvarez.  Left message on voice mail [phone] for patient to call clinic. [phone].

## 2022-08-18 NOTE — TELEPHONE ENCOUNTER
Refill Request    Janet Álvarez, CAIO  Psych Rn - [facility] 23 hours ago (11:50 AM)     KORI Wesley is taking Lyrica and gabapentin is not to be prescribed with that.  She also needs to follow-up with long-term psychiatry as referred quite a while ago.  Thank you.  Janet    Routing comment

## 2022-08-19 ENCOUNTER — TELEPHONE (OUTPATIENT)
Dept: FAMILY MEDICINE | Facility: OTHER | Age: 52
End: 2022-08-19

## 2022-08-19 ENCOUNTER — VIRTUAL VISIT (OUTPATIENT)
Dept: SLEEP MEDICINE | Facility: CLINIC | Age: 52
End: 2022-08-19
Payer: OTHER GOVERNMENT

## 2022-08-19 DIAGNOSIS — G47.33 OSA (OBSTRUCTIVE SLEEP APNEA): Primary | ICD-10-CM

## 2022-08-19 PROCEDURE — 99213 OFFICE O/P EST LOW 20 MIN: CPT | Mod: 95 | Performed by: PHYSICIAN ASSISTANT

## 2022-08-19 ASSESSMENT — SLEEP AND FATIGUE QUESTIONNAIRES
HOW LIKELY ARE YOU TO NOD OFF OR FALL ASLEEP WHILE SITTING AND TALKING TO SOMEONE: SLIGHT CHANCE OF DOZING
HOW LIKELY ARE YOU TO NOD OFF OR FALL ASLEEP WHILE SITTING AND READING: HIGH CHANCE OF DOZING
HOW LIKELY ARE YOU TO NOD OFF OR FALL ASLEEP IN A CAR, WHILE STOPPED FOR A FEW MINUTES IN TRAFFIC: HIGH CHANCE OF DOZING
HOW LIKELY ARE YOU TO NOD OFF OR FALL ASLEEP WHILE SITTING QUIETLY AFTER LUNCH WITHOUT ALCOHOL: HIGH CHANCE OF DOZING
HOW LIKELY ARE YOU TO NOD OFF OR FALL ASLEEP WHILE SITTING INACTIVE IN A PUBLIC PLACE: MODERATE CHANCE OF DOZING
HOW LIKELY ARE YOU TO NOD OFF OR FALL ASLEEP WHILE WATCHING TV: HIGH CHANCE OF DOZING
HOW LIKELY ARE YOU TO NOD OFF OR FALL ASLEEP WHILE LYING DOWN TO REST IN THE AFTERNOON WHEN CIRCUMSTANCES PERMIT: HIGH CHANCE OF DOZING
HOW LIKELY ARE YOU TO NOD OFF OR FALL ASLEEP WHEN YOU ARE A PASSENGER IN A CAR FOR AN HOUR WITHOUT A BREAK: HIGH CHANCE OF DOZING
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
## 2022-08-19 NOTE — PATIENT INSTRUCTIONS
[facility]  Search engine: https://mms.aadsm.org/members/directory/search_bootstrap.php?org_id=ADSM&   Certified in Dental Sleep Medicine    Jacinto Roblero DDS, MS   [address]   [address]   [place] [address]   Appointments: [phone]   Fax: [phone]

## 2022-08-19 NOTE — TELEPHONE ENCOUNTER
No return call, see below, request noted to ordering provider.  Will close encounter.  Nerissa Ortega RN  [facility]

## 2022-08-22 ENCOUNTER — TELEPHONE (OUTPATIENT)
Dept: SLEEP MEDICINE | Facility: CLINIC | Age: 52
End: 2022-08-22

## 2022-08-22 NOTE — TELEPHONE ENCOUNTER
She needs a visit Face to Face. I do not even know if I can accommodate her request but we can discuss it at her visit.     Simran Barreto PA-C

## 2022-08-22 NOTE — TELEPHONE ENCOUNTER
Patient called asking for orders for home sleep study.  She states she slept very well for her over night study in clinic on 8/3/22 and feels she never sleeps that well at home and wanted to ask if she could do an HST?  Please advise

## 2022-09-10 ENCOUNTER — HEALTH MAINTENANCE LETTER (OUTPATIENT)
Age: 52
End: 2022-09-10

## 2022-09-21 NOTE — PATIENT INSTRUCTIONS
1.  Atomoxetine discontinue  2.  Wellbutrin  mg daily  3.  Gabapentin 800 mg 3 times daily  4.  Lurasidone 20 mg daily with food  5.  Lamotrigine 200 mg daily    Continue all other medications as reviewed per electronic medical record today.   Safety plan reviewed. To the Emergency Department as needed or call after hours crisis line at [phone] or [phone]. [place] Crisis Text Line. Text [place] to 895784 or Suicide LifeLine Chat: suicideCerevo.org/chat/  To schedule individual or family therapy, call [facility] at [phone]  Schedule an appointment with me as needed. Call [facility] at [phone] to schedule.  Follow up with primary care provider as planned or for acute medical concerns.  Call the psychiatric nurse line with medication questions or concerns at [phone]  MyChart may be used to communicate with your provider, but this is not intended to be used for emergencies.    Crisis Resources:    National Suicide Prevention Lifeline: 967.547.7779 (TTY: 831.416.4518). Call anytime for help.  (www.suicidepreventionlifeline.org)  National [place] on Mental Illness (www.anne marie.org): 931.625.2886 or 766-404-6464.   Mental Health Association (www.mentalhealth.org): 832.606.5899 or 414-643-1183.  [place] Crisis Text Line: Text [place] to 211258  Suicide LifeLine Chat: suicideCerevo.org/chat

## 2022-09-28 DIAGNOSIS — F41.1 GENERALIZED ANXIETY DISORDER: ICD-10-CM

## 2022-09-28 RX ORDER — BUPROPION HYDROCHLORIDE 300 MG/1
300 TABLET ORAL EVERY MORNING
Qty: 30 TABLET | Refills: 0 | OUTPATIENT
Start: 2022-09-28

## 2022-09-28 NOTE — TELEPHONE ENCOUNTER
"Pharmacy called and stated that patient is all out of bupropion, and needs this filled today.      Requested Prescriptions   Pending Prescriptions Disp Refills     buPROPion (WELLBUTRIN XL) 300 MG 24 hr tablet 30 tablet 0     Sig: Take 1 tablet (300 mg) by mouth every morning       SSRIs Protocol Passed - 9/28/2022 10:32 AM        Passed - Recent (12 mo) or future (30 days) visit within the authorizing provider's specialty     Patient has had an office visit with the authorizing provider or a provider within the authorizing providers department within the previous 12 mos or has a future within next 30 days. See \"Patient Info\" tab in inbasket, or \"Choose Columns\" in Meds & Orders section of the refill encounter.              Passed - Medication is Bupropion     If the medication is Bupropion (Wellbutrin), and the patient is taking for smoking cessation; OK to refill.          Passed - Medication is active on med list        Passed - Patient is age 18 or older        Passed - No active pregnancy on record        Passed - No positive pregnancy test in last 12 months             Routing refill request to provider for review/approval because:  Unsure of plan        "

## 2022-10-14 ENCOUNTER — TRANSFERRED RECORDS (OUTPATIENT)
Dept: PSYCHIATRY | Facility: CLINIC | Age: 52
End: 2022-10-14

## 2022-10-17 ENCOUNTER — PRE VISIT (OUTPATIENT)
Dept: NEUROLOGY | Facility: CLINIC | Age: 52
End: 2022-10-17

## 2022-10-17 ENCOUNTER — VIRTUAL VISIT (OUTPATIENT)
Dept: NEUROLOGY | Facility: CLINIC | Age: 52
End: 2022-10-17
Attending: NURSE PRACTITIONER
Payer: OTHER GOVERNMENT

## 2022-10-17 ENCOUNTER — TELEPHONE (OUTPATIENT)
Dept: PSYCHIATRY | Facility: CLINIC | Age: 52
End: 2022-10-17

## 2022-10-17 DIAGNOSIS — R55 BLACKOUT SPELL: ICD-10-CM

## 2022-10-17 DIAGNOSIS — R69 DIAGNOSIS DEFERRED: Primary | ICD-10-CM

## 2022-10-17 PROCEDURE — 99207 PR NO BILLABLE SERVICE THIS VISIT: CPT | Performed by: NURSE PRACTITIONER

## 2022-10-17 ASSESSMENT — PATIENT HEALTH QUESTIONNAIRE - PHQ9
SUM OF ALL RESPONSES TO PHQ QUESTIONS 1-9: 21
10. IF YOU CHECKED OFF ANY PROBLEMS, HOW DIFFICULT HAVE THESE PROBLEMS MADE IT FOR YOU TO DO YOUR WORK, TAKE CARE OF THINGS AT HOME, OR GET ALONG WITH OTHER PEOPLE: EXTREMELY DIFFICULT
SUM OF ALL RESPONSES TO PHQ QUESTIONS 1-9: 21

## 2022-10-17 ASSESSMENT — MIGRAINE DISABILITY ASSESSMENT (MIDAS)
HOW MANY DAYS WAS YOUR PRODUCTIVITY CUT IN HALF BECAUSE OF HEADACHES: 0
HOW MANY DAYS DID YOU NOT DO HOUSEWORK BECAUSE OF HEADACHES: 6
HOW OFTEN WERE SOCIAL ACTIVITIES MISSED DUE TO HEADACHES: 14
HOW MANY DAYS WAS HOUSEWORK PRODUCTIVITY CUT IN HALF DUE TO HEADACHES: 15
ON A SCALE FROM 0-10 ON AVERAGE HOW PAINFUL WERE HEADACHES: 6
HOW MANY DAYS DID YOU MISS WORK OR SCHOOL BECAUSE OF HEADACHES: 0
TOTAL SCORE: 35
HOW MANY DAYS IN THE PAST 3 MONTHS HAVE YOU HAD A HEADACHE: 10

## 2022-10-17 ASSESSMENT — HEADACHE IMPACT TEST (HIT 6)
WHEN YOU HAVE A HEADACHE HOW OFTEN DO YOU WISH YOU COULD LIE DOWN: ALWAYS
HIT6 TOTAL SCORE: 66
HOW OFTEN HAVE YOU FELT TOO TIRED TO WORK BECAUSE OF YOUR HEADACHES: SOMETIMES
WHEN YOU HAVE HEADACHES HOW OFTEN IS THE PAIN SEVERE: VERY OFTEN
HOW OFTEN DO HEADACHES LIMIT YOUR DAILY ACTIVITIES: SOMETIMES
HOW OFTEN DID HEADACHS LIMIT CONCENTRATION ON WORK OR DAILY ACTIVITY: VERY OFTEN
HOW OFTEN HAVE YOU FELT FED UP OR IRRITATED BECAUSE OF YOUR HEADACHES: VERY OFTEN
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
## 2022-10-17 NOTE — TELEPHONE ENCOUNTER
Kendy with [facility] needs to hear from provider in order provide patient with service.   Please call back
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
## 2022-10-18 NOTE — TELEPHONE ENCOUNTER
Uncertain of what needs to be done. Routing to provider in case she is aware of what needs to be sent.     Maria Fernanda Reaves RN on 10/18/2022 at 4:21 PM

## 2022-10-19 NOTE — TELEPHONE ENCOUNTER
Updated patient via MyChart of Janet's directive. RN has not contact info for Kendy.    Maria Fernanda Reaves RN on 10/19/2022 at 1:46 PM

## 2022-10-19 NOTE — TELEPHONE ENCOUNTER
Janet Álvarez NP  Psych Rn - [facility] 17 hours ago (6:30 PM)     [place]  Any forms or communications need to go through her new psychiatric provider, Patsy Jon NP through [employer].  Her number is [phone].  This is a facility, I believe, in [place].  Thank you.  Sheyla

## 2022-10-24 ENCOUNTER — TRANSFERRED RECORDS (OUTPATIENT)
Dept: MULTI SPECIALTY CLINIC | Facility: CLINIC | Age: 52
End: 2022-10-24

## 2022-10-24 LAB
HPV ABSTRACT: NORMAL
PAP-ABSTRACT: NORMAL

## 2023-02-14 ENCOUNTER — VIRTUAL VISIT (OUTPATIENT)
Dept: PSYCHIATRY | Facility: CLINIC | Age: 53
End: 2023-02-14
Payer: OTHER GOVERNMENT

## 2023-02-14 DIAGNOSIS — F31.12 BIPOLAR AFFECTIVE DISORDER, MANIC, MODERATE (H): Primary | ICD-10-CM

## 2023-02-14 DIAGNOSIS — F41.1 GENERALIZED ANXIETY DISORDER: ICD-10-CM

## 2023-02-14 DIAGNOSIS — F90.8 ATTENTION DEFICIT HYPERACTIVITY DISORDER (ADHD), OTHER TYPE: ICD-10-CM

## 2023-02-14 DIAGNOSIS — F43.10 PTSD (POST-TRAUMATIC STRESS DISORDER): ICD-10-CM

## 2023-02-14 PROCEDURE — 99214 OFFICE O/P EST MOD 30 MIN: CPT | Mod: 95 | Performed by: NURSE PRACTITIONER

## 2023-02-14 RX ORDER — LAMOTRIGINE 200 MG/1
200 TABLET ORAL DAILY
Qty: 90 TABLET | Refills: 0 | Status: SHIPPED | OUTPATIENT
Start: 2023-02-14 | End: 2023-06-06

## 2023-02-14 RX ORDER — BUPROPION HYDROCHLORIDE 300 MG/1
300 TABLET ORAL EVERY MORNING
Qty: 90 TABLET | Refills: 0 | Status: SHIPPED | OUTPATIENT
Start: 2023-02-14 | End: 2023-04-13

## 2023-02-14 RX ORDER — PROPRANOLOL HCL 60 MG
60 CAPSULE, EXTENDED RELEASE 24HR ORAL DAILY
Qty: 90 CAPSULE | Refills: 0 | Status: SHIPPED | OUTPATIENT
Start: 2023-02-14 | End: 2023-06-06

## 2023-02-14 RX ORDER — PROPRANOLOL HYDROCHLORIDE 10 MG/1
2 TABLET ORAL
COMMUNITY
Start: 2022-12-16 | End: 2023-02-28 | Stop reason: ALTCHOICE

## 2023-02-14 RX ORDER — LORAZEPAM 0.5 MG/1
0.5 TABLET ORAL DAILY PRN
Qty: 15 TABLET | Refills: 0 | Status: SHIPPED | OUTPATIENT
Start: 2023-02-14 | End: 2023-03-05

## 2023-02-14 RX ORDER — BUPROPION HYDROCHLORIDE 150 MG/1
150 TABLET ORAL EVERY MORNING
Qty: 90 TABLET | Refills: 0 | Status: SHIPPED | OUTPATIENT
Start: 2023-02-14 | End: 2023-04-13

## 2023-02-14 ASSESSMENT — ANXIETY QUESTIONNAIRES
GAD7 TOTAL SCORE: 21
8. IF YOU CHECKED OFF ANY PROBLEMS, HOW DIFFICULT HAVE THESE MADE IT FOR YOU TO DO YOUR WORK, TAKE CARE OF THINGS AT HOME, OR GET ALONG WITH OTHER PEOPLE?: VERY DIFFICULT
6. BECOMING EASILY ANNOYED OR IRRITABLE: NEARLY EVERY DAY
4. TROUBLE RELAXING: NEARLY EVERY DAY
GAD7 TOTAL SCORE: 21
7. FEELING AFRAID AS IF SOMETHING AWFUL MIGHT HAPPEN: NEARLY EVERY DAY
2. NOT BEING ABLE TO STOP OR CONTROL WORRYING: NEARLY EVERY DAY
7. FEELING AFRAID AS IF SOMETHING AWFUL MIGHT HAPPEN: NEARLY EVERY DAY
3. WORRYING TOO MUCH ABOUT DIFFERENT THINGS: NEARLY EVERY DAY
IF YOU CHECKED OFF ANY PROBLEMS ON THIS QUESTIONNAIRE, HOW DIFFICULT HAVE THESE PROBLEMS MADE IT FOR YOU TO DO YOUR WORK, TAKE CARE OF THINGS AT HOME, OR GET ALONG WITH OTHER PEOPLE: VERY DIFFICULT
1. FEELING NERVOUS, ANXIOUS, OR ON EDGE: NEARLY EVERY DAY
GAD7 TOTAL SCORE: 21
5. BEING SO RESTLESS THAT IT IS HARD TO SIT STILL: NEARLY EVERY DAY

## 2023-02-14 ASSESSMENT — PATIENT HEALTH QUESTIONNAIRE - PHQ9
10. IF YOU CHECKED OFF ANY PROBLEMS, HOW DIFFICULT HAVE THESE PROBLEMS MADE IT FOR YOU TO DO YOUR WORK, TAKE CARE OF THINGS AT HOME, OR GET ALONG WITH OTHER PEOPLE: VERY DIFFICULT
SUM OF ALL RESPONSES TO PHQ QUESTIONS 1-9: 21
SUM OF ALL RESPONSES TO PHQ QUESTIONS 1-9: 21

## 2023-02-14 NOTE — PROGRESS NOTES
"Video-Visit Details    Type of service:  Phone visit     Start Time : 8:48 AM     End Time: 9:20 AM    Originating Location (pt. Location): Home        Distant Location (provider location):  Off-site    Mode of Communication:  Phone call to                Outpatient Psychiatric Progress Note    Name: Magnolia Gonzáles   : 1970                    Primary Care Provider: Simran Barreto PA-C   Therapist: None    PHQ-9 scores:  PHQ-9 SCORE 2021 2022 10/17/2022   PHQ-9 Total Score - - -   PHQ-9 Total Score MyChart - 22 (Severe depression) 21 (Severe depression)   PHQ-9 Total Score 22 22 21       JULIEN-7 scores:  JULIEN-7 SCORE 2021   Total Score - - -   Total Score - - -   Total Score 21 21 19       Patient Identification:    Patient is a 52 year old year old, single  White Choose not to answer female  who presents for return visit with me.  Patient is currently unemployed. Patient attended the session alone. Patient prefers to be called: \"Maryjane\".    Current medications include: betamethasone valerate (VALISONE) 0.1 % external ointment, Apply topically 2 times daily  buPROPion (WELLBUTRIN XL) 300 MG 24 hr tablet, Take 1 tablet (300 mg) by mouth every morning  clonazePAM (KLONOPIN) 0.5 MG tablet, Take 1 tablet (0.5 mg) by mouth daily as needed for anxiety  gabapentin (NEURONTIN) 400 MG capsule, Take 2 capsules (800 mg) by mouth 3 times daily  hydrocortisone (CORTAID) 1 % external ointment, Apply topically 2 times daily Use on face and around the eyes - DO NOT get in the eye  lamoTRIgine (LAMICTAL) 200 MG tablet, Take 1 tablet (200 mg) by mouth daily  levothyroxine (SYNTHROID/LEVOTHROID) 100 MCG tablet, Take 1 tablet (100 mcg) by mouth daily  lurasidone (LATUDA) 20 MG TABS tablet, Take 1 tablet (20 mg) by mouth daily With food    No current facility-administered medications on file prior to visit.       The [place] Prescription Monitoring Program has been reviewed and " "there are no concerns about diversionary activity for controlled substances at this time.      I was able to review most recent Primary Care Provider, specialty provider, and therapy visit notes that I have access to.     Today, patient reports that she saw two other providers and did not felt connected to them - was not taking medications that helped.  She is still homeless.  She is waiting for social security disability - she is working with an .  She is receiving SMART funds.  She is trying to find a place to work.  She [place] staying with someone [place] the [place]. She has none of her belongings.  She has no transportation.  She goes to the store twice a month.  Her anxiety is \"horrible\"  No SI.  She has slept poorly.  Her credit is ruined.  She was approved for medical cannabis.  Finger amputated in August - left hand middle finger.  She has back pain and is unable to stand for prolonged periods of time.  She has a  - [email or web address].  To meet with them tomorrow.  She sees animals and woman on a horse at night.  Started invega in October. She is afraid of the dark.       has a past medical history of Anxiety, Depressive disorder, Hypothyroid, and Suicidal ideation (7/19/2015).    Social history updates:    Maryjane is applying for disability.  She is unemployed and has severe financial stress.  She has no form of transportation, [place] the country.    Substance use updates:    No alcohol use reported  Tobacco use: No    Vital Signs:   There were no vitals taken for this visit.    Labs:    Most recent laboratory results reviewed and no new labs.     Mental Status Examination:  Appearance: awake, alert and moderate distress  Attitude: cooperative  Eye Contact:  Unable to assess  Gait and Station: No assistive Devices used and No dizziness or falls  Psychomotor Behavior:  Unable to assess  Oriented to:  time, person, and place  Attention Span and Concentration:  Fair  Speech:   clear, coherent, " pressured speech and Speaks: English  Mood:  anxious and depressed  Affect:  mood congruent and intensity is heightened  Associations:  no loose associations  Thought Process:  goal oriented  Thought Content:  no evidence of suicidal ideation or homicidal ideation, no auditory hallucinations present and visual hallucinations present  Recent and Remote Memory:  intact Not formally assessed. No amnesia.  Fund of Knowledge: appropriate  Insight:  fair  Judgment:  fair  Impulse Control:  fair    Suicide Risk Assessment:  Today Magnolia Gonzáles reports no thoughts to harm themself or others. In addition, there are notable risk factors for self-harm, including single status, anxiety, psychosis, hopelessness, withdrawing and mood change. However, risk is mitigated by history of seeking help when needed, future oriented, denies suicidal intent or plan and denies homicidal ideation, intent, or plan. Therefore, based on all available evidence including the factors cited above, Magnolia Gonzáles does not appear to be at imminent risk for self-harm, does not meet criteria for a 72-hr hold, and therefore remains appropriate for ongoing outpatient level of care.  A thorough assessment of risk factors related to suicide and self-harm have been reviewed and are noted above. The patient convincingly denies suicidality on several occasions. Local community safety resources printed and reviewed for patient to use if needed. There was no deceit detected, and the patient presented in a manner that was believable.     DSM5 Diagnosis:  Attention-Deficit/Hyperactivity Disorder  314.01 (F90.8) Other Specified Attention-Deficit / Hyperactiity Disorder  296.40 Bipolar I Disorder Current or Most Recent Episode Hypomanic  300.02 (F41.1) Generalized Anxiety Disorder  309.81 (F43.10) Posttraumatic Stress Disorder (includes Posttraumatic Stress Disorder for Children 6 Years and Younger)  With dissociative symptoms    Medical  comorbidities include:   Patient Active Problem List    Diagnosis Date Noted     Generalized anxiety disorder 07/29/2020     Priority: Medium     Major depressive disorder, recurrent episode, mild (H) 07/19/2017     Priority: Medium     PTSD (post-traumatic stress disorder) 07/19/2017     Priority: Medium     Bipolar affective disorder, manic, moderate (H) 09/23/2016     Priority: Medium     Patient refutes this. 10/5/2020       Overdose of Valium, intentional self-harm, subsequent encounter 05/29/2016     Priority: Medium     Carpal tunnel syndrome of left wrist 01/13/2016     Priority: Medium     Adjustment disorder with mixed anxiety and depressed mood 01/06/2016     Priority: Medium     Acquired hypothyroidism 01/06/2016     Priority: Medium     Bilateral carpal tunnel syndrome 01/06/2016     Priority: Medium     Social phobia 12/26/2014     Priority: Medium     Panic disorder without agoraphobia 12/26/2014     Priority: Medium     History of alcohol dependence (H) 07/22/2014     Priority: Medium     Overview:   Binge drinking disorder. Sober since 5/2016       Nondependent alcohol abuse, episodic drinking behavior 11/17/2005     Priority: Medium       Assessment:    Magnolia Gonzáles reported in today that she had seen 2 other providers since our visit and decided they were not a good fit for her.  She felt like medications were not helping keep her mood balanced.  She remains in financials stress.  She is working with an  to help her get disability money to support herself.  When she saw another provider she had [employer] testing done and plans to send a copy of that to me..  I started her on a low-dose of lorazepam for breakthrough anxiety, given her presentation.  She is instructed to take this sparingly.  She is being prescribed gabapentin by another provider.  Propranolol is changed to long-acting version to help with anxiety.  Lamotrigine is being taken for mood stabilization.  Wellbutrin XL  is being taken for ADHD symptoms as well as lift her depressive symptoms that she reports.  Talk therapy is recommended.    Medication side effects and alternatives were reviewed. Health promotion activities recommended and reviewed today. All questions addressed. Education and counseling completed regarding risks and benefits of medications and psychotherapy options.    Treatment Plan:        1.  Wellbutrin  mg daily    2.  Lamotrigine 200 mg daily    3.  Lorazepam 0.5 mg as needed for severe anxiety-take sparingly    4.  Propranolol LA 60 mg daily    5.  Talk therapy, when able to, for learning skills to manage life stressors    6.  Gabapentin prescribed by another provider        Continue all other medications as reviewed per electronic medical record today.     Safety plan reviewed. To the Emergency Department as needed or call after hours crisis line at [phone] or [phone]. [place] Crisis [place]. Text [place] to 471701 or Suicide LifeLine Chat: [facility] Indian [facility] School.org/chat/    To schedule individual or family therapy, call [facility] at [phone]    Schedule an appointment with me in April or sooner as needed. Call [facility] at [phone] to schedule.    Follow up with primary care provider as planned or for acute medical concerns.    Call the psychiatric nurse line with medication questions or concerns at [phone]    MyChart may be used to communicate with your provider, but this is not intended to be used for emergencies.    Crisis Resources:    National Suicide Prevention Lifeline: 662.711.3075 (TTY: 305.256.1258). Call anytime for help.  (www.suicidepreventionlifeline.org)  National [place] on Mental Illness (www.anne marie.org): 787.588.8429 or 368-152-0217.   Mental Health Association (www.mentalhealth.org): 971.270.9792 or 182-929-3840.  [place] Crisis [place]: Text [place] to 697235  Suicide LifeLine Chat:  suicidepreventionlifeline.org/[employer]    Administrative Billing:   Time spent with patient includes counseling and coordination of care regarding above diagnoses and treatment plan.    Patient Status:  Patient will continue to be seen for ongoing consultation and stabilization.    Signed:   HERMELINDA Vickers-BC   Psychiatry         Answers for HPI/ROS submitted by the patient on 2/14/2023  If you checked off any problems, how difficult have these problems made it for you to do your work, take care of things at home, or get along with other people?: Very difficult  PHQ9 TOTAL SCORE: 21  JULIEN 7 TOTAL SCORE: 21

## 2023-02-20 ENCOUNTER — MYC MEDICAL ADVICE (OUTPATIENT)
Dept: PSYCHIATRY | Facility: CLINIC | Age: 53
End: 2023-02-20

## 2023-02-21 ENCOUNTER — MYC MEDICAL ADVICE (OUTPATIENT)
Dept: PSYCHIATRY | Facility: CLINIC | Age: 53
End: 2023-02-21

## 2023-02-21 NOTE — TELEPHONE ENCOUNTER
[employer] documents printed and sent to provider.    Ijeoma Miller RN on 2/21/2023 at 1:54 PM

## 2023-02-21 NOTE — TELEPHONE ENCOUNTER
Received [employer] results images via [employer] that patient attached. Writer printed images.    Send to provider's email to review.  Original to scanning.

## 2023-02-28 RX ORDER — GABAPENTIN 400 MG/1
1200 CAPSULE ORAL 3 TIMES DAILY
Qty: 270 CAPSULE | Refills: 0 | COMMUNITY
Start: 2023-02-28 | End: 2023-03-15

## 2023-02-28 NOTE — PATIENT INSTRUCTIONS
1.  Wellbutrin  mg daily  2.  Lamotrigine 200 mg daily  3.  Lorazepam 0.5 mg as needed for severe anxiety-take sparingly  4.  Propranolol LA 60 mg daily  5.  Talk therapy, when able to, for learning skills to manage life stressors  6.  Gabapentin prescribed by another provider    Continue all other medications as reviewed per electronic medical record today.   Safety plan reviewed. To the Emergency Department as needed or call after hours crisis line at [phone] or [phone]. [place] Crisis [place]. Text [place] to 665850 or Suicide LifeLine Chat: "[employer].org/chat/  To schedule individual or family therapy, call [facility] at [phone]  Schedule an appointment with me in April or sooner as needed. Call [facility] at [phone] to schedule.  Follow up with primary care provider as planned or for acute medical concerns.  Call the psychiatric nurse line with medication questions or concerns at [phone]  MyChart may be used to communicate with your provider, but this is not intended to be used for emergencies.    Crisis Resources:    National Suicide Prevention Lifeline: 687.893.7859 (TTY: 305.879.5699). Call anytime for help.  (www.suicidepreventionlifeline.org)  National [place] on Mental Illness (www.anne marie.org): 663.841.1205 or 712-993-3045.   Mental Health Association (www.mentalhealth.org): 785.709.9290 or 466-794-5347.  [place] Crisis [place]: Text [place] to 958682  Suicide LifeLine Chat: suicide[employer].org/chat

## 2023-03-03 DIAGNOSIS — F31.12 BIPOLAR AFFECTIVE DISORDER, MANIC, MODERATE (H): ICD-10-CM

## 2023-03-03 NOTE — TELEPHONE ENCOUNTER
Reason for call:  Medication  If this is a refill request, has the caller requested the refill from the pharmacy already? Yes  Will the patient be using a [facility]? No  Name of the pharmacy and phone number for the current request: [facility] - [place], [place] - [address]  [phone]     Name of the medication requested: LORazepam (ATIVAN) 0.5 MG tablet     Other request: Call ASAP please  Phone number to reach patient:  Home number on file [phone] (home)  Best Time:  ASAP  Can we leave a detailed message on this number?  YES.      Date of Last Office Visit: 2/14/23  Date of Next Office Visit: 4/20/23  No shows since last visit: 0  Cancellations since last visit: 0    Medication requested: LORazepam (ATIVAN) 0.5 MG tablet Date last ordered: 2/14/23 Qty: 15 Refills: 0     [place] of [place] ?: yes  Medication last filled date: 2/14/23 Qty filled: 15  Other controlled substance on [place] ?: yes  If yes, is this a new medication?: no  If yes, name of medication: gabapentin and date filled: 2/18/23    Lapse in medication adherence greater than 5 days?: no    Medication refill request verified as identical to current order?: yes  Result of Last DAM, VPA, Li+ Level, CBC, or Carbamazepine Level (at or since last visit): N/A      Last visit treatment plan:     1.  Wellbutrin  mg daily    2.  Lamotrigine 200 mg daily    3.  Lorazepam 0.5 mg as needed for severe anxiety-take sparingly    4.  Propranolol LA 60 mg daily    5.  Talk therapy, when able to, for learning skills to manage life stressors    6.  Gabapentin prescribed by another provider         Continue all other medications as reviewed per electronic medical record today.     Safety plan reviewed. To the Emergency Department as needed or call after hours crisis line at [phone] or [phone]. [place] Crisis [place]. Text [place] to 922945 or Suicide LifeLine Chat: suicidepreventionlifeline.org/chat/    To schedule individual or family  therapy, call [facility] at [phone]    Schedule an appointment with me in April or sooner as needed. Call [facility] at [phone] to schedule.    Follow up with primary care provider as planned or for acute medical concerns.    Call the psychiatric nurse line with medication questions or concerns at [phone]  MyChart may be used to communicate with your provider, but this is not intended to be used for emergencies.  Return in about 6 weeks       []Medication refilled per  Medication Refill in Ambulatory Care  policy.  [x]Medication unable to be refilled by RN due to criteria not met as indicated below:    []Eligibility - not seen in the last year   []Supervision - no future appointment   []Compliance - no shows, cancellations or lapse in therapy   []Verification - order discrepancy   [x]Controlled medication   [x]Medication not included in policy   []90-day supply request   []Other

## 2023-03-03 NOTE — TELEPHONE ENCOUNTER
Reason for call:  Medication   If this is a refill request, has the caller requested the refill from the pharmacy already? Yes  Will the patient be using a [facility]? No  Name of the pharmacy and phone number for the current request: [id number] - [place], [place] - [address]  [phone]    Name of the medication requested: LORazepam (ATIVAN) 0.5 MG tablet    Other request: Call ASAP please    Phone number to reach patient:  Home number on file [phone] (home)    Best Time:  ASAP    Can we leave a detailed message on this number?  YES    Travel screening: Not Applicable

## 2023-03-05 RX ORDER — LORAZEPAM 0.5 MG/1
0.5 TABLET ORAL DAILY PRN
Qty: 15 TABLET | Refills: 1 | Status: SHIPPED | OUTPATIENT
Start: 2023-03-05 | End: 2023-04-13

## 2023-03-09 ENCOUNTER — MYC REFILL (OUTPATIENT)
Dept: FAMILY MEDICINE | Facility: OTHER | Age: 53
End: 2023-03-09

## 2023-03-09 DIAGNOSIS — F41.1 GENERALIZED ANXIETY DISORDER: ICD-10-CM

## 2023-03-09 DIAGNOSIS — F43.10 PTSD (POST-TRAUMATIC STRESS DISORDER): ICD-10-CM

## 2023-03-09 NOTE — TELEPHONE ENCOUNTER
Pending Prescriptions:                       Disp   Refills    gabapentin (NEURONTIN) 400 MG capsule      270 ca*0        Sig: Take 3 capsules (1,200 mg) by mouth 3 times daily    Routing refill request to provider for review/approval because:  Drug not on the G refill protocol     Requested Prescriptions   Pending Prescriptions Disp Refills    gabapentin (NEURONTIN) 400 MG capsule 270 capsule 0     Sig: Take 3 capsules (1,200 mg) by mouth 3 times daily       There is no refill protocol information for this order

## 2023-03-10 ENCOUNTER — MYC REFILL (OUTPATIENT)
Dept: FAMILY MEDICINE | Facility: OTHER | Age: 53
End: 2023-03-10

## 2023-03-10 DIAGNOSIS — F41.1 GENERALIZED ANXIETY DISORDER: ICD-10-CM

## 2023-03-10 DIAGNOSIS — F43.10 PTSD (POST-TRAUMATIC STRESS DISORDER): ICD-10-CM

## 2023-03-10 RX ORDER — GABAPENTIN 400 MG/1
1200 CAPSULE ORAL 3 TIMES DAILY
Qty: 270 CAPSULE | Refills: 0 | OUTPATIENT
Start: 2023-03-10

## 2023-03-10 NOTE — TELEPHONE ENCOUNTER
Did leave message letting Maryjane know its been since 9/21 that Simran Barreto last seen her and that she needs a F2F before any prescription are given.  Also reiterated that it looks like her psychiatrist has just filled the Gabapentin a week ago.    Closing encounter.    Kelin Jimenez RN  [facility] ~ Registered Nurse  Clinic Triage ~ [facility]  March 10, 2023

## 2023-03-16 ENCOUNTER — TELEPHONE (OUTPATIENT)
Dept: PSYCHIATRY | Facility: CLINIC | Age: 53
End: 2023-03-16

## 2023-03-16 ENCOUNTER — MYC REFILL (OUTPATIENT)
Dept: FAMILY MEDICINE | Facility: OTHER | Age: 53
End: 2023-03-16

## 2023-03-16 DIAGNOSIS — F43.10 PTSD (POST-TRAUMATIC STRESS DISORDER): ICD-10-CM

## 2023-03-16 DIAGNOSIS — F41.1 GENERALIZED ANXIETY DISORDER: ICD-10-CM

## 2023-03-16 RX ORDER — GABAPENTIN 400 MG/1
1200 CAPSULE ORAL 3 TIMES DAILY
Qty: 270 CAPSULE | Refills: 0 | Status: CANCELLED | OUTPATIENT
Start: 2023-03-16

## 2023-03-16 RX ORDER — GABAPENTIN 400 MG/1
1200 CAPSULE ORAL 3 TIMES DAILY
Qty: 270 CAPSULE | Refills: 0 | OUTPATIENT
Start: 2023-03-16

## 2023-03-16 NOTE — TELEPHONE ENCOUNTER
Pending Prescriptions:                       Disp   Refills    gabapentin (NEURONTIN) 400 MG capsule     270 ca*0            Sig: Take 3 capsules (1,200 mg) by mouth 3 times daily

## 2023-03-16 NOTE — TELEPHONE ENCOUNTER
Reason for call:  Medication refill    If this is a refill request, has the caller requested the refill from the pharmacy already? Yes      Name of the pharmacy and phone number for the current [facility]: [facility] [phone]    Name of the medication requested: Gabapetine    Other request: Per Pt, stated she is out medication    Phone number to reach patient:  [phone]    Best Time: Anytime     Can we leave a detailed message on this number?  Yes

## 2023-03-17 ENCOUNTER — TELEPHONE (OUTPATIENT)
Dept: PSYCHIATRY | Facility: CLINIC | Age: 53
End: 2023-03-17

## 2023-03-17 NOTE — TELEPHONE ENCOUNTER
"Reason for call:  Medication   If this is a refill request, has the caller requested the refill from the pharmacy already? unsure  Will the patient be using a [facility]? No  Name of the pharmacy and phone number for the current request: [facility] [phone]    Name of the medication requested: Lorazepam    Other request: pt also requesting a letter about her therapy dog either before her appt in April or to be discussed at her appt in April. Pt stated \"the Dog is male and his name is Selvin. This a different therapy dog than before.\"    Phone number to reach patient:  Cell number on file:    Telephone Information:   Mobile [phone]       Best Time:  anytime    Can we leave a detailed message on this number?  YES    Travel screening: Not Applicable  "

## 2023-03-17 NOTE — TELEPHONE ENCOUNTER
Gabapentin sent to pharmacy on 3/16/23. Called patient and let her know.     Ijeoma Miller RN on 3/17/2023 at 11:20 AM

## 2023-03-20 NOTE — TELEPHONE ENCOUNTER
1) RN reviewed refill request for LORazepam (ATIVAN) 0.5 MG tablet.     2) Per Chart Review, this medication was last ordered on 3/5/23 for 15 tablets with 1 refill. Pt has one refill on file.     3) RN spoke with pharmacy staff and they will fill this for pt.     Geno Barcenas RN on 3/20/2023 at 10:15 AM

## 2023-03-28 ENCOUNTER — TELEPHONE (OUTPATIENT)
Dept: PSYCHIATRY | Facility: CLINIC | Age: 53
End: 2023-03-28

## 2023-03-28 NOTE — TELEPHONE ENCOUNTER
Patient called to leave a message for Janet and the Nursing team. She states once a month she wakes up and immediately has to vomit. States it feels as if she has the flu or food poisoning. She is wondering if this is a side effect of her medication. Please follow up with patient at [phone]

## 2023-03-29 NOTE — TELEPHONE ENCOUNTER
Returned call to patient, she reports that she has had a monthly occurence of nausea/vomiting along with diarrhea that lasts briefly and then subsides until it occurs a month later. She can't attribute it to diet or exposure to illness (flu). She doesn't think it is related to her meds. She acknowledges that she is having major hardships and under stress.     Instructed that her message will be sent to provider. Patient is going to focus on stress management before her next appt with provider and see PCP if needed.

## 2023-03-29 NOTE — TELEPHONE ENCOUNTER
Returned call to patient to gather more information regarding concern for possible GI side effect.  LVM to call Nurse Triage back.

## 2023-04-13 ENCOUNTER — MYC MEDICAL ADVICE (OUTPATIENT)
Dept: PSYCHIATRY | Facility: CLINIC | Age: 53
End: 2023-04-13

## 2023-04-13 DIAGNOSIS — F41.1 GENERALIZED ANXIETY DISORDER: ICD-10-CM

## 2023-04-13 DIAGNOSIS — F90.8 ATTENTION DEFICIT HYPERACTIVITY DISORDER (ADHD), OTHER TYPE: ICD-10-CM

## 2023-04-13 RX ORDER — BUPROPION HYDROCHLORIDE 300 MG/1
300 TABLET ORAL EVERY MORNING
Qty: 90 TABLET | Refills: 0 | Status: SHIPPED | OUTPATIENT
Start: 2023-04-13 | End: 2023-06-06

## 2023-04-13 RX ORDER — BUPROPION HYDROCHLORIDE 150 MG/1
150 TABLET ORAL EVERY MORNING
Qty: 90 TABLET | Refills: 0 | Status: SHIPPED | OUTPATIENT
Start: 2023-04-13 | End: 2023-04-20 | Stop reason: DRUGHIGH

## 2023-04-13 NOTE — TELEPHONE ENCOUNTER
Reason for call:  Other   Patient called regarding (reason for call): call back  Additional comments: pt would like a call back regarding the contents of their [employer] message.     Phone number to reach patient:  Home number on file [phone] (home)    Best Time:  ASAP    Can we leave a detailed message on this number?  YES    Travel screening: Not Applicable

## 2023-04-13 NOTE — TELEPHONE ENCOUNTER
Date of Last Office Visit: 2/14/23  Date of Next Office Visit: 4/20/23  No shows since last visit: no  Cancellations since last visit: no    Medication requested: buPROPion (WELLBUTRIN XL) 300 MG 24 hr tablet Date last ordered: 2/14/23 Qty: 90 Refills: 0    Medication requested: buPROPion (WELLBUTRIN XL) 150 MG 24 hr tablet Date last ordered: 2/14/23 Qty: 30 Refills: 0         Lapse in medication adherence greater than 5 days?: no  If yes, call patient and gather details: na  Medication refill request verified as identical to current order?: yes  Result of Last DAM, VPA, Li+ Level, CBC, or Carbamazepine Level (at or since last visit): N/A    Last visit treatment plan:     Treatment Plan:          1.  Wellbutrin  mg daily    2.  Lamotrigine 200 mg daily    3.  Lorazepam 0.5 mg as needed for severe anxiety-take sparingly    4.  Propranolol LA 60 mg daily    5.  Talk therapy, when able to, for learning skills to manage life stressors    6.  Gabapentin prescribed by another provider         Continue all other medications as reviewed per electronic medical record today.     Safety plan reviewed. To the Emergency Department as needed or call after hours crisis line at [phone] or [phone]. [place] Crisis Text [place]. Text [place] to 792586 or Suicide LifeLine Chat: suicidepreventionlifeline.org/chat/    To schedule individual or family therapy, call [facility] at [phone]    Schedule an appointment with me in April or sooner as needed. Call [facility] at [phone] to schedule.    Follow up with primary care provider as planned or for acute medical concerns.    Call the psychiatric nurse line with medication questions or concerns at [phone]    MyChart may be used to communicate with your provider, but this is not intended to be used for emergencies.    []Medication refilled per  Medication Refill in Ambulatory Care  policy.  [x]Medication unable to be refilled by RN  due to criteria not met as indicated below:    []Eligibility - not seen in the last year   []Supervision - no future appointment   []Compliance - no shows, cancellations or lapse in therapy   []Verification - order discrepancy   []Controlled medication   [x]Medication not included in policy   []90-day supply request   []Other

## 2023-04-13 NOTE — TELEPHONE ENCOUNTER
RN returned a call to this patient.      1.  The patient reports that she is in a different living situation currently.  She was throwing away old  prescriptions and accidentally threw aware her current buPROPion (WELLBUTRIN XL) 150 MG 24 hr tablet and buPROPion (WELLBUTRIN XL) 300 MG 24 hr tablet.  She phoned her pharmacy for directions and they instructed her that she needs a new prescription.      2.  The patient is very frightened that she will go through antidepressant withdrawal.  Her last dose was this morning and she has no more medication.      3.  RN submitted 2 high priority refill requests to Janet Álvarez CNP for her review.

## 2023-04-14 ENCOUNTER — MYC MEDICAL ADVICE (OUTPATIENT)
Dept: PSYCHIATRY | Facility: CLINIC | Age: 53
End: 2023-04-14

## 2023-04-14 NOTE — TELEPHONE ENCOUNTER
Reason for call:  Other   Patient called regarding (reason for call): call back  Additional comments: Pt just called re: Refill   states the Pharm is aware and working on it.   No need for return phone call     Phone number to reach patient:  Other phone number:  No call back needed     Best Time:  NA     Can we leave a detailed message on this number?  Not Applicable    Travel screening: Not Applicable

## 2023-04-14 NOTE — TELEPHONE ENCOUNTER
Reason for call:  Medication   If this is a refill request, has the caller requested the refill from the pharmacy already? Pt calling after prompted by coordinator  Will the patient be using a [facility]? No  Name of the pharmacy and phone number for the current request: [facility]     Name of the medication requested:   buPROPion (WELLBUTRIN XL) 150 MG 24 hr tablet Take 1 tablet (150 mg) by mouth every morning With 300 mg daily for a total of 450 mg daily     buPROPion (WELLBUTRIN XL) 300 MG 24 hr tablet Take 1 tablet (300 mg) by mouth every morning     300 mg filled, the 150mg was not for above- she is out of medication for the 150 mg 24 hr tablet BuProPion    Other request: check to see if order was put through to the pharmacy for both medications    Phone number to reach patient:  Home number on file [phone] (home)    Best Time:  any    Can we leave a detailed message on this number?  YES    Travel screening: Not Applicable

## 2023-04-20 ENCOUNTER — TELEPHONE (OUTPATIENT)
Dept: PSYCHIATRY | Facility: CLINIC | Age: 53
End: 2023-04-20

## 2023-04-20 ENCOUNTER — VIRTUAL VISIT (OUTPATIENT)
Dept: PSYCHIATRY | Facility: CLINIC | Age: 53
End: 2023-04-20
Payer: COMMERCIAL

## 2023-04-20 DIAGNOSIS — F41.1 GENERALIZED ANXIETY DISORDER: ICD-10-CM

## 2023-04-20 DIAGNOSIS — F43.10 PTSD (POST-TRAUMATIC STRESS DISORDER): ICD-10-CM

## 2023-04-20 DIAGNOSIS — F31.12 BIPOLAR AFFECTIVE DISORDER, MANIC, MODERATE (H): Primary | ICD-10-CM

## 2023-04-20 PROCEDURE — 99214 OFFICE O/P EST MOD 30 MIN: CPT | Mod: 95 | Performed by: NURSE PRACTITIONER

## 2023-04-20 RX ORDER — DESVENLAFAXINE 50 MG/1
50 TABLET, FILM COATED, EXTENDED RELEASE ORAL DAILY
Qty: 30 TABLET | Refills: 1 | Status: SHIPPED | OUTPATIENT
Start: 2023-04-20 | End: 2023-06-06

## 2023-04-20 ASSESSMENT — ANXIETY QUESTIONNAIRES
7. FEELING AFRAID AS IF SOMETHING AWFUL MIGHT HAPPEN: NEARLY EVERY DAY
1. FEELING NERVOUS, ANXIOUS, OR ON EDGE: NEARLY EVERY DAY
7. FEELING AFRAID AS IF SOMETHING AWFUL MIGHT HAPPEN: NEARLY EVERY DAY
6. BECOMING EASILY ANNOYED OR IRRITABLE: NEARLY EVERY DAY
5. BEING SO RESTLESS THAT IT IS HARD TO SIT STILL: NEARLY EVERY DAY
GAD7 TOTAL SCORE: 21
IF YOU CHECKED OFF ANY PROBLEMS ON THIS QUESTIONNAIRE, HOW DIFFICULT HAVE THESE PROBLEMS MADE IT FOR YOU TO DO YOUR WORK, TAKE CARE OF THINGS AT HOME, OR GET ALONG WITH OTHER PEOPLE: EXTREMELY DIFFICULT
GAD7 TOTAL SCORE: 21
GAD7 TOTAL SCORE: 21
8. IF YOU CHECKED OFF ANY PROBLEMS, HOW DIFFICULT HAVE THESE MADE IT FOR YOU TO DO YOUR WORK, TAKE CARE OF THINGS AT HOME, OR GET ALONG WITH OTHER PEOPLE?: EXTREMELY DIFFICULT
4. TROUBLE RELAXING: NEARLY EVERY DAY
2. NOT BEING ABLE TO STOP OR CONTROL WORRYING: NEARLY EVERY DAY
3. WORRYING TOO MUCH ABOUT DIFFERENT THINGS: NEARLY EVERY DAY

## 2023-04-20 ASSESSMENT — PATIENT HEALTH QUESTIONNAIRE - PHQ9
10. IF YOU CHECKED OFF ANY PROBLEMS, HOW DIFFICULT HAVE THESE PROBLEMS MADE IT FOR YOU TO DO YOUR WORK, TAKE CARE OF THINGS AT HOME, OR GET ALONG WITH OTHER PEOPLE: EXTREMELY DIFFICULT
SUM OF ALL RESPONSES TO PHQ QUESTIONS 1-9: 23
SUM OF ALL RESPONSES TO PHQ QUESTIONS 1-9: 23

## 2023-04-20 NOTE — PROGRESS NOTES
"Telemedicine Visit: The patient's condition can be safely assessed and treated via synchronous audio and visual telemedicine encounter.      Reason for Telemedicine Visit: Patient unable to travel and Patient lives in a designated Health Professional Shortage Area (HPSA)    Originating Site (Patient Location): Patient's home        Distant Location (provider location):  On-site    Consent:  The patient/guardian has verbally consented to: the potential risks and benefits of telemedicine (video visit) versus in person care; bill my insurance or make self-payment for services provided; and responsibility for payment of non-covered services.     Mode of Communication:  Video Conference via [employer]    As the provider I attest to compliance with applicable laws and regulations related to telemedicine.      Length of visit: 40 minutes             Outpatient Psychiatric Progress Note    Name: Magnolia KADEEM Gonzáles   : 1970                    Primary Care Provider: Simran Barreto PA-C   Therapist: None    PHQ-9 scores:      2022     1:31 PM 10/17/2022     2:50 PM 2023     8:37 AM   PHQ-9 SCORE   PHQ-9 Total Score MyChart 22 (Severe depression) 21 (Severe depression) 21 (Severe depression)   PHQ-9 Total Score 22 21 21       JULIEN-7 scores:      2021     3:01 PM 2022     5:18 PM 2023     8:38 AM   JULIEN-7 SCORE   Total Score   21 (severe anxiety)   Total Score 21 19 21       Patient Identification:    Patient is a 53 year old year old, single  White Choose not to answer female  who presents for return visit with me.  Patient is currently disabled. Patient attended the session alone. Patient prefers to be called: \" Maryjane\".    Current medications include: betamethasone valerate (VALISONE) 0.1 % external ointment, Apply topically 2 times daily  buPROPion (WELLBUTRIN XL) 150 MG 24 hr tablet, Take 1 tablet (150 mg) by mouth every morning With 300 mg daily for a total of 450 mg daily  buPROPion " (WELLBUTRIN XL) 300 MG 24 hr tablet, Take 1 tablet (300 mg) by mouth every morning  gabapentin (NEURONTIN) 400 MG capsule, Take 3 capsules (1,200 mg) by mouth 3 times daily  hydrocortisone (CORTAID) 1 % external ointment, Apply topically 2 times daily Use on face and around the eyes - DO NOT get in the eye  lamoTRIgine (LAMICTAL) 200 MG tablet, Take 1 tablet (200 mg) by mouth daily  levothyroxine (SYNTHROID/LEVOTHROID) 100 MCG tablet, Take 1 tablet (100 mcg) by mouth daily  LORazepam (ATIVAN) 0.5 MG tablet, Take 1 tablet (0.5 mg) by mouth daily as needed for anxiety  propranolol ER (INDERAL LA) 60 MG 24 hr capsule, Take 1 capsule (60 mg) by mouth daily    No current facility-administered medications on file prior to visit.       The [place] Prescription Monitoring Program has been reviewed and there are no concerns about diversionary activity for controlled substances at this time.      I was able to review most recent Primary Care Provider, specialty provider, and therapy visit notes that I have access to.     Today, patient reports that her Social Security was approved.  She is concerned about getting the check as it possibly was sent to a previous address.  She is going to be getting 5 months back pay.  Right now she is living with her friend in [place].  She does not have a vehicle.  With regards to her concerns about having blackouts, she did have an MRI and an EEG that were within normal limits.  Changes were made to her Lamictal and she now is taking it twice a day.  Despite these changes she has noticed increased agitation and is concerned about getting short with her dog.  She also feels uncomfortable in her own skin.  She has been approved for medical cannabis.  Some nights she hallucinates, reportedly seeing forces that fly away and something dark walking by in her room.  Part of her issue is that she worries that something bad is going to happen when things are going well for her.  Next week she has to  go in for court regarding sentencing for previous charges.  She might have to be on probation for 4 years, have an interlock on her car, or she might lose her 's license.  She has not started the propranolol ER.  She is unable to move without taking the Wellbutrin and would like to continue with that.  She has days where she cries a lot.     has a past medical history of Anxiety, Depressive disorder, Hypothyroid, and Suicidal ideation (7/19/2015).    Social history updates:    Maryjane currently resides with a friend in [place].  She will soon be receiving Social Security benefits.  She does not have a vehicle.    Substance use updates:    She denies use of substances  Tobacco use: No    Vital Signs:   There were no vitals taken for this visit.    Labs:    Most recent laboratory results reviewed and no new labs.     Mental Status Examination:  Appearance: awake, alert and mild distress  Attitude: cooperative  Eye Contact:  Unable to assess  Gait and Station: No dizziness or falls  Psychomotor Behavior:  Unable to assess  Oriented to:  time, person, and place  Attention Span and Concentration:  Normal  Speech:   vtspeech: pressured speech and Speaks: English  Mood:  : anxious and depressed  Affect:  : mood congruent  Associations:  no loose associations  Thought Process:  tangental  Thought Content:  no evidence of suicidal ideation or homicidal ideation, no auditory hallucinations present, visual hallucinations present and obsessions present  Recent and Remote Memory:  intact Not formally assessed. No amnesia.  Fund of Knowledge: appropriate  Insight:  fair  Judgment: fair  Impulse Control:  fair    Suicide Risk Assessment:  Today Magnolia Gonzáles reports no thoughts to harm themself or others. In addition, there are notable risk factors for self-harm, including single status, anxiety, psychosis, anger/rage and mood change. However, risk is mitigated by history of seeking help when needed, future oriented,  denies suicidal intent or plan and denies homicidal ideation, intent, or plan. Therefore, based on all available evidence including the factors cited above, Magnolia Gonzáles does not appear to be at imminent risk for self-harm, does not meet criteria for a 72-hr hold, and therefore remains appropriate for ongoing outpatient level of care.  A thorough assessment of risk factors related to suicide and self-harm have been reviewed and are noted above. The patient convincingly denies suicidality on several occasions. Local community safety resources printed and reviewed for patient to use if needed. There was no deceit detected, and the patient presented in a manner that was believable.     DSM5 Diagnosis:  296.89 Bipolar II Disorder With rapid cycling and moderate  300.02 (F41.1) Generalized Anxiety Disorder  309.81 (F43.10) Posttraumatic Stress Disorder (includes Posttraumatic Stress Disorder for Children 6 Years and Younger)  With dissociative symptoms    Medical comorbidities include:   Patient Active Problem List    Diagnosis Date Noted     Generalized anxiety disorder 07/29/2020     Priority: Medium     Major depressive disorder, recurrent episode, mild (H) 07/19/2017     Priority: Medium     PTSD (post-traumatic stress disorder) 07/19/2017     Priority: Medium     Bipolar affective disorder, manic, moderate (H) 09/23/2016     Priority: Medium     Patient refutes this. 10/5/2020       Overdose of Valium, intentional self-harm, subsequent encounter 05/29/2016     Priority: Medium     Carpal tunnel syndrome of left wrist 01/13/2016     Priority: Medium     Adjustment disorder with mixed anxiety and depressed mood 01/06/2016     Priority: Medium     Acquired hypothyroidism 01/06/2016     Priority: Medium     Bilateral carpal tunnel syndrome 01/06/2016     Priority: Medium     Social phobia 12/26/2014     Priority: Medium     Panic disorder without agoraphobia 12/26/2014     Priority: Medium     History of  "alcohol dependence (H) 07/22/2014     Priority: Medium     Overview:   Binge drinking disorder. Sober since 5/2016       Nondependent alcohol abuse, episodic drinking behavior 11/17/2005     Priority: Medium       Assessment:    Magnolia Gonzáles reports ongoing depression and anxiety symptoms.  A court sentencing hearing is coming up for her which may result in probation or removal of her 's license.  She does not own a vehicle at this time.  She is currently living with a new friend but would like to have her own place, possibly moving to [place].  She is concerned about increased irritability, and becoming impatient with her pet.  She does not want to stop the Wellbutrin as she tells me that is the only medication that helps her \"move\"..    Medication side effects and alternatives were reviewed. Health promotion activities recommended and reviewed today. All questions addressed. Education and counseling completed regarding risks and benefits of medications and psychotherapy options.    Treatment Plan:        1.  Wellbutrin  mg daily    2.  Desvenlafaxine 50 mg daily    3.  Gabapentin 1200 mg 3 times daily    4.  Lamotrigine 200 mg daily    5.  Try the propranolol ER 60 mg daily to help decrease anxiety    6.  Lorazepam 0.5 mg as needed for extreme anxiety-take sparingly- limited quantities prescribed    7.  Consider talk therapy, when able to, for learning skills to manage life stressors        Continue all other medications as reviewed per electronic medical record today.     Safety plan reviewed. To the Emergency Department as needed or call after hours crisis line at [phone] or [phone]. [place] Crisis Text Line. Text [place] to 497173 or Suicide LifeLine Chat: suicidepreventionlifeline.org/chat/    To schedule individual or family therapy, call [facility] at [phone]    Schedule an appointment with me in 6 weeks or sooner as needed. Call [facility] " at [phone] to schedule.    Follow up with primary care provider as planned or for acute medical concerns.    Call the psychiatric nurse line with medication questions or concerns at [phone]    MyChart may be used to communicate with your provider, but this is not intended to be used for emergencies.    Crisis Resources:    National Suicide Prevention Lifeline: 691.148.1110 (TTY: 673.326.2464). Call anytime for help.  (www.suicidepreventionlifeline.org)  National Manor on Mental Illness (www.anne marie.org): 639.711.7621 or 832-468-9133.   Mental Health Association (www.mentalhealth.org): 737.510.3706 or 400-851-1212.  [place] Crisis Text Line: Text [place] to 917114  Suicide LifeLine Chat: suicideJohnshout Brothers Platformline.org/chat    Administrative Billing:   Time spent with patient includes counseling and coordination of care regarding above diagnoses and treatment plan.    Patient Status:  This is a continuous care patient and medications will be prescribed by the psychiatric provider until further indicated.    Signed:   HERMELINDA Vickers-BC   Psychiatry       Answers for HPI/ROS submitted by the patient on 4/20/2023  If you checked off any problems, how difficult have these problems made it for you to do your work, take care of things at home, or get along with other people?: Extremely difficult  PHQ9 TOTAL SCORE: 23  JULIEN 7 TOTAL SCORE: 21

## 2023-04-20 NOTE — NURSING NOTE
Is the patient currently in the state of [place]? YES    Visit mode:TELEPHONE    If the visit is dropped, the patient can be reconnected by: TELEPHONE VISIT: Phone number: [phone]    Will anyone else be joining the visit? NO      How would you like to obtain your AVS? MyChart    Are changes needed to the allergy or medication list? NO    Reason for visit: Telephone

## 2023-04-20 NOTE — PROGRESS NOTES
Done   Answers for HPI/ROS submitted by the patient on 4/20/2023  If you checked off any problems, how difficult have these problems made it for you to do your work, take care of things at home, or get along with other people?: Extremely difficult  PHQ9 TOTAL SCORE: 23  JULIEN 7 TOTAL SCORE: 21

## 2023-04-20 NOTE — TELEPHONE ENCOUNTER
Reason for call: Medication     Patient called regarding (reason for call): Received call from patient, stated she would like for medication prescription to be sent to   [facility] instead of previous clinic    Phone number to reach patient:  [phone]    Best Time: Anytime    Can we leave a detailed message on this number?  Yes

## 2023-04-21 ENCOUNTER — TELEPHONE (OUTPATIENT)
Dept: PSYCHIATRY | Facility: CLINIC | Age: 53
End: 2023-04-21

## 2023-04-21 DIAGNOSIS — F41.1 GENERALIZED ANXIETY DISORDER: ICD-10-CM

## 2023-04-21 RX ORDER — DESVENLAFAXINE 50 MG/1
50 TABLET, FILM COATED, EXTENDED RELEASE ORAL DAILY
Qty: 30 TABLET | Refills: 1 | Status: CANCELLED | OUTPATIENT
Start: 2023-04-21

## 2023-04-21 NOTE — TELEPHONE ENCOUNTER
Prior Authorization Retail Medication Request    Medication/Dose: desvenlafaxine (PRISTIQ) 50 MG 24 hr tablet  ICD code (if different than what is on RX):  NA  Previously Tried and Failed:  NA  Rationale:  NA    Insurance Name:  /[employer]  Insurance ID:  [id number]      Pharmacy Information (if different than what is on RX)  Name:  [facility]  Phone:  [facility]

## 2023-04-21 NOTE — TELEPHONE ENCOUNTER
Reason for call:  Other   Patient called regarding (reason for call): call back and prescription  Additional comments: PT advised her med needs a prior authorization and that she transferred it to [facility] in [place], [place] Phone: [phone]    Phone number to reach patient:  Home number on file [phone] (home)    Best Time:  asap    Can we leave a detailed message on this number?  YES    Travel screening: Not Applicable

## 2023-04-21 NOTE — TELEPHONE ENCOUNTER
"    1) RN reviewed Telephone message: \"...PT advised her med needs a prior authorization and that she transferred it to [facility] in [place], [place] Phone: [phone]..\"    2) RN LVM at [phone], noting that message is not clear as to whether or not the prescription for desvenlafaxine (PRISTIQ) 50 MG 24 hr tablet has already been successfully transferred. Pt transferring from [facility] to [facility].     3) RN asked pt to call [phone] if any further action is required as to location of prescription.     4) RN also informed pt that the Prior Authorization request has been sent to the Prior Auth team to be processed.     5) RN routing to [facility] Med with information about new pharmacy and inquiring as to whether the original script should be cancelled before or after PA process is complete.     Geno Barcenas RN on 4/21/2023 at 4:04 PM  "

## 2023-04-21 NOTE — TELEPHONE ENCOUNTER
"4/21/23: Reason for call:  Medication   If this is a refill request, has the caller requested the refill from the pharmacy already? Yes  Will the patient be using a [facility]? No  Name of the pharmacy and phone number for the current request: "[facility]    Name of the medication requested: desvenlafaxine (PRISTIQ) 50 MG 24 hr tablet     Other request: Pt [facility], \"don't transfer it.\"     Phone number to reach patient:  Cell number on file:    Telephone Information:   Mobile [phone]       Best Time: ASAP if needed regarding recent request to not transfer medication.    Can we leave a detailed message on this number?  YES    Travel screening: Not Applicable  "

## 2023-04-22 NOTE — TELEPHONE ENCOUNTER
Central Prior Authorization Team   Phone: [phone]      PA Initiation    Medication: desvenlafaxine (PRISTIQ) 50 MG 24 hr tablet  Insurance Company: [employer] - Phone [phone] Fax [phone]  Pharmacy Filling the Rx: [id number] - [place], [place] - [address]  Filling Pharmacy Phone: [phone]  Filling Pharmacy Fax:    Start Date: 4/22/2023

## 2023-04-23 NOTE — TELEPHONE ENCOUNTER
Prior Authorization Approval    Authorization Effective Date: 2/1/2023  Authorization Expiration Date: 4/22/2024  Medication: desvenlafaxine (PRISTIQ) 50 MG 24 hr tablet  Approved Dose/Quantity:   Reference #:     Insurance Company: [employer] - Phone [phone] Fax [phone]  Expected CoPay:       CoPay Card Available:      Foundation Assistance Needed:    Which Pharmacy is filling the prescription (Not needed for infusion/clinic administered): [id number] - [place], [place] - [address]  Pharmacy Notified: Yes  Patient Notified: No-Pharmacy will notify patient when ready.

## 2023-04-24 ENCOUNTER — MYC REFILL (OUTPATIENT)
Dept: FAMILY MEDICINE | Facility: CLINIC | Age: 53
End: 2023-04-24

## 2023-04-24 DIAGNOSIS — F31.12 BIPOLAR AFFECTIVE DISORDER, MANIC, MODERATE (H): ICD-10-CM

## 2023-04-24 RX ORDER — LORAZEPAM 0.5 MG/1
0.5 TABLET ORAL DAILY PRN
Qty: 15 TABLET | Refills: 0 | Status: SHIPPED | OUTPATIENT
Start: 2023-04-25 | End: 2023-06-01

## 2023-04-24 NOTE — TELEPHONE ENCOUNTER
Reason for call:  Other   Patient called regarding (reason for call): call back and prescription  Additional comments: Pt requesting a callback in regards to status of her medication.     Phone number to reach patient:  Home number on file [phone] (home)    Best Time:  asap    Can we leave a detailed message on this number?  YES    Travel screening: Not Applicable

## 2023-04-24 NOTE — TELEPHONE ENCOUNTER
Patient called asking about status of medication refill request.     RN called patient to assess medication status. She reports she has enough lorazepam tablets to get to Thursday, 4/27/2023. On Thursday, she has court and was told by provider she could take 2 tablets. She will not have enough medication to do this. She states her anxiety has been very high and she has to fill her lorazepam every 15 days and does not drive. RN reviewed next appointment on 6/6/2023 and she states that was the soonest she could get in.     She reports she has not started her desvenlafaxine yet as it had required a PA and she is trying to have the person who helps her  medication make one stop at the the pharmacy.     May need note to pharmacy for any early  dates.    Date of Last Office Visit: 4/20/2023  Date of Next Office Visit: 6/6/2023  No shows since last visit: 0  Cancellations since last visit: 0    Medication requested: LORazepam (ATIVAN) 0.5 MG tablet Date last ordered: 4/13/2023 Qty: 15 Refills: 0     Review of [place] ?: Yes  Medication last filled date: 4/13/2023 Qty filled: 15  Other controlled substance on [place] ?: Yes.Gabapentin  If yes, is this a new medication?: No    Lapse in medication adherence greater than 5 days?: No    Medication refill request verified as identical to current order?: Yes  Result of Last DAM, VPA, Li+ Level, CBC, or Carbamazepine Level (at or since last visit): N/A    Last visit treatment plan: Note Incomplete    []Medication refilled per  Medication Refill in Ambulatory Care  policy.  [x]Medication unable to be refilled by RN due to criteria not met as indicated below:    []Eligibility - not seen in the last year   []Supervision - no future appointment   []Compliance - no shows, cancellations or lapse in therapy   []Verification - order discrepancy   [x]Controlled medication   []Medication not included in policy   []90-day supply request   []Other

## 2023-04-26 ENCOUNTER — TELEPHONE (OUTPATIENT)
Dept: PSYCHIATRY | Facility: CLINIC | Age: 53
End: 2023-04-26

## 2023-04-26 NOTE — TELEPHONE ENCOUNTER
[facility] buPROPion (WELLBUTRIN XL) 150 MG 24 hr tablet.   As per chart review, it was discontinued on 4/20/23 dt dose adjustment. This nurse was unable to verify this information because last dictation not completed. Please, clarify her current dose. Thank you           buPROPion (WELLBUTRIN XL) 150 MG 24 hr tablet (Discontinued) 90 tablet 0 4/13/2023 4/20/2023 No   Sig - Route: Take 1 tablet (150 mg) by mouth every morning With 300 mg daily for a total of 450 mg daily - Oral   Sent to pharmacy as: buPROPion HCl ER (XL) 150 MG Oral Tablet Extended Release 24 Hour (WELLBUTRIN XL)   Class: E-Prescribe   Notes to Pharmacy: One time early refill   Reason for Discontinue: Dose adjustment

## 2023-04-27 NOTE — PATIENT INSTRUCTIONS
"Patient Education   The [facility] Program  What to Expect  Here's what to expect in the Panel Psychiatry Program.   About the program  You'll be meeting with a psychiatric doctor to check your mental health. A psychiatric doctor helps you deal with troubling thoughts and feelings by giving you medicine. They'll make sure you know the plan for your care. You may see them for a long time. When you're feeling better, they may refer you back to seeing your family doctor.   If you have any questions, we'll be glad to talk to you.  About visits  Be open  At your visits, please talk openly about your problems. It may feel hard, but it's the best way for us to help you.  Cancelling visits  If you can't come to your visit, please call us right away at [phone]. If you don't cancel at least 24 hours (1 full day) before your visit, that's \"late cancellation.\"  Not showing up for your visits  Being very late is the same as not showing up. You'll be a \"no show\" if:  You're more than 15 minutes late for a 30-minute (half hour) visit.  You're more than 30 minutes late for a 60-minute (full hour) visit.  If you cancel late or don't show up 2 times within 6 months, we may end your care.  Getting help between visits  If you need help between visits, you can call us Monday to Friday from 8 a.m. to 4:30 p.m. at [phone].  Emergency care  Call 911 or go to the nearest emergency department if your life or someone else's life is in danger.  Call 988 anytime to reach the national Suicide and Crisis hotline.  Medicine refills  To refill your medicine, call your pharmacy. You can also call [facility]'s [facility] at [phone], Monday to Friday, 8 a.m. to 4:30 p.m. It can take 1 to 3 business days to get a refill.   Forms, letters, and tests  You may have papers to fill out, like FMLA, short-term disability, and workability. We can help you with these forms at your visits, but you must have an " appointment. You may need more than 1 visit for this, to be in an intensive therapy program, or both.  Before we can give you medicine for ADHD, we may refer you to get tested for it or confirm it another way.  We may not be able to give you an emotional support animal letter.  We don't do mental health checks ordered by the court.   We don't do mental health testing, but we can refer you to get tested.   Thank you for choosing us for your care.  For informational purposes only. Not to replace the advice of your health care provider. Copyright   2022 [facility]. All rights reserved. [place] 002618 - 12/22.    Treatment plan:  1.  Wellbutrin  mg daily  2.  Desvenlafaxine 50 mg daily  3.  Gabapentin 1200 mg 3 times daily  4.  Lamotrigine 200 mg daily  5.  Try the propranolol ER 60 mg daily to help decrease anxiety  6.  Lorazepam 0.5 mg as needed for extreme anxiety-take sparingly- limited quantities prescribed  7.  Consider talk therapy, when able to, for learning skills to manage life stressors    Continue all other medications as reviewed per electronic medical record today.   Safety plan reviewed. To the Emergency Department as needed or call after hours crisis line at [phone] or [phone]. [place] Crisis Text Line. Text [place] to 345809 or Suicide LifeLine Chat: suicidepreventionlifeline.org/chat/  To schedule individual or family therapy, call [facility] at [phone]  Schedule an appointment with me in 6 weeks or sooner as needed. Call [facility] at [phone] to schedule.  Follow up with primary care provider as planned or for acute medical concerns.  Call the psychiatric nurse line with medication questions or concerns at [phone]  MyChart may be used to communicate with your provider, but this is not intended to be used for emergencies.    Crisis Resources:    National Suicide Prevention Lifeline: 228.617.2157 (TTY: 879.244.9151). Call  anytime for help.  (www.suicidepreventionlifeline.org)  National [place] on Mental Illness (www.anne marie.org): 258.555.1320 or 512-887-7780.   Mental Health Association (www.mentalhealth.org): 885.992.2938 or 039-557-8482.  [place] Crisis Text Line: Text [place] to 660312  Suicide LifeLine Chat: suicidepreventionlifeline.org/chat

## 2023-04-30 ENCOUNTER — HEALTH MAINTENANCE LETTER (OUTPATIENT)
Age: 53
End: 2023-04-30

## 2023-05-05 ENCOUNTER — TELEPHONE (OUTPATIENT)
Dept: FAMILY MEDICINE | Facility: CLINIC | Age: 53
End: 2023-05-05
Payer: MEDICAID

## 2023-05-05 NOTE — TELEPHONE ENCOUNTER
Did speak with pt She will need to recert as it's been a year. Per Norman a virtual visit is ok and he did offer to do it now as he had a cancellation. Pt stated understanding but did decline opting to wait for 5/31/23.

## 2023-05-05 NOTE — TELEPHONE ENCOUNTER
General Call    Contacts       Type Contact Phone/Fax    05/05/2023 01:25 PM CDT Phone (Incoming) Maryjane Gonzáles (Self) [phone] (H)        Reason for Call:  Certification medical marijuana    What are your questions or concerns:  Pt was seen 5/18/22, never filled RX due to finances, wants to get certified again.    Scheduled next available 5/31/23, please call to advise if this can be done without being seen or to confirm she needs to be seen. (writer did advise she needs this appt)      Date of last appointment with provider: 5/18/23    Could we send this information to you in [employer] or would you prefer to receive a phone call?:   Patient would prefer a phone call   Okay to leave a detailed message?: Yes at Home number on file [phone] (home)

## 2023-05-27 ENCOUNTER — APPOINTMENT (OUTPATIENT)
Dept: CT IMAGING | Facility: CLINIC | Age: 53
End: 2023-05-27
Attending: NURSE PRACTITIONER
Payer: MEDICAID

## 2023-05-27 ENCOUNTER — HOSPITAL ENCOUNTER (EMERGENCY)
Facility: CLINIC | Age: 53
Discharge: HOME | End: 2023-05-27
Attending: NURSE PRACTITIONER | Admitting: NURSE PRACTITIONER
Payer: MEDICAID

## 2023-05-27 VITALS
RESPIRATION RATE: 20 BRPM | OXYGEN SATURATION: 98 % | TEMPERATURE: 97.9 F | BODY MASS INDEX: 26.73 KG/M2 | SYSTOLIC BLOOD PRESSURE: 122 MMHG | HEART RATE: 102 BPM | DIASTOLIC BLOOD PRESSURE: 88 MMHG | WEIGHT: 155.7 LBS

## 2023-05-27 DIAGNOSIS — K29.00 ACUTE GASTRITIS WITHOUT HEMORRHAGE, UNSPECIFIED GASTRITIS TYPE: ICD-10-CM

## 2023-05-27 LAB
ALBUMIN SERPL BCG-MCNC: 4.4 G/DL (ref 3.5–5.2)
ALCOHOL BREATH TEST: 0.05 (ref 0–0.01)
ALP SERPL-CCNC: 124 U/L (ref 35–104)
ALT SERPL W P-5'-P-CCNC: 21 U/L (ref 10–35)
ANION GAP SERPL CALCULATED.3IONS-SCNC: 17 MMOL/L (ref 7–15)
AST SERPL W P-5'-P-CCNC: 29 U/L (ref 10–35)
BASOPHILS # BLD AUTO: 0.1 10E3/UL (ref 0–0.2)
BASOPHILS NFR BLD AUTO: 1 %
BILIRUB SERPL-MCNC: 0.5 MG/DL
BUN SERPL-MCNC: 11.2 MG/DL (ref 6–20)
CALCIUM SERPL-MCNC: 8.9 MG/DL (ref 8.6–10)
CHLORIDE SERPL-SCNC: 100 MMOL/L (ref 98–107)
CREAT SERPL-MCNC: 0.97 MG/DL (ref 0.51–0.95)
DEPRECATED HCO3 PLAS-SCNC: 21 MMOL/L (ref 22–29)
EOSINOPHIL # BLD AUTO: 0.1 10E3/UL (ref 0–0.7)
EOSINOPHIL NFR BLD AUTO: 2 %
ERYTHROCYTE [DISTWIDTH] IN BLOOD BY AUTOMATED COUNT: 13.8 % (ref 10–15)
ETHANOL SERPL-MCNC: 0.1 G/DL
GFR SERPL CREATININE-BSD FRML MDRD: 70 ML/MIN/1.73M2
GLUCOSE SERPL-MCNC: 93 MG/DL (ref 70–99)
HCT VFR BLD AUTO: 39.8 % (ref 35–47)
HGB BLD-MCNC: 13.4 G/DL (ref 11.7–15.7)
IMM GRANULOCYTES # BLD: 0 10E3/UL
IMM GRANULOCYTES NFR BLD: 0 %
LIPASE SERPL-CCNC: 32 U/L (ref 13–60)
LYMPHOCYTES # BLD AUTO: 2.5 10E3/UL (ref 0.8–5.3)
LYMPHOCYTES NFR BLD AUTO: 43 %
MCH RBC QN AUTO: 30.9 PG (ref 26.5–33)
MCHC RBC AUTO-ENTMCNC: 33.7 G/DL (ref 31.5–36.5)
MCV RBC AUTO: 92 FL (ref 78–100)
MONOCYTES # BLD AUTO: 0.7 10E3/UL (ref 0–1.3)
MONOCYTES NFR BLD AUTO: 12 %
NEUTROPHILS # BLD AUTO: 2.5 10E3/UL (ref 1.6–8.3)
NEUTROPHILS NFR BLD AUTO: 42 %
NRBC # BLD AUTO: 0 10E3/UL
NRBC BLD AUTO-RTO: 0 /100
PLATELET # BLD AUTO: 349 10E3/UL (ref 150–450)
POTASSIUM SERPL-SCNC: 3.4 MMOL/L (ref 3.4–5.3)
PROT SERPL-MCNC: 7.6 G/DL (ref 6.4–8.3)
RBC # BLD AUTO: 4.34 10E6/UL (ref 3.8–5.2)
SODIUM SERPL-SCNC: 138 MMOL/L (ref 136–145)
WBC # BLD AUTO: 5.9 10E3/UL (ref 4–11)

## 2023-05-27 PROCEDURE — 82075 ASSAY OF BREATH ETHANOL: CPT | Performed by: NURSE PRACTITIONER

## 2023-05-27 PROCEDURE — C9113 INJ PANTOPRAZOLE SODIUM, VIA: HCPCS | Mod: JZ | Performed by: NURSE PRACTITIONER

## 2023-05-27 PROCEDURE — 96374 THER/PROPH/DIAG INJ IV PUSH: CPT | Mod: 59 | Performed by: NURSE PRACTITIONER

## 2023-05-27 PROCEDURE — 258N000003 HC RX IP 258 OP 636: Performed by: NURSE PRACTITIONER

## 2023-05-27 PROCEDURE — 96375 TX/PRO/DX INJ NEW DRUG ADDON: CPT | Performed by: NURSE PRACTITIONER

## 2023-05-27 PROCEDURE — 85025 COMPLETE CBC W/AUTO DIFF WBC: CPT | Performed by: NURSE PRACTITIONER

## 2023-05-27 PROCEDURE — 96361 HYDRATE IV INFUSION ADD-ON: CPT | Performed by: NURSE PRACTITIONER

## 2023-05-27 PROCEDURE — 36415 COLL VENOUS BLD VENIPUNCTURE: CPT | Performed by: NURSE PRACTITIONER

## 2023-05-27 PROCEDURE — 250N000011 HC RX IP 250 OP 636: Performed by: NURSE PRACTITIONER

## 2023-05-27 PROCEDURE — 82077 ASSAY SPEC XCP UR&BREATH IA: CPT | Performed by: NURSE PRACTITIONER

## 2023-05-27 PROCEDURE — 80053 COMPREHEN METABOLIC PANEL: CPT | Performed by: NURSE PRACTITIONER

## 2023-05-27 PROCEDURE — 250N000009 HC RX 250: Performed by: NURSE PRACTITIONER

## 2023-05-27 PROCEDURE — 83690 ASSAY OF LIPASE: CPT | Performed by: NURSE PRACTITIONER

## 2023-05-27 PROCEDURE — 99285 EMERGENCY DEPT VISIT HI MDM: CPT | Mod: 25 | Performed by: NURSE PRACTITIONER

## 2023-05-27 PROCEDURE — 250N000011 HC RX IP 250 OP 636: Mod: JZ | Performed by: NURSE PRACTITIONER

## 2023-05-27 PROCEDURE — 99284 EMERGENCY DEPT VISIT MOD MDM: CPT | Performed by: NURSE PRACTITIONER

## 2023-05-27 PROCEDURE — 74177 CT ABD & PELVIS W/CONTRAST: CPT

## 2023-05-27 RX ORDER — SODIUM CHLORIDE 9 MG/ML
INJECTION, SOLUTION INTRAVENOUS CONTINUOUS
Status: DISCONTINUED | OUTPATIENT
Start: 2023-05-27 | End: 2023-05-28 | Stop reason: HOSPADM

## 2023-05-27 RX ORDER — ONDANSETRON 4 MG/1
4 TABLET, ORALLY DISINTEGRATING ORAL EVERY 8 HOURS PRN
Qty: 10 TABLET | Refills: 0 | Status: SHIPPED | OUTPATIENT
Start: 2023-05-27 | End: 2023-05-30

## 2023-05-27 RX ORDER — ONDANSETRON 2 MG/ML
4 INJECTION INTRAMUSCULAR; INTRAVENOUS ONCE
Status: COMPLETED | OUTPATIENT
Start: 2023-05-27 | End: 2023-05-27

## 2023-05-27 RX ORDER — OMEPRAZOLE 40 MG/1
40 CAPSULE, DELAYED RELEASE ORAL DAILY
Qty: 30 CAPSULE | Refills: 0 | Status: SHIPPED | OUTPATIENT
Start: 2023-05-27 | End: 2024-01-29

## 2023-05-27 RX ORDER — IOPAMIDOL 755 MG/ML
200 INJECTION, SOLUTION INTRAVASCULAR ONCE
Status: COMPLETED | OUTPATIENT
Start: 2023-05-27 | End: 2023-05-27

## 2023-05-27 RX ADMIN — SODIUM CHLORIDE 60 ML: 9 INJECTION, SOLUTION INTRAVENOUS at 21:59

## 2023-05-27 RX ADMIN — ONDANSETRON 4 MG: 2 INJECTION INTRAMUSCULAR; INTRAVENOUS at 21:33

## 2023-05-27 RX ADMIN — IOPAMIDOL 75 ML: 755 INJECTION, SOLUTION INTRAVENOUS at 22:00

## 2023-05-27 RX ADMIN — PANTOPRAZOLE SODIUM 40 MG: 40 INJECTION, POWDER, FOR SOLUTION INTRAVENOUS at 21:35

## 2023-05-27 RX ADMIN — SODIUM CHLORIDE 1000 ML: 9 INJECTION, SOLUTION INTRAVENOUS at 21:33

## 2023-05-27 ASSESSMENT — ACTIVITIES OF DAILY LIVING (ADL): ADLS_ACUITY_SCORE: 35

## 2023-05-28 NOTE — DISCHARGE INSTRUCTIONS
Omeprazole 40 mg daily on an empty stomach.  Zofran 4 mg oral disintegrating tablet every 8 hours as needed for nausea.  Avoid alcohol, ibuprofen, acidic foods, spicy foods, caffeine.  I recommend you follow-up with your psychiatrist to discuss your anxiety as this could be contributing to your nausea and stomach pains.  If you are not improving with the omeprazole I do recommend you have a visit with your regular clinic provider and discuss possibly getting an upper endoscopy.    Return to the emergency department for fevers, vomiting and unable to keep fluids down, bloody emesis, or worse in any way.

## 2023-05-28 NOTE — ED PROVIDER NOTES
History     Chief Complaint   Patient presents with     Abdominal Pain     HPI  Magnolia Gonzáles is a 53 year old female with history of upper thyroidism, alcohol/binge drinking behavior, panic disorder, PTSD, and depression who presents for evaluation of epigastric pain, nausea, vomiting.  Symptoms started about 2 weeks ago.  Pain is constant.  She is concerned she has an ulcer.  She states she has problems with anxiety which then makes the epigastric pain worse.  She has been retching.  She states she has lost 10 pounds in the last 2 weeks.  Loose stools.  No black or bloody stools.  No urinary symptoms.  She has had no history of abdominal surgeries.  She takes ibuprofen regularly.  She drinks alcohol regularly, and does have a history of binge drinking.  She has been drinking alcohol over the last couple days, vodka.      Allergies:  Allergies   Allergen Reactions     Serotonin Reuptake Inhibitors (Ssris) Rash     Zoloft [Sertraline] Rash       Problem List:    Patient Active Problem List    Diagnosis Date Noted     Generalized anxiety disorder 07/29/2020     Priority: Medium     Major depressive disorder, recurrent episode, mild (H) 07/19/2017     Priority: Medium     PTSD (post-traumatic stress disorder) 07/19/2017     Priority: Medium     Bipolar affective disorder, manic, moderate (H) 09/23/2016     Priority: Medium     Patient refutes this. 10/5/2020       Overdose of Valium, intentional self-harm, subsequent encounter 05/29/2016     Priority: Medium     Carpal tunnel syndrome of left wrist 01/13/2016     Priority: Medium     Adjustment disorder with mixed anxiety and depressed mood 01/06/2016     Priority: Medium     Acquired hypothyroidism 01/06/2016     Priority: Medium     Bilateral carpal tunnel syndrome 01/06/2016     Priority: Medium     Social phobia 12/26/2014     Priority: Medium     Panic disorder without agoraphobia 12/26/2014     Priority: Medium     History of alcohol dependence (H)  "07/22/2014     Priority: Medium     Overview:   Binge drinking disorder. Sober since 5/2016       Nondependent alcohol abuse, episodic drinking behavior 11/17/2005     Priority: Medium        Past Medical History:    Past Medical History:   Diagnosis Date     Anxiety      Depressive disorder      Hypothyroid      Suicidal ideation 7/19/2015       Past Surgical History:    Past Surgical History:   Procedure Laterality Date     ORTHOPEDIC SURGERY         Family History:    Family History   Problem Relation Age of Onset     Depression/Anxiety Mother      Alcohol/Drug Father         Pancreatic CA       Social History:  Marital Status:  Single [1]  Social History     Tobacco Use     Smoking status: Never     Smokeless tobacco: Never   Vaping Use     Vaping status: Never Used   Substance Use Topics     Alcohol use: Yes     Alcohol/week: 0.0 standard drinks of alcohol     Comment: \"once every three months\"     Drug use: No        Medications:    omeprazole (PRILOSEC) 40 MG DR capsule  ondansetron (ZOFRAN ODT) 4 MG ODT tab  betamethasone valerate (VALISONE) 0.1 % external ointment  buPROPion (WELLBUTRIN XL) 300 MG 24 hr tablet  desvenlafaxine (PRISTIQ) 50 MG 24 hr tablet  gabapentin (NEURONTIN) 400 MG capsule  hydrocortisone (CORTAID) 1 % external ointment  lamoTRIgine (LAMICTAL) 200 MG tablet  levothyroxine (SYNTHROID/LEVOTHROID) 100 MCG tablet  LORazepam (ATIVAN) 0.5 MG tablet  propranolol ER (INDERAL LA) 60 MG 24 hr capsule          Review of Systems  As mentioned above in the history present illness. All other systems were reviewed and are negative.    Physical Exam   BP: (!) 125/93  Pulse: 118  Temp: 97.9  F (36.6  C)  Resp: 20  Weight: 70.6 kg (155 lb 11.2 oz)  SpO2: 97 %      Physical Exam  Constitutional:       General: She is in acute distress (anxious).      Appearance: She is well-developed. She is not ill-appearing.   HENT:      Head: Normocephalic and atraumatic.      Right Ear: External ear normal.      Left " Ear: External ear normal.      Nose: Nose normal.      Mouth/Throat:      Mouth: Mucous membranes are moist.   Eyes:      Conjunctiva/sclera: Conjunctivae normal.   Cardiovascular:      Rate and Rhythm: Normal rate and regular rhythm.      Heart sounds: Normal heart sounds. No murmur heard.  Pulmonary:      Effort: Pulmonary effort is normal. No respiratory distress.      Breath sounds: Normal breath sounds.   Abdominal:      General: Bowel sounds are normal. There is no distension.      Palpations: Abdomen is soft.      Tenderness: There is abdominal tenderness in the epigastric area.   Musculoskeletal:         General: Normal range of motion.   Skin:     General: Skin is warm and dry.      Findings: No rash.   Neurological:      General: No focal deficit present.      Mental Status: She is alert and oriented to person, place, and time.   Psychiatric:         Mood and Affect: Mood is anxious.         Speech: Speech is tangential.         ED Course                 Procedures              Results for orders placed or performed during the hospital encounter of 05/27/23 (from the past 24 hour(s))   CBC with platelets differential    Narrative    The following orders were created for panel order CBC with platelets differential.  Procedure                               Abnormality         Status                     ---------                               -----------         ------                     CBC with platelets and d...[[id number]]                      Final result                 Please view results for these tests on the individual orders.   Comprehensive metabolic panel   Result Value Ref Range    Sodium 138 136 - 145 mmol/L    Potassium 3.4 3.4 - 5.3 mmol/L    Chloride 100 98 - 107 mmol/L    Carbon Dioxide (CO2) 21 (L) 22 - 29 mmol/L    Anion Gap 17 (H) 7 - 15 mmol/L    Urea Nitrogen 11.2 6.0 - 20.0 mg/dL    Creatinine 0.97 (H) 0.51 - 0.95 mg/dL    Calcium 8.9 8.6 - 10.0 mg/dL    Glucose 93 70 - 99 mg/dL     Alkaline Phosphatase 124 (H) 35 - 104 U/L    AST 29 10 - 35 U/L    ALT 21 10 - 35 U/L    Protein Total 7.6 6.4 - 8.3 g/dL    Albumin 4.4 3.5 - 5.2 g/dL    Bilirubin Total 0.5 <=1.2 mg/dL    GFR Estimate 70 >60 mL/min/1.73m2   Lipase   Result Value Ref Range    Lipase 32 13 - 60 U/L   Ethyl Alcohol Level   Result Value Ref Range    Alcohol ethyl 0.10 (H) <=0.01 g/dL   CBC with platelets and differential   Result Value Ref Range    WBC Count 5.9 4.0 - 11.0 10e3/uL    RBC Count 4.34 3.80 - 5.20 10e6/uL    Hemoglobin 13.4 11.7 - 15.7 g/dL    Hematocrit 39.8 35.0 - 47.0 %    MCV 92 78 - 100 fL    MCH 30.9 26.5 - 33.0 pg    MCHC 33.7 31.5 - 36.5 g/dL    RDW 13.8 10.0 - 15.0 %    Platelet Count 349 150 - 450 10e3/uL    % Neutrophils 42 %    % Lymphocytes 43 %    % Monocytes 12 %    % Eosinophils 2 %    % Basophils 1 %    % Immature Granulocytes 0 %    NRBCs per 100 WBC 0 <1 /100    Absolute Neutrophils 2.5 1.6 - 8.3 10e3/uL    Absolute Lymphocytes 2.5 0.8 - 5.3 10e3/uL    Absolute Monocytes 0.7 0.0 - 1.3 10e3/uL    Absolute Eosinophils 0.1 0.0 - 0.7 10e3/uL    Absolute Basophils 0.1 0.0 - 0.2 10e3/uL    Absolute Immature Granulocytes 0.0 <=0.4 10e3/uL    Absolute NRBCs 0.0 10e3/uL   CT ABDOMEN PELVIS W CONTRAST    Narrative    EXAM: CT ABDOMEN PELVIS W CONTRAST  LOCATION: [facility]  DATE/TIME: 5/27/2023 10:16 PM CDT    INDICATION: Epigastric abdominal pain.  COMPARISON: None.  TECHNIQUE: CT scan of the abdomen and pelvis was performed following injection of IV contrast. Multiplanar reformats were obtained. Dose reduction techniques were used.  CONTRAST: 75 mL Isovue 370    FINDINGS:   LOWER CHEST: Significant abnormalities.    HEPATOBILIARY: Mild hepatic steatosis. Liver and gallbladder otherwise normal.    PANCREAS: Normal.    SPLEEN: Normal.    ADRENAL GLANDS: Normal.    KIDNEYS/BLADDER: Normal.    BOWEL: Normal.    LYMPH NODES: A 15 x 10 mm lymph node with partial peripheral  calcification in the left abdominal mesentery is most certainly benign. No lymphadenopathy in the abdomen and pelvis.    VASCULATURE: Unremarkable.    PELVIC ORGANS: Normal.    MUSCULOSKELETAL: Moderate dextroscoliosis of the lumbar spine centered at L3 with left lateral listhesis of L1 on L2 and right lateral listhesis of L4 on L5, with associated moderate to severe degenerative disc space narrowing. No suspicious osseous   lesions or acute fractures.        Impression    IMPRESSION:     1.  No acute process or significant abnormalities in the abdomen and pelvis.    2.  Moderate dextroscoliosis of the lumbar spine with significant associated spinal degenerative changes.   Alcohol breath test POCT   Result Value Ref Range    Alcohol Breath Test 0.046 (A) 0.00 - 0.01       Medications   0.9% sodium chloride BOLUS (1,000 mLs Intravenous $New Bag 5/27/23 2133)     Followed by   sodium chloride 0.9% infusion (has no administration in time range)   pantoprazole (PROTONIX) IV push injection 40 mg (40 mg Intravenous $Given 5/27/23 2135)   ondansetron (ZOFRAN) injection 4 mg (4 mg Intravenous $Given 5/27/23 2133)   iopamidol (ISOVUE-370) solution 200 mL (75 mLs Intravenous $Given 5/27/23 2200)   sodium chloride 0.9 % bag 100mL for CT scan flush use (60 mLs Intravenous $Given 5/27/23 2159)       Assessments & Plan (with Medical Decision Making)   Patient's work-up here is consistent with alcoholic gastritis and probably also related to regular ibuprofen use. She admits to drinking vodka for the last couple days.   BP is mildly elevated at 125/93.  She is afebrile.  Heart rate is 118 bpm.  etoh level is 0.10.   Bicarb 21 and anion gap 17 related to her alcohol intoxication.  Creat mildly elevated at 0.97 probably mild dehydration.  Abdominal CT shows no acute pathology to explain her pain.  Specifically no evidence of bowel perforation, no obstruction, no mass, no evidence of pancreatitis.  No cholelithiasis or  cholecystitis.  I suspect her symptoms are related to gastritis from excessive alcohol intake and frequent ibuprofen use.  Additionally, her anxiety does not appear to be managed very well and this will also contribute to her nausea and epigastric pain.    Patient was given IV NS 1 liter bolus, IV Zofran and IV pantoprazole.  She does report her symptoms have improved with these measures.  Patient drove herself here to the emergency department so we did perform a breathalyzer test to ensure she was under the legal limit to be able to drive.  Breathalyzer is 0.046, under the legal limit.  BP normalized.  Heart rate came down to 102 bpm after IV fluids.  She still is a bit anxious, but stable for discharge.      New Prescriptions    OMEPRAZOLE (PRILOSEC) 40 MG DR CAPSULE    Take 1 capsule (40 mg) by mouth daily    ONDANSETRON (ZOFRAN ODT) 4 MG ODT TAB    Take 1 tablet (4 mg) by mouth every 8 hours as needed for nausea       Final diagnoses:   Acute gastritis without hemorrhage, unspecified gastritis type - likely related to alcohol use       5/27/2023   [facility] EMERGENCY DEPT     Chelita Carlisle, ANNETTA PERSAUD  05/27/23 3242

## 2023-06-06 ENCOUNTER — VIRTUAL VISIT (OUTPATIENT)
Dept: PSYCHIATRY | Facility: CLINIC | Age: 53
End: 2023-06-06
Payer: MEDICAID

## 2023-06-06 DIAGNOSIS — R11.0 NAUSEA: ICD-10-CM

## 2023-06-06 DIAGNOSIS — F31.12 BIPOLAR AFFECTIVE DISORDER, MANIC, MODERATE (H): Primary | ICD-10-CM

## 2023-06-06 DIAGNOSIS — F43.10 PTSD (POST-TRAUMATIC STRESS DISORDER): ICD-10-CM

## 2023-06-06 DIAGNOSIS — R42 VERTIGO: ICD-10-CM

## 2023-06-06 DIAGNOSIS — F41.1 GENERALIZED ANXIETY DISORDER: ICD-10-CM

## 2023-06-06 PROCEDURE — 99214 OFFICE O/P EST MOD 30 MIN: CPT | Mod: 95 | Performed by: NURSE PRACTITIONER

## 2023-06-06 RX ORDER — MECLIZINE HYDROCHLORIDE 25 MG/1
25 TABLET ORAL DAILY PRN
Qty: 15 TABLET | Refills: 0 | Status: SHIPPED | OUTPATIENT
Start: 2023-06-06 | End: 2023-12-25

## 2023-06-06 RX ORDER — GABAPENTIN 400 MG/1
1200 CAPSULE ORAL 3 TIMES DAILY
Qty: 270 CAPSULE | Refills: 0 | Status: SHIPPED | OUTPATIENT
Start: 2023-06-29 | End: 2023-07-05

## 2023-06-06 RX ORDER — ONDANSETRON 4 MG/1
4 TABLET, ORALLY DISINTEGRATING ORAL DAILY PRN
Qty: 15 TABLET | Refills: 0 | Status: SHIPPED | OUTPATIENT
Start: 2023-06-06 | End: 2023-08-01

## 2023-06-06 RX ORDER — OLANZAPINE 5 MG/1
5 TABLET, ORALLY DISINTEGRATING ORAL AT BEDTIME
Qty: 15 TABLET | Refills: 1 | Status: SHIPPED | OUTPATIENT
Start: 2023-06-06 | End: 2023-06-19

## 2023-06-06 NOTE — PROGRESS NOTES
"Virtual Visit Details    Type of service:  Telephone Visit   Phone call duration: 30 minutes                Outpatient Psychiatric Progress Note    Name: Magnolia Gonzáles   : 1970                    Primary Care Provider: Simran Barreto PA-C   Therapist: None    PHQ-9 scores:      10/17/2022     2:50 PM 2023     8:37 AM 2023    11:03 AM   PHQ-9 SCORE   PHQ-9 Total Score MyChart 21 (Severe depression) 21 (Severe depression) 23 (Severe depression)   PHQ-9 Total Score 21 21 23       JULIEN-7 scores:      2022     5:18 PM 2023     8:38 AM 2023    11:05 AM   JULIEN-7 SCORE   Total Score  21 (severe anxiety) 21 (severe anxiety)   Total Score 19 21 21       Patient Identification:    Patient is a 53 year old year old, single  White Choose not to answer female  who presents for return visit with me.  Patient is currently disabled. Patient attended the session alone. Patient prefers to be called: \" Maryjane\".    Current medications include: betamethasone valerate (VALISONE) 0.1 % external ointment, Apply topically 2 times daily  buPROPion (WELLBUTRIN XL) 300 MG 24 hr tablet, Take 1 tablet (300 mg) by mouth every morning  desvenlafaxine (PRISTIQ) 50 MG 24 hr tablet, Take 1 tablet (50 mg) by mouth daily  gabapentin (NEURONTIN) 400 MG capsule, Take 3 capsules (1,200 mg) by mouth 3 times daily  hydrocortisone (CORTAID) 1 % external ointment, Apply topically 2 times daily Use on face and around the eyes - DO NOT get in the eye  lamoTRIgine (LAMICTAL) 200 MG tablet, Take 1 tablet (200 mg) by mouth daily  levothyroxine (SYNTHROID/LEVOTHROID) 100 MCG tablet, Take 1 tablet (100 mcg) by mouth daily  LORazepam (ATIVAN) 0.5 MG tablet, Take 1 tablet (0.5 mg) by mouth daily as needed for anxiety  omeprazole (PRILOSEC) 40 MG DR capsule, Take 1 capsule (40 mg) by mouth daily  propranolol ER (INDERAL LA) 60 MG 24 hr capsule, Take 1 capsule (60 mg) by mouth daily    No current facility-administered medications on " file prior to visit.       The [place] Prescription Monitoring Program has been reviewed and there are no concerns about diversionary activity for controlled substances at this time.      I was able to review most recent Primary Care Provider, specialty provider, and therapy visit notes that I have access to.     Today, patient reports she is still living in the same place with a friend .  Her fifth wheel is on her friends property for now.  Her vertigo has worsened.  She has been dry heaving and has not been able to keep anything down.  She is having flashback memories of past trauma incidents.  She went to the ED with negative findings.  Her anxiety is high due to trauma flashback memory .  She is having no trauma therapist yet.  A personal care assistat is coming to her home next week.  She pushes people away when she does not feel good.   She gets confused as to where she is at when she wakes up.   She has tinnitus that also makes her nauseated.  This has been going on for the past 3 weeks.  The lorazepam does not do a thing for her.  She missed an apointment  With [facility]  To get medical cannabis.       has a past medical history of Anxiety, Depressive disorder, Hypothyroid, and Suicidal ideation (7/19/2015).    Social history updates:    Maryjane is living with a friend right now.  She has qualified for being disabled and is receiving money to support herself.    Substance use updates:    No alcohol use reported  Tobacco use: No    Vital Signs:   There were no vitals taken for this visit.    Labs:    Most recent laboratory results reviewed and no new labs.     Mental Status Examination:  Appearance: awake, alert and moderate distress  Attitude: cooperative  Eye Contact:  Unable to assess  Gait and Station: No dizziness or falls  Psychomotor Behavior:  Unable to assess  Oriented to:  time, person, and place  Attention Span and Concentration:  Normal  Speech:   vtspeech: clear, coherent and Speaks:  English  Mood:  : anxious, depressed and labile  Affect:  : intensity is heightened  Associations:  no loose associations  Thought Process:  goal oriented  Thought Content:  no evidence of suicidal ideation or homicidal ideation, no auditory hallucinations present and no visual hallucinations present  Recent and Remote Memory:  intact Not formally assessed. No amnesia.  Fund of Knowledge: appropriate  Insight:  fair  Judgment: fair  Impulse Control:  fair    Suicide Risk Assessment:  Today Magnolia Gonzáles reports no thoughts to harm themself or others. In addition, there are notable risk factors for self-harm, including single status and mood change. However, risk is mitigated by history of seeking help when needed, future oriented, denies suicidal intent or plan and denies homicidal ideation, intent, or plan. Therefore, based on all available evidence including the factors cited above, Magnolia Gonzáles does not appear to be at imminent risk for self-harm, does not meet criteria for a 72-hr hold, and therefore remains appropriate for ongoing outpatient level of care.  A thorough assessment of risk factors related to suicide and self-harm have been reviewed and are noted above. The patient convincingly denies suicidality on several occasions. Local community safety resources printed and reviewed for patient to use if needed. There was no deceit detected, and the patient presented in a manner that was believable.     DSM5 Diagnosis:  296.89 Bipolar II Disorder With mixed features and moderate  300.02 (F41.1) Generalized Anxiety Disorder  309.81 (F43.10) Posttraumatic Stress Disorder (includes Posttraumatic Stress Disorder for Children 6 Years and Younger)  Without dissociative symptoms    Medical comorbidities include:   Patient Active Problem List    Diagnosis Date Noted     Generalized anxiety disorder 07/29/2020     Priority: Medium     Major depressive disorder, recurrent episode, mild (H) 07/19/2017      Priority: Medium     PTSD (post-traumatic stress disorder) 07/19/2017     Priority: Medium     Bipolar affective disorder, manic, moderate (H) 09/23/2016     Priority: Medium     Patient refutes this. 10/5/2020       Overdose of Valium, intentional self-harm, subsequent encounter 05/29/2016     Priority: Medium     Carpal tunnel syndrome of left wrist 01/13/2016     Priority: Medium     Adjustment disorder with mixed anxiety and depressed mood 01/06/2016     Priority: Medium     Acquired hypothyroidism 01/06/2016     Priority: Medium     Bilateral carpal tunnel syndrome 01/06/2016     Priority: Medium     Social phobia 12/26/2014     Priority: Medium     Panic disorder without agoraphobia 12/26/2014     Priority: Medium     History of alcohol dependence (H) 07/22/2014     Priority: Medium     Overview:   Binge drinking disorder. Sober since 5/2016       Nondependent alcohol abuse, episodic drinking behavior 11/17/2005     Priority: Medium       Assessment:    Magnolia Gonzáles presents in distress.  She has been experiencing excessive nausea and vomiting that is prompted a visit to the emergency department for medications to stop this.  She is taking olanzapine ODT 5 mg at bedtime.  I instructed her to seek out a primary care provider to provide her with future refills of Zofran and Prilosec.  Because of her inconsistency in taking the following medications, they will be discontinued: Bupropion, desvenlafaxine, lamotrigine, and propranolol.  Gabapentin is continuing for now to help with mood regulation, pain, and anxiety.  Lorazepam is also available as needed for anxiety and it also can be beneficial in decreasing nausea.  Talk therapy is encouraged.  Transportation is difficult for her at this point as I was hoping to see her in person soon for a clinic visit..    Medication side effects and alternatives were reviewed. Health promotion activities recommended and reviewed today. All questions addressed.  Education and counseling completed regarding risks and benefits of medications and psychotherapy options.    Treatment Plan:        1.  Lorazepam 0.5 mg daily as needed for anxiety    2.  Olanzapine ODT 5 mg at bedtime    3.  Bupropion discontinued    4.  Desvenlafaxine discontinued    5.  Lamotrigine discontinued    6.  Propranolol ER discontinued    7.  Gabapentin 1200 mg 3 times daily        Continue all other medications as reviewed per electronic medical record today.     Safety plan reviewed. To the Emergency Department as needed or call after hours crisis line at [phone] or [phone]. [place] Crisis Text [place]. Text [place] to 141314 or Suicide LifeLine Chat: suicideRock My World.org/chat/    To schedule individual or family therapy, call [facility] at [phone]    Schedule an appointment with me in 6 weeks or sooner as needed. Call [facility] at [phone] to schedule.    Follow up with primary care provider as planned or for acute medical concerns.    Call the psychiatric nurse line with medication questions or concerns at [phone]    E[employer]hart may be used to communicate with your provider, but this is not intended to be used for emergencies.    Crisis Resources:    National Suicide Prevention Lifeline: 482.612.2082 (TTY: 774.950.3322). Call anytime for help.  (www.suicidepreventionlifeline.org)  National [place] on Mental Illness (www.anne marie.org): 631.956.1949 or 739-594-9266.   Mental Health Association (www.mentalhealth.org): 706.241.5631 or 185-964-7954.  [place] Crisis Text [place]: Text [place] to 362386  Suicide LifeLine Chat: suicideRock My World.org/chat    Administrative Billing:   Time spent with patient includes counseling and coordination of care regarding above diagnoses and treatment plan.    Patient Status:  This is a continuous care patient and medications will be prescribed by the psychiatric provider until further indicated.    Signed:    Janet Álvarez, PMHNP-BC   Psychiatry

## 2023-06-06 NOTE — NURSING NOTE
Is the patient currently in the state of [place]? YES - at home.    Visit mode:TELEPHONE    If the visit is dropped, the patient can be reconnected by: TELEPHONE VISIT: Phone number:   Telephone Information:   Mobile [phone]       Will anyone else be joining the visit? No  (If patient encounters technical issues they should call [phone])    How would you like to obtain your AVS? MyChart    Are changes needed to the allergy or medication list? NO    Rooming Documentation: Attendance Guidelines - Care team has reviewed attendance agreement with patient. Patient advised that two failed appointments within 6 months may lead to termination of current episode of care.      Unable to complete qnrs due to time.         Reason for visit: PIO Hawley, VVF

## 2023-06-08 ENCOUNTER — TELEPHONE (OUTPATIENT)
Dept: ADDICTION MEDICINE | Facility: CLINIC | Age: 53
End: 2023-06-08
Payer: MEDICAID

## 2023-06-08 DIAGNOSIS — F31.9 BIPOLAR AFFECTIVE DISORDER, REMISSION STATUS UNSPECIFIED (H): Primary | ICD-10-CM

## 2023-06-08 NOTE — TELEPHONE ENCOUNTER
Prior Authorization Retail Medication Request    Medication/Dose: OLANZapine zydis (ZYPREXA) 5 MG ODT  ICD code (if different than what is on RX):  NA  Previously Tried and Failed:  NA  Rationale:  NA    Insurance Name:  //BLUE PLUS  Insurance ID:  [id number]      Pharmacy Information (if different than what is on RX)  Name:  [facility]  Phone:  NA

## 2023-06-12 ENCOUNTER — TELEPHONE (OUTPATIENT)
Dept: PSYCHIATRY | Facility: CLINIC | Age: 53
End: 2023-06-12
Payer: MEDICAID

## 2023-06-12 DIAGNOSIS — F41.1 GENERALIZED ANXIETY DISORDER: ICD-10-CM

## 2023-06-12 RX ORDER — BUPROPION HYDROCHLORIDE 300 MG/1
TABLET ORAL
Qty: 30 TABLET | Refills: 1 | OUTPATIENT
Start: 2023-06-12

## 2023-06-12 NOTE — TELEPHONE ENCOUNTER
6/12/23: Reason for call:  Other   Patient called regarding (reason for call): call back  Additional comments: Pt called to ask about status of Wellbutrin rx, said pharmacy sent it over. She also said she talked with Janet about a new medication that she can take under her tongue due to nausea but couldn't recall the name of med. Pt is asking for follow up to discuss medications.     Phone number to reach patient: [phone]  Best Time:  ASAP    Can we leave a detailed message on this number?  YES    Travel screening: Not Applicable

## 2023-06-13 ENCOUNTER — TELEPHONE (OUTPATIENT)
Dept: BEHAVIORAL HEALTH | Facility: CLINIC | Age: 53
End: 2023-06-13

## 2023-06-13 NOTE — PATIENT INSTRUCTIONS
"Patient Education   The Panel Psychiatry Program  What to Expect  Here's what to expect in the Panel Psychiatry Program.   About the program  You'll be meeting with a psychiatric doctor to check your mental health. A psychiatric doctor helps you deal with troubling thoughts and feelings by giving you medicine. They'll make sure you know the plan for your care. You may see them for a long time. When you're feeling better, they may refer you back to seeing your family doctor.   If you have any questions, we'll be glad to talk to you.  About visits  Be open  At your visits, please talk openly about your problems. It may feel hard, but it's the best way for us to help you.  Cancelling visits  If you can't come to your visit, please call us right away at [phone]. If you don't cancel at least 24 hours (1 full day) before your visit, that's \"late cancellation.\"  Not showing up for your visits  Being very late is the same as not showing up. You'll be a \"no show\" if:  You're more than 15 minutes late for a 30-minute (half hour) visit.  You're more than 30 minutes late for a 60-minute (full hour) visit.  If you cancel late or don't show up 2 times within 6 months, we may end your care.  Getting help between visits  If you need help between visits, you can call us Monday to Friday from 8 a.m. to 4:30 p.m. at [phone].  Emergency care  Call 911 or go to the nearest emergency department if your life or someone else's life is in danger.  Call 988 anytime to reach the national Suicide and Crisis hotline.  Medicine refills  To refill your medicine, call your pharmacy. You can also call [facility] at [phone], Monday to Friday, 8 a.m. to 4:30 p.m. It can take 1 to 3 business days to get a refill.   Forms, letters, and tests  You may have papers to fill out, like FMLA, short-term disability, and workability. We can help you with these forms at your visits, but you must have an " appointment. You may need more than 1 visit for this, to be in an intensive therapy program, or both.  Before we can give you medicine for ADHD, we may refer you to get tested for it or confirm it another way.  We may not be able to give you an emotional support animal letter.  We don't do mental health checks ordered by the court.   We don't do mental health testing, but we can refer you to get tested.   Thank you for choosing us for your care.  For informational purposes only. Not to replace the advice of your health care provider. Copyright   2022 [facility] [employer]. All rights reserved. [employer] 328425 - 12/22.         Treatment Plan:      1.  Lorazepam 0.5 mg daily as needed for anxiety  2.  Olanzapine ODT 5 mg at bedtime  3.  Bupropion discontinued  4.  Desvenlafaxine discontinued  5.  Lamotrigine discontinued  6.  Propranolol ER discontinued  7.  Gabapentin 1200 mg 3 times daily      Continue all other medical directions per primary care provider.   Continue all other medications as reviewed per electronic medical record today.   Safety plan reviewed. To the Emergency Department as needed or call after hours crisis line at [phone] or [phone]. [place] Crisis Text Line: Text [place] to 158346  or  Suicide LifeLine Chat: suicidepreventionlifeline.org/chat/  To schedule individual or family therapy, call [facility] at [phone].   Schedule an appointment with me in 6 weeks or sooner as needed.  Call [facility] at [phone] to schedule.  Follow up with primary care provider as planned or for acute medical concerns.  Call the psychiatric nurse line with medication questions or concerns at [phone].  Spect[employer]hart may be used to communicate with your provider, but this is not intended to be used for emergencies.    Crisis Resources:    National Suicide Prevention Lifeline: 683.766.1476 (TTY: 753.127.6660). Call anytime for help.   (www.suicidepreventionlifeline.org)  National [place] on Mental Illness (www.anne marie.org): 587-049-7681 or 302-862-4167.   Mental Health Association (www.mentalhealth.org): 396.599.6046 or 727-474-8720.  [place] Crisis Text Line: Text [place] to 459490  Suicide LifeLine Chat: suicidepreventionCreative Marketline.org/chat

## 2023-06-13 NOTE — TELEPHONE ENCOUNTER
Central Prior Authorization Team   Phone: [phone]      PA Initiation    Medication: OLANZAPINE 5 MG PO TBDP  Insurance Company: [place] Medicaid ([facility]) - Phone [phone] Fax [phone]  Pharmacy Filling the Rx: [id number] - [place], [place] - [address]  Filling Pharmacy Phone: [phone]  Filling Pharmacy Fax:    Start Date: 6/13/2023

## 2023-06-14 ENCOUNTER — MYC MEDICAL ADVICE (OUTPATIENT)
Dept: PSYCHIATRY | Facility: CLINIC | Age: 53
End: 2023-06-14
Payer: MEDICAID

## 2023-06-14 DIAGNOSIS — F32.A DEPRESSION, UNSPECIFIED DEPRESSION TYPE: Primary | ICD-10-CM

## 2023-06-14 RX ORDER — BUPROPION HYDROCHLORIDE 150 MG/1
150 TABLET ORAL EVERY MORNING
Qty: 30 TABLET | Refills: 0 | Status: SHIPPED | OUTPATIENT
Start: 2023-06-14 | End: 2023-06-30 | Stop reason: DRUGHIGH

## 2023-06-14 NOTE — TELEPHONE ENCOUNTER
Per Dr. Wagner covering for Janet: Wellbutrin XL restarted at just 150 mg daily given recent medical concerns with vomiting/gastritis, etc. Pt can message HUNG Gonzales in a couple weeks with update on 150 mg daily. Thanks.

## 2023-06-14 NOTE — TELEPHONE ENCOUNTER
"Psych providers are covering for V. Henri    Spoke to Maryjane, requesting to restart Wellbutrin.    Pt has been off of the medicine for about a week ( 300 mg a day), says she was taking it for energy, can't keep up without it.   Pt reports having difficulties refilling Zyprexa ODT but now sees it's in the process of filling at the pharmacy. She is planning to  it together with Wellbutrin if possible. \" I would rather stay on Wellbutrin and hoping to get it filled soon\"   "

## 2023-06-14 NOTE — CONFIDENTIAL NOTE
Wellbutrin XL restarted at just 150 mg daily given recent medical concerns with vomiting/gastritis, etc. Pt can message HUNG Gonzales in a couple weeks with update on 150 mg daily. Thanks.

## 2023-06-15 ENCOUNTER — VIRTUAL VISIT (OUTPATIENT)
Dept: FAMILY MEDICINE | Facility: CLINIC | Age: 53
End: 2023-06-15
Payer: OTHER GOVERNMENT

## 2023-06-15 ENCOUNTER — TELEPHONE (OUTPATIENT)
Dept: PSYCHIATRY | Facility: CLINIC | Age: 53
End: 2023-06-15

## 2023-06-15 DIAGNOSIS — F43.10 PTSD (POST-TRAUMATIC STRESS DISORDER): Primary | ICD-10-CM

## 2023-06-15 PROCEDURE — 99213 OFFICE O/P EST LOW 20 MIN: CPT | Mod: VID | Performed by: PHYSICIAN ASSISTANT

## 2023-06-15 ASSESSMENT — ANXIETY QUESTIONNAIRES
GAD7 TOTAL SCORE: 20
5. BEING SO RESTLESS THAT IT IS HARD TO SIT STILL: MORE THAN HALF THE DAYS
3. WORRYING TOO MUCH ABOUT DIFFERENT THINGS: NEARLY EVERY DAY
7. FEELING AFRAID AS IF SOMETHING AWFUL MIGHT HAPPEN: NEARLY EVERY DAY
6. BECOMING EASILY ANNOYED OR IRRITABLE: NEARLY EVERY DAY
IF YOU CHECKED OFF ANY PROBLEMS ON THIS QUESTIONNAIRE, HOW DIFFICULT HAVE THESE PROBLEMS MADE IT FOR YOU TO DO YOUR WORK, TAKE CARE OF THINGS AT HOME, OR GET ALONG WITH OTHER PEOPLE: VERY DIFFICULT
1. FEELING NERVOUS, ANXIOUS, OR ON EDGE: NEARLY EVERY DAY
GAD7 TOTAL SCORE: 20
2. NOT BEING ABLE TO STOP OR CONTROL WORRYING: NEARLY EVERY DAY

## 2023-06-15 ASSESSMENT — PATIENT HEALTH QUESTIONNAIRE - PHQ9
5. POOR APPETITE OR OVEREATING: NEARLY EVERY DAY
SUM OF ALL RESPONSES TO PHQ QUESTIONS 1-9: 22
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
## 2023-06-15 NOTE — TELEPHONE ENCOUNTER
Prior Authorization Retail Medication Request    Medication/Dose: Olanzapine 5 mg   ICD code (if different than what is on RX):    Previously Tried and Failed:    Rationale:      Insurance Name:    Insurance ID:        Pharmacy Information (if different than what is on RX)  Name:    Phone:

## 2023-06-15 NOTE — TELEPHONE ENCOUNTER
Central Prior Authorization Team   Phone: [phone]      PA Initiation    Medication: OLANZAPINE 5 MG PO TBDP  Insurance Company: [place] Medicaid ([facility]) - Phone [phone] Fax [phone]  Pharmacy Filling the Rx: [id number] - [place], [place] - [address]  Filling Pharmacy Phone: [phone]  Filling Pharmacy Fax:    Start Date: 6/15/2023

## 2023-06-15 NOTE — TELEPHONE ENCOUNTER
".Reason for Call:  Medication or medication refill:    Do you use a [facility]? NO    [id number] - [place], [place]  Phone:[phone]      Name of the medication requested:     OLANZapine zydis (ZYPREXA) 5 MG ODT  Pharmacy is waiting for approval to fill med      Pt also stated, \"Thank you for filling my buPROPion (WELLBUTRIN XL)!\" :)    Can we leave a detailed message on this number? Not Applicable    Phone number patient can be reached at:   Mobile:  [phone]    Best Time: ASAP    Call taken on 6/15/2023 at 10:31 AM by Mone Hawthorne    "

## 2023-06-15 NOTE — TELEPHONE ENCOUNTER
RN spoke to [facility] pharmacist who reported that OLANZapine zydis (ZYPREXA) 5 MG ODT [place].     1. Pharmacist reported that Zyprexa 5mg tablet is covered if it is not ODT formulation. Pharmacist reported that a prior authorization would be needed to cover ODT.     2. The patient reported that she prefers to have ODT as she experiences nausea with tablet form of Olanzapine.    3. Pharmacist reported that the patient is on [place]. RN spoke with the patient who reported that she believes she may have [employer] for [place] as well as [place]. The patient would like to check with [employer] insurance to clarify if she has coverage for ODT. She will contact the clinic with this information.

## 2023-06-18 NOTE — TELEPHONE ENCOUNTER
PRIOR AUTHORIZATION DENIED    Medication: OLANZAPINE 5 MG PO TBDP  Insurance Company: [place] Medicaid ([facility]) - Phone [phone] Fax [phone]  Denial Date: 6/14/2023    Denial Rational: Patient must had tried at least 2 preferred alternatives from list below.                Appeal Information:         Patient Notified: No

## 2023-06-19 RX ORDER — OMEPRAZOLE 40 MG/1
CAPSULE, DELAYED RELEASE ORAL
Qty: 30 CAPSULE | Refills: 0 | OUTPATIENT
Start: 2023-06-19

## 2023-06-19 RX ORDER — OLANZAPINE 5 MG/1
5 TABLET ORAL AT BEDTIME
Qty: 30 TABLET | Refills: 0 | Status: SHIPPED | OUTPATIENT
Start: 2023-06-19 | End: 2023-09-01

## 2023-06-19 NOTE — TELEPHONE ENCOUNTER
RN received notification from Prior Authorization Team that this patients OLANZapine zydis (ZYPREXA) 5 MG ODT was denied by insurance.      1.  RN phoned the patient an informed her that the Prior authorization has been denied.      2.  The patient reported that Janet Álvarez CNP had originally prescribed the Zydis ODT because she has occasional difficulty keeping food down.      3.  The patient verbalized that this has somewhat improved and she will be making a appointment with GI in the future.      4.  The patient is open and willing to try Zyprexa 5mg tablet form and take it with a little yogurt or apple sauce.  The Zyprexa tablets are covered by insurance.      RN sent this message to Psych Provider [place] for their review.

## 2023-06-20 NOTE — TELEPHONE ENCOUNTER
Sent [employer] message.   MICHAEL SánchezN, RN, PHN  [place]  June 20, 2023

## 2023-07-03 ENCOUNTER — MYC MEDICAL ADVICE (OUTPATIENT)
Dept: PSYCHIATRY | Facility: CLINIC | Age: 53
End: 2023-07-03
Payer: COMMERCIAL

## 2023-07-03 ENCOUNTER — TELEPHONE (OUTPATIENT)
Dept: PSYCHIATRY | Facility: CLINIC | Age: 53
End: 2023-07-03
Payer: COMMERCIAL

## 2023-07-03 DIAGNOSIS — F31.12 BIPOLAR AFFECTIVE DISORDER, MANIC, MODERATE (H): ICD-10-CM

## 2023-07-03 DIAGNOSIS — F43.10 PTSD (POST-TRAUMATIC STRESS DISORDER): ICD-10-CM

## 2023-07-03 DIAGNOSIS — F41.1 GENERALIZED ANXIETY DISORDER: ICD-10-CM

## 2023-07-03 NOTE — TELEPHONE ENCOUNTER
Reason for call:  Medication   If this is a refill request, has the caller requested the refill from the pharmacy already? No  Will the patient be using a [facility]? No  Name of the pharmacy and phone number for the current request: [id number] [place] - [address] [place] [address] [phone]    Name of the medication requested: LORazepam (ATIVAN) 0.5 MG tablet  gabapentin (NEURONTIN) 400 MG capsule    Other request: Pt reports being out of medications and that her new pharmacy will be closed tomorrow for holiday    Phone number to reach patient:  Home number on file [phone] (home)    Best Time:  ASAP    Can we leave a detailed message on this number?  YES    Travel screening: Not Applicable

## 2023-07-03 NOTE — TELEPHONE ENCOUNTER
1) RN reviewed request to have LORazepam (ATIVAN) 0.5 MG tablet and gabapentin (NEURONTIN) 400 MG capsule from [facility] in [place] to [facility] in [place].     2) Per [place] , these were last filled on 6/1/23:          3) RN spoke with pharmacy staff at [facility] and cancelled these two medications that were prescribed on 6/23/23 and 6/29/23.     4) RN cuing up identical prescriptions to be [facility] [place].     Geno Barcenas RN on 7/3/2023 at 4:25 PM

## 2023-07-05 ENCOUNTER — MYC REFILL (OUTPATIENT)
Dept: PSYCHIATRY | Facility: CLINIC | Age: 53
End: 2023-07-05
Payer: COMMERCIAL

## 2023-07-05 DIAGNOSIS — F43.10 PTSD (POST-TRAUMATIC STRESS DISORDER): ICD-10-CM

## 2023-07-05 DIAGNOSIS — F41.1 GENERALIZED ANXIETY DISORDER: ICD-10-CM

## 2023-07-05 RX ORDER — GABAPENTIN 400 MG/1
1200 CAPSULE ORAL 3 TIMES DAILY
Qty: 270 CAPSULE | Refills: 0 | Status: CANCELLED | OUTPATIENT
Start: 2023-07-05

## 2023-07-05 RX ORDER — GABAPENTIN 400 MG/1
1200 CAPSULE ORAL 3 TIMES DAILY
Qty: 270 CAPSULE | Refills: 0 | Status: SHIPPED | OUTPATIENT
Start: 2023-07-05 | End: 2023-08-31

## 2023-07-05 RX ORDER — LORAZEPAM 0.5 MG/1
0.5 TABLET ORAL DAILY PRN
Qty: 15 TABLET | Refills: 0 | Status: SHIPPED | OUTPATIENT
Start: 2023-07-05 | End: 2023-09-01

## 2023-07-05 NOTE — TELEPHONE ENCOUNTER
Gabapentin requires a PA, due to larger dose that she is taking. Pharmacy did fax us the PA information.     Ijeoma Miller RN on 7/5/2023 at 3:27 PM

## 2023-07-05 NOTE — TELEPHONE ENCOUNTER
Patient reports she has been trying to get the medications from encounter dated 7/3/2023 @ 1620 filled since last week. Patient reports the pharmacy has not received them. Call patient back at: [phone]

## 2023-07-05 NOTE — TELEPHONE ENCOUNTER
Henri patient. Routing to [facility] nursing pool and covering providers.      Maria Fernanda Reaves RN on 7/5/2023 at 11:54 AM

## 2023-07-06 ENCOUNTER — TELEPHONE (OUTPATIENT)
Dept: PSYCHIATRY | Facility: CLINIC | Age: 53
End: 2023-07-06
Payer: COMMERCIAL

## 2023-07-06 NOTE — TELEPHONE ENCOUNTER
Prior Authorization Retail Medication Request    Medication/Dose: gabapentin (NEURONTIN) 400 MG capsule  ICD code (if different than what is on RX):  PTSD  Previously Tried and Failed:    Rationale:  Insurance will only cover 3 caps per day. Patient takes a total of 9 caps per day    Insurance Name:  Covermymeds Key: [id number]  Insurance ID:        Pharmacy Information (if different than what is on RX)  [facility]  Phone:  [phone]

## 2023-07-06 NOTE — TELEPHONE ENCOUNTER
Reason for call:  Other   Patient called regarding (reason for call): prescription and PA (Prior Authorization)   Additional comments: Pharmacist advised they need the clinical questions criteria answered for the prior authorization.   gabapentin (NEURONTIN) 400 MG capsule    case#: [id number]    Phone number to reach patient:   Mone MATHEWS for [employer] #: [phone]    Best Time:  asap    Can we leave a detailed message on this number?  YES    Travel screening: Not Applicable

## 2023-07-06 NOTE — TELEPHONE ENCOUNTER
Per Janet: Did the PA information go to the PA team?  As Maryjane has been off of gabapentin, would she consider restarting it at 300 mg three times daily?    The PA team is working on the encounter. Writer will message patient about starting 300 mg 3 times a day.     Ijeoma Miller RN on 7/6/2023 at 4:00 PM

## 2023-07-06 NOTE — TELEPHONE ENCOUNTER
RN called [facility] in [place] and talked with pharmacist. Pharmacist reported that insurance will only cover 3 capsules daily without a PA. The patient is currently taking a total of 9 capsules daily.     ABISAI VELAZQUEZ RN on 7/6/2023 at 10:34 AM

## 2023-07-07 NOTE — TELEPHONE ENCOUNTER
Spoke with the patient. She does not want to go to a lower dose while the PA is being done. The pharmacy did give her a couple days worth of her medications. She also talked to her insurance and they are marking it as high priority.   She is also requesting to add the Bupropion  mg to her Bupropion  mg. She said since being off of it she has low energy and motivation. It was discontinued on 4/20/23.     Ijeoma Miller RN on 7/7/2023 at 10:37 AM

## 2023-07-10 RX ORDER — BUPROPION HYDROCHLORIDE 150 MG/1
150 TABLET ORAL EVERY MORNING
Qty: 30 TABLET | Refills: 1 | Status: SHIPPED | OUTPATIENT
Start: 2023-07-10 | End: 2023-09-15

## 2023-07-10 NOTE — TELEPHONE ENCOUNTER
I sent Wellbutrin  mg daily to her pharmacy to add to her 300 mg tablet.  If there is anything else I need to do about the gabapentin prior authorization, please let me know.  Sheyla

## 2023-07-10 NOTE — TELEPHONE ENCOUNTER
Central Prior Authorization Team   Phone: [phone]      PA Initiation    Medication: GABAPENTIN 400 MG PO CAPS  Insurance Company: [employer] - Phone [phone] Fax [phone]  Pharmacy Filling the Rx: [id number] - [place] - [address] [place] [address]  Filling Pharmacy Phone: [phone]  Filling Pharmacy Fax:    Start Date: 7/10/2023

## 2023-07-10 NOTE — TELEPHONE ENCOUNTER
RN returned call to patient to discuss PA. Patient verbalized her frustration with the PA process. She reported that she is waiting for someone from [employer] to return her call in 24 hours. Patient would like to appeal the PA. Will wait until denial comes through.     ABISAI VELAZQUEZ RN on 7/10/2023 at 10:54 AM

## 2023-07-11 NOTE — TELEPHONE ENCOUNTER
Prior Authorization Approval    Medication: GABAPENTIN 400 MG PO CAPS  Authorization Effective Date: 6/10/2023  Authorization Expiration Date: 7/9/2024  Approved Dose/Quantity: 270 per 30 days  Reference #: [id number]   Insurance Company: [employer] - Phone [phone] Fax [phone]  Expected CoPay:       CoPay Card Available:      Financial Assistance Needed:   Which Pharmacy is filling the prescription: [id number] - [place], [place] - [address] [place] [address]  Pharmacy Notified: Yes  Patient Notified: No

## 2023-07-28 ENCOUNTER — TELEPHONE (OUTPATIENT)
Dept: GASTROENTEROLOGY | Facility: CLINIC | Age: 53
End: 2023-07-28
Payer: COMMERCIAL

## 2023-07-28 NOTE — TELEPHONE ENCOUNTER
Pt called in hoping to schedule an EGD. She explained that she saw a provder in the ER back in May and thought they discussed putting a referral in.     After reaching out to that provider they said that the pt would need to see the primary care provider for next steps.    Reached out to the pt LVM letting them know but if pt calls back please inform them that they need to follow up with their primary care provider for a referral and next steps.

## 2023-07-31 ENCOUNTER — HOSPITAL ENCOUNTER (EMERGENCY)
Facility: CLINIC | Age: 53
Discharge: HOME | End: 2023-08-01
Attending: FAMILY MEDICINE | Admitting: FAMILY MEDICINE
Payer: COMMERCIAL

## 2023-07-31 DIAGNOSIS — R42 VERTIGO: ICD-10-CM

## 2023-07-31 LAB
ALBUMIN SERPL BCG-MCNC: 4.8 G/DL (ref 3.5–5.2)
ALP SERPL-CCNC: 133 U/L (ref 35–104)
ALT SERPL W P-5'-P-CCNC: 25 U/L (ref 0–50)
ANION GAP SERPL CALCULATED.3IONS-SCNC: 16 MMOL/L (ref 7–15)
AST SERPL W P-5'-P-CCNC: 35 U/L (ref 0–45)
BASOPHILS # BLD AUTO: 0.1 10E3/UL (ref 0–0.2)
BASOPHILS NFR BLD AUTO: 1 %
BILIRUB SERPL-MCNC: 1.1 MG/DL
BUN SERPL-MCNC: 13.3 MG/DL (ref 6–20)
CALCIUM SERPL-MCNC: 9.4 MG/DL (ref 8.6–10)
CHLORIDE SERPL-SCNC: 95 MMOL/L (ref 98–107)
CREAT SERPL-MCNC: 0.74 MG/DL (ref 0.51–0.95)
DEPRECATED HCO3 PLAS-SCNC: 25 MMOL/L (ref 22–29)
EOSINOPHIL # BLD AUTO: 0.1 10E3/UL (ref 0–0.7)
EOSINOPHIL NFR BLD AUTO: 1 %
ERYTHROCYTE [DISTWIDTH] IN BLOOD BY AUTOMATED COUNT: 13.7 % (ref 10–15)
GFR SERPL CREATININE-BSD FRML MDRD: >90 ML/MIN/1.73M2
GLUCOSE SERPL-MCNC: 114 MG/DL (ref 70–99)
HCT VFR BLD AUTO: 43.4 % (ref 35–47)
HGB BLD-MCNC: 14.6 G/DL (ref 11.7–15.7)
IMM GRANULOCYTES # BLD: 0 10E3/UL
IMM GRANULOCYTES NFR BLD: 0 %
LYMPHOCYTES # BLD AUTO: 2 10E3/UL (ref 0.8–5.3)
LYMPHOCYTES NFR BLD AUTO: 31 %
MCH RBC QN AUTO: 31.5 PG (ref 26.5–33)
MCHC RBC AUTO-ENTMCNC: 33.6 G/DL (ref 31.5–36.5)
MCV RBC AUTO: 94 FL (ref 78–100)
MONOCYTES # BLD AUTO: 0.6 10E3/UL (ref 0–1.3)
MONOCYTES NFR BLD AUTO: 9 %
NEUTROPHILS # BLD AUTO: 3.7 10E3/UL (ref 1.6–8.3)
NEUTROPHILS NFR BLD AUTO: 58 %
NRBC # BLD AUTO: 0 10E3/UL
NRBC BLD AUTO-RTO: 0 /100
PLATELET # BLD AUTO: 356 10E3/UL (ref 150–450)
POTASSIUM SERPL-SCNC: 4.1 MMOL/L (ref 3.4–5.3)
PROT SERPL-MCNC: 7.8 G/DL (ref 6.4–8.3)
RBC # BLD AUTO: 4.63 10E6/UL (ref 3.8–5.2)
SODIUM SERPL-SCNC: 136 MMOL/L (ref 136–145)
WBC # BLD AUTO: 6.4 10E3/UL (ref 4–11)

## 2023-07-31 PROCEDURE — 250N000013 HC RX MED GY IP 250 OP 250 PS 637: Performed by: FAMILY MEDICINE

## 2023-07-31 PROCEDURE — 80053 COMPREHEN METABOLIC PANEL: CPT | Performed by: FAMILY MEDICINE

## 2023-07-31 PROCEDURE — 99284 EMERGENCY DEPT VISIT MOD MDM: CPT | Performed by: FAMILY MEDICINE

## 2023-07-31 PROCEDURE — 85025 COMPLETE CBC W/AUTO DIFF WBC: CPT | Performed by: FAMILY MEDICINE

## 2023-07-31 PROCEDURE — 36415 COLL VENOUS BLD VENIPUNCTURE: CPT | Performed by: EMERGENCY MEDICINE

## 2023-07-31 PROCEDURE — 99284 EMERGENCY DEPT VISIT MOD MDM: CPT | Mod: 25 | Performed by: FAMILY MEDICINE

## 2023-07-31 PROCEDURE — 258N000003 HC RX IP 258 OP 636: Performed by: EMERGENCY MEDICINE

## 2023-07-31 PROCEDURE — 96376 TX/PRO/DX INJ SAME DRUG ADON: CPT | Performed by: FAMILY MEDICINE

## 2023-07-31 PROCEDURE — 250N000011 HC RX IP 250 OP 636: Mod: JZ | Performed by: EMERGENCY MEDICINE

## 2023-07-31 PROCEDURE — 96374 THER/PROPH/DIAG INJ IV PUSH: CPT | Performed by: FAMILY MEDICINE

## 2023-07-31 PROCEDURE — 85025 COMPLETE CBC W/AUTO DIFF WBC: CPT | Performed by: EMERGENCY MEDICINE

## 2023-07-31 PROCEDURE — 96361 HYDRATE IV INFUSION ADD-ON: CPT | Performed by: FAMILY MEDICINE

## 2023-07-31 PROCEDURE — 250N000011 HC RX IP 250 OP 636: Mod: JZ | Performed by: FAMILY MEDICINE

## 2023-07-31 RX ORDER — ONDANSETRON 2 MG/ML
4 INJECTION INTRAMUSCULAR; INTRAVENOUS ONCE
Status: COMPLETED | OUTPATIENT
Start: 2023-07-31 | End: 2023-07-31

## 2023-07-31 RX ORDER — CETIRIZINE HYDROCHLORIDE 10 MG/1
10 TABLET ORAL ONCE
Status: COMPLETED | OUTPATIENT
Start: 2023-07-31 | End: 2023-07-31

## 2023-07-31 RX ADMIN — ONDANSETRON 4 MG: 2 INJECTION INTRAMUSCULAR; INTRAVENOUS at 22:46

## 2023-07-31 RX ADMIN — SODIUM CHLORIDE 1000 ML: 9 INJECTION, SOLUTION INTRAVENOUS at 22:46

## 2023-07-31 RX ADMIN — CETIRIZINE HYDROCHLORIDE 10 MG: 10 TABLET, FILM COATED ORAL at 23:36

## 2023-07-31 RX ADMIN — ONDANSETRON 4 MG: 2 INJECTION INTRAMUSCULAR; INTRAVENOUS at 23:35

## 2023-07-31 ASSESSMENT — ACTIVITIES OF DAILY LIVING (ADL): ADLS_ACUITY_SCORE: 35

## 2023-08-01 VITALS
HEIGHT: 63 IN | SYSTOLIC BLOOD PRESSURE: 150 MMHG | OXYGEN SATURATION: 96 % | HEART RATE: 105 BPM | RESPIRATION RATE: 19 BRPM | WEIGHT: 159.3 LBS | DIASTOLIC BLOOD PRESSURE: 66 MMHG | TEMPERATURE: 97.9 F | BODY MASS INDEX: 28.23 KG/M2

## 2023-08-01 RX ORDER — ONDANSETRON 4 MG/1
4 TABLET, ORALLY DISINTEGRATING ORAL EVERY 8 HOURS PRN
Qty: 10 TABLET | Refills: 0 | Status: SHIPPED | OUTPATIENT
Start: 2023-08-01 | End: 2024-04-18

## 2023-08-01 NOTE — ED TRIAGE NOTES
States she has vertigo and meclizine at home and vomiting.  Tried meds and they are not helping, not keeping anything down at this point.  Is waiting for endoscopy to be scheduled.  Requesting IV at triage

## 2023-08-01 NOTE — DISCHARGE INSTRUCTIONS
We are glad that your vertigo has improved.  Continue Zyrtec 10 mg up to twice a day to help with the vertigo.  Add Zofran every 6 hours as needed for nausea and vomiting.  Try the Epley maneuver at home.  Establish a primary care provider to follow-up about your gastrointestinal problems.  Return to the emergency department if your symptoms worsen.

## 2023-08-01 NOTE — ED PROVIDER NOTES
[facility] ED Provider Note   Patient: Magnolia Gonzáles  MRN #:  [id number]  Date of Visit: July 31, 2023    CC:     Chief Complaint   Patient presents with    Dizziness     HPI:  Magnolia Gonzáles is a 53 year old female who presented to the emergency department with acute recurrent vertigo that started several days ago, with ability keep anything down.  Patient states she has not been able to take her medications for the past week.  She has severe vertigo occurring frequently, and her last visit for the same problem was about a month and a half ago.  She was here at the end of May with acute gastritis, and is awaiting scheduling for an upper endoscopy.  Patient has a history of traumatic brain injury, with reported episodes of vertigo since that time.  She has had several ER visits for the same problem.  She has meclizine at home but she did not take anything today.  Patient reports some dry heaves throughout the day.  She has not had any urine output today.  Patient denies any departure from her typical symptoms of vertigo.  She does not think that she has any stroke symptoms.  Patient apparently recently retired.  She has a history of PTSD, anxiety, depression, bipolar disorder.  Her medical records indicate a history of hypothyroidism, and previous episodes for vertigo.  She has not had any clinic follow-ups since her last visit.    Problem List:  Patient Active Problem List    Diagnosis Date Noted    Generalized anxiety disorder 07/29/2020     Priority: Medium    Major depressive disorder, recurrent episode, mild (H) 07/19/2017     Priority: Medium    PTSD (post-traumatic stress disorder) 07/19/2017     Priority: Medium    Bipolar affective disorder, manic, moderate (H) 09/23/2016     Priority: Medium     Patient refutes this. 10/5/2020      Overdose of Valium, intentional self-harm, subsequent encounter 05/29/2016     Priority:  "Medium    Carpal tunnel syndrome of left wrist 01/13/2016     Priority: Medium    Adjustment disorder with mixed anxiety and depressed mood 01/06/2016     Priority: Medium    Acquired hypothyroidism 01/06/2016     Priority: Medium    Bilateral carpal tunnel syndrome 01/06/2016     Priority: Medium    Social phobia 12/26/2014     Priority: Medium    Panic disorder without agoraphobia 12/26/2014     Priority: Medium    History of alcohol dependence (H) 07/22/2014     Priority: Medium     Overview:   Binge drinking disorder. Sober since 5/2016      Nondependent alcohol abuse, episodic drinking behavior 11/17/2005     Priority: Medium       Past Medical History:   Diagnosis Date    Anxiety     Depressive disorder     Hypothyroid     Suicidal ideation 7/19/2015       MEDS: ondansetron (ZOFRAN ODT) 4 MG ODT tab  betamethasone valerate (VALISONE) 0.1 % external ointment  buPROPion (WELLBUTRIN XL) 150 MG 24 hr tablet  buPROPion (WELLBUTRIN XL) 300 MG 24 hr tablet  gabapentin (NEURONTIN) 400 MG capsule  hydrocortisone (CORTAID) 1 % external ointment  levothyroxine (SYNTHROID/LEVOTHROID) 100 MCG tablet  LORazepam (ATIVAN) 0.5 MG tablet  meclizine (ANTIVERT) 25 MG tablet  OLANZapine (ZYPREXA) 5 MG tablet  omeprazole (PRILOSEC) 40 MG DR capsule        ALLERGIES:    Allergies   Allergen Reactions    Serotonin Reuptake Inhibitors (Ssris) Rash    Zoloft [Sertraline] Rash       Past Surgical History:   Procedure Laterality Date    ORTHOPEDIC SURGERY         Social History     Tobacco Use    Smoking status: Never     Passive exposure: Never    Smokeless tobacco: Never   Vaping Use    Vaping Use: Never used   Substance Use Topics    Alcohol use: Yes     Alcohol/week: 0.0 standard drinks of alcohol     Comment: \"once every three months\"    Drug use: No         Review of Systems   Except as noted in HPI, all other systems were reviewed and are negative    Physical Exam   Vitals were reviewed  Patient Vitals for the past 12 hrs:   BP " "Temp Temp src Pulse Resp SpO2 Height Weight   08/01/23 0050 (!) 150/66 -- Oral 105 -- -- -- --   07/31/23 2103 -- -- -- -- -- -- -- 72.3 kg (159 lb 4.8 oz)   07/31/23 2102 (!) 167/115 97.9  F (36.6  C) Oral 112 19 96 % 1.6 m (5' 3\") --     GENERAL APPEARANCE: Alert and oriented x3, no active vomiting at this time  FACE: normal facies  EYES: Pupils are equal: Extraocular muscles are intact; no nystagmus  HENT: normal external exam; the left tympanic membrane is slightly scarred; right TM appears normal; no middle ear effusions  NECK: no adenopathy or asymmetry: No carotid bruits  RESP: normal respiratory effort; clear breath sounds bilaterally  CV: regular rate and rhythm; no significant murmurs, gallops or rubs  ABD: soft, no tenderness; no rebound or guarding; bowel sounds are normal  EXT: No calf tenderness or pitting edema  SKIN: no worrisome rash  NEURO: no facial droop; no focal deficits, speech is normal; normal hand grasp, strength is 5+/5+ in the upper and lower extremities        Available Lab/Imaging Results     Results for orders placed or performed during the hospital encounter of 07/31/23 (from the past 24 hour(s))   CBC with platelets differential    Narrative    The following orders were created for panel order CBC with platelets differential.  Procedure                               Abnormality         Status                     ---------                               -----------         ------                     CBC with platelets and d...[[id number]]                      Final result                 Please view results for these tests on the individual orders.   Comprehensive metabolic panel   Result Value Ref Range    Sodium 136 136 - 145 mmol/L    Potassium 4.1 3.4 - 5.3 mmol/L    Chloride 95 (L) 98 - 107 mmol/L    Carbon Dioxide (CO2) 25 22 - 29 mmol/L    Anion Gap 16 (H) 7 - 15 mmol/L    Urea Nitrogen 13.3 6.0 - 20.0 mg/dL    Creatinine 0.74 0.51 - 0.95 mg/dL    Calcium 9.4 8.6 - 10.0 mg/dL    " Glucose 114 (H) 70 - 99 mg/dL    Alkaline Phosphatase 133 (H) 35 - 104 U/L    AST 35 0 - 45 U/L    ALT 25 0 - 50 U/L    Protein Total 7.8 6.4 - 8.3 g/dL    Albumin 4.8 3.5 - 5.2 g/dL    Bilirubin Total 1.1 <=1.2 mg/dL    GFR Estimate >90 >60 mL/min/1.73m2   CBC with platelets and differential   Result Value Ref Range    WBC Count 6.4 4.0 - 11.0 10e3/uL    RBC Count 4.63 3.80 - 5.20 10e6/uL    Hemoglobin 14.6 11.7 - 15.7 g/dL    Hematocrit 43.4 35.0 - 47.0 %    MCV 94 78 - 100 fL    MCH 31.5 26.5 - 33.0 pg    MCHC 33.6 31.5 - 36.5 g/dL    RDW 13.7 10.0 - 15.0 %    Platelet Count 356 150 - 450 10e3/uL    % Neutrophils 58 %    % Lymphocytes 31 %    % Monocytes 9 %    % Eosinophils 1 %    % Basophils 1 %    % Immature Granulocytes 0 %    NRBCs per 100 WBC 0 <1 /100    Absolute Neutrophils 3.7 1.6 - 8.3 10e3/uL    Absolute Lymphocytes 2.0 0.8 - 5.3 10e3/uL    Absolute Monocytes 0.6 0.0 - 1.3 10e3/uL    Absolute Eosinophils 0.1 0.0 - 0.7 10e3/uL    Absolute Basophils 0.1 0.0 - 0.2 10e3/uL    Absolute Immature Granulocytes 0.0 <=0.4 10e3/uL    Absolute NRBCs 0.0 10e3/uL                Impression     Final diagnoses:   Vertigo         ED Course & Medical Decision Making   Magnolia Gonzáles is a 53 year old female who presented to the emergency department with several days of ongoing vertigo, affecting her GI tract, and specifically causing nausea, vomiting and dry heaves.  Patient came into the emergency department requesting IV fluids.  She was here about 6 weeks ago with similar presentation.  She has had a couple of previous ER visits for vertigo.  Patient denies any new changes from her normal episodes of vertigo.  She does not have any known triggers.  She has some meclizine but did not take anything today.  She has never tried the Epley maneuver at home.  Patient continues to have some GI symptoms and is awaiting scheduling an upper endoscopy.    Vital signs reveal a temp of 97.9, blood pressure 167/115, heart  rate of 112, respiratory rate of 19 with 96% oxygen saturation.  Patient has a normal neurologic exam.  On extraocular muscle testing, there is no nystagmus.  Tympanic membranes are intact with slight scarring of the left TM.  I do not see any fusions.  Patient does report some ringing.  We discussed the possibility of Ménière's disease.    Laboratory work-up reveals a normal CBC, comprehensive metabolic panel is normal except for slightly low chloride of 95, nonfasting glucose of 114, and an alkaline phosphatase of 133.  Normal liver enzymes.  Patient received IV fluids, Zyrtec, and Zofran.    Patient felt better after receiving her medications.  We discussed trying the Epley maneuver at home.  Continue Zyrtec up to twice a day to help with her vertigo.  She did not even take her meclizine today but had taken it as needed in the past.  Continue Zofran for nausea.  Patient should establish care with a primary care provider for follow-up of her gastrointestinal issues and the recurrent episodes of vertigo.  I do not think that she has Ménière's although this has not been formally ruled out.      Written after-visit summary and instructions were given at the time of discharge.        Discharge Instructions:   We are glad that your vertigo has improved.  Continue Zyrtec 10 mg up to twice a day to help with the vertigo.  Add Zofran every 6 hours as needed for nausea and vomiting.  Try the Epley maneuver at home.  Establish a primary care provider to follow-up about your gastrointestinal problems.  Return to the emergency department if your symptoms worsen.       Disclaimer: This note consists of words and symbols derived from keyboarding and dictation using voice recognition software.  As a result, there may be errors that have gone undetected.  Please consider this when interpreting information found in this note.       Sea Chung MD  08/01/23 2922

## 2023-08-28 ENCOUNTER — TELEPHONE (OUTPATIENT)
Dept: PSYCHIATRY | Facility: CLINIC | Age: 53
End: 2023-08-28
Payer: COMMERCIAL

## 2023-08-28 NOTE — TELEPHONE ENCOUNTER
Pharmacy ([facility]) was contacted regarding Prior Authorization for Desvenlafaxine ER 50 mg, medication was not ordered by provider, Janet Álvarez NP. Pharmacy thought that the patient had requested PA.  Message left for patient to return call  regarding PA and to make appt so that patient can discuss meds with provider.

## 2023-08-29 DIAGNOSIS — F43.10 PTSD (POST-TRAUMATIC STRESS DISORDER): ICD-10-CM

## 2023-08-29 DIAGNOSIS — F41.1 GENERALIZED ANXIETY DISORDER: ICD-10-CM

## 2023-08-29 NOTE — TELEPHONE ENCOUNTER
RN received a refill request notification from [facility] Coordinators with Dr. Wagner tagged as the provider.  The patient is no longer a patient of Dr. Wagner and is currently followed by Janet Álvarez CNP.       RN routed this message to the [facility] RN pool.

## 2023-08-29 NOTE — TELEPHONE ENCOUNTER
Reason for call:  Other   Patient called regarding (reason for call): prescription  Additional comments: [facility] pharmacy called reporting they sent a refill request for patients gabapentin which was denied. Pharmacy reports the prescribing provider is Dr. Wagner. Call pharmacy at: [phone]    Phone number to reach patient:  Home number on file [phone] (home)    Best Time:  ASAP    Can we leave a detailed message on this number?  YES    Travel screening: Not Applicable

## 2023-08-31 RX ORDER — GABAPENTIN 400 MG/1
1200 CAPSULE ORAL 3 TIMES DAILY
Qty: 270 CAPSULE | Refills: 0 | Status: SHIPPED | OUTPATIENT
Start: 2023-08-31 | End: 2023-09-25

## 2023-08-31 NOTE — TELEPHONE ENCOUNTER
RN reviewed message from [facility] regarding [facility]'s inquiry as to why Gabapentin had been denied.     RN phoned [facility] in [place] at [phone]. Pharmacy reported that they received a fax stating the the Gabapentin had been denied. Pharmacy reported that the Gabapentin had not been filled since 7/10/23.     RN could not find an indication as to why the Gabapentin had been denied. Routing to provider to advise    RN attempted to call the patient to inquire whether she had been out of medication or missed any doses and LVM to call the clinic back.     Date of Last Office Visit: 6/6/23  Date of Next Office Visit: none scheduled  No shows since last visit: 1  Cancellations since last visit: 0    Medication requested: gabapentin (NEURONTIN) 400 MG capsule  Date last ordered: 7/5/23 Qty: 270 Refills: 0     Review of [place] ?: yes  Medication last sold date: 7/10/23 Qty filled: 270  Other controlled substance on [place] ?: yes  If yes, is this a new medication?: no  If yes, name of medication: lorazepam and date filled: 7/5/23    Lapse in medication adherence greater than 5 days?: yes  If yes, call patient and gather details: LVM to call the clinic back  Medication refill request verified as identical to current order?: yes  Result of Last DAM, VPA, Li+ Level, CBC, or Carbamazepine Level (at or since last visit): N/A    Last visit treatment plan:     Treatment Plan:        1.  Lorazepam 0.5 mg daily as needed for anxiety  2.  Olanzapine ODT 5 mg at bedtime  3.  Bupropion discontinued  4.  Desvenlafaxine discontinued  5.  Lamotrigine discontinued  6.  Propranolol ER discontinued  7.  Gabapentin 1200 mg 3 times daily     Continue all other medications as reviewed per electronic medical record today.   Safety plan reviewed. To the Emergency Department as needed or call after hours crisis line at [phone] or [phone]. [place] Crisis Text Line. Text [place] to 643303 or Suicide LifeLine Chat:  suicidepreventionlifeline.org/chat/  To schedule individual or family therapy, call [facility] at [phone]  Schedule an appointment with me in 6 weeks or sooner as needed. Call [facility] at [phone] to schedule.  Follow up with primary care provider as planned or for acute medical concerns.  Call the psychiatric nurse line with medication questions or concerns at [phone]  MyChart may be used to communicate with your provider, but this is not intended to be used for emergencies    []Medication refilled per  Medication Refill in Ambulatory Care  policy.  [x]Medication unable to be refilled by RN due to criteria not met as indicated below:    []Eligibility - not seen in the last year   [x]Supervision - no future appointment   []Compliance - no shows, cancellations or lapse in therapy   []Verification - order discrepancy   [x]Controlled medication   []Medication not included in policy   []90-day supply request   []Other

## 2023-08-31 NOTE — TELEPHONE ENCOUNTER
8/31/2023 Left a VM for the Pt to contact Intake to schedule a follow up with Dr. Janet Álvarez BM

## 2023-09-01 ENCOUNTER — MYC MEDICAL ADVICE (OUTPATIENT)
Dept: PSYCHIATRY | Facility: CLINIC | Age: 53
End: 2023-09-01
Payer: COMMERCIAL

## 2023-09-01 ENCOUNTER — TELEPHONE (OUTPATIENT)
Dept: PSYCHIATRY | Facility: CLINIC | Age: 53
End: 2023-09-01
Payer: COMMERCIAL

## 2023-09-01 DIAGNOSIS — F31.9 BIPOLAR AFFECTIVE DISORDER, REMISSION STATUS UNSPECIFIED (H): ICD-10-CM

## 2023-09-01 DIAGNOSIS — F31.12 BIPOLAR AFFECTIVE DISORDER, MANIC, MODERATE (H): ICD-10-CM

## 2023-09-01 RX ORDER — OLANZAPINE 5 MG/1
5 TABLET ORAL AT BEDTIME
Qty: 15 TABLET | Refills: 0 | Status: SHIPPED | OUTPATIENT
Start: 2023-09-01 | End: 2023-10-02

## 2023-09-01 RX ORDER — LORAZEPAM 0.5 MG/1
0.5 TABLET ORAL DAILY PRN
Qty: 15 TABLET | Refills: 0 | Status: SHIPPED | OUTPATIENT
Start: 2023-09-01 | End: 2023-09-13

## 2023-09-01 NOTE — TELEPHONE ENCOUNTER
RN phoned patient and spoke with her about her  olanzapine prescription. She thanked RN. She spoke with [facility] and they do have a bed for her so she is going to try to make her way there.     ABSIAI VELAZQUEZ RN on 9/1/2023 at 4:32 PM

## 2023-09-01 NOTE — TELEPHONE ENCOUNTER
RN phoned patient and spoke to her about the [facility]. She plans to call and see what they recommend.     She would like zyprexa sent to [facility] in [place].     ABISAI VELAZQUEZ RN on 9/1/2023 at 4:20 PM

## 2023-09-01 NOTE — TELEPHONE ENCOUNTER
Incoming call from patient. She would like a refill of lorazepam as well     Date of Last Office Visit: 6/6/23  Date of Next Office Visit: 10/2/23  No shows since last visit: 1  Cancellations since last visit: 0    Medication requested: LORazepam (ATIVAN) 0.5 MG tablet  Date last ordered: 7/5/23 Qty: 15 Refills: 0     [place] of [place] ?: yes  Medication last sold date: 7/5/23 Qty filled: 15  Other [place] on [place] ?: [place]  If yes, is this a new medication?: no  If yes, name of medication: Gabapentin and date filled: 7/10/23    Lapse in medication adherence greater than 5 days?: PRN   If yes, call patient and gather details: NA  Medication refill request verified as identical to current order?: yes  Result of Last DAM, VPA, Li+ Level, CBC, or Carbamazepine Level (at or since last visit): N/A    Last visit treatment plan: Treatment Plan:        1.  Lorazepam 0.5 mg daily as needed for anxiety  2.  Olanzapine ODT 5 mg at bedtime  3.  Bupropion discontinued  4.  Desvenlafaxine discontinued  5.  Lamotrigine discontinued  6.  Propranolol ER discontinued  7.  Gabapentin 1200 mg 3 times daily     Continue all other medications as reviewed per electronic medical record today.   Safety plan reviewed. To the Emergency Department as needed or call after hours crisis line at [phone] or [phone]. [place] Crisis Text Line. Text [place] to 302809 or Suicide LifeLine Chat: suicidepreventionlifeline.org/chat/  To schedule individual or family therapy, call [facility] at [phone]  Schedule an appointment with me in 6 weeks or sooner as needed. Call [facility] at [phone] to schedule.    []Medication refilled per  Medication Refill in Ambulatory Care  policy.  [x]Medication unable to be refilled by RN due to criteria not met as indicated below:    []Eligibility - not seen in the last year   []Supervision - no future appointment   []Compliance - no shows, cancellations or  lapse in therapy   []Verification - order discrepancy   [x]Controlled medication   []Medication not included in policy   []90-day supply request   []Other
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
## 2023-09-01 NOTE — TELEPHONE ENCOUNTER
RN reviewed message from [facility] coordinator.     RN phoned [facility] in [facility] at [phone]. Spoke with pharmacist who reported that she will go ahead and transfer the Gabapentin from [facility] to [facility].     RN called patient and LVM that her Gabapentin would be transferred.       ABISAI VELAZQUEZ RN on 9/1/2023 at 9:40 AM

## 2023-09-01 NOTE — TELEPHONE ENCOUNTER
RN attempted to call patient and LVM to call the clinic back at [phone]. RN sent a [employer] message letting patient know that her Gabapentin to [facility].     ABISAI VELAZQUEZ RN on 9/1/2023 at 9:21 AM

## 2023-09-01 NOTE — TELEPHONE ENCOUNTER
Reason for call:  Medication   If this is a refill request, has the caller requested the refill from the pharmacy already? Yes  Will the patient be using a [facility]? No  Name of the pharmacy and phone number for the current request:     [facility] in [facility]   [address]  [facility] [address] pharm ph# [phone]    Name of the medication requested:   gabapentin (NEURONTIN) 400 MG capsule   LORazepam (ATIVAN) 0.5 MG tablet     Other request: Pt missed last appt. Is now out of med's  And requesting they be sent to another pharm(See above)   Client did gladys future appt asking for bridge.   Please contact client ASAP se is concerned that she is out with the Holiday weekend.     Phone number to reach patient:  Home number on file [phone] (home)    Best Time:  ASAP     Can we leave a detailed message on this number?  YES    Travel screening: Not Applicable

## 2023-09-01 NOTE — TELEPHONE ENCOUNTER
Reason for call:  Other   Patient called regarding (reason for call): call back  Additional comments: pt is having a hard time. Pt is homeless and needs to talk to you. Phone does not have a lot of battery left and you can leave a message    Phone number to reach patient:  Cell number on file:    Telephone Information:   Mobile [phone]       Best Time:  anytime    Can we leave a detailed message on this number?  YES    Travel screening: Not Applicable

## 2023-09-11 ENCOUNTER — MYC MEDICAL ADVICE (OUTPATIENT)
Dept: PSYCHIATRY | Facility: CLINIC | Age: 53
End: 2023-09-11
Payer: COMMERCIAL

## 2023-09-11 ENCOUNTER — TELEPHONE (OUTPATIENT)
Dept: PSYCHIATRY | Facility: CLINIC | Age: 53
End: 2023-09-11
Payer: COMMERCIAL

## 2023-09-11 DIAGNOSIS — F31.12 BIPOLAR AFFECTIVE DISORDER, MANIC, MODERATE (H): ICD-10-CM

## 2023-09-11 NOTE — TELEPHONE ENCOUNTER
[facility] for provider's review.     Incoming call from patient. She reports that she continues to live at the shelter. She has until Friday to figure out what she is going to do with her dog.    She is requesting a therapy note for her dog so she can have him at the shelter    She has not been taking the Olanzapine due to fear that it will make her too drowsy when she needs to be out of the shelter at 6am.     Patient wanted to update Janet VELAZQUEZ RN on 9/11/2023 at 2:42 PM

## 2023-09-11 NOTE — TELEPHONE ENCOUNTER
Reason for call: Return call     Patient called regarding (reason for call): Received call from Pt, reported returning call to Dr. Álvarez    Phone number to reach patient:  Home number on file [phone] (home)    Best Time:  Anytime    Can we leave a detailed message on this number?  YES

## 2023-09-11 NOTE — TELEPHONE ENCOUNTER
Routing patient's mychart correspondence for provider's review.   See telephone encounter from 9/1/23. Patient did not go to [facility] and is providing the provider an update on how she is doing.     RN attempted to call patient to get an update on how she is doing. LVM to call the clinic back.     ABISAI VELAZQUEZ RN on 9/11/2023 at 12:53 PM

## 2023-09-12 ENCOUNTER — TELEPHONE (OUTPATIENT)
Dept: PSYCHIATRY | Facility: CLINIC | Age: 53
End: 2023-09-12
Payer: COMMERCIAL

## 2023-09-12 NOTE — TELEPHONE ENCOUNTER
Received refill request for gabapentin. Last refilled on 8/31/23 and on  picked up on 9/1/23. Too soon for a refill.     gabapentin (NEURONTIN) 400 MG capsule 270 capsule 0 8/31/2023  No   Sig - Route: Take 3 capsules (1,200 mg) by mouth 3 times daily - Oral       Will send note back to pharmacy.     Ijeoma Miller RN on 9/12/2023 at 11:08 AM

## 2023-09-12 NOTE — TELEPHONE ENCOUNTER
Please let Maryjane know that I am not comfortable writing emotional support animal letters for temporary housing situations.  This may put the pet at risk for harm when their living situation is inconsistent.  Once she is settled somewhere more permanent, I would be happy to compose a letter of support for her.  Thank you.  Sheyla

## 2023-09-13 RX ORDER — LORAZEPAM 0.5 MG/1
0.5 TABLET ORAL DAILY PRN
Qty: 15 TABLET | Refills: 0 | Status: SHIPPED | OUTPATIENT
Start: 2023-09-14 | End: 2023-10-02

## 2023-09-13 NOTE — TELEPHONE ENCOUNTER
RN attempted to call patient and LVM to call the clinic back at [phone].     ABISAI VELAZQUEZ RN on 9/13/2023 at 8:47 AM

## 2023-09-13 NOTE — TELEPHONE ENCOUNTER
Incoming call from patient. RN relayed Janet Álvarez's message. Patient verbalized understanding. She requests a refill of lorazepam sent to [facility] in [place]. Reports that she has a prescription for lorazepam at [facility] pharmacy in [place] that cannot be transferred    RN phoned [facility] pharmacy at [phone] to verify that there was a prescription for lorazepam. Spoke with the pharmacy technician. Pharmacy technician reported that there was not a prescription for lorazepam. RN will pend a prescription for lorazepam for provider's review    Date of Last Office Visit: 6/6/23  Date of Next Office Visit: 10/2/23  No shows since last visit: 1  Cancellations since last visit: 0    Medication requested: LORazepam (ATIVAN) 0.5 MG tablet  Date last ordered: 9/1/23 Qty: 15 Refills: 0     [place] of [place] ?[place]  Medication last sold date: 9/1/23 Qty filled: 15  Other [place] on [place] ?: yes  If yes, is this a new medication?: no  If yes, name of medication: Gabapentin and date filled: 9/1/23    Lapse in medication adherence greater than 5 days?: no  If yes, call patient and gather details: NA  Medication refill request verified as identical to current order?: yes  Result of Last DAM, VPA, Li+ Level, CBC, or Carbamazepine Level (at or since last visit): N/A    Last visit treatment plan:     Assessment:     Magnolia Gonzáles presents in distress.  She has been experiencing excessive nausea and vomiting that is prompted a visit to the emergency department for medications to stop this.  She is taking olanzapine ODT 5 mg at bedtime.  I instructed her to seek out a primary care provider to provide her with future refills of Zofran and Prilosec.  Because of her inconsistency in taking the following medications, they will be discontinued: Bupropion, desvenlafaxine, lamotrigine, and propranolol.  Gabapentin is continuing for now to help with mood regulation, pain, and anxiety.  Lorazepam is also available as  needed for anxiety and it also can be beneficial in decreasing nausea.  Talk therapy is encouraged.  Transportation is difficult for her at this point as I was hoping to see her in person soon for a clinic visit..     Medication side effects and alternatives were reviewed. Health promotion activities recommended and reviewed today. All questions addressed. Education and counseling completed regarding risks and benefits of medications and psychotherapy options.     Treatment Plan:        1.  Lorazepam 0.5 mg daily as needed for anxiety  2.  Olanzapine ODT 5 mg at bedtime  3.  Bupropion discontinued  4.  Desvenlafaxine discontinued  5.  Lamotrigine discontinued  6.  Propranolol ER discontinued  7.  Gabapentin 1200 mg 3 times daily     Continue all other medications as reviewed per electronic medical record today.   Safety plan reviewed. To the Emergency Department as needed or call after hours crisis line at [phone] or [phone]. [place] Crisis Text Line. Text [place] to 895529 or Suicide LifeLine Chat: suicidepreventionLegionsline.org/chat/  To schedule individual or family therapy, call [facility] at [phone]  Schedule an appointment with me in 6 weeks or sooner as needed. Call [facility] at [phone] to schedule.  Follow up with primary care provider as planned or for acute medical concerns.  Call the psychiatric nurse line with medication questions or concerns at [phone]  MyChart may be used to communicate with your provider, but this is not intended to be used for emergencies.    []Medication refilled per  Medication Refill in Ambulatory Care  policy.  [x]Medication unable to be refilled by RN due to criteria not met as indicated below:    []Eligibility - not seen in the last year   []Supervision - no future appointment   []Compliance - no shows, cancellations or lapse in therapy   []Verification - order discrepancy   [x]Controlled medication   []Medication not  included in policy   []90-day supply request   []Other

## 2023-09-15 ENCOUNTER — MYC MEDICAL ADVICE (OUTPATIENT)
Dept: PSYCHIATRY | Facility: CLINIC | Age: 53
End: 2023-09-15
Payer: COMMERCIAL

## 2023-09-15 DIAGNOSIS — F43.10 PTSD (POST-TRAUMATIC STRESS DISORDER): ICD-10-CM

## 2023-09-15 DIAGNOSIS — F31.12 BIPOLAR AFFECTIVE DISORDER, MANIC, MODERATE (H): ICD-10-CM

## 2023-09-15 DIAGNOSIS — F41.1 GENERALIZED ANXIETY DISORDER: ICD-10-CM

## 2023-09-15 NOTE — TELEPHONE ENCOUNTER
Date of Last Office Visit: 6/6/2023  Date of Next Office Visit: 10/02/2023  No shows since last visit: 1 on 8/4/2023  Cancellations since last visit: None    Medication requested: gabapentin (NEURONTIN) 400 MG capsule  Date last ordered: 8/31/2023 Qty: 270 Refills: 0    Medication requested: buPROPion (WELLBUTRIN XL) 150 MG 24 hr tablet  Date last ordered: 7/10/2023 Qty: 30 Refills: 1    Medication requested: buPROPion (WELLBUTRIN XL) 300 MG 24 hr tablet  Date last ordered: 6/30/2023 Qty: 30 Refills: 3  - RN contacted [facility] in [facility] and staff verified that they had 1 refill remaining on file for this medication,  RN contacted [facility] and asked them to transfer this refill into their pharmacy.     Pharmacy also verified that the patient picked up all 3 of these medication on 9/1/2023.      [place] of [place] ?: [place]  Medication last sold date: 9/1/2023 Qty filled: 270  at [facility] in [facility]  Other [place] substance on [place] ?: yes  If yes, is this a new medication?: NO  If yes, name of medication: N/A and date filled: N/A    Lapse in medication adherence greater than 5 days?: No  If yes, call patient and gather details: N/A  Medication refill request verified as identical to current order?: yes  Result of Last DAM, VPA, Li+ Level, CBC, or Carbamazepine Level (at or since last visit): N/A    Last visit treatment plan:     1.  Lorazepam 0.5 mg daily as needed for anxiety  2.  Olanzapine ODT 5 mg at bedtime  3.  Bupropion discontinued  4.  Desvenlafaxine discontinued  5.  Lamotrigine discontinued  6.  Propranolol ER discontinued  7.  Gabapentin 1200 mg 3 times daily     Continue all other medications as reviewed per electronic medical record today.   Safety plan reviewed. To the Emergency Department as needed or call after hours crisis line at [phone] or [phone]. [place] Crisis Text Line. Text [place] to 385561 or Suicide LifeLine Chat:  suicidepreventionlifeline.org/chat/  To schedule individual or family therapy, call [facility] at [phone]  Schedule an appointment with me in 6 weeks or sooner as needed. Call [facility] at [phone] to schedule.  Follow up with primary care provider as planned or for acute medical concerns.  Call the psychiatric nurse line with medication questions or concerns at [phone]  MyChart may be used to communicate with your provider, but this is not intended to be used for emergencies.      []Medication refilled per  Medication Refill in Ambulatory Care  policy.  [x]Medication unable to be refilled by RN due to criteria not met as indicated below:    []Eligibility - not seen in the last year   []Supervision - no future appointment   []Compliance - no shows, cancellations or lapse in therapy   []Verification - order discrepancy   [x]Controlled medication   []Medication not included in policy   []90-day supply request   [x]Other - meds were just refilled on 9/1/2023 at [facility] in [facility].  The patient will be out of medication prior to next visit yadira Gonzales                      on 10/2/2023

## 2023-09-18 RX ORDER — GABAPENTIN 400 MG/1
1200 CAPSULE ORAL 3 TIMES DAILY
Qty: 270 CAPSULE | Refills: 0 | OUTPATIENT
Start: 2023-09-18

## 2023-09-18 RX ORDER — BUPROPION HYDROCHLORIDE 150 MG/1
150 TABLET ORAL EVERY MORNING
Qty: 30 TABLET | Refills: 1 | Status: SHIPPED | OUTPATIENT
Start: 2023-09-18 | End: 2023-10-02
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
## 2023-09-19 ENCOUNTER — TELEPHONE (OUTPATIENT)
Dept: PSYCHIATRY | Facility: CLINIC | Age: 53
End: 2023-09-19
Payer: COMMERCIAL

## 2023-09-19 NOTE — TELEPHONE ENCOUNTER
See mychart encounter from 9/15/23. Patient requested an early refill of Gabapentin and wellbutrin as she lost her prescriptions. Refill request sent to covering providers. Covering provider refilled wellbutrin but refused gabapentin.     Attempted to call patient to let her know. LVM to call the clinic back.      Will route to Janet Álvarez to address when she is back in the office.     ABISAI VELAZQUEZ RN on 9/19/2023 at 11:18 AM

## 2023-09-19 NOTE — TELEPHONE ENCOUNTER
"Incoming call from patient. RN explained that the Wellbutrin was approved but the Gabapentin refill was refused by the covering provider    Patient was upset. Stated \"I don't get it. Every other provider has approved my refill.\" \"I called my insurance company and they are ok with it.\" \"This has never happened to me before.\" \"I need my medication.\" RN attempted to explain that the refill request was likely not approved because it was early and the covering provider did not feel comfortable refilling a controlled substance early. RN informed patient that the refill request would be sent to Janet Álvarez to address when she returns on 9/21/23. Patient hung up the phone.     Routing to Janet Álvarez to address when she returns.     ABISAI VELAZQUEZ RN on 9/19/2023 at 11:41 AM    "

## 2023-09-19 NOTE — TELEPHONE ENCOUNTER
Reason for call:  Medication   If this is a refill request, has the caller requested the refill from the pharmacy already? Yes  Will the patient be using a [facility]? No  Name of the pharmacy and phone number for the current request: [facility] in [place]    Name of the medication requested: gabapentin    Other request: pt states she is out of this med    Phone number to reach patient:  Home number on file [phone] (home)    Best Time:  ASAP    Can we leave a detailed message on this number?  YES    Travel screening: Not Applicable
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
## 2023-09-22 ENCOUNTER — MYC MEDICAL ADVICE (OUTPATIENT)
Dept: PSYCHIATRY | Facility: CLINIC | Age: 53
End: 2023-09-22
Payer: COMMERCIAL

## 2023-09-22 DIAGNOSIS — F41.1 GENERALIZED ANXIETY DISORDER: ICD-10-CM

## 2023-09-22 DIAGNOSIS — F43.10 PTSD (POST-TRAUMATIC STRESS DISORDER): ICD-10-CM

## 2023-09-22 NOTE — TELEPHONE ENCOUNTER
Reason for call:  Medication   If this is a refill request, has the caller requested the refill from the pharmacy already? N/A  Will the patient be using a [facility]? No  Name of the pharmacy and phone number for the current request: [facility] in [place]     Name of the medication requested: Gapabatin 400mg     Other request: none    Phone number to reach patient:  Home number on file [phone] (home)    Best Time:  any     Can we leave a detailed message on this number?  YES    Travel screening: Not Applicable

## 2023-09-24 ENCOUNTER — MYC MEDICAL ADVICE (OUTPATIENT)
Dept: PSYCHIATRY | Facility: CLINIC | Age: 53
End: 2023-09-24
Payer: COMMERCIAL

## 2023-09-25 RX ORDER — GABAPENTIN 400 MG/1
1200 CAPSULE ORAL 3 TIMES DAILY
Qty: 270 CAPSULE | Refills: 0 | Status: SHIPPED | OUTPATIENT
Start: 2023-09-29 | End: 2023-10-23

## 2023-09-25 NOTE — TELEPHONE ENCOUNTER
I sent gabapentin prescription to be refilled on September 29 as the last fill was on September 1.  Sheyla

## 2023-09-26 NOTE — TELEPHONE ENCOUNTER
RN phoned patient and relayed Janet Álvarez's message.     ABISAI VELAZQUEZ RN on 9/26/2023 at 9:41 AM

## 2023-09-29 ASSESSMENT — ANXIETY QUESTIONNAIRES
1. FEELING NERVOUS, ANXIOUS, OR ON EDGE: NEARLY EVERY DAY
IF YOU CHECKED OFF ANY PROBLEMS ON THIS QUESTIONNAIRE, HOW DIFFICULT HAVE THESE PROBLEMS MADE IT FOR YOU TO DO YOUR WORK, TAKE CARE OF THINGS AT HOME, OR GET ALONG WITH OTHER PEOPLE: EXTREMELY DIFFICULT
GAD7 TOTAL SCORE: 19
4. TROUBLE RELAXING: NEARLY EVERY DAY
5. BEING SO RESTLESS THAT IT IS HARD TO SIT STILL: MORE THAN HALF THE DAYS
7. FEELING AFRAID AS IF SOMETHING AWFUL MIGHT HAPPEN: NEARLY EVERY DAY
6. BECOMING EASILY ANNOYED OR IRRITABLE: MORE THAN HALF THE DAYS
2. NOT BEING ABLE TO STOP OR CONTROL WORRYING: NEARLY EVERY DAY
3. WORRYING TOO MUCH ABOUT DIFFERENT THINGS: NEARLY EVERY DAY
GAD7 TOTAL SCORE: 19

## 2023-10-02 ENCOUNTER — VIRTUAL VISIT (OUTPATIENT)
Dept: PSYCHIATRY | Facility: CLINIC | Age: 53
End: 2023-10-02
Payer: COMMERCIAL

## 2023-10-02 ENCOUNTER — MYC MEDICAL ADVICE (OUTPATIENT)
Dept: PSYCHIATRY | Facility: CLINIC | Age: 53
End: 2023-10-02
Payer: COMMERCIAL

## 2023-10-02 DIAGNOSIS — F90.8 ATTENTION DEFICIT HYPERACTIVITY DISORDER (ADHD), OTHER TYPE: ICD-10-CM

## 2023-10-02 DIAGNOSIS — F31.12 BIPOLAR AFFECTIVE DISORDER, MANIC, MODERATE (H): ICD-10-CM

## 2023-10-02 DIAGNOSIS — F43.23 ADJUSTMENT DISORDER WITH MIXED ANXIETY AND DEPRESSED MOOD: ICD-10-CM

## 2023-10-02 DIAGNOSIS — F31.12 BIPOLAR AFFECTIVE DISORDER, MANIC, MODERATE (H): Primary | ICD-10-CM

## 2023-10-02 DIAGNOSIS — F41.1 GENERALIZED ANXIETY DISORDER: ICD-10-CM

## 2023-10-02 DIAGNOSIS — F43.10 PTSD (POST-TRAUMATIC STRESS DISORDER): ICD-10-CM

## 2023-10-02 PROCEDURE — 99214 OFFICE O/P EST MOD 30 MIN: CPT | Mod: VID | Performed by: NURSE PRACTITIONER

## 2023-10-02 RX ORDER — LORAZEPAM 0.5 MG/1
0.5 TABLET ORAL DAILY PRN
Qty: 15 TABLET | Refills: 0 | Status: SHIPPED | OUTPATIENT
Start: 2023-10-02 | End: 2023-10-03

## 2023-10-02 RX ORDER — METHYLPHENIDATE HYDROCHLORIDE 5 MG/1
5 TABLET ORAL DAILY
Qty: 15 TABLET | Refills: 0 | Status: SHIPPED | OUTPATIENT
Start: 2023-10-02 | End: 2023-10-03

## 2023-10-02 RX ORDER — BUPROPION HYDROCHLORIDE 300 MG/1
300 TABLET ORAL EVERY MORNING
Qty: 30 TABLET | Refills: 3 | Status: SHIPPED | OUTPATIENT
Start: 2023-10-02 | End: 2024-01-15

## 2023-10-02 RX ORDER — ZIPRASIDONE HYDROCHLORIDE 20 MG/1
20 CAPSULE ORAL
Qty: 30 CAPSULE | Refills: 1 | Status: SHIPPED | OUTPATIENT
Start: 2023-10-02 | End: 2023-10-31

## 2023-10-02 RX ORDER — BUPROPION HYDROCHLORIDE 150 MG/1
150 TABLET ORAL EVERY MORNING
Qty: 30 TABLET | Refills: 1 | Status: SHIPPED | OUTPATIENT
Start: 2023-10-02 | End: 2023-12-11

## 2023-10-02 ASSESSMENT — PATIENT HEALTH QUESTIONNAIRE - PHQ9
10. IF YOU CHECKED OFF ANY PROBLEMS, HOW DIFFICULT HAVE THESE PROBLEMS MADE IT FOR YOU TO DO YOUR WORK, TAKE CARE OF THINGS AT HOME, OR GET ALONG WITH OTHER PEOPLE: EXTREMELY DIFFICULT
SUM OF ALL RESPONSES TO PHQ QUESTIONS 1-9: 20
SUM OF ALL RESPONSES TO PHQ QUESTIONS 1-9: 20

## 2023-10-02 ASSESSMENT — PAIN SCALES - GENERAL: PAINLEVEL: SEVERE PAIN (6)

## 2023-10-02 NOTE — NURSING NOTE
Is the patient currently in the state of [place]? YES    Visit mode:VIDEO    If the visit is dropped, the patient can be reconnected by: VIDEO VISIT: Text to cell phone:   Telephone Information:   Mobile [phone]       Will anyone else be joining the visit? No  (If patient encounters technical issues they should call [phone])    How would you like to obtain your AVS? MyChart    Are changes needed to the allergy or medication list? No    Rooming Documentation: Assigned questionnaire(s) completed .    Reason for visit: RECHECK     WILNER Brennan        
Home

## 2023-10-02 NOTE — PROGRESS NOTES
"Virtual Visit Details    Type of service:  Video Visit   Video Start Time: 12:48 PM  Video End Time: 1:16 PM    Originating Location (pt. Location): Other [facility] room    Distant Location (provider location):  On-site  Platform used for Video Visit: [facility]           Outpatient Psychiatric Progress Note    Name: Magnolia Gonzáles   : 1970                    Primary Care Provider: Simran Barreto PA-C   Therapist: None    PHQ-9 scores:      2023     8:37 AM 2023    11:03 AM 6/15/2023     8:06 AM   PHQ-9 SCORE   PHQ-9 Total Score MyChart 21 (Severe depression) 23 (Severe depression)    PHQ-9 Total Score 21 23 22       JULIEN-7 scores:      2023    11:05 AM 6/15/2023     8:06 AM 2023    10:29 AM   JULIEN-7 SCORE   Total Score 21 (severe anxiety)  19 (severe anxiety)   Total Score 21 20 19       Patient Identification:    Patient is a 53 year old year old, single  White Choose not to answer female  who presents for return visit with me.  Patient is currently unemployed. Patient attended the session alone. Patient prefers to be called: \" Maryjane\".    Current medications include: betamethasone valerate (VALISONE) 0.1 % external ointment, Apply topically 2 times daily  buPROPion (WELLBUTRIN XL) 150 MG 24 hr tablet, Take 1 tablet (150 mg) by mouth every morning With 300 mg for a total of 450 mg daily  buPROPion (WELLBUTRIN XL) 300 MG 24 hr tablet, Take 1 tablet (300 mg) by mouth every morning  gabapentin (NEURONTIN) 400 MG capsule, Take 3 capsules (1,200 mg) by mouth 3 times daily  hydrocortisone (CORTAID) 1 % external ointment, Apply topically 2 times daily Use on face and around the eyes - DO NOT get in the eye  levothyroxine (SYNTHROID/LEVOTHROID) 100 MCG tablet, Take 1 tablet (100 mcg) by mouth daily  LORazepam (ATIVAN) 0.5 MG tablet, Take 1 tablet (0.5 mg) by mouth daily as needed for anxiety  meclizine (ANTIVERT) 25 MG tablet, Take 1 tablet (25 mg) by mouth daily as needed for dizziness or " nausea  OLANZapine (ZYPREXA) 5 MG tablet, Take 1 tablet (5 mg) by mouth At Bedtime  omeprazole (PRILOSEC) 40 MG DR capsule, Take 1 capsule (40 mg) by mouth daily  ondansetron (ZOFRAN ODT) 4 MG ODT tab, Take 1 tablet (4 mg) by mouth every 8 hours as needed for nausea    No current facility-administered medications on file prior to visit.       The [place] Prescription Monitoring Program has been reviewed and there are no concerns about diversionary activity for controlled substances at this time.      I was able to review most recent Primary Care Provider, specialty provider, and therapy visit notes that I have access to.     Today, patient reports she recently got accepted into an apartment and is requesting an emotional support animal letter for her dog to be able to live with her.  A  is working with her to get housing.  She has been homeless and her credit scores went down.  .  [place]  - [place] .  She is now in a motel.  With her dog.  She cries easily and is so sad all the time.  Her anxiety is so high.  She has been on edge.  Parents  and no children.  Her parents urns and her  great aunts urn have been stolen.  She is hallucinating - seeing dark shadow people,horses, deer, angels, dragons.  Coming out of the mirror.  2 hovering around  her -she tells me she has been working with God to talk to them.  Abilify gives her tics.  Ativan does not help -      has a past medical history of Anxiety, Depressive disorder, Hypothyroid, and Suicidal ideation (2015).    Social history updates:    Maryjane is currently staying in a hotel with her dog.      Substance use updates:    No alcohol use reported  Tobacco use: No    Vital Signs:   There were no vitals taken for this visit.    Labs:    Most recent laboratory results reviewed and no new labs.     Mental Status Examination:  Appearance: awake, alert, casual dress,  and mild distress  Attitude: cooperative  Eye Contact:  adequate  Gait and Station: No dizziness or falls  Psychomotor Behavior:  fidgeting  Oriented to:  time, person, and place  Attention Span and Concentration:  Normal  Speech:   vtspeech: clear, coherent and Speaks: English  Mood:  anxious, depressed, and labile  Affect:  mood congruent  Associations:  no loose associations  Thought Process:  goal oriented  Thought Content:  no evidence of suicidal ideation or homicidal ideation, no auditory hallucinations present, and visual hallucinations present  Recent and Remote Memory:  intact Not formally assessed. No amnesia.  Fund of Knowledge: appropriate  Insight:  good  Judgment: fair  Impulse Control:  fair    Suicide Risk Assessment:  Today Magnolia Gonzáles reports no thoughts to harm themself or others. In addition, there are notable risk factors for self-harm, including anxiety, wrecklessness, and mood change. However, risk is mitigated by history of seeking help when needed, future oriented, denies suicidal intent or plan, and denies homicidal ideation, intent, or plan. Therefore, based on all available evidence including the factors cited above, Magnolia Gonzáles does not appear to be at imminent risk for self-harm, does not meet criteria for a 72-hr hold, and therefore remains appropriate for ongoing outpatient level of care.  A thorough assessment of risk factors related to suicide and self-harm have been reviewed and are noted above. The patient convincingly denies suicidality on several occasions. Local community safety resources printed and reviewed for patient to use if needed. There was no deceit detected, and the patient presented in a manner that was believable.     DSM5 Diagnosis:  Attention-Deficit/Hyperactivity Disorder  314.01 (F90.8) Other Specified Attention-Deficit / Hyperactiity Disorder  296.41 Bipolar I Disorder Current or Most Recent Episode Manic, Mild   300.02 (F41.1)  Generalized Anxiety Disorder  309.81 (F43.10) Posttraumatic Stress Disorder (includes Posttraumatic Stress Disorder for Children 6 Years and Younger)  Without dissociative symptoms    Medical comorbidities include:   Patient Active Problem List    Diagnosis Date Noted    Generalized anxiety disorder 07/29/2020     Priority: Medium    Major depressive disorder, recurrent episode, mild (H24) 07/19/2017     Priority: Medium    PTSD (post-traumatic stress disorder) 07/19/2017     Priority: Medium    Bipolar affective disorder, manic, moderate (H) 09/23/2016     Priority: Medium     Patient refutes this. 10/5/2020        Overdose of Valium, intentional self-harm, subsequent encounter 05/29/2016     Priority: Medium    Carpal tunnel syndrome of left wrist 01/13/2016     Priority: Medium    Adjustment disorder with mixed anxiety and depressed mood 01/06/2016     Priority: Medium    Acquired hypothyroidism 01/06/2016     Priority: Medium    Bilateral carpal tunnel syndrome 01/06/2016     Priority: Medium    Social phobia 12/26/2014     Priority: Medium    Panic disorder without agoraphobia 12/26/2014     Priority: Medium    History of alcohol dependence (H) 07/22/2014     Priority: Medium     Overview:   Binge drinking disorder. Sober since 5/2016        Nondependent alcohol abuse, episodic drinking behavior 11/17/2005     Priority: Medium       Assessment:    Magnolia Gonzáles recently found out she will be moving into an apartment in a few weeks.  This has decreased her stress despite continued anxiety over her future.  Motional support animal letter was written for her to take to her new place of residence.  She is scattered and disorganized at this point and I added a very low dose of methylphenidate in the morning to help with that as well as depression symptoms.  With regards to mood dysregulation well as concerns for visual hallucinations, I added ziprasidone 20 mg in the evening with food.  Wellbutrin will  continue for her depression.  Gabapentin will continue to manage anxiety.  Lorazepam is to be taken sparingly for extreme panic symptoms.  Olanzapine has been discontinued as she is concerned about gaining weight when taking that and feeling sluggish.  Many of her symptoms seem to be stemming from meeting her survival needs.  Once she is stable in her own apartment we can review her medication list again to decrease some of the polypharmacy that is going on right now..    Medication side effects and alternatives were reviewed. Health promotion activities recommended and reviewed today. All questions addressed. Education and counseling completed regarding risks and benefits of medications and psychotherapy options.    Treatment Plan:      1.  Emotional support animal letter completed and sent to your housing stabilization   2.  Add methylphenidate 5 mg daily in the morning  3.  Add ziprasidone 20 mg in the evening with food  4.  Continue Wellbutrin  mg daily  5.  Continue gabapentin 1200 mg 3 times daily  6.  Continue lorazepam 0.5 mg daily as needed for breakthrough anxiety-take sparingly  7.  Olanzapine discontinued  8.  Talk therapy, when able to, for learning skills to manage life stressors and adjustments    Continue all other medications as reviewed per electronic medical record today.   Safety plan reviewed. To the Emergency Department as needed or call after hours crisis line at [phone] or [phone]. [place] Crisis [place]. Text [place] to 337922 or Suicide LifeLine Chat: suicidepreventionlifeline.org/chat/  To schedule individual or family therapy, call [facility] at [phone]  Schedule an appointment with me in December or sooner as needed. Call [facility] at [phone] to schedule.  Follow up with primary care provider as planned or for acute medical concerns.  Call the psychiatric nurse line with medication questions or concerns at  [phone]  Mobile [employer] may be used to communicate with your provider, but this is not intended to be used for emergencies.    Crisis Resources:    National Suicide Prevention Lifeline: 103.184.2507 (TTY: 589.342.3347). Call anytime for help.  (www.suicidepreventionlifeline.org)  National [place] on Mental Illness (www.anne marie.org): 873.646.4458 or 099-914-2959.   Mental Health Association (www.mentalhealth.org): 338.621.7945 or 564-119-8846.  [place] Crisis [place]: Text [place] to 582412  Suicide LifeLine Chat: suicideAkonni [employer].org/chat    Administrative Billing:   Time spent with patient includes counseling and coordination of care regarding above diagnoses and treatment plan.    Patient Status:  This is a continuous care patient and medications will be prescribed by the psychiatric provider until further indicated.    Signed:   HERMELINDA Vickers-BC   Psychiatry     Answers submitted by the patient for this visit:  JULIEN-7 (Submitted on 9/29/2023)  JULIEN 7 TOTAL SCORE: 19

## 2023-10-02 NOTE — LETTER
2023    Re:  Magnolia Gonzáles (:  1970)  To whom it may concern,   Ms. Gonzáles is a patient who has been under my care since May, 2022.  She carries diagnoses including PTSD, bipolar disorder, and anxiety. I am familiar with her history and with the functional limitations imposed by her mental health and emotional related diagnoses.  Due to this emotional disability, she has certain limitations of coping.  To help alleviate these challenges and to enhance her day-to-day functionality, I have prescribed her to obtain an emotional support animal.  The presence of this animal is necessary for the emotional and mental health of Maryjane because its presence will likely mitigate the symptoms that she continues to experience.  I have not evaluated the animal in question, only the potential benefits that Maryjane may derive from them. Should you have any additional questions, please do not hesitate to contact me.    Sincerely,    Janet Álvarez NP  Psychiatric Mental Health Nurse Practitioner - Board Certified

## 2023-10-02 NOTE — PATIENT INSTRUCTIONS
"Patient Education   The Panel Psychiatry Program  What to Expect  Here's what to expect in the Panel Psychiatry Program.   About the program  You'll be meeting with a psychiatric doctor to check your mental health. A psychiatric doctor helps you deal with troubling thoughts and feelings by giving you medicine. They'll make sure you know the plan for your care. You may see them for a long time. When you're feeling better, they may refer you back to seeing your family doctor.   If you have any questions, we'll be glad to talk to you.  About visits  Be open  At your visits, please talk openly about your problems. It may feel hard, but it's the best way for us to help you.  Cancelling visits  If you can't come to your visit, please call us right away at [phone]. If you don't cancel at least 24 hours (1 full day) before your visit, that's \"late cancellation.\"  Not showing up for your visits  Being very late is the same as not showing up. You'll be a \"no show\" if:  You're more than 15 minutes late for a 30-minute (half hour) visit.  You're more than 30 minutes late for a 60-minute (full hour) visit.  If you cancel late or don't show up 2 times within 6 months, we may end your care.  Getting help between visits  If you need help between visits, you can call us Monday to Friday from 8 a.m. to 4:30 p.m. at [phone].  Emergency care  Call 911 or go to the nearest emergency department if your life or someone else's life is in danger.  Call 988 anytime to reach the national Suicide and Crisis hotline.  Medicine refills  To refill your medicine, call your pharmacy. You can also call [facility] at [phone], Monday to Friday, 8 a.m. to 4:30 p.m. It can take 1 to 3 business days to get a refill.   Forms, letters, and tests  You may have papers to fill out, like FMLA, short-term disability, and workability. We can help you with these forms at your visits, but you must have an " appointment. You may need more than 1 visit for this, to be in an intensive therapy program, or both.  Before we can give you medicine for ADHD, we may refer you to get tested for it or confirm it another way.  We may not be able to give you an emotional support animal letter.  We don't do mental health checks ordered by the court.   We don't do mental health testing, but we can refer you to get tested.   Thank you for choosing us for your care.  For informational purposes only. Not to replace the advice of your health care provider. Copyright   2022 [employer]. All rights reserved. [employer] 052576 - 12/22.         Treatment Plan:      1.  Emotional support animal letter completed and sent to your housing stabilization   2.  Add methylphenidate 5 mg daily in the morning  3.  Add ziprasidone 20 mg in the evening with food  4.  Continue Wellbutrin  mg daily  5.  Continue gabapentin 1200 mg 3 times daily  6.  Continue lorazepam 0.5 mg daily as needed for breakthrough anxiety-take sparingly  7.  Olanzapine discontinued  8.  Talk therapy, when able to, for learning skills to manage life stressors and adjustments    Continue all other medications as reviewed per electronic medical record today.   Safety plan reviewed. To the Emergency Department as needed or call after hours crisis line at [phone] or [phone]. [place] Crisis Text [place]. Text [place] to 494317 or Suicide LifeLine Chat: suicidepreventionlifeline.org/chat/  To schedule individual or family therapy, call [facility] at [phone]  Schedule an appointment with me in December or sooner as needed. Call [facility] at [phone] to schedule.  Follow up with primary care provider as planned or for acute medical concerns.  Call the psychiatric nurse line with medication questions or concerns at [phone]  MyChart may be used to communicate with your provider, but this is not  intended to be used for emergencies.    Crisis Resources:    National Suicide Prevention Lifeline: 893.299.9814 (TTY: 121.974.5164). Call anytime for help.  (www.suicidepreventionlifeline.org)  National [place] on Mental Illness (www.anne marie.org): 621.507.4507 or 637-189-7603.   Mental Health Association (www.mentalhealth.org): 446.599.4281 or 208-570-8364.  [place] Crisis [place]: Text [place] to 246682  Suicide LifeLine Chat: suicidepreventionlifeline.org/chat

## 2023-10-03 RX ORDER — LORAZEPAM 0.5 MG/1
0.5 TABLET ORAL DAILY PRN
Qty: 15 TABLET | Refills: 0 | Status: SHIPPED | OUTPATIENT
Start: 2023-10-03 | End: 2023-10-17

## 2023-10-03 RX ORDER — METHYLPHENIDATE HYDROCHLORIDE 5 MG/1
5 TABLET ORAL DAILY
Qty: 15 TABLET | Refills: 0 | Status: SHIPPED | OUTPATIENT
Start: 2023-10-03 | End: 2023-10-17

## 2023-10-03 NOTE — TELEPHONE ENCOUNTER
The patient called back.and and spoke to RN.    The patient does want the Lorazepam and Methylphenidate sent to [facility] and cancelled at [facility].    She does want to leave the Bupropion prescriptions  at [facility] pharmacy.    RN called ,[facility] pharmacy and cancelled the Lorazepam and Methylphenidate prescriptions.     RN pended Lorazepam and Methylphenidate to [facility] pharmacy in [place] for provider review.    Vani Simeon RN on 10/3/2023 at 12:22 PM

## 2023-10-03 NOTE — TELEPHONE ENCOUNTER
Patient requesting refills for Lorazepam, Methylphenidate and Ziprasidone be sent to [facility] pharmacy in [place] as the [facility] is out.    RN did call [facility] pharmacy and found out the following information.    The Lorazepam has been filled and is ready for pick-up.  The Bupropion 150 mg dose cannot be filled until 10/20/23  The Bupropion 300 mg dose cannot be filled until 10/14/2023.  The Ziprasidone was already transferred to [facility] pharmacy yesterday  The Methylphenidate is on back order, they are expecting a shipment today but it has not arrived yet and they are not sure if they will receive it or not.     RN did attempt to call the patient to discuss but she did not answer and RN left a voicemail for a call back at [phone].    RN sent a [employer] message to the patient to get further clarification on:  -if she wants the Lorazepam cancelled at [facility] and sent to [facility]  - if she wants to wait to see  if [facility] gets the Methylphenidate in or if it should be cancelled and sent [facility].    Vani Simeon RN on 10/3/2023 at 11:59 AM

## 2023-10-17 DIAGNOSIS — F31.12 BIPOLAR AFFECTIVE DISORDER, MANIC, MODERATE (H): ICD-10-CM

## 2023-10-17 DIAGNOSIS — F90.8 ATTENTION DEFICIT HYPERACTIVITY DISORDER (ADHD), OTHER TYPE: ICD-10-CM

## 2023-10-17 RX ORDER — METHYLPHENIDATE HYDROCHLORIDE 5 MG/1
5 TABLET ORAL DAILY
Qty: 15 TABLET | Refills: 0 | Status: SHIPPED | OUTPATIENT
Start: 2023-10-17 | End: 2023-10-18

## 2023-10-17 RX ORDER — LORAZEPAM 0.5 MG/1
0.5 TABLET ORAL DAILY PRN
Qty: 15 TABLET | Refills: 0 | Status: SHIPPED | OUTPATIENT
Start: 2023-10-17 | End: 2023-10-18

## 2023-10-17 NOTE — TELEPHONE ENCOUNTER
Date of Last Office Visit: 10/2/2023  Date of Next Office Visit: 12/01/2023  No shows since last visit: none  Cancellations since last visit: none    Medication requested: LORazepam (ATIVAN) 0.5 MG tablet  Date last ordered: 10/3/23 Qty: 15 Refills: 0     Medication requested: methylphenidate (RITALIN) 5 MG tablet  Date last ordered: 10/3/23 Qty: 15 Refills: 0    [place] of [place] ?: Yes  Medication last sold date: 10/6/23 for both medications  Qty filled: 15  Other [place] substance on [place] ?: Yes  If yes, is this a new medication?: No  If yes, name of medication: N/A and date filled: N/A    Lapse in medication adherence greater than 5 days?: No  If yes, call patient and gather details: N/A  Medication refill request verified as identical to current order?: yes  Result of Last DAM, VPA, Li+ Level, CBC, or Carbamazepine Level (at or since last visit): N/A    Last visit treatment plan:   Assessment:   Magnolia Gonzáles recently found out she will be moving into an apartment in a few weeks.  This has decreased her stress despite continued anxiety over her future.  Motional support animal letter was written for her to take to her new place of residence.  She is scattered and disorganized at this point and I added a very low dose of methylphenidate in the morning to help with that as well as depression symptoms.  With regards to mood dysregulation well as concerns for visual hallucinations, I added ziprasidone 20 mg in the evening with food.  Wellbutrin will continue for her depression.  Gabapentin will continue to manage anxiety.  Lorazepam is to be taken sparingly for extreme panic symptoms.  Olanzapine has been discontinued as she is concerned about gaining weight when taking that and feeling sluggish.  Many of her symptoms seem to be stemming from meeting her survival needs.  Once she is stable in her own apartment we can review her medication list again to decrease some of the polypharmacy that is going on  right now..     Medication side effects and alternatives were reviewed. Health promotion activities recommended and reviewed today. All questions addressed. Education and counseling completed regarding risks and benefits of medications and psychotherapy options.     Treatment Plan:  1.  Emotional support animal letter completed and sent to your housing stabilization   2.  Add methylphenidate 5 mg daily in the morning  3.  Add ziprasidone 20 mg in the evening with food  4.  Continue Wellbutrin  mg daily  5.  Continue gabapentin 1200 mg 3 times daily  6.  Continue lorazepam 0.5 mg daily as needed for breakthrough anxiety-take sparingly  7.  Olanzapine discontinued  8.  Talk therapy, when able to, for learning skills to manage life stressors and adjustments  Continue all other medications as reviewed per electronic medical record today.   Safety plan reviewed. To the Emergency Department as needed or call after hours crisis line at [phone] or [phone]. [place] Crisis Text [place]. Text [place] to 577498 or Suicide LifeLine Chat: suicidepreventionComticaline.org/chat/  To schedule individual or family therapy, call [facility] at [phone]  Schedule an appointment with me in December or sooner as needed. Call [facility] at [phone] to schedule.  Follow up with primary care provider as planned or for acute medical concerns.  Call the psychiatric nurse line with medication questions or concerns at [phone]  MyChart may be used to communicate with your provider, but this is not intended to be used for emergencies.    []Medication refilled per  Medication Refill in Ambulatory Care  policy.  [x]Medication unable to be refilled by RN due to criteria not met as indicated below:    []Eligibility - not seen in the last year   []Supervision - no future appointment   []Compliance - no shows, cancellations or lapse in therapy   []Verification - order  discrepancy   [x]Controlled medication   []Medication not included in policy   []90-day supply request   [x]Other - Patient requests more than a 15 day supply d/t hardship with being able to pick the prescriptions up at the pharmacy .    RN pended refills for provider review but made no changes to the prescriptions in terms of quantity..  Vani Simeon RN on 10/17/2023 at 10:09 AM

## 2023-10-18 DIAGNOSIS — F90.8 ATTENTION DEFICIT HYPERACTIVITY DISORDER (ADHD), OTHER TYPE: ICD-10-CM

## 2023-10-18 DIAGNOSIS — F31.12 BIPOLAR AFFECTIVE DISORDER, MANIC, MODERATE (H): ICD-10-CM

## 2023-10-18 NOTE — TELEPHONE ENCOUNTER
Patient called and said she had wanted her refills of the Lorazepam and methylphenidate 5 mg sent to the [facility] not [facility].     LORazepam (ATIVAN) 0.5 MG tablet 15 tablet 0 10/17/2023  No   Sig - Route: Take 1 tablet (0.5 mg) by mouth daily as needed for anxiety - Oral     methylphenidate (RITALIN) 5 MG tablet 15 tablet 0 10/17/2023  No   Sig - Route: Take 1 tablet (5 mg) by mouth daily       She is also wondering why the order is only for 15 tabs? Can she go back to 30 tabs so she doesn't have to keep refilling?   Vickey'd up for #30 tabs feel free to change.    Ijeoma Miller RN on 10/18/2023 at 12:23 PM

## 2023-10-18 NOTE — TELEPHONE ENCOUNTER
Reason for call:  Other   Patient called regarding (reason for call): prescription  Additional comments: Patient reports the prescriptions for lorazepam and methylphenidate were sent to the wrong pharmacy. They need to be sent to [facility] in [place]. Patient also wants to know why she is getting prescriptions for 15 instead of 30. Patient reports due to this she is out. Sent high priority.    Phone number to reach patient:  Home number on file [phone] (home)    Best Time:  ASAP    Can we leave a detailed message on this number?  YES    Travel screening: Not Applicable

## 2023-10-19 ENCOUNTER — TELEPHONE (OUTPATIENT)
Dept: PSYCHIATRY | Facility: CLINIC | Age: 53
End: 2023-10-19

## 2023-10-19 NOTE — TELEPHONE ENCOUNTER
RN received a message from the [facility] Coordinators indicating that this patient had called and was going to  her LORazepam (ATIVAN) 0.5 MG tablet  and methylphenidate (RITALIN) 5 MG tablet  prescriptions at [address]  She does not want the prescriptions to go to [facility] in [facility].       RN forwarded this message to Janet Álvarez CNP for her awareness.    RN phoned the patient and LVM indicating that RN would make Janet Álvarez aware of her request.    RN noted that these prescriptions had been sent 10/17/23 to the   [address]   as requested by the patient.   4.  RN LVM instructing the patient that the prescriptions in question were sent to her [facility] as requested on 10/17/23 and to please call the clinic at [phone] if she had any other questions or concerns.      Bony Kraus RN on 10/19/2023 at 11:21 AM

## 2023-10-19 NOTE — TELEPHONE ENCOUNTER
Reason for call:  Medication      Patient called regarding (reason for call): Received call from the patient stated her medication of Lorazepam and Methylphenidate was sent to [facility]; and she will pick them up there. She stated she does not want the medication sent [facility] in [place].    Phone number to reach patient:  Home number on file [phone] (home)    Best Time:  Anytime    Can we leave a detailed message on this number?  YES

## 2023-10-20 RX ORDER — LORAZEPAM 0.5 MG/1
0.5 TABLET ORAL DAILY PRN
Qty: 30 TABLET | Refills: 0 | Status: SHIPPED | OUTPATIENT
Start: 2023-10-20 | End: 2023-11-21

## 2023-10-20 RX ORDER — METHYLPHENIDATE HYDROCHLORIDE 5 MG/1
5 TABLET ORAL DAILY
Qty: 30 TABLET | Refills: 0 | Status: SHIPPED | OUTPATIENT
Start: 2023-10-20 | End: 2023-12-08

## 2023-10-20 NOTE — TELEPHONE ENCOUNTER
Returned call to patient from provider, Janet Álvarez NP, that refills had been sent to [facility] and no changes were made. Patient expressed gratitude and apologized for having to change pharmacy. Reassured patient that if there are any future changes, just let us know.

## 2023-10-23 ENCOUNTER — MYC REFILL (OUTPATIENT)
Dept: PSYCHIATRY | Facility: CLINIC | Age: 53
End: 2023-10-23
Payer: COMMERCIAL

## 2023-10-23 DIAGNOSIS — F41.1 GENERALIZED ANXIETY DISORDER: ICD-10-CM

## 2023-10-23 DIAGNOSIS — F43.10 PTSD (POST-TRAUMATIC STRESS DISORDER): ICD-10-CM

## 2023-10-24 RX ORDER — GABAPENTIN 400 MG/1
1200 CAPSULE ORAL 3 TIMES DAILY
Qty: 270 CAPSULE | Refills: 1 | Status: SHIPPED | OUTPATIENT
Start: 2023-10-24 | End: 2023-12-17

## 2023-10-24 NOTE — TELEPHONE ENCOUNTER
Date of Last Office Visit: 10/2/23  Date of Next Office Visit: 12/1/23  No shows since last visit: 0  Cancellations since last visit: 0    Medication requested: gabapentin (NEURONTIN) 400 MG capsule  Date last ordered: 9/29/23 Qty: 270 Refills: 0     [place] of [place] ?: Yes  Medication last sold date: 9/27/23 Qty filled: 270  Other [place] ?: [place]  If yes, is this a new medication?: No    Lapse in medication adherence greater than 5 days?: No  If yes, call patient and gather details: na  Medication refill request verified as identical to current order?: Yes  Result of Last DAM, VPA, Li+ Level, CBC, or Carbamazepine Level (at or since last visit): N/A    Last visit treatment plan:   1.  Emotional support animal letter completed and sent to your housing stabilization   2.  Add methylphenidate 5 mg daily in the morning  3.  Add ziprasidone 20 mg in the evening with food  4.  Continue Wellbutrin  mg daily  5.  Continue gabapentin 1200 mg 3 times daily  6.  Continue lorazepam 0.5 mg daily as needed for breakthrough anxiety-take sparingly  7.  Olanzapine discontinued  8.  Talk therapy, when able to, for learning skills to manage life stressors and adjustments    [x]Medication refilled per  Medication Refill in Ambulatory Care  policy.  []Medication unable to be refilled by RN due to criteria not met as indicated below:    []Eligibility - not seen in the last year   []Supervision - no future appointment   []Compliance - no shows, cancellations or lapse in therapy   []Verification - order discrepancy   []Controlled medication   []Medication not included in policy   []90-day supply request   []Other

## 2023-10-31 ENCOUNTER — MYC REFILL (OUTPATIENT)
Dept: PSYCHIATRY | Facility: CLINIC | Age: 53
End: 2023-10-31
Payer: COMMERCIAL

## 2023-10-31 DIAGNOSIS — F31.12 BIPOLAR AFFECTIVE DISORDER, MANIC, MODERATE (H): ICD-10-CM

## 2023-10-31 NOTE — CONFIDENTIAL NOTE
Please see MyChart correspondence.    Pt has been taking Geodon 20 mg once daily for 1 month now. Pt reports that she hasn't noticed any type of benefit from taking this medication thus far. Pt also denies any side effects.    Pt has a refill on file for Geodon 20 mg capsule once daily but is inquiring about a possible dose increase prior to her follow up appointment on December 1st.     Routing to Janet Álvarez NP, for consideration.

## 2023-11-02 RX ORDER — ZIPRASIDONE HYDROCHLORIDE 40 MG/1
40 CAPSULE ORAL
Qty: 30 CAPSULE | Refills: 0 | Status: SHIPPED | OUTPATIENT
Start: 2023-11-02 | End: 2023-12-04

## 2023-11-21 ENCOUNTER — MYC REFILL (OUTPATIENT)
Dept: PSYCHIATRY | Facility: CLINIC | Age: 53
End: 2023-11-21
Payer: COMMERCIAL

## 2023-11-21 DIAGNOSIS — F31.12 BIPOLAR AFFECTIVE DISORDER, MANIC, MODERATE (H): ICD-10-CM

## 2023-11-21 RX ORDER — LORAZEPAM 0.5 MG/1
0.5 TABLET ORAL DAILY PRN
Qty: 30 TABLET | Refills: 1 | Status: SHIPPED | OUTPATIENT
Start: 2023-11-21 | End: 2024-01-26

## 2023-11-21 NOTE — TELEPHONE ENCOUNTER
Date of Last Office Visit: 10/02/2023  Date of Next Office Visit: 12/01/2023  No shows since last visit: 0  Cancellations since last visit: 0    Medication requested: LORazepam (ATIVAN) 0.5 MG tablet  Date last ordered: 10/20/2023 Qty: 30 Refills: 0     [place] of [place] ?: Not a delegate for this provider.  Lapse in medication adherence greater than 5 days?: NO  Medication refill request verified as identical to current order?: yes  Result of Last DAM, VPA, Li+ Level, CBC, or Carbamazepine Level (at or since last visit): N/A    Last visit treatment plan:   Return in about 2 months (around 12/2/2023) for Medication followup.    Add methylphenidate 5 mg daily in the morning   Add ziprasidone 20 mg in the evening with food   Continue Wellbutrin  mg daily   Continue gabapentin 1200 mg 3 times daily   Continue lorazepam 0.5 mg daily as needed for breakthrough anxiety-take sparingly   Olanzapine discontinued      []Medication refilled per  Medication Refill in Ambulatory Care  policy.  []Medication unable to be refilled by RN due to criteria not met as indicated below:    []Eligibility - not seen in the last year   []Supervision - no future appointment   []Compliance - no shows, cancellations or lapse in therapy   []Verification - order discrepancy   []Controlled medication   [x]Medication not included in policy   []90-day supply request   [x]Other

## 2023-12-10 ENCOUNTER — HEALTH MAINTENANCE LETTER (OUTPATIENT)
Age: 53
End: 2023-12-10

## 2023-12-11 ENCOUNTER — MYC REFILL (OUTPATIENT)
Dept: PSYCHIATRY | Facility: CLINIC | Age: 53
End: 2023-12-11
Payer: COMMERCIAL

## 2023-12-11 DIAGNOSIS — F31.12 BIPOLAR AFFECTIVE DISORDER, MANIC, MODERATE (H): ICD-10-CM

## 2023-12-11 RX ORDER — BUPROPION HYDROCHLORIDE 150 MG/1
150 TABLET ORAL EVERY MORNING
Qty: 30 TABLET | Refills: 1 | Status: SHIPPED | OUTPATIENT
Start: 2023-12-11 | End: 2024-01-29

## 2023-12-11 NOTE — TELEPHONE ENCOUNTER
Date of Last Office Visit: 10/02/2023  Date of Next Office Visit: 01/16/2024  No shows since last visit: 1  Cancellations since last visit: 0    Medication requested: buPROPion (WELLBUTRIN XL) 150 MG 24 hr tablet  Date last ordered: 10/02/2023 Qty: 30 Refills: 1     [place] of [place] ?: N/A  Lapse in medication adherence greater than 5 days?: no  Medication refill request verified as identical to current order?: yes  Result of Last DAM, VPA, Li+ Level, CBC, or Carbamazepine Level (at or since last visit): N/A    Last visit treatment plan:   Return in about 2 months (around 12/2/2023) for Medication followup.     1.  Emotional support animal letter completed and sent to your housing stabilization   2.  Add methylphenidate 5 mg daily in the morning  3.  Add ziprasidone 20 mg in the evening with food  4.  Continue Wellbutrin  mg daily  5.  Continue gabapentin 1200 mg 3 times daily  6.  Continue lorazepam 0.5 mg daily as needed for breakthrough anxiety-take sparingly  7.  Olanzapine discontinued    []Medication refilled per  Medication Refill in Ambulatory Care  policy.  []Medication unable to be refilled by RN due to criteria not met as indicated below:    []Eligibility - not seen in the last year   []Supervision - no future appointment   [x]Compliance - no shows, cancellations or lapse in therapy   []Verification - order discrepancy   []Controlled medication   [x]Medication not included in policy   []90-day supply request   [x]Other

## 2023-12-14 ENCOUNTER — TELEPHONE (OUTPATIENT)
Dept: FAMILY MEDICINE | Facility: CLINIC | Age: 53
End: 2023-12-14
Payer: COMMERCIAL

## 2023-12-14 NOTE — TELEPHONE ENCOUNTER
Patient Quality Outreach    Patient is due for the following:   Colon Cancer Screening  Breast Cancer Screening - Mammogram  Physical Preventive Adult Physical      Topic Date Due    Hepatitis B Vaccine (1 of 3 - 3-dose series) Never done    Flu Vaccine (1) 09/01/2023    COVID-19 Vaccine (3 - 2023-24 season) 09/01/2023       Next Steps:   Schedule a Adult Preventative    Type of outreach:    Phone, left message for patient/parent to call back.      Questions for provider review:               Aleida Adamson CMA

## 2023-12-17 ENCOUNTER — MYC REFILL (OUTPATIENT)
Dept: PSYCHIATRY | Facility: CLINIC | Age: 53
End: 2023-12-17
Payer: COMMERCIAL

## 2023-12-17 DIAGNOSIS — F43.10 PTSD (POST-TRAUMATIC STRESS DISORDER): ICD-10-CM

## 2023-12-17 DIAGNOSIS — F41.1 GENERALIZED ANXIETY DISORDER: ICD-10-CM

## 2023-12-19 RX ORDER — GABAPENTIN 400 MG/1
1200 CAPSULE ORAL 3 TIMES DAILY
Qty: 270 CAPSULE | Refills: 1 | Status: SHIPPED | OUTPATIENT
Start: 2023-12-19 | End: 2024-01-29

## 2023-12-19 NOTE — TELEPHONE ENCOUNTER
Date of Last Office Visit: 10/2/23  Date of Next Office Visit: 1/16/24  No shows since last visit: 1  Cancellations since last visit: 0    Medication requested: gabapentin (NEURONTIN) 400 MG capsule  Date last ordered: 10/24/23 Qty: 270 Refills: 1     [place] ?: yes  Medication last sold date: 11/21/23 Qty filled: 270  Other [place] ?: yes  If yes, is this a new medication?: no  If yes, name of medication: na and date filled: na    Lapse in medication adherence greater than 5 days?: no  If yes, call patient and gather details: NA  Medication refill request verified as identical to current order?: yes  Result of Last DAM, VPA, Li+ Level, CBC, or Carbamazepine Level (at or since last visit): N/A    Last visit treatment plan:     Treatment Plan:        1.  Emotional support animal letter completed and sent to your housing stabilization   2.  Add methylphenidate 5 mg daily in the morning  3.  Add ziprasidone 20 mg in the evening with food  4.  Continue Wellbutrin  mg daily  5.  Continue gabapentin 1200 mg 3 times daily  6.  Continue lorazepam 0.5 mg daily as needed for breakthrough anxiety-take sparingly  7.  Olanzapine discontinued  8.  Talk therapy, when able to, for learning skills to manage life stressors and adjustments     Continue all other medications as reviewed per electronic medical record today.   Safety plan reviewed. To the Emergency Department as needed or call after hours crisis line at [phone] or [phone]. [place] Crisis Text Line. Text [place] to 538185 or Suicide LifeLine Chat: suicidepreventionlifeline.org/chat/  To schedule individual or family therapy, call [facility] at [phone]  Schedule an appointment with me in December or sooner as needed. Call [facility] at [phone] to schedule.    []Medication refilled per  Medication Refill in Ambulatory Care  policy.  [x]Medication unable to be refilled by  RN due to criteria not met as indicated below:    []Eligibility - not seen in the last year   []Supervision - no future appointment   []Compliance - no shows, cancellations or lapse in therapy   []Verification - order discrepancy   [x]Controlled medication   []Medication not included in policy   []90-day supply request   []Other

## 2023-12-22 ENCOUNTER — OFFICE VISIT (OUTPATIENT)
Dept: FAMILY MEDICINE | Facility: CLINIC | Age: 53
End: 2023-12-22
Payer: COMMERCIAL

## 2023-12-22 VITALS
WEIGHT: 159.8 LBS | TEMPERATURE: 98.1 F | HEIGHT: 63 IN | DIASTOLIC BLOOD PRESSURE: 74 MMHG | HEART RATE: 91 BPM | OXYGEN SATURATION: 97 % | SYSTOLIC BLOOD PRESSURE: 114 MMHG | BODY MASS INDEX: 28.31 KG/M2 | RESPIRATION RATE: 18 BRPM

## 2023-12-22 DIAGNOSIS — L30.9 DERMATITIS: ICD-10-CM

## 2023-12-22 DIAGNOSIS — Z12.31 VISIT FOR SCREENING MAMMOGRAM: Primary | ICD-10-CM

## 2023-12-22 DIAGNOSIS — M79.7 FIBROMYALGIA: ICD-10-CM

## 2023-12-22 DIAGNOSIS — E03.9 ACQUIRED HYPOTHYROIDISM: ICD-10-CM

## 2023-12-22 DIAGNOSIS — G56.01 CARPAL TUNNEL SYNDROME OF RIGHT WRIST: ICD-10-CM

## 2023-12-22 DIAGNOSIS — M54.50 CHRONIC MIDLINE LOW BACK PAIN WITHOUT SCIATICA: ICD-10-CM

## 2023-12-22 DIAGNOSIS — G89.29 CHRONIC MIDLINE LOW BACK PAIN WITHOUT SCIATICA: ICD-10-CM

## 2023-12-22 DIAGNOSIS — Z12.11 SCREEN FOR COLON CANCER: ICD-10-CM

## 2023-12-22 LAB — TSH SERPL DL<=0.005 MIU/L-ACNC: 1.95 UIU/ML (ref 0.3–4.2)

## 2023-12-22 PROCEDURE — 90471 IMMUNIZATION ADMIN: CPT | Performed by: FAMILY MEDICINE

## 2023-12-22 PROCEDURE — 84443 ASSAY THYROID STIM HORMONE: CPT | Performed by: FAMILY MEDICINE

## 2023-12-22 PROCEDURE — 90686 IIV4 VACC NO PRSV 0.5 ML IM: CPT | Performed by: FAMILY MEDICINE

## 2023-12-22 PROCEDURE — 99214 OFFICE O/P EST MOD 30 MIN: CPT | Mod: 25 | Performed by: FAMILY MEDICINE

## 2023-12-22 PROCEDURE — 36415 COLL VENOUS BLD VENIPUNCTURE: CPT | Performed by: FAMILY MEDICINE

## 2023-12-22 RX ORDER — OMEGA-3 FATTY ACIDS/FISH OIL 300-1000MG
200-400 CAPSULE ORAL EVERY 4 HOURS PRN
Qty: 60 CAPSULE | Refills: 1 | Status: SHIPPED | OUTPATIENT
Start: 2023-12-22 | End: 2024-09-21

## 2023-12-22 RX ORDER — DIAPER,BRIEF,INFANT-TODD,DISP
EACH MISCELLANEOUS 2 TIMES DAILY
Qty: 30 G | Refills: 0 | Status: SHIPPED | OUTPATIENT
Start: 2023-12-22

## 2023-12-22 RX ORDER — ACETAMINOPHEN 500 MG
500-1000 TABLET ORAL EVERY 6 HOURS PRN
Qty: 90 TABLET | Refills: 3 | Status: SHIPPED | OUTPATIENT
Start: 2023-12-22

## 2023-12-22 RX ORDER — TRIAMCINOLONE ACETONIDE 5 MG/G
CREAM TOPICAL 2 TIMES DAILY
Qty: 454 G | Refills: 1 | Status: SHIPPED | OUTPATIENT
Start: 2023-12-22

## 2023-12-22 RX ORDER — MULTIVITAMIN
1 TABLET ORAL DAILY
Qty: 90 TABLET | Refills: 3 | Status: SHIPPED | OUTPATIENT
Start: 2023-12-22 | End: 2024-09-21

## 2023-12-22 RX ORDER — TRIAMCINOLONE ACETONIDE 5 MG/G
CREAM TOPICAL 2 TIMES DAILY
COMMUNITY
End: 2023-12-22

## 2023-12-22 ASSESSMENT — PAIN SCALES - GENERAL: PAINLEVEL: SEVERE PAIN (6)

## 2023-12-22 NOTE — COMMUNITY RESOURCES LIST (ENGLISH)
12/22/2023   [facility]  N/A  For questions about this resource list or additional care needs, please contact your primary care clinic or care manager.  Phone: [phone]   Email: N/A   Address: [address] [place] [address]   Hours: N/A        Financial Stability       Utility payment assistance  1  [employer] [place] ([employer]ER) - Emergency Assistance - Utility payment assistance Distance: 17.44 miles      In-Person   [address] [place] [address]  Language: English, Kazakh  Hours: Mon 10:00 AM - 1:30 PM Appt. Only, Wed 10:00 AM - 1:30 PM Appt. Only, Thu 4:30 PM - 6:30 PM Appt. Only, Fri 10:00 AM - 1:30 PM Appt. Only  Fees: Free   Phone: (486) 734-3085 Email: info@DoublePositive.[employer]x Website: http://www.DoublePositive.org          Food and Nutrition       Food pantry  2  [place] [facility] Distance: 0.26 miles      Pickup   [address] [place] [address]  Language: English  Hours: Mon 1:00 PM - 3:00 PM , Wed 1:00 PM - 3:00 PM , Fri 9:00 AM - 11:00 AM  Fees: Free   Phone: (307) 584-2520 Email: info@B-[employer] Website: http://sites."[employer].TranSiC/Storm Exchange.TranSiC/Topguest/home?authuser=0     3  [place] [facility] Distance: 13.01 miles      Pickup   [address] [place] [address]  Language: English  Hours: Thu 12:00 PM - 4:00 PM  Fees: Free   Phone: (872) 775-7343 Email: leo@[address].[employer] Website: https://www.PlayDo.com/PublicBeta/          Transportation       Free or low-cost transportation  4  [place] [facility] Distance: 18.67 miles      In-Person   [address] [place] [address]  Language: English  Hours: Mon - Fri 6:00 AM - 6:00 PM  Fees: Insurance, Self Pay   Phone: (429) 675-6015 Email: info@Rel[employer]s Website: http://www.Magikflix.TranSiC     Transportation to medical appointments  5  [facility] Distance: 17.66 miles      In-Person   [address] [place] [address]  Language: English   Hours: Mon - Fri 4:00 AM - 6:00 PM  Fees: Insurance, Self Pay   Phone: (682) 276-9861 Email: krissy@[employer]     6  [place] [facility] Transportation Distance: 18.67 miles      In-Person   [address] [place] [address]  Language: English  Hours: Mon - Fri 6:00 AM - 6:00 PM  Fees: Insurance, Self Pay   Phone: (126) 880-7057 Email: info@[employer] Website: http://www.Mobilitrix          Important Numbers & Websites       Emergency Services   911  City Services   311  Poison Control   (381) 864-1518  Suicide Prevention Lifeline   (773) 125-2551 (TALK)  Child Abuse Hotline   (998) 916-3557 (4-A-Child)  Sexual Assault Hotline   (956) 218-2299 (HOPE)  National Runaway Safeline   (472) 722-6812 (RUNAWAY)  All-Options Talkline   (765) 720-5851  Substance Abuse Referral   (665) 858-2470 (HELP)

## 2023-12-22 NOTE — PROGRESS NOTES
"  Assessment & Plan       ICD-10-CM    1. Visit for screening mammogram  Z12.31       2. Screen for colon cancer  Z12.11       3. Acquired hypothyroidism  E03.9 TSH with free T4 reflex     TSH with free T4 reflex      4. Dermatitis  L30.9 triamcinolone (ARISTOCORT HP) 0.5 % external cream     hydrocortisone (CORTAID) 1 % external ointment      5. Fibromyalgia  M79.7 multivitamin (ONE-DAILY) tablet     tiZANidine (ZANAFLEX) 4 MG tablet      6. Chronic midline low back pain without sciatica  M54.50 Occupational Medicine Referral    G89.29 ibuprofen (ADVIL/MOTRIN) 200 MG capsule     acetaminophen (TYLENOL) 500 MG tablet      7. Carpal tunnel syndrome of right wrist  G56.01 Wrist/Arm/Hand Bracking Supplies Order Wrist Brace; Right; non-thumb spica           Eczema.  Treated topically with both low potency and moderate potency steroid.  This was refilled.  Hypothyroidism.  Stable at last check 8 months ago.  Recheck TSH today further recommendation to follow.  \"All over pain\".  Sounds consistent with fibromyalgia.  Today nontender.  Tells me it usually is occurs mainly in the morning.  She will continue to manage with tizanidine and gabapentin.  Chronic low back pain.  This is the focus of her disability claim.  She is established through the state, as well as the [facility].  Referral to occupational medicine to see if there is any further to do in terms of continuing her disability claim.  CTS.  Trial of bracing at night.    Return in about 3 months (around 3/22/2024).    Hugh Porras MD  [facility] DASHAWN Wesley is a 53 year old, presenting for the following health issues:  Recheck Medication (Back pain, establish care /)      12/22/2023     7:54 AM   Additional Questions   Roomed by Abbi       History of Present Illness       Reason for visit:  Med mangagement, back pain    She eats 0-1 servings of fruits and vegetables daily.She consumes 1 sweetened beverage(s) daily.She exercises " "with enough effort to increase her heart rate 9 or less minutes per day.  She exercises with enough effort to increase her heart rate 3 or less days per week.   She is taking medications regularly.     Doing better now that she has stable housing  Struggles with chronic low back pain, as well as widespread pain \"all over\"  This is going on for many years  Has been offered spinal injections, surgery, but she is nervous about that \"I have lots of anxiety\"  Mental health meds managed by psychiatry  Biggest concern today is medication refills and establishing care      Review of Systems   Constitutional, HEENT, cardiovascular, pulmonary, gi and gu systems are negative, except as otherwise noted.      Objective    /74 (Cuff Size: Adult Regular)   Pulse 91   Temp 98.1  F (36.7  C) (Temporal)   Resp 18   Ht 1.6 m (5' 3\")   Wt 72.5 kg (159 lb 12.8 oz)   SpO2 97%   BMI 28.31 kg/m    Body mass index is 28.31 kg/m .  Physical Exam   GENERAL: healthy, alert and no distress  RESP: lungs clear to auscultation - no rales, rhonchi or wheezes  CV: regular rate and rhythm, normal S1 S2, no S3 or S4, no murmur, click or rub, no peripheral edema and peripheral pulses strong  MS: no gross musculoskeletal defects noted, no edema                      "

## 2023-12-22 NOTE — COMMUNITY RESOURCES LIST (ENGLISH)
12/22/2023   [facility]  N/A  For questions about this resource list or additional care needs, please contact your primary care clinic or care manager.  Phone: [phone]   Email: N/A   Address: [address] [place] [address]   Hours: N/A        Financial Stability       Utility payment assistance  1  [employer] [place] (CAER) - Emergency Assistance - Utility payment assistance Distance: 17.44 miles      In-Person   [address] [place] [address]  Language: English, Maltese  Hours: Mon 10:00 AM - 1:30 PM Appt. Only, Wed 10:00 AM - 1:30 PM Appt. Only, Thu 4:30 PM - 6:30 PM Appt. Only, Fri 10:00 AM - 1:30 PM Appt. Only  Fees: Free   Phone: (779) 648-7574 Email: info@[employer].Varthana Website: http://www.[employer]ion [employer].org          Food and Nutrition       Food pantry  2  [place] [facility] Distance: 0.26 miles      Pickup   [address] [place] [address]  Language: English  Hours: Mon 1:00 PM - 3:00 PM , Wed 1:00 PM - 3:00 PM , Fri 9:00 AM - 11:00 AM  Fees: Free   Phone: (852) 289-1880 Email: info@[employer] Website: http://sites.OpTier.World BX/2NGageU.World BX/Hinacom/home?authuser=0     3  [place] [facility] Distance: 13.01 miles      Pickup   [address] [place] [address]  Language: English  Hours: Thu 12:00 PM - 4:00 PM  Fees: Free   Phone: (566) 664-6141 Email: leo@[place] Website: https://www.Homeschooling Through the Ages.com/iContact/          Transportation       Free or low-cost transportation  4  [place] [facility] Distance: 18.67 miles      In-Person   [address] [place] [address]  Language: English  Hours: Mon - Fri 6:00 AM - 6:00 PM  Fees: Insurance, Self Pay   Phone: (196) 468-4971 Email: info@You[employer]go Website: http://www.ActiveEon.World BX     Transportation to medical appointments  5  [facility] Distance: 17.66 miles      In-Person   [address] [place] [address]  Language: English   Hours: Mon - Fri 4:00 AM - 6:00 PM  Fees: Insurance, Self Pay   Phone: (940) 664-2066 Email: krissy@Wavo.me.PlayWith     6  [place] [facility] Transportation Distance: 18.67 miles      In-Person   [address] [place] [address]  Language: English  Hours: Mon - Fri 6:00 AM - 6:00 PM  Fees: Insurance, Self Pay   Phone: (942) 143-2178 Email: info@LevelEleven Website: http://www.SHEEX          Important Numbers & Websites       Emergency Services   911  City Services   311  Poison Control   (702) 878-6012  Suicide Prevention Lifeline   (499) 768-9019 (TALK)  Child Abuse Hotline   (123) 744-3330 (4-A-Child)  Sexual Assault Hotline   (478) 425-7916 (HOPE)  National Runaway Safeline   (840) 366-3805 (RUNAWAY)  All-Options Talkline   (912) 687-1682  Substance Abuse Referral   (119) 803-2735 (HELP)

## 2023-12-27 DIAGNOSIS — F43.10 PTSD (POST-TRAUMATIC STRESS DISORDER): ICD-10-CM

## 2023-12-27 DIAGNOSIS — F41.1 GENERALIZED ANXIETY DISORDER: ICD-10-CM

## 2023-12-27 RX ORDER — GABAPENTIN 400 MG/1
1200 CAPSULE ORAL 3 TIMES DAILY
Qty: 270 CAPSULE | Refills: 1 | OUTPATIENT
Start: 2023-12-27

## 2023-12-27 NOTE — TELEPHONE ENCOUNTER
Refill request from [employer] for Gabapentin 400 mg. Was last filled on 12/19/23 but sent to [facility] pharmacy in [place].   Will refuse this request.     gabapentin (NEURONTIN) 400 MG capsule 270 capsule 1 12/19/2023 -- No   Sig - Route: Take 3 capsules (1,200 mg) by mouth 3 times daily - Oral     Ijeoma Miller RN on 12/27/2023 at 12:29 PM

## 2023-12-27 NOTE — TELEPHONE ENCOUNTER
Pending Prescriptions:                       Disp   Refills    gabapentin (NEURONTIN) 400 MG capsule     270 ca*1            Sig: Take 3 capsules (1,200 mg) by mouth 3 times daily

## 2024-01-04 DIAGNOSIS — F41.1 GENERALIZED ANXIETY DISORDER: ICD-10-CM

## 2024-01-04 DIAGNOSIS — F43.10 PTSD (POST-TRAUMATIC STRESS DISORDER): ICD-10-CM

## 2024-01-04 RX ORDER — GABAPENTIN 400 MG/1
1200 CAPSULE ORAL 3 TIMES DAILY
Qty: 270 CAPSULE | Refills: 1 | OUTPATIENT
Start: 2024-01-04

## 2024-01-04 NOTE — TELEPHONE ENCOUNTER
Was filled at [facility] in [place] on 12/19/23 has refills remaining.     Julie Behrendt RN

## 2024-01-08 ENCOUNTER — MYC MEDICAL ADVICE (OUTPATIENT)
Dept: PSYCHIATRY | Facility: CLINIC | Age: 54
End: 2024-01-08
Payer: COMMERCIAL

## 2024-01-08 ENCOUNTER — MYC MEDICAL ADVICE (OUTPATIENT)
Dept: FAMILY MEDICINE | Facility: CLINIC | Age: 54
End: 2024-01-08
Payer: COMMERCIAL

## 2024-01-08 DIAGNOSIS — E03.9 HYPOTHYROIDISM, UNSPECIFIED TYPE: ICD-10-CM

## 2024-01-09 RX ORDER — LEVOTHYROXINE SODIUM 100 UG/1
100 TABLET ORAL DAILY
Qty: 30 TABLET | Refills: 0 | OUTPATIENT
Start: 2024-01-09

## 2024-01-09 NOTE — TELEPHONE ENCOUNTER
Routed to [facility] as instructed but this is not last seen fam practice provider- please advise if not correct     Victorina Zelaya RN

## 2024-01-12 DIAGNOSIS — E03.9 HYPOTHYROIDISM, UNSPECIFIED TYPE: ICD-10-CM

## 2024-01-12 RX ORDER — LEVOTHYROXINE SODIUM 100 UG/1
100 TABLET ORAL DAILY
Qty: 90 TABLET | Refills: 0 | OUTPATIENT
Start: 2024-01-12

## 2024-01-12 NOTE — TELEPHONE ENCOUNTER
Patient calling on status of her refill request. Patient stating she has been out for a week. Med placed for provider to review and sign. Machelle Haney LPN

## 2024-01-12 NOTE — TELEPHONE ENCOUNTER
RN Triage    Patient Contact    Attempt # 1    Was call answered?  No.  Left message on voicemail with information to call me back.         Dr. Porras wanted RN to contact patient to figure how who was prescribing her levothyroxine previously.  She sent in a [employer] message on 1/8/24 asking for a refill.     TSH was normal but we have not filled this prescriptions in over 2 years. Please get more information from patient if she calls back per Dr. Vern Matt RN on 1/12/2024 at 2:42 PM

## 2024-01-15 ENCOUNTER — MYC REFILL (OUTPATIENT)
Dept: PSYCHIATRY | Facility: CLINIC | Age: 54
End: 2024-01-15
Payer: COMMERCIAL

## 2024-01-15 DIAGNOSIS — F43.23 ADJUSTMENT DISORDER WITH MIXED ANXIETY AND DEPRESSED MOOD: ICD-10-CM

## 2024-01-15 NOTE — TELEPHONE ENCOUNTER
Called and spoke to patient regarding her levothyroxine.     Patient states she is trying to get Dr. Porras to fill her levothyroxine because her psychiatrist has been prescribing it and they want her PCP to fill it. She did not state who had been filling it prior to that.     She states she is confused on why the medication is not on her MyChart with Sanjeev or [facility] as she has been on this medication for 20 years.    She states she has been out and does not feel well without her medication.     She states her TSH was in good range when Dr. Porras checked it because she has been on levothyroxine.     She is wondering if we can start filling this medication for her.     Please review and advise.     MICHAEL WiseN, RN

## 2024-01-16 RX ORDER — BUPROPION HYDROCHLORIDE 300 MG/1
300 TABLET ORAL EVERY MORNING
Qty: 30 TABLET | Refills: 1 | Status: SHIPPED | OUTPATIENT
Start: 2024-01-16 | End: 2024-01-29

## 2024-01-16 NOTE — TELEPHONE ENCOUNTER
I believe this communication may have been inadvertently sent to the wrong provider.    I do not know:  Who,  What,  Why,  Or when.    Amilcar

## 2024-01-16 NOTE — TELEPHONE ENCOUNTER
Date of Last Office Visit: 10/2/23  Date of Next Office Visit: Today, but provider out  No shows since last visit: 12/1/23  Cancellations since last visit: none    Medication requested: buPROPion (WELLBUTRIN XL) 300 MG 24 hr tablet  Date last ordered: 10/2/23 Qty: 30 Refills: 3     [place] of [place] ?: N/A    Lapse in medication adherence greater than 5 days?: unknown  If yes, call patient and gather details: N/A  Medication refill request verified as identical to current order?: yes  Result of Last DAM, VPA, Li+ Level, CBC, or Carbamazepine Level (at or since last visit): N/A    Last visit treatment plan:   Treatment Plan:     1.  Emotional support animal letter completed and sent to your housing stabilization   2.  Add methylphenidate 5 mg daily in the morning  3.  Add ziprasidone 20 mg in the evening with food  4.  Continue Wellbutrin  mg daily  5.  Continue gabapentin 1200 mg 3 times daily  6.  Continue lorazepam 0.5 mg daily as needed for breakthrough anxiety-take sparingly  7.  Olanzapine discontinued  8.  Talk therapy, when able to, for learning skills to manage life stressors and adjustments     Continue all other medications as reviewed per electronic medical record today.   Safety plan reviewed. To the Emergency Department as needed or call after hours crisis line at [phone] or [phone]. [place] Crisis Text Line. Text [place] to 922230 or Suicide LifeLine Chat: suicidepreventionlifeline.org/chat/  To schedule individual or family therapy, call [facility] at [phone]  Schedule an appointment with me in December or sooner as needed. Call [facility] at [phone] to schedule.  Follow up with primary care provider as planned or for acute medical concerns.  Call the psychiatric nurse line with medication questions or concerns at [phone]  MyChart may be used to communicate with your provider, but this is not intended to be used for  emergencies.       []Medication refilled per  Medication Refill in Ambulatory Care  policy.  [x]Medication unable to be refilled by RN due to criteria not met as indicated below:    []Eligibility - not seen in the last year   [x]Supervision - no future appointment   [x]Compliance - no shows, cancellations or lapse in therapy   []Verification - order discrepancy   []Controlled medication   []Medication not included in policy   []90-day supply request   []Other

## 2024-01-18 RX ORDER — LEVOTHYROXINE SODIUM 100 UG/1
100 TABLET ORAL DAILY
Qty: 90 TABLET | Refills: 0 | Status: SHIPPED | OUTPATIENT
Start: 2024-01-18 | End: 2024-04-10

## 2024-01-18 NOTE — TELEPHONE ENCOUNTER
Patient asking if this can be changed to a 90 day supply. Last TSH level was checked by Dr. Porras on 12/22/2023.    Roberto Thorpe,MICHAELN, RN

## 2024-01-19 ENCOUNTER — NURSE TRIAGE (OUTPATIENT)
Dept: FAMILY MEDICINE | Facility: OTHER | Age: 54
End: 2024-01-19
Payer: COMMERCIAL

## 2024-01-19 ENCOUNTER — APPOINTMENT (OUTPATIENT)
Dept: GENERAL RADIOLOGY | Facility: CLINIC | Age: 54
End: 2024-01-19
Attending: FAMILY MEDICINE
Payer: COMMERCIAL

## 2024-01-19 ENCOUNTER — HOSPITAL ENCOUNTER (EMERGENCY)
Facility: CLINIC | Age: 54
Discharge: HOME | End: 2024-01-19
Attending: FAMILY MEDICINE | Admitting: FAMILY MEDICINE
Payer: COMMERCIAL

## 2024-01-19 VITALS
BODY MASS INDEX: 27.46 KG/M2 | DIASTOLIC BLOOD PRESSURE: 104 MMHG | HEART RATE: 90 BPM | WEIGHT: 155 LBS | TEMPERATURE: 97 F | HEIGHT: 63 IN | SYSTOLIC BLOOD PRESSURE: 150 MMHG | OXYGEN SATURATION: 97 % | RESPIRATION RATE: 18 BRPM

## 2024-01-19 DIAGNOSIS — E03.9 ACQUIRED HYPOTHYROIDISM: ICD-10-CM

## 2024-01-19 DIAGNOSIS — J06.9 VIRAL UPPER RESPIRATORY TRACT INFECTION: ICD-10-CM

## 2024-01-19 LAB
DEPRECATED S PYO AG THROAT QL EIA: NEGATIVE
GROUP A STREP BY PCR: NOT DETECTED
SARS-COV-2 RNA RESP QL NAA+PROBE: NEGATIVE
T4 FREE SERPL-MCNC: 0.76 NG/DL (ref 0.9–1.7)
TSH SERPL DL<=0.005 MIU/L-ACNC: 13.54 UIU/ML (ref 0.3–4.2)

## 2024-01-19 PROCEDURE — 87635 SARS-COV-2 COVID-19 AMP PRB: CPT | Performed by: FAMILY MEDICINE

## 2024-01-19 PROCEDURE — 87651 STREP A DNA AMP PROBE: CPT | Performed by: FAMILY MEDICINE

## 2024-01-19 PROCEDURE — 84439 ASSAY OF FREE THYROXINE: CPT | Performed by: FAMILY MEDICINE

## 2024-01-19 PROCEDURE — 70220 X-RAY EXAM OF SINUSES: CPT

## 2024-01-19 PROCEDURE — 99284 EMERGENCY DEPT VISIT MOD MDM: CPT | Performed by: FAMILY MEDICINE

## 2024-01-19 PROCEDURE — 36415 COLL VENOUS BLD VENIPUNCTURE: CPT | Performed by: FAMILY MEDICINE

## 2024-01-19 PROCEDURE — 84443 ASSAY THYROID STIM HORMONE: CPT | Performed by: FAMILY MEDICINE

## 2024-01-19 ASSESSMENT — ACTIVITIES OF DAILY LIVING (ADL): ADLS_ACUITY_SCORE: 35

## 2024-01-19 NOTE — ED TRIAGE NOTES
"Here with sore throat times three days. States her back hurts and her \"whole body feels numb\". Reports anxiety-she has been off her tyroid medication for three weeks and is not sure if this is all related.     Triage Assessment (Adult)       Row Name 01/19/24 0935          Triage Assessment    Airway WDL WDL        Respiratory WDL    Respiratory WDL WDL        Skin Circulation/Temperature WDL    Skin Circulation/Temperature WDL WDL        Cardiac WDL    Cardiac WDL WDL        Peripheral/Neurovascular WDL    Peripheral Neurovascular WDL WDL        Cognitive/Neuro/Behavioral WDL    Cognitive/Neuro/Behavioral WDL WDL                     "

## 2024-01-19 NOTE — ED TRIAGE NOTES
Triage Assessment (Adult)       Row Name 01/19/24 0935          Triage Assessment    Airway WDL WDL        Respiratory WDL    Respiratory WDL WDL        Skin Circulation/Temperature WDL    Skin Circulation/Temperature WDL WDL        Cardiac WDL    Cardiac WDL WDL        Peripheral/Neurovascular WDL    Peripheral Neurovascular WDL WDL        Cognitive/Neuro/Behavioral WDL    Cognitive/Neuro/Behavioral WDL WDL

## 2024-01-19 NOTE — CONFIDENTIAL NOTE
Patient started with sore throat and trouble swallowing, swollen in the back of her throat. These started yesterday. States it is really red in the back of her throat, tongue swollen.     Denies fevers, denies trouble breathing, throat closing.    Hx of scoliosis, back pain, gets sharp at times, is on disability for chronic back pain, this pain is different and on the right side. Radiates into buttocks.     Feeling chilled and dizzy. This started yesterday as well.     Advised patient to be seen today. Discussed UC or message to provider for possible work in in clinic at [facility]. Patient states she does not have transportation and has a medical ride coming at 9am to bring her in. She plans to just come at that time and if she cannot be seen in clinic go to the ER.

## 2024-01-19 NOTE — TELEPHONE ENCOUNTER
Reason for Disposition    SEVERE sore throat pain    Additional Information    Negative: SEVERE difficulty breathing (e.g., struggling for each breath, speaks in single words)    Negative: Sounds like a life-threatening emergency to the triager    Negative: Throat culture results, call about    Negative: Productive cough is main symptom    Negative: Runny nose is main symptom    Negative: Drooling or spitting out saliva (because can't swallow)    Negative: Unable to open mouth completely    Negative: Drinking very little and has signs of dehydration (e.g., no urine > 12 hours, very dry mouth, very lightheaded)    Negative: Patient sounds very sick or weak to the triager    Negative: Difficulty breathing (per caller) but not severe    Negative: Fever > 103 F (39.4 C)    Negative: Refuses to drink anything for > 12 hours    Protocols used: Sore Throat-A-OH

## 2024-01-19 NOTE — ED PROVIDER NOTES
History     Chief Complaint   Patient presents with    Pharyngitis     HPI  Magnolia Gonzáles is a 53 year old female who presents with sore throat has been going on for the past 3 days.  Patient is concerned that her throat might close up and she might stop breathing.  She is very concerned because she lives alone.  She is also requesting to have her thyroid rechecked.  This was just checked 3 weeks ago and was normal.  She had been on thyroid medicine for many years but because her last thyroid check was normal, her doctor is refusing to put her back on medications.  Patient is also convinced that she has a sinus infection as she has been having some yellowish drainage.  This has been going on for more than a week.  Patient is also worried that she has COVID as she has been having some chills and some intermittent body aches.  She is also concerned because she has scoliosis and her back pain seems to be moving from the left to the right.    Allergies:  Allergies   Allergen Reactions    Serotonin Reuptake Inhibitors (Ssris) Rash    Zoloft [Sertraline] Rash       Problem List:    Patient Active Problem List    Diagnosis Date Noted    Generalized anxiety disorder 07/29/2020     Priority: Medium    Major depressive disorder, recurrent episode, mild (H24) 07/19/2017     Priority: Medium    PTSD (post-traumatic stress disorder) 07/19/2017     Priority: Medium    Bipolar affective disorder, manic, moderate (H) 09/23/2016     Priority: Medium     Patient refutes this. 10/5/2020      Overdose of Valium, intentional self-harm, subsequent encounter 05/29/2016     Priority: Medium    Carpal tunnel syndrome of left wrist 01/13/2016     Priority: Medium    Adjustment disorder with mixed anxiety and depressed mood 01/06/2016     Priority: Medium    Acquired hypothyroidism 01/06/2016     Priority: Medium    Bilateral carpal tunnel syndrome 01/06/2016     Priority: Medium    Social phobia 12/26/2014     Priority: Medium     "Panic disorder without agoraphobia 12/26/2014     Priority: Medium    History of alcohol dependence (H) 07/22/2014     Priority: Medium     Overview:   Binge drinking disorder. Sober since 5/2016      Nondependent alcohol abuse, episodic drinking behavior 11/17/2005     Priority: Medium        Past Medical History:    Past Medical History:   Diagnosis Date    Anxiety     Depressive disorder     Hypothyroid     Suicidal ideation 7/19/2015       Past Surgical History:    Past Surgical History:   Procedure Laterality Date    ORTHOPEDIC SURGERY         Family History:    Family History   Problem Relation Age of Onset    Depression/Anxiety Mother     Alcohol/Drug Father         Pancreatic CA       Social History:  Marital Status:  Single [1]  Social History     Tobacco Use    Smoking status: Never     Passive exposure: Never    Smokeless tobacco: Never   Vaping Use    Vaping Use: Never used   Substance Use Topics    Alcohol use: Yes     Alcohol/week: 0.0 standard drinks of alcohol     Comment: \"once every three months\"    Drug use: No        Medications:    acetaminophen (TYLENOL) 500 MG tablet  betamethasone valerate (VALISONE) 0.1 % external ointment  buPROPion (WELLBUTRIN XL) 150 MG 24 hr tablet  buPROPion (WELLBUTRIN XL) 300 MG 24 hr tablet  gabapentin (NEURONTIN) 400 MG capsule  hydrocortisone (CORTAID) 1 % external ointment  ibuprofen (ADVIL/MOTRIN) 200 MG capsule  levothyroxine (SYNTHROID/LEVOTHROID) 100 MCG tablet  LORazepam (ATIVAN) 0.5 MG tablet  meclizine (ANTIVERT) 25 MG tablet  methylphenidate (RITALIN) 10 MG tablet  multivitamin (ONE-DAILY) tablet  omeprazole (PRILOSEC) 40 MG DR capsule  ondansetron (ZOFRAN ODT) 4 MG ODT tab  tiZANidine (ZANAFLEX) 4 MG tablet  triamcinolone (ARISTOCORT HP) 0.5 % external cream  ziprasidone (GEODON) 40 MG capsule          Review of Systems   All other systems reviewed and are negative.      Physical Exam   BP: (!) 154/108  Pulse: 92  Temp: 97  F (36.1  C)  Resp: 16  Height: " "160 cm (5' 3\")  Weight: 70.3 kg (155 lb)  SpO2: 98 %      Physical Exam  Vitals and nursing note reviewed.   Constitutional:       General: She is not in acute distress.     Appearance: She is well-developed. She is not diaphoretic.   HENT:      Head: Normocephalic and atraumatic.      Nose: Nose normal.      Mouth/Throat:      Pharynx: No oropharyngeal exudate.   Eyes:      Conjunctiva/sclera: Conjunctivae normal.   Cardiovascular:      Rate and Rhythm: Normal rate and regular rhythm.      Heart sounds: Normal heart sounds. No murmur heard.     No friction rub.   Pulmonary:      Effort: Pulmonary effort is normal. No respiratory distress.      Breath sounds: Normal breath sounds. No stridor. No wheezing or rales.   Abdominal:      General: Bowel sounds are normal. There is no distension.      Palpations: Abdomen is soft. There is no mass.      Tenderness: There is no abdominal tenderness. There is no guarding.   Musculoskeletal:         General: No tenderness. Normal range of motion.      Cervical back: Normal range of motion and neck supple.   Skin:     General: Skin is warm and dry.      Capillary Refill: Capillary refill takes less than 2 seconds.      Findings: No erythema.   Neurological:      Mental Status: She is alert and oriented to person, place, and time.   Psychiatric:         Judgment: Judgment normal.         ED Course                 Procedures           Results for orders placed or performed during the hospital encounter of 01/19/24 (from the past 24 hour(s))   TSH with free T4 reflex   Result Value Ref Range    TSH 13.54 (H) 0.30 - 4.20 uIU/mL   T4 free   Result Value Ref Range    Free T4 0.76 (L) 0.90 - 1.70 ng/dL   Streptococcus A Rapid Screen w/Reflex to PCR    Specimen: Throat; Swab   Result Value Ref Range    Group A Strep antigen Negative Negative   Symptomatic COVID-19 Virus (Coronavirus) by PCR Nose    Specimen: Nose; Swab   Result Value Ref Range    SARS CoV2 PCR Negative Negative    " Narrative    Testing was performed using the Xpert Xpress SARS-CoV-2 Assay on the Cepheid Gene-Xpert Instrument Systems. Additional information about this Emergency Use Authorization (EUA) assay can be found via the Lab Guide. This test should be ordered for the detection of SARS-CoV-2 in individuals who meet SARS-CoV-2 clinical and/or epidemiological criteria as well as from individuals without symptoms or other reasons to suspect COVID-19. Test performance for asymptomatic patients has only been established in anterior nasal swab specimens. This test is for in vitro diagnostic use under the FDA EUA for laboratories certified under CLIA to perform high complexity testing. This test has not been FDA cleared or approved. A negative result does not rule out the presence of PCR inhibitors in the specimen or target RNA concentration below the limit of detection for the assay. The possibility of a false negative should be considered if the patient's recent exposure or clinical presentation suggests COVID-19. This test was validated by the [facility]. This laboratory is certified under the Clinical Laboratory Improvement Amendments (CLIA) as qualified to perform high complexity laboratory testing.     XR Sinus Complete G/E 3 Views    Narrative    SINUS COMPLETE THREE OR MORE VIEWS   1/19/2024 10:32 AM     HISTORY: Sinus pressure, patient is convinced she has sinus infection.    COMPARISON: None.      Impression    IMPRESSION: No high-grade mucosal thickening or air-fluid levels  identified.     PANKAJ HOUSE MD         SYSTEM ID:  [id number]       Medications - No data to display    X-ray of sinuses were unremarkable, COVID and strep was negative.  I think patient likely has some other type of viral URI which is causing her sore throat and bodyaches symptoms.  I hope that the symptoms should resolve over the next few days with just continued conservative care.  I  recommended to the patient that she can try some over-the-counter Mucinex and may be some saline nasal sprays.  Patient's TSH did come back elevated so patient does need to be back on her thyroid medication.  At first I thought that she needed refill for this but she says that her prescription actually is waiting over at the pharmacy, her primary care doctor had already refilled this.  At this point we will go ahead and discharge the patient this time    Assessments & Plan (with Medical Decision Making)  Viral URI, hypothyroidism     I have reviewed the nursing notes.    I have reviewed the findings, diagnosis, plan and need for follow up with the patient.    New Prescriptions    No medications on file       Final diagnoses:   Viral upper respiratory tract infection   Acquired hypothyroidism       1/19/2024   [facility] EMERGENCY DEPT       Savage Crum MD  01/19/24 5606

## 2024-01-26 ENCOUNTER — TELEPHONE (OUTPATIENT)
Dept: PSYCHIATRY | Facility: CLINIC | Age: 54
End: 2024-01-26
Payer: COMMERCIAL

## 2024-01-26 DIAGNOSIS — F31.12 BIPOLAR AFFECTIVE DISORDER, MANIC, MODERATE (H): ICD-10-CM

## 2024-01-26 DIAGNOSIS — F90.8 ATTENTION DEFICIT HYPERACTIVITY DISORDER (ADHD), OTHER TYPE: ICD-10-CM

## 2024-01-26 RX ORDER — LORAZEPAM 0.5 MG/1
0.5 TABLET ORAL DAILY PRN
Qty: 30 TABLET | Refills: 0 | Status: SHIPPED | OUTPATIENT
Start: 2024-01-26 | End: 2024-02-27

## 2024-01-26 RX ORDER — METHYLPHENIDATE HYDROCHLORIDE 10 MG/1
10 TABLET ORAL DAILY
Qty: 30 TABLET | Refills: 0 | Status: SHIPPED | OUTPATIENT
Start: 2024-01-26 | End: 2024-02-27

## 2024-01-26 NOTE — TELEPHONE ENCOUNTER
Date of Last Office Visit: 10/02/2023  Date of Next Office Visit: 1/29/2024  No shows since last visit: 12/01/2023  Cancellations since last visit: none    Medication requested: methylphenidate (RITALIN) 10 MG tablet  Date last ordered: 12/26/2023 Qty: 30 Refills: 0  Take 1 tablet (10 mg) by mouth daily   Review of [place] ?: Yes  Medication last sold date: 12/28/2023 Qty filled: 30    Medication requested: Lorazepam (ATIVAN) 0.5 MG tablet  Date last ordered: 11/21/2023 Qty: 30 Refills: 1  Take 1 tablet (0.5 mg) by mouth daily as needed for anxiety   Review of [place] ?: Yes  Medication last sold date: 12/28/2023 Qty filled: 30    Other controlled substance on [place] ?: yes  If yes, is this a new medication?: No -Gabapentin is on the current med list      Lapse in medication adherence greater than 5 days?: No the patient told [facility] they will be out of medication as of today.   If yes, call patient and gather details: n/a  Medication refill request verified as identical to current order?: Yes  Result of Last DAM, VPA, Li+ Level, CBC, or Carbamazepine Level (at or since last visit): Thyroid, COVID and strep labs completed on 1/19/2024    Last visit treatment plan:     Assessment:     Magnolia Gonzáles recently found out she will be moving into an apartment in a few weeks.  This has decreased her stress despite continued anxiety over her future.  Motional support animal letter was written for her to take to her new place of residence.  She is scattered and disorganized at this point and I added a very low dose of methylphenidate in the morning to help with that as well as depression symptoms.  With regards to mood dysregulation well as concerns for visual hallucinations, I added ziprasidone 20 mg in the evening with food.  Wellbutrin will continue for her depression.  Gabapentin will continue to manage anxiety.  Lorazepam is to be taken sparingly for extreme panic symptoms.  Olanzapine has been discontinued as she is  concerned about gaining weight when taking that and feeling sluggish.  Many of her symptoms seem to be stemming from meeting her survival needs.  Once she is stable in her own apartment we can review her medication list again to decrease some of the polypharmacy that is going on right now..     Medication side effects and alternatives were reviewed. Health promotion activities recommended and reviewed today. All questions addressed. Education and counseling completed regarding risks and benefits of medications and psychotherapy options.     Treatment Plan:  1.  Emotional support animal letter completed and sent to your housing stabilization   2.  Add methylphenidate 5 mg daily in the morning - 5 mg discontinued 12/8/2023 and changed to 10 mg dose   3.  Add ziprasidone 20 mg in the evening with food  4.  Continue Wellbutrin  mg daily  5.  Continue gabapentin 1200 mg 3 times daily  6.  Continue lorazepam 0.5 mg daily as needed for breakthrough anxiety-take sparingly  7.  Olanzapine discontinued  8.  Talk therapy, when able to, for learning skills to manage life stressors and adjustments     Continue all other medications as reviewed per electronic medical record today.   Safety plan reviewed. To the Emergency Department as needed or call after hours crisis line at [phone] or [phone]. [place] Crisis Text Line. Text [place] to 803793 or Suicide LifeLine Chat: suicidepreventionlifeline.org/chat/  To schedule individual or family therapy, call [facility] at [phone]  Schedule an appointment with me in December or sooner as needed. Call [facility] at [phone] to schedule.  Follow up with primary care provider as planned or for acute medical concerns.  Call the psychiatric nurse line with medication questions or concerns at [phone]  MyChart may be used to communicate with your provider, but this is not intended to be used for  emergencies.    []Medication refilled per  Medication Refill in Ambulatory Care  policy.  [x]Medication unable to be refilled by RN due to criteria not met as indicated below:    []Eligibility - not seen in the last year   []Supervision - no future appointment   [x]Compliance - no shows, cancellations or lapse in therapy   []Verification - order discrepancy   [x]Controlled medication   []Medication not included in policy   []90-day supply request   []Other    RN pended 30-day refills for provider review.    Vani Simeon RN on 1/26/2024 at 10:20 AM

## 2024-01-26 NOTE — TELEPHONE ENCOUNTER
Reason for call:  Medication     If this is a refill request, has the caller requested the refill from the pharmacy already? Yes    Will the patient be using a [facility]? No    Name of the pharmacy and phone number for the current request: [id number] - [place], [place] - [address]   [phone]     Name of the medication requested: methylphenidate (RITALIN) 10 MG tablet (out today)  LORazepam (ATIVAN) 0.5 MG tablet  (out today)    Other request: Pt stated they need provider to approve refill of this medication and transfer to above pharmacy. Reminded pt that 3-5 days of notice is helpful prior to medication being out, pt replied they have already put in this request and it hasn't been answered.     Phone number to reach patient:  Home number on file [phone] (home)    Best Time:  ASAP    Can we leave a detailed message on this number?  YES    Travel screening: Not Applicable

## 2024-01-29 ENCOUNTER — VIRTUAL VISIT (OUTPATIENT)
Dept: PSYCHIATRY | Facility: CLINIC | Age: 54
End: 2024-01-29
Payer: COMMERCIAL

## 2024-01-29 DIAGNOSIS — F31.12 BIPOLAR AFFECTIVE DISORDER, MANIC, MODERATE (H): Primary | ICD-10-CM

## 2024-01-29 DIAGNOSIS — F43.23 ADJUSTMENT DISORDER WITH MIXED ANXIETY AND DEPRESSED MOOD: ICD-10-CM

## 2024-01-29 DIAGNOSIS — F41.1 GENERALIZED ANXIETY DISORDER: ICD-10-CM

## 2024-01-29 DIAGNOSIS — F43.10 PTSD (POST-TRAUMATIC STRESS DISORDER): ICD-10-CM

## 2024-01-29 PROCEDURE — 99443 PR PHYSICIAN TELEPHONE EVALUATION 21-30 MIN: CPT | Mod: 93 | Performed by: NURSE PRACTITIONER

## 2024-01-29 RX ORDER — ZIPRASIDONE HYDROCHLORIDE 60 MG/1
60 CAPSULE ORAL
Qty: 30 CAPSULE | Refills: 1 | Status: SHIPPED | OUTPATIENT
Start: 2024-01-29 | End: 2024-04-23

## 2024-01-29 RX ORDER — BUPROPION HYDROCHLORIDE 150 MG/1
150 TABLET ORAL EVERY MORNING
Qty: 30 TABLET | Refills: 1 | Status: SHIPPED | OUTPATIENT
Start: 2024-01-29 | End: 2024-02-28

## 2024-01-29 RX ORDER — BUPROPION HYDROCHLORIDE 300 MG/1
300 TABLET ORAL EVERY MORNING
Qty: 30 TABLET | Refills: 1 | Status: SHIPPED | OUTPATIENT
Start: 2024-01-29 | End: 2024-03-25

## 2024-01-29 RX ORDER — GABAPENTIN 400 MG/1
1200 CAPSULE ORAL 3 TIMES DAILY
Qty: 270 CAPSULE | Refills: 1 | Status: SHIPPED | OUTPATIENT
Start: 2024-01-29 | End: 2024-03-20

## 2024-01-29 ASSESSMENT — ANXIETY QUESTIONNAIRES
IF YOU CHECKED OFF ANY PROBLEMS ON THIS QUESTIONNAIRE, HOW DIFFICULT HAVE THESE PROBLEMS MADE IT FOR YOU TO DO YOUR WORK, TAKE CARE OF THINGS AT HOME, OR GET ALONG WITH OTHER PEOPLE: VERY DIFFICULT
4. TROUBLE RELAXING: NEARLY EVERY DAY
7. FEELING AFRAID AS IF SOMETHING AWFUL MIGHT HAPPEN: NEARLY EVERY DAY
8. IF YOU CHECKED OFF ANY PROBLEMS, HOW DIFFICULT HAVE THESE MADE IT FOR YOU TO DO YOUR WORK, TAKE CARE OF THINGS AT HOME, OR GET ALONG WITH OTHER PEOPLE?: VERY DIFFICULT
3. WORRYING TOO MUCH ABOUT DIFFERENT THINGS: NEARLY EVERY DAY
5. BEING SO RESTLESS THAT IT IS HARD TO SIT STILL: NEARLY EVERY DAY
2. NOT BEING ABLE TO STOP OR CONTROL WORRYING: NEARLY EVERY DAY
7. FEELING AFRAID AS IF SOMETHING AWFUL MIGHT HAPPEN: NEARLY EVERY DAY
GAD7 TOTAL SCORE: 21
1. FEELING NERVOUS, ANXIOUS, OR ON EDGE: NEARLY EVERY DAY
GAD7 TOTAL SCORE: 21
6. BECOMING EASILY ANNOYED OR IRRITABLE: NEARLY EVERY DAY
GAD7 TOTAL SCORE: 21

## 2024-01-29 ASSESSMENT — PATIENT HEALTH QUESTIONNAIRE - PHQ9
10. IF YOU CHECKED OFF ANY PROBLEMS, HOW DIFFICULT HAVE THESE PROBLEMS MADE IT FOR YOU TO DO YOUR WORK, TAKE CARE OF THINGS AT HOME, OR GET ALONG WITH OTHER PEOPLE: EXTREMELY DIFFICULT
SUM OF ALL RESPONSES TO PHQ QUESTIONS 1-9: 16
SUM OF ALL RESPONSES TO PHQ QUESTIONS 1-9: 16

## 2024-01-29 ASSESSMENT — PAIN SCALES - GENERAL: PAINLEVEL: NO PAIN (0)

## 2024-01-29 NOTE — NURSING NOTE
Is the patient currently in the state of [place]? YES    Visit mode:TELEPHONE    If the visit is dropped, the patient can be reconnected by: TELEPHONE VISIT: Phone number:   Telephone Information:   Mobile [phone]       Will anyone else be joining the visit? No  (If patient encounters technical issues they should call [phone])    How would you like to obtain your AVS? MyChart    Are changes needed to the allergy or medication list? No    Rooming Documentation: Assigned questionnaire(s) completed .    Reason for visit: RECHECK     WILNER Brennan

## 2024-01-29 NOTE — PROGRESS NOTES
"Virtual Visit Details    Type of service:  Telephone Visit   Phone call duration: 30 minutes     Patient Location:  home  Provider Location:  On site           Outpatient Psychiatric Progress Note    Name: Magnolia Gonzáles   : 1970                    Primary Care Provider: Simran Barreto PA-C   Therapist: None    PHQ-9 scores:      6/15/2023     8:06 AM 10/2/2023    12:25 PM 2023     7:47 AM   PHQ-9 SCORE   PHQ-9 Total Score MyChart  20 (Severe depression) 20 (Severe depression)   PHQ-9 Total Score 22 20 20       JULIEN-7 scores:      2023    11:05 AM 6/15/2023     8:06 AM 2023    10:29 AM   JULIEN-7 SCORE   Total Score 21 (severe anxiety)  19 (severe anxiety)   Total Score 21 20 19       Patient Identification:    Patient is a 53 year old year old, single  White Choose not to answer female  who presents for return visit with me.  Patient is currently unemployed. Patient attended the session alone. Patient prefers to be called: \" Maryjane\".    Current medications include: acetaminophen (TYLENOL) 500 MG tablet, Take 1-2 tablets (500-1,000 mg) by mouth every 6 hours as needed for mild pain  betamethasone valerate (VALISONE) 0.1 % external ointment, Apply topically 2 times daily (Patient not taking: Reported on 2023)  buPROPion (WELLBUTRIN XL) 150 MG 24 hr tablet, Take 1 tablet (150 mg) by mouth every morning With 300 mg for a total of 450 mg daily  buPROPion (WELLBUTRIN XL) 300 MG 24 hr tablet, Take 1 tablet (300 mg) by mouth every morning  gabapentin (NEURONTIN) 400 MG capsule, Take 3 capsules (1,200 mg) by mouth 3 times daily  hydrocortisone (CORTAID) 1 % external ointment, Apply topically 2 times daily Use on face and around the eyes - DO NOT get in the eye  ibuprofen (ADVIL/MOTRIN) 200 MG capsule, Take 1-2 capsules (200-400 mg) by mouth every 4 hours as needed for fever  levothyroxine (SYNTHROID/LEVOTHROID) 100 MCG tablet, Take 1 tablet (100 mcg) by mouth daily for 90 days  LORazepam " (ATIVAN) 0.5 MG tablet, Take 1 tablet (0.5 mg) by mouth daily as needed for anxiety  meclizine (ANTIVERT) 25 MG tablet, Take 1 tablet (25 mg) by mouth daily as needed for dizziness or nausea  methylphenidate (RITALIN) 10 MG tablet, Take 1 tablet (10 mg) by mouth daily  multivitamin (ONE-DAILY) tablet, Take 1 tablet by mouth daily  omeprazole (PRILOSEC) 40 MG DR capsule, Take 1 capsule (40 mg) by mouth daily (Patient not taking: Reported on 12/22/2023)  ondansetron (ZOFRAN ODT) 4 MG ODT tab, Take 1 tablet (4 mg) by mouth every 8 hours as needed for nausea  tiZANidine (ZANAFLEX) 4 MG tablet, Take 1 tablet (4 mg) by mouth 3 times daily as needed for other (pain)  triamcinolone (ARISTOCORT HP) 0.5 % external cream, Apply topically 2 times daily  ziprasidone (GEODON) 40 MG capsule, Take 1 capsule (40 mg) by mouth daily with food    No current facility-administered medications on file prior to visit.       The [place] Prescription Monitoring Program has been reviewed and there are no concerns about diversionary activity for controlled substances at this time.      I was able to review most recent Primary Care Provider, specialty provider, and therapy visit notes that I have access to.     Today, patient reports she rehomed her pet because her apartment is too small.  She might ask for a therapy pet letter in the future if she gets another pet.  She is not feeling relief.  She has mood swings - one minute she feels good and then the next minute she feels down.  She will throws things.  She got fined for dog waste violation. She isolates to her apartment.  Not social.  Finances are getting better.  She has lived in multiple places over this past year.  Scoliosis is getting worse.  She is going to start water therapy.  She plans to see a surgeon in a month.    She cannot sit or stand for very long.  She feels more focused  since taking the ritalin - can finish tasks without distraction.  Sleep is still not good - menopause  and hot flashes - frequent awakenings.  She feels like negative energy has followed her.  She is trying to see a  for this.  1 night she awoke and found a stove burner turned on - She cries frequently.  She has had a lot of flashbacks.  Agoraphobia - She does not feel safe when out into the community.  Takes meclizine for vertigo.       has a past medical history of Anxiety, Depressive disorder, Hypothyroid, and Suicidal ideation (7/19/2015).    Social history updates:    Maryjane continues to live in her own apartment.  She is unemployed.    Substance use updates:    Denies use of alcohol  Tobacco use: No    Vital Signs:   There were no vitals taken for this visit.    Labs:    Most recent laboratory results reviewed and no new labs.     Mental Status Examination:  Appearance: awake, alert and mild distress  Attitude: cooperative  Eye Contact:   Not able to assess  Gait and Station: Dizziness  Psychomotor Behavior:   Not able to assess  Oriented to:  time, person, and place  Attention Span and Concentration:  Normal  Speech:   vtspeech: clear, coherent and Speaks: English  Mood:  anxious, depressed, and labile  Affect:  mood congruent  Associations:  no loose associations  Thought Process:  goal oriented  Thought Content:  no evidence of suicidal ideation or homicidal ideation, no auditory hallucinations present, and no visual hallucinations present  Recent and Remote Memory:  intact Not formally assessed. No amnesia.  Fund of Knowledge: appropriate  Insight:  fair  Judgment: fair  Impulse Control:  fair    Suicide Risk Assessment:  Today Magnolia Gonzáles reports no thoughts to harm themself or others. In addition, there are notable risk factors for self-harm, including single status, anxiety, withdrawing, and mood change. However, risk is mitigated by history of seeking help when needed, future oriented, denies suicidal intent or plan, and denies homicidal ideation, intent, or plan. Therefore, based on all  available evidence including the factors cited above, Magnolia Gonzáles does not appear to be at imminent risk for self-harm, does not meet criteria for a 72-hr hold, and therefore remains appropriate for ongoing outpatient level of care.  A thorough assessment of risk factors related to suicide and self-harm have been reviewed and are noted above. The patient convincingly denies suicidality on several occasions. Local community safety resources printed and reviewed for patient to use if needed. There was no deceit detected, and the patient presented in a manner that was believable.     DSM5 Diagnosis:  296.40 Bipolar I Disorder Current or Most Recent Episode Hypomanic  300.02 (F41.1) Generalized Anxiety Disorder  309.81 (F43.10) Posttraumatic Stress Disorder (includes Posttraumatic Stress Disorder for Children 6 Years and Younger)  Without dissociative symptoms    Medical comorbidities include:   Patient Active Problem List    Diagnosis Date Noted    Generalized anxiety disorder 07/29/2020     Priority: Medium    Major depressive disorder, recurrent episode, mild (H24) 07/19/2017     Priority: Medium    PTSD (post-traumatic stress disorder) 07/19/2017     Priority: Medium    Bipolar affective disorder, manic, moderate (H) 09/23/2016     Priority: Medium     Patient refutes this. 10/5/2020      Overdose of Valium, intentional self-harm, subsequent encounter 05/29/2016     Priority: Medium    Carpal tunnel syndrome of left wrist 01/13/2016     Priority: Medium    Adjustment disorder with mixed anxiety and depressed mood 01/06/2016     Priority: Medium    Acquired hypothyroidism 01/06/2016     Priority: Medium    Bilateral carpal tunnel syndrome 01/06/2016     Priority: Medium    Social phobia 12/26/2014     Priority: Medium    Panic disorder without agoraphobia 12/26/2014     Priority: Medium    History of alcohol dependence (H) 07/22/2014     Priority: Medium     Overview:   Binge drinking disorder. Sober since  5/2016      Nondependent alcohol abuse, episodic drinking behavior 11/17/2005     Priority: Medium       Assessment:    Magnolia Gonzáles reports feeling little relief from her mood dysregulation and has been having thoughts of negative energy in her life.  I increased the ziprasidone to 60 mg in the evening.  Methylphenidate continues to help manage disorganization which has improved since starting this medication.  Wellbutrin continues at maximum dose for depression symptoms of negativity, low mood, and low energy.  Gabapentin continues for anxiety relief as well as mood stabilization.  Lorazepam is available as needed for breakthrough panic, especially in social settings.  She is encouraged to take this sparingly..    Medication side effects and alternatives were reviewed. Health promotion activities recommended and reviewed today. All questions addressed. Education and counseling completed regarding risks and benefits of medications and psychotherapy options.    Treatment Plan:      1.  Increase ziprasidone to 60 mg in the evening  2.  Continue Methylphenidate 10 mg daily  3.  Continue Lorazepam 0.5 mg as needed for anxiety  4.  Continue wellbutrin  mg daily  5.  Continue gabapentin 1200 mg 3 times daily    Continue all other medications as reviewed per electronic medical record today.   Safety plan reviewed. To the Emergency Department as needed or call after hours crisis line at [phone] or [phone]. [place] Crisis Text [place]. Text [place] to 613120 or Suicide LifeLine Chat: suicidepreventionlifeline.org/chat/  To schedule individual or family therapy, call [facility] at [phone]  Schedule an appointment with me in 6 weeks or sooner as needed. Call [facility] at [phone] to schedule.  Follow up with primary care provider as planned or for acute medical concerns.  Call the psychiatric nurse line with medication questions or concerns at  [phone]  Kentrellhart may be used to communicate with your provider, but this is not intended to be used for emergencies.        Crisis Resources   The EmPath is an adults only unit located at [facility] in [place] is a short term (generally less than 23 hour stay) designed for crisis intervention and stabilization. Pts have the opportunity to meet quickly with a behavioral health team for evaluation in a calm and peaceful therapuetic environment. To be evaluated for admission pts are triaged throught the [facility] ED.      The following hotlines are for both adults and children. The and are open 24 hours a day, 7 days a week unless noted otherwise.        Crisis Lines      Crisis Text Line  Text 795234  You will be connected with a trained live crisis counselor to provide support.        Gambling Hotline  6.193.389.8098 [hope]        línea de crisis española  [phone]        [facility] Cashier Live Helpline  410.245.5797        National Hope Line  4.111.066.8739 [hope]        National Suicide Prevention Lifeline  Free and confidential support  988 or 1.223.815.TALK [8255]  http://suicidepreventionlifeline.org        The Praveen Project (LGBTQ Youth Crisis Line)  2.376.597.2247  text START to 443-588        [place] Crisis Line  2.138.790.9523 (Press 1)  or text 333522    [facility] Mental Health Crisis Response  Within [place], call **CRISIS [**310914] to be connected to a mental health professional who can assist you.        [facility] Crisis  [phone]      [facility] Mobile Crisis  [phone]      [facility] Crisis  [phone]      [facility] Mobile Crisis  [phone] (adults)  [phone] (children)      [facility] Mobile Crisis  [phone] (adults)  [phone] (children)      [facility] Mobile Crisis  [phone]      [facility] Mobile Crisis  [phone]    Community Resources      Fast Tracker  Linking people to mental health and substance  use disorder resources  Consumrckermn.org        [place] Mental Health Warmline  Peer to peer support  5 pm to 9 am 7 days/week  8.721.336.6506  https://mnwitw.org/danay        [place] on Mental Illness (ADINA)  560.253.4561 or 1.888.ADINA.HELPS  https://namimn.org/        [facility] Urgent Care for Adult Mental Health  [facility] residents only   [address]  [phone]        [facility]  Free mental health counseling  https://walkin.org/  612.870.0565 X2    Mental Health Apps      Calm Harm  https://[employer].co.uk/      My3  https://my3app.org/      [place] Safety Plan  https://www.Solstice Neurosciencestyplan.org/   Administrative Billing:   Time spent with patient includes counseling and coordination of care regarding above diagnoses and treatment plan.    Patient Status:  This is a continuous care patient and medications will be prescribed by the psychiatric provider until further indicated.    Signed:   HERMELINDA Vickers-BC   Psychiatry       Answers submitted by the patient for this visit:  Patient Health Questionnaire (Submitted on 1/29/2024)  If you checked off any problems, how difficult have these problems made it for you to do your work, take care of things at home, or get along with other people?: Extremely difficult  PHQ9 TOTAL SCORE: 16  JULIEN-7 (Submitted on 1/29/2024)  JULIEN 7 TOTAL SCORE: 21

## 2024-01-29 NOTE — PATIENT INSTRUCTIONS
"Patient Education   The Panel Psychiatry Program  What to Expect  Here's what to expect in the Panel Psychiatry Program.   About the program  You'll be meeting with a psychiatric doctor to check your mental health. A psychiatric doctor helps you deal with troubling thoughts and feelings by giving you medicine. They'll make sure you know the plan for your care. You may see them for a long time. When you're feeling better, they may refer you back to seeing your family doctor.   If you have any questions, we'll be glad to talk to you.  About visits  Be open  At your visits, please talk openly about your problems. It may feel hard, but it's the best way for us to help you.  Cancelling visits  If you can't come to your visit, please call us right away at [phone]. If you don't cancel at least 24 hours (1 full day) before your visit, that's \"late cancellation.\"  Not showing up for your visits  Being very late is the same as not showing up. You'll be a \"no show\" if:  You're more than 15 minutes late for a 30-minute (half hour) visit.  You're more than 30 minutes late for a 60-minute (full hour) visit.  If you cancel late or don't show up 2 times within 6 months, we may end your care.  Getting help between visits  If you need help between visits, you can call us Monday to Friday from 8 a.m. to 4:30 p.m. at [phone].  Emergency care  Call 911 or go to the nearest emergency department if your life or someone else's life is in danger.  Call 988 anytime to reach the national Suicide and Crisis hotline.  Medicine refills  To refill your medicine, call your pharmacy. You can also call [facility]'s Behavioral Access at [phone], Monday to Friday, 8 a.m. to 4:30 p.m. It can take 1 to 3 business days to get a refill.   Forms, letters, and tests  You may have papers to fill out, like FMLA, short-term disability, and workability. We can help you with these forms at your visits, but you must have an " appointment. You may need more than 1 visit for this, to be in an intensive therapy program, or both.  Before we can give you medicine for ADHD, we may refer you to get tested for it or confirm it another way.  We may not be able to give you an emotional support animal letter.  We don't do mental health checks ordered by the court.   We don't do mental health testing, but we can refer you to get tested.   Thank you for choosing us for your care.  For informational purposes only. Not to replace the advice of your health care provider. Copyright   2022 [facility]. All rights reserved. [employer] 682137 - 12/22.       Treatment Plan:      1.  Increase ziprasidone to 60 mg in the evening  2.  Continue Methylphenidate 10 mg daily  3.  Continue Lorazepam 0.5 mg as needed for anxiety  4.  Continue wellbutrin  mg daily  5.  Continue gabapentin 1200 mg 3 times daily      Continue all other medical directions per primary care provider.   Continue all other medications as reviewed per electronic medical record today.   Safety plan reviewed. To the Emergency Department as needed or call after hours crisis line at [phone] or [phone]. [place] Crisis Text [place]: Text [place] to 841229  or  Suicide LifeLine Chat: suicidepreventionlifeline.org/chat/  To schedule individual or family therapy, call [facility] at [phone].   Schedule an appointment with me in 6 weeks or sooner as needed.  Call [facility] at [phone] to schedule.  Follow up with primary care provider as planned or for acute medical concerns.  Call the psychiatric nurse line with medication questions or concerns at [phone].  itzathart may be used to communicate with your provider, but this is not intended to be used for emergencies.        Crisis Resources   The EmPath is an adults only unit located at [facility] in [place] is a short term (generally less than 23 hour stay) designed for crisis  intervention and stabilization. Pts have the opportunity to meet quickly with a behavioral health team for evaluation in a calm and peaceful therapuetic environment. To be evaluated for admission pts are triaged throught the [facility] ED.      The following hotlines are for both adults and children. The and are open 24 hours a day, 7 days a week unless noted otherwise.        Crisis Lines      Crisis Text Line  Text 160666  You will be connected with a trained live crisis counselor to provide support.        Gambling Hotline  5.881.523.6478 [hope]        línea de crisis española  687.131.1876        [place] [employer] Helpline  729.043.4221        National Hope Line  4.676.984.7977 [hope]        National Suicide Prevention Lifeline  Free and confidential support  988 or 1.251.502.TALK [8255]  http://suicidepreventionlifeline.org        The Praveen Project (LGBTQ Youth Crisis Line)  3.025.120.3286  text START to 605-787        [place] Crisis Line  0.573.841.0141 (Press 1)  or text 305614    [facility] Mental Health Crisis Response  Within [place], call **CRISIS [**872537] to be connected to a mental health professional who can assist you.        [facility] Crisis  [phone]      [facility] Mobile Crisis  [phone]      [facility] Crisis  [phone]      [facility] Mobile Crisis  [phone] (adults)  [phone] (children)      [facility] Mobile Crisis  [phone] (adults)  [phone] (children)      [facility] Mobile Crisis  [phone]      [facility] Mobile Crisis  [phone]    Community Resources      Fast Tracker  Linking people to mental health and substance use disorder resources  fasttrackermn.org        [place] Mental Health Warmline  Peer to peer support  5 pm to 9 am 7 days/week  3.809.280.2961  https://mnwitw.org/danay        National [place] on Mental Illness (ADINA)  081.369.4447 or 1.888.ADINA.HELPS   https://Community [facility] Healthimn.org/        [facility] Urgent Care for Adult Mental Health  [facility] residents only   [address]  [phone]        [facility]  Free mental health counseling  https://walkin.org/  612.870.0565 X2    Mental Health Apps      Calm Harm  https://calmharm.co.uk/      My3  https://my3app.org/      [place] Safety Plan  https://www.mysafetyplan.org/

## 2024-01-30 ENCOUNTER — TELEPHONE (OUTPATIENT)
Dept: PSYCHIATRY | Facility: CLINIC | Age: 54
End: 2024-01-30
Payer: COMMERCIAL

## 2024-01-30 NOTE — TELEPHONE ENCOUNTER
RN phoned the patient. She reports that she is not able to transfer her prescriptions for Wellbutrin, Geodon, and Gabapentin from [facility] to [facility] in [place].     RN called [facility] in [place] and spoke with pharmacy staff. RN requested that the pharmacy transfer her prescriptions from [facility] to [facility]. They verbalized understanding.    RN phoned the patient to let her know. She thanked RN       ABISAI VELAZQUEZ RN on 1/30/2024 at 4:03 PM

## 2024-01-30 NOTE — TELEPHONE ENCOUNTER
Reason for call:  Medication   If this is a refill request, has the caller requested the refill from the pharmacy already? Yes  Will the patient be using a [facility]? No  Name of the pharmacy and phone number for the current request: [facility]    Name of the medication requested: bupropion    Other request: Pt states her rx was sent to the wrong pharmacy.    Phone number to reach patient:  Home number on file [phone] (home)    Best Time:  asap    Can we leave a detailed message on this number?  YES    Travel screening: Not Applicable

## 2024-02-27 DIAGNOSIS — F90.8 ATTENTION DEFICIT HYPERACTIVITY DISORDER (ADHD), OTHER TYPE: ICD-10-CM

## 2024-02-27 DIAGNOSIS — F31.12 BIPOLAR AFFECTIVE DISORDER, MANIC, MODERATE (H): ICD-10-CM

## 2024-02-27 DIAGNOSIS — R42 VERTIGO: ICD-10-CM

## 2024-02-27 NOTE — TELEPHONE ENCOUNTER
"Refill request r'cd from [facility] via fax for buPROPion (WELLBUTRIN XL) 150 MG 24 hr  denied due to fills on file This medication was last prescribed to [facility] on 1/29/24 for Qty of 30 with 1 refill(s).       Pt should not need a new prescription until around 3/29/24.    Next f/u: 3/11/24    Faxed \"not authorized\" back to pharmacy & & asking to intimate Rx transfer from [facility].     Made a phone call and spoke w/[facility] pharmacy staff. They awaiting  for [facility] pharmacy to request the transfer.    LVM updating the pt that she should have fills on file for most of her medications at [facility] pharmacy. We will work on the once she doesn't.                   "

## 2024-02-27 NOTE — TELEPHONE ENCOUNTER
Patient requesting refills of Bupropion  mg, bupropion  mg, gabapentin 400 mg, geodon 60 mg. These were filled on 1/29/24 with 1 additional refill but they were sent to the [facility] not [facility]. Called patient and let her know she had a refill and she could have [facility] transfer it from [facility]. Told patient to give us a call back if that wasn't an option.     Date of Last Office Visit: 1/29/24  Date of Next Office Visit: 3/11/24  No shows since last visit: 0  Cancellations since last visit: 0    Medication requested: LORazepam (ATIVAN) 0.5 MG tablet  Date last ordered: 1/26/24 Qty: 30 Refills: 0    Medication requested: methylphenidate (RITALIN) 10 MG tablet  Date last ordered: 1/26/24 Qty: 30 Refills: 0    Medication requested: meclizine (ANTIVERT) 25 MG tablet   Date last ordered: 12/26/24 Qty: 15 Refills: 0     [place] of [place] ?: No, no access for provider    Lapse in medication adherence greater than 5 days?: No  If yes, call patient and gather details: na  Medication refill request verified as identical to current order?: yes  Result of Last DAM, VPA, Li+ Level, CBC, or Carbamazepine Level (at or since last visit): N/A    Last visit treatment plan: 1.  Increase ziprasidone to 60 mg in the evening  2.  Continue Methylphenidate 10 mg daily  3.  Continue Lorazepam 0.5 mg as needed for anxiety  4.  Continue wellbutrin  mg daily  5.  Continue gabapentin 1200 mg 3 times daily   reports feeling little relief from her mood dysregulation and has been having thoughts of negative energy in her life.  I increased the ziprasidone to 60 mg in the evening.  Methylphenidate continues to help manage disorganization which has improved since starting this medication.  Wellbutrin continues at maximum dose for depression symptoms of negativity, low mood, and low energy.  Gabapentin continues for anxiety relief as well as mood stabilization.  Lorazepam is available as needed for breakthrough panic, especially  in social settings.  She is encouraged to take this sparingly..     []Medication refilled per  Medication Refill in Ambulatory Care  policy.  [x]Medication unable to be refilled by RN due to criteria not met as indicated below:    []Eligibility - not seen in the last year   []Supervision - no future appointment   []Compliance - no shows, cancellations or lapse in therapy   []Verification - order discrepancy   [x]Controlled medication   []Medication not included in policy   []90-day supply request   []Other

## 2024-02-27 NOTE — TELEPHONE ENCOUNTER
Reason for call:  Medication   If this is a refill request, has the caller requested the refill from the pharmacy already? No  Will the patient be using a [facility]? No  Name of the pharmacy and phone number for the current request: [facility]  [address] [place] [address] [phone]    Name of the medication requested:   methylphenidate (RITALIN) 10 MG tablet   LORazepam (ATIVAN) 0.5 MG tablet   meclizine (ANTIVERT) 25 MG tablet   buPROPion (WELLBUTRIN XL) 150 MG 24 hr tablet   gabapentin (NEURONTIN) 400 MG capsule   buPROPion (WELLBUTRIN XL) 300 MG 24 hr tablet   ziprasidone (GEODON) 60 MG capsule         Other request: Pt reports being completely out of buPROPion (WELLBUTRIN XL) 150 MG 24 hr tablet. Close to being out of methylphenidate (RITALIN) 10 MG tablet     Phone number to reach patient:  Home number on file [phone]    Best Time:  any    Can we leave a detailed message on this number?  YES    Travel screening: Negative

## 2024-02-28 ENCOUNTER — TELEPHONE (OUTPATIENT)
Dept: PSYCHIATRY | Facility: CLINIC | Age: 54
End: 2024-02-28
Payer: COMMERCIAL

## 2024-02-28 DIAGNOSIS — F31.12 BIPOLAR AFFECTIVE DISORDER, MANIC, MODERATE (H): ICD-10-CM

## 2024-02-28 RX ORDER — BUPROPION HYDROCHLORIDE 150 MG/1
150 TABLET ORAL EVERY MORNING
Qty: 30 TABLET | Refills: 1 | Status: SHIPPED | OUTPATIENT
Start: 2024-02-28 | End: 2024-04-16

## 2024-02-28 NOTE — TELEPHONE ENCOUNTER
Reason for call:  Medication refill    If this is a refill request, has the caller requested the refill from the pharmacy already? No    Will the patient be using a [facility]? No    Name of the pharmacy and phone number for the current request: [facility] [phone]    Name of the medication requested: Bupropion    Phone number to reach patient:  Home number on file [phone] (home)    Best Time:  Anytime    Can we leave a detailed message on this number?  YES    Travel screening: Not Applicable

## 2024-02-28 NOTE — TELEPHONE ENCOUNTER
Called [facility] and they did get the patient's medications transferred from [facility]. The only one not transferred was the bupropion  mg. She said Isrrael Alston said there were no refills.   Even though on our end it shows 1 refill.     Date of Last Office Visit: 1/29/24  Date of Next Office Visit: 3/11/24  No shows since last visit: 0  Cancellations since last visit: 0    Medication requested: buPROPion (WELLBUTRIN XL) 150 MG 24 hr tablet  Date last ordered: 1/29/24 Qty: 30 Refills: 1     [place] of [place] ?: NA    Lapse in medication adherence greater than 5 days?: No  If yes, call patient and gather details: na  Medication refill request verified as identical to current order?: yes  Result of Last DAM, VPA, Li+ Level, CBC, or Carbamazepine Level (at or since last visit): N/A    Last visit treatment plan: 1.  Increase ziprasidone to 60 mg in the evening  2.  Continue Methylphenidate 10 mg daily  3.  Continue Lorazepam 0.5 mg as needed for anxiety  4.  Continue wellbutrin  mg daily  5.  Continue gabapentin 1200 mg 3 times daily    I increased the ziprasidone to 60 mg in the evening.  Methylphenidate continues to help manage disorganization which has improved since starting this medication.  Wellbutrin continues at maximum dose for depression symptoms of negativity, low mood, and low energy.  Gabapentin continues for anxiety relief as well as mood stabilization.  Lorazepam is available as needed for breakthrough panic, especially in social settings.  She is encouraged to take this sparingly..     [x]Medication refilled per  Medication Refill in Ambulatory Care  policy.  []Medication unable to be refilled by RN due to criteria not met as indicated below:    []Eligibility - not seen in the last year   []Supervision - no future appointment   []Compliance - no shows, cancellations or lapse in therapy   []Verification - order discrepancy   []Controlled medication   []Medication not included in  policy   []90-day supply request   []Other

## 2024-03-01 RX ORDER — METHYLPHENIDATE HYDROCHLORIDE 10 MG/1
10 TABLET ORAL DAILY
Qty: 30 TABLET | Refills: 0 | Status: SHIPPED | OUTPATIENT
Start: 2024-03-01 | End: 2024-04-23

## 2024-03-01 RX ORDER — MECLIZINE HYDROCHLORIDE 25 MG/1
25 TABLET ORAL DAILY PRN
Qty: 15 TABLET | Refills: 0 | OUTPATIENT
Start: 2024-03-01

## 2024-03-01 RX ORDER — LORAZEPAM 0.5 MG/1
0.5 TABLET ORAL DAILY PRN
Qty: 30 TABLET | Refills: 0 | Status: SHIPPED | OUTPATIENT
Start: 2024-03-01 | End: 2024-04-09

## 2024-03-15 ENCOUNTER — TELEPHONE (OUTPATIENT)
Dept: PSYCHIATRY | Facility: CLINIC | Age: 54
End: 2024-03-15

## 2024-03-15 NOTE — TELEPHONE ENCOUNTER
Reason for call: Medication question    Patient called regarding (reason for call): Received call from patient, stated that the [facility] pharmacy is on back order for a month for the 10 mg of Methylphenidate, but they do have the 5 mg in stock. She would like know if a new prescription can be written to double up on the 5 mg?    Phone number to reach patient:  Home number on file [phone] (home)    Best Time:  Anytime     Can we leave a detailed message on this number?  YES    Travel screening: Not Applicable

## 2024-03-18 ENCOUNTER — MYC MEDICAL ADVICE (OUTPATIENT)
Dept: PSYCHIATRY | Facility: CLINIC | Age: 54
End: 2024-03-18
Payer: COMMERCIAL

## 2024-03-18 NOTE — TELEPHONE ENCOUNTER
RN received notification from the [facility] Coordinators that this patient had called to notify the clinic that her prescription for methylphenidate (RITALIN) 10 MG tablet was unable to be filled due to backorder of this dose.       The patient is asking if a new prescription for methylphenidate (RITALIN) 5 mg tablet MG tablet Take 2 tablets.   2.  RN phoned [facility] [address] [place] [address] - [address] and spoke to Pharm CAMRYN Lane.  She indicated they just received a shipment of 300+ methylphenidate (RITALIN) 10 MG tablet.      3.  The Pharmacist indicated that the earliest they would be able to refill this prescription would be 3/30/24.  The patient last picked up her prescription on 3/2/24.      4.  RN sent the patient a [employer] message explaining the instructions from the Pharmacy.  .      Bony Kraus RN on 3/18/2024 at 3:07 PM

## 2024-03-19 ENCOUNTER — TELEPHONE (OUTPATIENT)
Dept: PSYCHIATRY | Facility: CLINIC | Age: 54
End: 2024-03-19
Payer: COMMERCIAL

## 2024-03-19 NOTE — TELEPHONE ENCOUNTER
Prior Authorization Retail Medication Request    Medication/Dose: Gabapentin 400 mg capsules  Diagnosis and ICD code (if different than what is on RX):    Associated Diagnoses    Generalized anxiety disorder [F41.1]      PTSD (post-traumatic stress disorder) [F43.10]        New/renewal/insurance change PA/secondary ins. PA:  Previously Tried and Failed:    Rationale:      Insurance   Primary:   Insurance ID:    Secondary (if applicable):  Insurance ID:      Pharmacy Information (if different than what is on RX)  Name:    Phone:    Fax:            PA Team please route the PA final decision to the [facility] RN Pool and to Janet Álvarez CNP.      Bony Kraus, EDITH on 3/19/2024 at 12:42 PM

## 2024-03-19 NOTE — TELEPHONE ENCOUNTER
Prior Authorization Retail Medication Request    Medication/Dose: Wellbutrin 300 mg  Diagnosis and ICD code (if different than what is on RX):  NA  New/renewal/insurance change PA/secondary ins. PA:  Previously Tried and Failed:  NA  Rationale:  NA    Insurance   Primary: [place]   Insurance ID:  [id number]     Secondary (if applicable):[facility]  Insurance ID:  NA    Pharmacy Information (if different than what is on RX)  [facility]  [facility]  Phone:  [facility]  Fax:NA

## 2024-03-20 ENCOUNTER — MYC REFILL (OUTPATIENT)
Dept: PSYCHIATRY | Facility: CLINIC | Age: 54
End: 2024-03-20
Payer: COMMERCIAL

## 2024-03-20 DIAGNOSIS — F43.10 PTSD (POST-TRAUMATIC STRESS DISORDER): ICD-10-CM

## 2024-03-20 DIAGNOSIS — F41.1 GENERALIZED ANXIETY DISORDER: ICD-10-CM

## 2024-03-20 NOTE — TELEPHONE ENCOUNTER
Date of Last Office Visit: 01/29/2024  Date of Next Office Visit: 04/23/2024  No shows since last visit: 0  Cancellations since last visit: 2    Medication requested: gabapentin (NEURONTIN) 400 MG capsule  Date last ordered: 01/29/2024 Qty: 270 Refills: 1     Review of [place] ?: N/A  Lapse in medication adherence greater than 5 days?: NO  Medication refill request verified as identical to current order?: Yes  Result of Last DAM, VPA, Li+ Level, CBC, or Carbamazepine Level (at or since last visit): N/A    Last visit treatment plan:   Return in about 6 weeks (around 3/11/2024) for Medication followup.   1.  Increase ziprasidone to 60 mg in the evening  2.  Continue Methylphenidate 10 mg daily  3.  Continue Lorazepam 0.5 mg as needed for anxiety  4.  Continue wellbutrin  mg daily  5.  Continue gabapentin 1200 mg 3 times daily  []Medication refilled per  Medication Refill in Ambulatory Care  policy.  []Medication unable to be refilled by RN due to criteria not met as indicated below:    []Eligibility - not seen in the last year   []Supervision - no future appointment   []Compliance - no shows, cancellations or lapse in therapy   []Verification - order discrepancy   []Controlled medication   [x]Medication not included in policy   []90-day supply request   [x]Other

## 2024-03-21 RX ORDER — GABAPENTIN 400 MG/1
1200 CAPSULE ORAL 3 TIMES DAILY
Qty: 270 CAPSULE | Refills: 0 | Status: SHIPPED | OUTPATIENT
Start: 2024-03-21 | End: 2024-04-23

## 2024-03-24 ENCOUNTER — MYC MEDICAL ADVICE (OUTPATIENT)
Dept: PSYCHIATRY | Facility: CLINIC | Age: 54
End: 2024-03-24
Payer: COMMERCIAL

## 2024-03-24 DIAGNOSIS — F43.23 ADJUSTMENT DISORDER WITH MIXED ANXIETY AND DEPRESSED MOOD: ICD-10-CM

## 2024-03-25 RX ORDER — BUPROPION HYDROCHLORIDE 300 MG/1
300 TABLET ORAL EVERY MORNING
Qty: 30 TABLET | Refills: 1 | Status: SHIPPED | OUTPATIENT
Start: 2024-03-25 | End: 2024-04-23

## 2024-03-25 NOTE — TELEPHONE ENCOUNTER
Date of Last Office Visit: 1/29/24  Date of Next Office Visit: 4/23/24  No shows since last visit: 0  Cancellations since last visit: 2    Medication requested: buPROPion (WELLBUTRIN XL) 300 MG 24 hr tablet  Date last ordered: 1/29/24 Qty: 30 Refills: 1     [place] of [place] ?: NA    Lapse in medication adherence greater than 5 days?: no  If yes, call patient and gather details: na  Medication refill request verified as identical to current order?: yes  Result of Last DAM, VPA, Li+ Level, CBC, or Carbamazepine Level (at or since last visit): N/A    Last visit treatment plan:     Treatment Plan:        1.  Increase ziprasidone to 60 mg in the evening  2.  Continue Methylphenidate 10 mg daily  3.  Continue Lorazepam 0.5 mg as needed for anxiety  4.  Continue wellbutrin  mg daily  5.  Continue gabapentin 1200 mg 3 times daily     Continue all other medications as reviewed per electronic medical record today.   Safety plan reviewed. To the Emergency Department as needed or call after hours crisis line at [phone] or [phone]. [place] Crisis Text Line. Text [place] to 100690 or Suicide LifeLine Chat: suicidepreventionlifeline.org/chat/  To schedule individual or family therapy, call [facility] at [phone]  Schedule an appointment with me in 6 weeks or sooner as needed.     []Medication refilled per  Medication Refill in Ambulatory Care  policy.  []Medication unable to be refilled by RN due to criteria not met as indicated below:    []Eligibility - not seen in the last year   []Supervision - no future appointment   []Compliance - no shows, cancellations or lapse in therapy   []Verification - order discrepancy   []Controlled medication   []Medication not included in policy   []90-day supply request   []Other

## 2024-03-29 ENCOUNTER — MYC MEDICAL ADVICE (OUTPATIENT)
Dept: PSYCHIATRY | Facility: CLINIC | Age: 54
End: 2024-03-29
Payer: COMMERCIAL

## 2024-03-29 DIAGNOSIS — R42 VERTIGO: ICD-10-CM

## 2024-03-29 RX ORDER — MECLIZINE HYDROCHLORIDE 25 MG/1
25 TABLET ORAL DAILY PRN
Qty: 15 TABLET | Refills: 0 | OUTPATIENT
Start: 2024-03-29

## 2024-03-29 RX ORDER — ONDANSETRON 4 MG/1
4 TABLET, ORALLY DISINTEGRATING ORAL EVERY 8 HOURS PRN
Qty: 10 TABLET | Refills: 0 | OUTPATIENT
Start: 2024-03-29

## 2024-03-29 NOTE — TELEPHONE ENCOUNTER
Prior Authorization Not Needed per Insurance    Medication: BUPROPION HCL ER (XL) 300 MG PO TB24  Insurance Company: [employer] Non-Specialty PA's - Phone [phone] Fax [phone]  Expected CoPay: $    Pharmacy Filling the Rx: [facility] - [place], [place] - [address]  Pharmacy Notified: y  Patient Notified: y    Looking at fax pharmacy send it shows last fill date of 03/04. Called pharmacy and the confirmed that this is just RTS. PA not needed at this time.

## 2024-03-29 NOTE — TELEPHONE ENCOUNTER
"1) Reviewed patient's [employer] message: \"Luis Antonio Gonzales,      Can you please refill my Meclizine and Ondansetron.     Thank you,  ~PAPA~\"    2) Ondansetron was last ordered by Dr. Chung on 8/1/23.       Date of Last Office Visit: 1/29/24  Date of Next Office Visit: 4/23/24  No shows since last visit: 0  Cancellations since last visit: 2    Medication requested: meclizine (ANTIVERT) 25 MG tablet  Date last ordered: 12/26/23 Qty: 15 Refills: 0    Medication requested: ondansetron (ZOFRAN ODT) 4 MG ODT tab  Date last ordered: 8/1/23 Qty: 10 Refills: 0     Lapse in medication adherence greater than 5 days?: prn  If yes, call patient and gather details: na  Medication refill request verified as identical to current order?: yes  Result of Last DAM, VPA, Li+ Level, CBC, or Carbamazepine Level (at or since last visit): N/A    Last visit treatment plan:     Treatment Plan:        1.  Increase ziprasidone to 60 mg in the evening  2.  Continue Methylphenidate 10 mg daily  3.  Continue Lorazepam 0.5 mg as needed for anxiety  4.  Continue wellbutrin  mg daily  5.  Continue gabapentin 1200 mg 3 times daily     Continue all other medications as reviewed per electronic medical record today.   Safety plan reviewed. To the Emergency Department as needed or call after hours crisis line at [phone] or [phone]. [place] Crisis Text Line. Text [place] to 425134 or Suicide LifeLine Chat: suicidepreventionlifeline.org/chat/  To schedule individual or family therapy, call [facility] at [phone]  Schedule an appointment with me in 6 weeks or sooner as needed. Call [facility] at [phone] to schedule.    []Medication refilled per  Medication Refill in Ambulatory Care  policy.  [x]Medication unable to be refilled by RN due to criteria not met as indicated below:    []Eligibility - not seen in the last year   []Supervision - no future appointment   []Compliance - no shows, cancellations or lapse in " therapy   []Verification - order discrepancy   []Controlled medication   [x]Medication not included in policy   []90-day supply request   []Other

## 2024-03-29 NOTE — TELEPHONE ENCOUNTER
Prior Authorization Not Needed per Insurance    Medication: GABAPENTIN 400 MG PO CAPS  Insurance Company: [employer] Non-Specialty PA's - Phone [phone] Fax [phone]  Expected CoPay: $    Pharmacy Filling the Rx: [facility] [place], [place] - [address]  Pharmacy Notified: y  Patient Notified: y - pharmacy to notify    Pa already on file. Looking at fax pharmacy send it shows last fill date of 03/04. Called pharmacy and the confirmed that this is just RTS. PA not needed at this time.

## 2024-04-01 NOTE — TELEPHONE ENCOUNTER
RN received notification from the PA Team that a PA is not needed for this patients Gabapentin and the patient should be able to refill her medication 4/1/24.       RN sent the patient a [employer] message explaining this and instructing the patient to please call her pharmacy regarding this.      Bony Kraus RN on 4/1/2024 at 10:30 AM

## 2024-04-01 NOTE — TELEPHONE ENCOUNTER
RN received notification from the PA Team that a PA is not needed for this patients buPROPion (WELLBUTRIN XL) 300 MG 24 hr tablet and the patient should be able to refill her medication 4/1/24.        RN sent the patient a [employer] message explaining this and instructing the patient to please call her pharmacy regarding this.       Bony Kraus RN on 4/1/2024 at 10:30 AM

## 2024-04-03 ENCOUNTER — MYC REFILL (OUTPATIENT)
Dept: PSYCHIATRY | Facility: CLINIC | Age: 54
End: 2024-04-03
Payer: COMMERCIAL

## 2024-04-03 DIAGNOSIS — R42 VERTIGO: ICD-10-CM

## 2024-04-03 RX ORDER — MECLIZINE HYDROCHLORIDE 25 MG/1
25 TABLET ORAL DAILY PRN
Qty: 15 TABLET | Refills: 0 | OUTPATIENT
Start: 2024-04-03

## 2024-04-03 NOTE — TELEPHONE ENCOUNTER
"1) Reviewed refill request for meclizine (ANTIVERT) 25 MG tablet     2) This was denied by Janet Álvarez on 3/29/24 due to \"Originating/Specialty Provider to approve.\"    ABISAI VELAZQUEZ RN on 4/3/2024 at 4:19 PM    "

## 2024-04-04 DIAGNOSIS — F31.12 BIPOLAR AFFECTIVE DISORDER, MANIC, MODERATE (H): ICD-10-CM

## 2024-04-04 NOTE — TELEPHONE ENCOUNTER
"      Refill request r'cd from [facility] via fax for 30 - day supply of buPROPion (WELLBUTRIN XL) 150 MG  denied as pt has supply to reach appt on 4/23/24 .   This medication was last prescribed on  2/28/24 for Qty 30 with 1 refill(s).       Disp Refills Start End SEAMUS   buPROPion (WELLBUTRIN XL) 150 MG 24 hr tablet 30 tablet 1 2/28/2024 -- No   Sig - Route: Take 1 tablet (150 mg) by mouth every morning With 300 mg for a total of 450 mg daily - Oral   Sent to pharmacy as: buPROPion HCl ER (XL) 150 MG Oral Tablet Extended Release 24 Hour (WELLBUTRIN XL)   Class: E-Prescribe        Next f/u: 4/23/24     Faxed \"not authorized\" back to pharmacy with reason.                    "

## 2024-04-09 ENCOUNTER — MYC REFILL (OUTPATIENT)
Dept: FAMILY MEDICINE | Facility: CLINIC | Age: 54
End: 2024-04-09
Payer: COMMERCIAL

## 2024-04-09 ENCOUNTER — MYC REFILL (OUTPATIENT)
Dept: PSYCHIATRY | Facility: CLINIC | Age: 54
End: 2024-04-09
Payer: COMMERCIAL

## 2024-04-09 DIAGNOSIS — F43.23 ADJUSTMENT DISORDER WITH MIXED ANXIETY AND DEPRESSED MOOD: ICD-10-CM

## 2024-04-09 DIAGNOSIS — M79.7 FIBROMYALGIA: ICD-10-CM

## 2024-04-09 DIAGNOSIS — F31.12 BIPOLAR AFFECTIVE DISORDER, MANIC, MODERATE (H): ICD-10-CM

## 2024-04-09 DIAGNOSIS — E03.9 HYPOTHYROIDISM, UNSPECIFIED TYPE: ICD-10-CM

## 2024-04-09 RX ORDER — LEVOTHYROXINE SODIUM 100 UG/1
100 TABLET ORAL DAILY
Qty: 90 TABLET | Refills: 0 | OUTPATIENT
Start: 2024-04-09

## 2024-04-09 RX ORDER — BUPROPION HYDROCHLORIDE 150 MG/1
150 TABLET ORAL EVERY MORNING
Qty: 30 TABLET | Refills: 1 | OUTPATIENT
Start: 2024-04-09

## 2024-04-09 RX ORDER — BUPROPION HYDROCHLORIDE 300 MG/1
300 TABLET ORAL EVERY MORNING
Qty: 30 TABLET | Refills: 1 | OUTPATIENT
Start: 2024-04-09

## 2024-04-09 RX ORDER — MULTIVITAMIN
1 TABLET ORAL DAILY
Qty: 90 TABLET | Refills: 3 | OUTPATIENT
Start: 2024-04-09

## 2024-04-09 RX ORDER — LORAZEPAM 0.5 MG/1
0.5 TABLET ORAL DAILY PRN
Qty: 30 TABLET | Refills: 0 | Status: SHIPPED | OUTPATIENT
Start: 2024-04-09 | End: 2024-05-13

## 2024-04-09 NOTE — TELEPHONE ENCOUNTER
I am no longer following with this patient. She needs to see her new PCP in clinic for a visit.     Simran Barreto PA-C

## 2024-04-09 NOTE — TELEPHONE ENCOUNTER
Dr. Porras last filled the medication for patient on 1/18/24. Will route to him to see if he would like it filled or if patient needs a visit. Last seen by him on 12/22/23.     Med failed protocol d/t elevated TSH.     TSH   Date Value Ref Range Status   01/19/2024 13.54 (H) 0.30 - 4.20 uIU/mL Final   08/11/2021 2.18 0.40 - 4.00 mU/L Final   06/15/2021 9.31 (H) 0.40 - 4.00 mU/L Final     Shanon Epps RN on 4/9/2024 at 2:06 PM

## 2024-04-09 NOTE — TELEPHONE ENCOUNTER
See other encounter from today 4/9/24. Too soon for a refill.   The bupropion 300 mg was ordered on 3/25/24 with 1 additional refill.     Ijeoma Miller RN on 4/9/2024 at 3:45 PM

## 2024-04-09 NOTE — TELEPHONE ENCOUNTER
Received refill request for lorazepam but also bupropion  mg and 150 mg in a different encounter.   The bupropion 300 mg was ordered on 3/25/24 with 1 additional refill. The Bupropion  was ordered on 2/28/24 with 1 additional refill. Which is a little too soon for a refill.     Date of Last Office Visit: 1/29/24  Date of Next Office Visit: 4/23/24  No shows since last visit: 0  Cancellations since last visit: 0    Medication requested: LORazepam (ATIVAN) 0.5 MG tablet  Date last ordered: 3/1/24 Qty: 30 Refills: 0     Review of [place] ?: No, not a delegate for provider    Lapse in medication adherence greater than 5 days?: No, patient out  If yes, call patient and gather details: na  Medication refill request verified as identical to current order?: yes  Result of Last DAM, VPA, Li+ Level, CBC, or Carbamazepine Level (at or since last visit): N/A    Last visit treatment plan: 1.  Increase ziprasidone to 60 mg in the evening  2.  Continue Methylphenidate 10 mg daily  3.  Continue Lorazepam 0.5 mg as needed for anxiety  4.  Continue wellbutrin  mg daily  5.  Continue gabapentin 1200 mg 3 times daily    []Medication refilled per  Medication Refill in Ambulatory Care  policy.  [x]Medication unable to be refilled by RN due to criteria not met as indicated below:    []Eligibility - not seen in the last year   []Supervision - no future appointment   []Compliance - no shows, cancellations or lapse in therapy   []Verification - order discrepancy   [x]Controlled medication   []Medication not included in policy   []90-day supply request   []Other

## 2024-04-09 NOTE — TELEPHONE ENCOUNTER
Buproprion  is too soon to refill. Ordered on 2/28/24 with 1 refill.   See other encounter from today    Ijeoma Miller RN on 4/9/2024 at 3:47 PM

## 2024-04-10 RX ORDER — LEVOTHYROXINE SODIUM 100 UG/1
100 TABLET ORAL DAILY
Qty: 90 TABLET | Refills: 0 | Status: SHIPPED | OUTPATIENT
Start: 2024-04-10 | End: 2024-09-21

## 2024-04-10 NOTE — TELEPHONE ENCOUNTER
Refilled. Does need another TSH. We'll check every 2 mo until back in range. Has she been taking the 100mcg?

## 2024-04-16 RX ORDER — BUPROPION HYDROCHLORIDE 150 MG/1
150 TABLET ORAL EVERY MORNING
Qty: 30 TABLET | Refills: 1 | Status: SHIPPED | OUTPATIENT
Start: 2024-04-16 | End: 2024-04-23

## 2024-04-16 NOTE — TELEPHONE ENCOUNTER
Pt called back stating she has 300mg bupropion, but NOT the 150mg.  Please call her to discuss if there are any questions. [phone]

## 2024-04-16 NOTE — TELEPHONE ENCOUNTER
RN received a phone call from this patient.  The patient indicated that she is currently out of her buPROPion (WELLBUTRIN XL) 150 MG 24 hr tablet.  She is requesting that this medication be filled today and not future dated.      Bony Kraus RN on 4/16/2024 at 8:48 AM

## 2024-04-16 NOTE — TELEPHONE ENCOUNTER
Patient should have enough medication until around 4/24/24.  Provider please review and future date the medication if you feel appropriate.      Date of Last Office Visit: 1/29/24  Date of Next Office Visit: 4/23/24  No shows since last visit: No  Cancellations since last visit: 2/16/24 3/11/24    Medication requested: buPROPion (WELLBUTRIN XL) 150 MG 24 hr tablet  Date last ordered: 2/28/24 Qty: 30 Refills: 1      Lapse in medication adherence greater than 5 days?: No  If yes, call patient and gather details: na  Medication refill request verified as identical to current order?: yes  Result of Last DAM, VPA, Li+ Level, CBC, or Carbamazepine Level (at or since last visit): N/A    Last visit treatment plan:     Treatment Plan:        1.  Increase ziprasidone to 60 mg in the evening  2.  Continue Methylphenidate 10 mg daily  3.  Continue Lorazepam 0.5 mg as needed for anxiety  4.  Continue wellbutrin  mg daily  5.  Continue gabapentin 1200 mg 3 times daily     Continue all other medications as reviewed per electronic medical record today.   Safety plan reviewed. To the Emergency Department as needed or call after hours crisis line at [phone] or [phone]. [place] Crisis Text Line. Text [place] to 564026 or Suicide LifeLine Chat: suicidepreventionlifeline.org/chat/  To schedule individual or family therapy, call [facility] at [phone]  Schedule an appointment with me in 6 weeks or sooner as needed. Call [facility] at [phone] to schedule.  Follow up with primary care provider as planned or for acute medical concerns.  Call the psychiatric nurse line with medication questions or concerns at [phone]  MyChart may be used to communicate with your provider, but this is not intended to be used for emergencies.    []Medication refilled per  Medication Refill in Ambulatory Care  policy.  [x]Medication unable to be refilled by RN due to criteria not met as indicated  below:    []Eligibility - not seen in the last year   []Supervision - no future appointment   []Compliance - no shows, cancellations or lapse in therapy   []Verification - order discrepancy   []Controlled medication   [x]Medication not included in policy   []90-day supply request   []Other

## 2024-04-18 ENCOUNTER — HOSPITAL ENCOUNTER (EMERGENCY)
Facility: CLINIC | Age: 54
Discharge: HOME | End: 2024-04-18
Attending: STUDENT IN AN ORGANIZED HEALTH CARE EDUCATION/TRAINING PROGRAM | Admitting: STUDENT IN AN ORGANIZED HEALTH CARE EDUCATION/TRAINING PROGRAM
Payer: COMMERCIAL

## 2024-04-18 VITALS
OXYGEN SATURATION: 100 % | DIASTOLIC BLOOD PRESSURE: 93 MMHG | RESPIRATION RATE: 20 BRPM | TEMPERATURE: 97.7 F | SYSTOLIC BLOOD PRESSURE: 122 MMHG | HEART RATE: 96 BPM

## 2024-04-18 DIAGNOSIS — F41.0 ANXIETY ATTACK: ICD-10-CM

## 2024-04-18 DIAGNOSIS — R79.89 ELEVATED TSH: ICD-10-CM

## 2024-04-18 LAB
ALBUMIN SERPL BCG-MCNC: 4.9 G/DL (ref 3.5–5.2)
ALP SERPL-CCNC: 119 U/L (ref 40–150)
ALT SERPL W P-5'-P-CCNC: 45 U/L (ref 0–50)
ANION GAP SERPL CALCULATED.3IONS-SCNC: 16 MMOL/L (ref 7–15)
AST SERPL W P-5'-P-CCNC: 27 U/L (ref 0–45)
BASOPHILS # BLD AUTO: 0.1 10E3/UL (ref 0–0.2)
BASOPHILS NFR BLD AUTO: 1 %
BILIRUB SERPL-MCNC: 0.7 MG/DL
BUN SERPL-MCNC: 9.2 MG/DL (ref 6–20)
CALCIUM SERPL-MCNC: 10.3 MG/DL (ref 8.6–10)
CHLORIDE SERPL-SCNC: 101 MMOL/L (ref 98–107)
CREAT SERPL-MCNC: 0.85 MG/DL (ref 0.51–0.95)
DEPRECATED HCO3 PLAS-SCNC: 21 MMOL/L (ref 22–29)
EGFRCR SERPLBLD CKD-EPI 2021: 81 ML/MIN/1.73M2
EOSINOPHIL # BLD AUTO: 0.5 10E3/UL (ref 0–0.7)
EOSINOPHIL NFR BLD AUTO: 6 %
ERYTHROCYTE [DISTWIDTH] IN BLOOD BY AUTOMATED COUNT: 13.2 % (ref 10–15)
GLUCOSE SERPL-MCNC: 137 MG/DL (ref 70–99)
HCT VFR BLD AUTO: 39.1 % (ref 35–47)
HGB BLD-MCNC: 12.9 G/DL (ref 11.7–15.7)
IMM GRANULOCYTES # BLD: 0 10E3/UL
IMM GRANULOCYTES NFR BLD: 0 %
LYMPHOCYTES # BLD AUTO: 1.2 10E3/UL (ref 0.8–5.3)
LYMPHOCYTES NFR BLD AUTO: 13 %
MAGNESIUM SERPL-MCNC: 1.8 MG/DL (ref 1.7–2.3)
MCH RBC QN AUTO: 30.8 PG (ref 26.5–33)
MCHC RBC AUTO-ENTMCNC: 33 G/DL (ref 31.5–36.5)
MCV RBC AUTO: 93 FL (ref 78–100)
MONOCYTES # BLD AUTO: 1 10E3/UL (ref 0–1.3)
MONOCYTES NFR BLD AUTO: 10 %
NEUTROPHILS # BLD AUTO: 6.7 10E3/UL (ref 1.6–8.3)
NEUTROPHILS NFR BLD AUTO: 71 %
NRBC # BLD AUTO: 0 10E3/UL
NRBC BLD AUTO-RTO: 0 /100
PLATELET # BLD AUTO: 403 10E3/UL (ref 150–450)
POTASSIUM SERPL-SCNC: 3.7 MMOL/L (ref 3.4–5.3)
PROT SERPL-MCNC: 7.7 G/DL (ref 6.4–8.3)
RBC # BLD AUTO: 4.19 10E6/UL (ref 3.8–5.2)
SODIUM SERPL-SCNC: 138 MMOL/L (ref 135–145)
T4 FREE SERPL-MCNC: 1.12 NG/DL (ref 0.9–1.7)
TROPONIN T SERPL HS-MCNC: <6 NG/L
TSH SERPL DL<=0.005 MIU/L-ACNC: 9.15 UIU/ML (ref 0.3–4.2)
WBC # BLD AUTO: 9.5 10E3/UL (ref 4–11)

## 2024-04-18 PROCEDURE — 80053 COMPREHEN METABOLIC PANEL: CPT | Performed by: STUDENT IN AN ORGANIZED HEALTH CARE EDUCATION/TRAINING PROGRAM

## 2024-04-18 PROCEDURE — 84439 ASSAY OF FREE THYROXINE: CPT | Performed by: STUDENT IN AN ORGANIZED HEALTH CARE EDUCATION/TRAINING PROGRAM

## 2024-04-18 PROCEDURE — 99284 EMERGENCY DEPT VISIT MOD MDM: CPT | Performed by: STUDENT IN AN ORGANIZED HEALTH CARE EDUCATION/TRAINING PROGRAM

## 2024-04-18 PROCEDURE — 258N000003 HC RX IP 258 OP 636: Performed by: STUDENT IN AN ORGANIZED HEALTH CARE EDUCATION/TRAINING PROGRAM

## 2024-04-18 PROCEDURE — 36415 COLL VENOUS BLD VENIPUNCTURE: CPT | Performed by: STUDENT IN AN ORGANIZED HEALTH CARE EDUCATION/TRAINING PROGRAM

## 2024-04-18 PROCEDURE — 85025 COMPLETE CBC W/AUTO DIFF WBC: CPT | Performed by: STUDENT IN AN ORGANIZED HEALTH CARE EDUCATION/TRAINING PROGRAM

## 2024-04-18 PROCEDURE — 96374 THER/PROPH/DIAG INJ IV PUSH: CPT | Performed by: STUDENT IN AN ORGANIZED HEALTH CARE EDUCATION/TRAINING PROGRAM

## 2024-04-18 PROCEDURE — 250N000011 HC RX IP 250 OP 636: Performed by: STUDENT IN AN ORGANIZED HEALTH CARE EDUCATION/TRAINING PROGRAM

## 2024-04-18 PROCEDURE — 99285 EMERGENCY DEPT VISIT HI MDM: CPT | Mod: 25 | Performed by: STUDENT IN AN ORGANIZED HEALTH CARE EDUCATION/TRAINING PROGRAM

## 2024-04-18 PROCEDURE — 84484 ASSAY OF TROPONIN QUANT: CPT | Performed by: STUDENT IN AN ORGANIZED HEALTH CARE EDUCATION/TRAINING PROGRAM

## 2024-04-18 PROCEDURE — 84443 ASSAY THYROID STIM HORMONE: CPT | Performed by: STUDENT IN AN ORGANIZED HEALTH CARE EDUCATION/TRAINING PROGRAM

## 2024-04-18 PROCEDURE — 83735 ASSAY OF MAGNESIUM: CPT | Performed by: STUDENT IN AN ORGANIZED HEALTH CARE EDUCATION/TRAINING PROGRAM

## 2024-04-18 RX ORDER — LORAZEPAM 2 MG/ML
1 INJECTION INTRAMUSCULAR ONCE
Status: COMPLETED | OUTPATIENT
Start: 2024-04-18 | End: 2024-04-18

## 2024-04-18 RX ORDER — ONDANSETRON 4 MG/1
4 TABLET, ORALLY DISINTEGRATING ORAL EVERY 8 HOURS PRN
Qty: 10 TABLET | Refills: 0 | Status: SHIPPED | OUTPATIENT
Start: 2024-04-18 | End: 2024-04-21

## 2024-04-18 RX ADMIN — SODIUM CHLORIDE 1000 ML: 9 INJECTION, SOLUTION INTRAVENOUS at 04:04

## 2024-04-18 RX ADMIN — LORAZEPAM 1 MG: 2 INJECTION INTRAMUSCULAR; INTRAVENOUS at 04:04

## 2024-04-18 ASSESSMENT — ACTIVITIES OF DAILY LIVING (ADL)
ADLS_ACUITY_SCORE: 36
ADLS_ACUITY_SCORE: 36

## 2024-04-18 NOTE — Clinical Note
Magnolia Gonzáles was seen and treated in our emergency department on 4/18/2024.  She may return to work on 04/20/2024.       If you have any questions or concerns, please don't hesitate to call.      Vance Quintanilla MD

## 2024-04-18 NOTE — ED PROVIDER NOTES
"  History     Chief Complaint   Patient presents with    Anxiety     HPI  Magnolia Gonzáles is a 54 year old female with history of anxiety, depression, PTSD, bipolar, nondependent alcohol abuse who presents for evaluation of anxiety.  Patient reports significantly worsened anxiety over the past few days.  She also reports having a few episodes of nonbloody loose stools and some vomiting since yesterday.  No known sick contacts.  She expressed several different recent medical complaints including chronic back pain and a sensation that she is \"getting shorter\" due to issues with her leg.  None of these complaints are new for her, but she also endorses significant stress over the past few weeks and collectively her issues have been causing her increasing anxiety.  Tonight while out on a walk she [place] EMS [place] due to severe anxiety and panic.  She was given Zyprexa and with no improvement of her symptoms.  In emergency department she again described some of the above issues, and aside from the nausea/vomiting yesterday, none of which are new.  She is tachycardic on arrival and has somewhat pressured speech.  Patient denies any thoughts of harming herself/others, substance use.  She also has a therapist that she sees regularly.  She denies having any fever, cough or cold symptoms, chest pain or palpitations, weight loss, night sweats, changes in bowel or urinary habits, other complaints today.    Allergies:  Allergies   Allergen Reactions    Serotonin Reuptake Inhibitors (Ssris) Rash    Zoloft [Sertraline] Rash       Problem List:    Patient Active Problem List    Diagnosis Date Noted    Generalized anxiety disorder 07/29/2020     Priority: Medium    Major depressive disorder, recurrent episode, mild (H24) 07/19/2017     Priority: Medium    PTSD (post-traumatic stress disorder) 07/19/2017     Priority: Medium    Bipolar affective disorder, manic, moderate (H) 09/23/2016     Priority: Medium     " "Patient refutes this. 10/5/2020      Overdose of Valium, intentional self-harm, subsequent encounter 05/29/2016     Priority: Medium    Carpal tunnel syndrome of left wrist 01/13/2016     Priority: Medium    Adjustment disorder with mixed anxiety and depressed mood 01/06/2016     Priority: Medium    Acquired hypothyroidism 01/06/2016     Priority: Medium    Bilateral carpal tunnel syndrome 01/06/2016     Priority: Medium    Social phobia 12/26/2014     Priority: Medium    Panic disorder without agoraphobia 12/26/2014     Priority: Medium    History of alcohol dependence (H) 07/22/2014     Priority: Medium     Overview:   Binge drinking disorder. Sober since 5/2016      Nondependent alcohol abuse, episodic drinking behavior 11/17/2005     Priority: Medium        Past Medical History:    Past Medical History:   Diagnosis Date    Anxiety     Depressive disorder     Hypothyroid     Suicidal ideation 7/19/2015       Past Surgical History:    Past Surgical History:   Procedure Laterality Date    ORTHOPEDIC SURGERY         Family History:    Family History   Problem Relation Age of Onset    Depression/Anxiety Mother     Alcohol/Drug Father         Pancreatic CA       Social History:  Marital Status:  Single [1]  Social History     Tobacco Use    Smoking status: Never     Passive exposure: Never    Smokeless tobacco: Never   Vaping Use    Vaping status: Never Used   Substance Use Topics    Alcohol use: Yes     Alcohol/week: 0.0 standard drinks of alcohol     Comment: \"once every three months\"    Drug use: No        Medications:    ondansetron (ZOFRAN ODT) 4 MG ODT tab  acetaminophen (TYLENOL) 500 MG tablet  buPROPion (WELLBUTRIN XL) 150 MG 24 hr tablet  buPROPion (WELLBUTRIN XL) 300 MG 24 hr tablet  gabapentin (NEURONTIN) 400 MG capsule  hydrocortisone (CORTAID) 1 % external ointment  ibuprofen (ADVIL/MOTRIN) 200 MG capsule  levothyroxine (SYNTHROID/LEVOTHROID) 100 MCG tablet  LORazepam (ATIVAN) 0.5 MG tablet  meclizine " (ANTIVERT) 25 MG tablet  methylphenidate (RITALIN) 10 MG tablet  multivitamin (ONE-DAILY) tablet  tiZANidine (ZANAFLEX) 4 MG tablet  triamcinolone (ARISTOCORT HP) 0.5 % external cream  ziprasidone (GEODON) 60 MG capsule      Review of Systems   All other systems reviewed and are negative.  See HPI.    Physical Exam   BP: (!) 150/99  Pulse: 113  Temp: 97.7  F (36.5  C)  Resp: 22  SpO2: 100 %    Physical Exam  Vitals and nursing note reviewed.   Constitutional:       General: She is in acute distress.      Appearance: Normal appearance. She is not diaphoretic.      Comments: Anxious, pressured and tangential speech, but she is redirectable and otherwise seems to be answering questions appropriately.   HENT:      Head: Atraumatic.      Mouth/Throat:      Mouth: Mucous membranes are moist.      Pharynx: Oropharynx is clear.   Eyes:      General: No scleral icterus.     Extraocular Movements: Extraocular movements intact.      Conjunctiva/sclera: Conjunctivae normal.      Pupils: Pupils are equal, round, and reactive to light.   Cardiovascular:      Rate and Rhythm: Regular rhythm. Tachycardia present.      Pulses: Normal pulses.      Heart sounds: Normal heart sounds. No murmur heard.  Pulmonary:      Effort: No respiratory distress.      Breath sounds: Normal breath sounds. No wheezing.   Abdominal:      General: Abdomen is flat.      Tenderness: There is no abdominal tenderness.   Musculoskeletal:         General: Normal range of motion.      Cervical back: Normal range of motion and neck supple.      Right lower leg: No edema.      Left lower leg: No edema.   Skin:     General: Skin is warm.      Capillary Refill: Capillary refill takes less than 2 seconds.      Coloration: Skin is not pale.      Findings: No rash.   Neurological:      General: No focal deficit present.      Mental Status: She is alert and oriented to person, place, and time.      Cranial Nerves: No cranial nerve deficit.      Sensory: No sensory  deficit.      Motor: No weakness.      Coordination: Coordination normal.      Gait: Gait normal.   Psychiatric:      Comments: Very anxious and elevated.  Pressured speech.  Tangential speech.  Redirectable and overall pleasant, oriented.  She adamantly denies any thoughts of harming herself or others, denies hallucinations.         ED Course        Procedures              Results for orders placed or performed during the hospital encounter of 04/18/24 (from the past 24 hour(s))   CBC with platelets differential    Narrative    The following orders were created for panel order CBC with platelets differential.  Procedure                               Abnormality         Status                     ---------                               -----------         ------                     CBC with platelets and d...[[id number]]                      Final result                 Please view results for these tests on the individual orders.   Comprehensive metabolic panel   Result Value Ref Range    Sodium 138 135 - 145 mmol/L    Potassium 3.7 3.4 - 5.3 mmol/L    Carbon Dioxide (CO2) 21 (L) 22 - 29 mmol/L    Anion Gap 16 (H) 7 - 15 mmol/L    Urea Nitrogen 9.2 6.0 - 20.0 mg/dL    Creatinine 0.85 0.51 - 0.95 mg/dL    GFR Estimate 81 >60 mL/min/1.73m2    Calcium 10.3 (H) 8.6 - 10.0 mg/dL    Chloride 101 98 - 107 mmol/L    Glucose 137 (H) 70 - 99 mg/dL    Alkaline Phosphatase 119 40 - 150 U/L    AST 27 0 - 45 U/L    ALT 45 0 - 50 U/L    Protein Total 7.7 6.4 - 8.3 g/dL    Albumin 4.9 3.5 - 5.2 g/dL    Bilirubin Total 0.7 <=1.2 mg/dL   Troponin T, High Sensitivity   Result Value Ref Range    Troponin T, High Sensitivity <6 <=14 ng/L   TSH with free T4 reflex   Result Value Ref Range    TSH 9.15 (H) 0.30 - 4.20 uIU/mL   Magnesium   Result Value Ref Range    Magnesium 1.8 1.7 - 2.3 mg/dL   CBC with platelets and differential   Result Value Ref Range    WBC Count 9.5 4.0 - 11.0 10e3/uL    RBC Count 4.19 3.80 - 5.20 10e6/uL     Hemoglobin 12.9 11.7 - 15.7 g/dL    Hematocrit 39.1 35.0 - 47.0 %    MCV 93 78 - 100 fL    MCH 30.8 26.5 - 33.0 pg    MCHC 33.0 31.5 - 36.5 g/dL    RDW 13.2 10.0 - 15.0 %    Platelet Count 403 150 - 450 10e3/uL    % Neutrophils 71 %    % Lymphocytes 13 %    % Monocytes 10 %    % Eosinophils 6 %    % Basophils 1 %    % Immature Granulocytes 0 %    NRBCs per 100 WBC 0 <1 /100    Absolute Neutrophils 6.7 1.6 - 8.3 10e3/uL    Absolute Lymphocytes 1.2 0.8 - 5.3 10e3/uL    Absolute Monocytes 1.0 0.0 - 1.3 10e3/uL    Absolute Eosinophils 0.5 0.0 - 0.7 10e3/uL    Absolute Basophils 0.1 0.0 - 0.2 10e3/uL    Absolute Immature Granulocytes 0.0 <=0.4 10e3/uL    Absolute NRBCs 0.0 10e3/uL   T4 free   Result Value Ref Range    Free T4 1.12 0.90 - 1.70 ng/dL       Medications   sodium chloride 0.9% BOLUS 1,000 mL (0 mLs Intravenous Stopped 4/18/24 6246)   LORazepam (ATIVAN) injection 1 mg (1 mg Intravenous $Given 4/18/24 8409)       Assessments & Plan (with Medical Decision Making)     I have reviewed the nursing notes.    I have reviewed the findings, diagnosis, plan and need for follow up with the patient.    Medical Decision Making  Magnolia Gonzáles is a 54 year old female with history of anxiety, depression, PTSD, bipolar, nondependent alcohol abuse who presents for evaluation of anxiety.  Patient was hypertensive and tachycardic on arrival, though was also visibly anxious.  Her exam is most significant for elevated mood, pressured and somewhat tangential speech.  Physically she appears well, mucous membranes are moist, heart rhythm is regular and lung sounds clear, abdomen is entirely nontender.  Her presentation seems  mostly related to anxiety and potentially some component of bipolar/lara.  Her mood is elevated but she is also redirectable and able to articulate that added stress in her life has triggered increasing anxiety related to multiple chronic medical issues that she has been following on an outpatient  basis.  Patient denied having any thoughts of harming herself/others and she does not appear to be hallucinating.  Aside from an episode of vomiting yesterday and some loose stools, I do not think any of those things are related to her current presentation and I doubt her symptoms have a metabolic cause.  However, she does have a history of thyroid issues and in the setting of vomiting/diarrhea, may be dehydrated.  Patient had already received Zyprexa from EMS without any significant improvement of symptoms.  Her speech and anxiety improved tremendously after single dose of Ativan.  Tachycardia also resolved after liter of fluids.  Labs were mostly unremarkable; no leukocytosis or anemia, minimal anion gap and hyperglycemia present with otherwise normal electrolytes.  Troponin was negative.  TSH was elevated but free T4 levels were within normal limits.  Patient felt improved after the above therapies and seems much more coherent during conversation.  She feels comfortable with outpatient psychiatry care and again denies having any thoughts of self-harm.  She is also clinically sober and was able to arrange for a ride home.  I will provide a prescription for Zofran to help with the intermittent nausea and proceed with it.  Advised that she follow-up with her primary care doctor and therapist for reevaluation.  Patient agrees to return to the emergency department sooner for any new or acutely worsening symptoms.    Discharge Medication List as of 4/18/2024  5:40 AM          Final diagnoses:   Anxiety attack   Elevated TSH       4/18/2024   [facility] EMERGENCY DEPT       Vance Quintanilla MD  04/18/24 6098

## 2024-04-18 NOTE — DISCHARGE INSTRUCTIONS
I think your symptoms earlier today were due to anxiety and a panic attack.  Your labs today were very reassuring.  I will send you home with a new prescription for a nausea medication.  Take this as needed for nausea and drink plenty of fluids over the next several days.  Follow-up with your primary care doctor regarding any residual abdominal discomfort and referral to GI specialists.  Return to the emergency department sooner for any new or acutely worsening symptoms.

## 2024-04-18 NOTE — ED TRIAGE NOTES
"Patient presents by EMS for concerns of anxiety. EMS reports she called them from [place] after she was out walking. She had \"a multitude of complaints\" at that time that then led to her feeling anxious. Was given 10mg of ODT zyprexa en route and patient reports \"I don't feel it at all\". In triage states she \"wants to get up and walk around because she does not feel good\". Requesting medications in triage.     Triage Assessment (Adult)       Row Name 04/18/24 0341          Triage Assessment    Airway WDL WDL        Respiratory WDL    Respiratory WDL WDL        Skin Circulation/Temperature WDL    Skin Circulation/Temperature WDL WDL        Cardiac WDL    Cardiac WDL X;rhythm     Pulse Rate & Regularity tachycardic        Peripheral/Neurovascular WDL    Peripheral Neurovascular WDL WDL        Cognitive/Neuro/Behavioral WDL    Cognitive/Neuro/Behavioral WDL X;mood/behavior     Level of Consciousness alert     Arousal Level opens eyes spontaneously     Orientation oriented x 4     Speech clear;spontaneous;logical     Mood/Behavior anxious;cooperative;restless        Pupils (CN II)    Pupil PERRLA yes     Pupil Size Left 2 mm     Pupil Size Right 2 mm        [place] Coma Scale    Best Eye Response 4-->(E4) spontaneous     Best Motor Response 6-->(M6) obeys commands     Best Verbal Response 5-->(V5) oriented     Alexis Coma Scale Score 15                     "

## 2024-04-23 ENCOUNTER — VIRTUAL VISIT (OUTPATIENT)
Dept: PSYCHIATRY | Facility: CLINIC | Age: 54
End: 2024-04-23
Payer: COMMERCIAL

## 2024-04-23 ENCOUNTER — MYC MEDICAL ADVICE (OUTPATIENT)
Dept: PSYCHIATRY | Facility: CLINIC | Age: 54
End: 2024-04-23
Payer: COMMERCIAL

## 2024-04-23 VITALS — HEIGHT: 61 IN | WEIGHT: 153 LBS | BODY MASS INDEX: 28.89 KG/M2

## 2024-04-23 DIAGNOSIS — F41.1 GENERALIZED ANXIETY DISORDER: ICD-10-CM

## 2024-04-23 DIAGNOSIS — F43.10 PTSD (POST-TRAUMATIC STRESS DISORDER): Primary | ICD-10-CM

## 2024-04-23 DIAGNOSIS — F43.10 PTSD (POST-TRAUMATIC STRESS DISORDER): ICD-10-CM

## 2024-04-23 DIAGNOSIS — F43.23 ADJUSTMENT DISORDER WITH MIXED ANXIETY AND DEPRESSED MOOD: ICD-10-CM

## 2024-04-23 DIAGNOSIS — F90.8 ATTENTION DEFICIT HYPERACTIVITY DISORDER (ADHD), OTHER TYPE: ICD-10-CM

## 2024-04-23 DIAGNOSIS — F31.12 BIPOLAR AFFECTIVE DISORDER, MANIC, MODERATE (H): Primary | ICD-10-CM

## 2024-04-23 PROCEDURE — 99214 OFFICE O/P EST MOD 30 MIN: CPT | Mod: 93 | Performed by: NURSE PRACTITIONER

## 2024-04-23 RX ORDER — GABAPENTIN 400 MG/1
1200 CAPSULE ORAL 3 TIMES DAILY
Qty: 270 CAPSULE | Refills: 0 | Status: SHIPPED | OUTPATIENT
Start: 2024-04-23 | End: 2024-05-21

## 2024-04-23 RX ORDER — METHYLPHENIDATE HYDROCHLORIDE 10 MG/1
10 TABLET ORAL DAILY
Qty: 30 TABLET | Refills: 0 | Status: SHIPPED | OUTPATIENT
Start: 2024-04-23 | End: 2024-05-20

## 2024-04-23 RX ORDER — ZIPRASIDONE HYDROCHLORIDE 80 MG/1
80 CAPSULE ORAL
Qty: 30 CAPSULE | Refills: 2 | Status: SHIPPED | OUTPATIENT
Start: 2024-04-23 | End: 2024-05-28

## 2024-04-23 RX ORDER — BUPROPION HYDROCHLORIDE 300 MG/1
300 TABLET ORAL EVERY MORNING
Qty: 30 TABLET | Refills: 1 | Status: SHIPPED | OUTPATIENT
Start: 2024-04-23 | End: 2024-06-28

## 2024-04-23 RX ORDER — BUPROPION HYDROCHLORIDE 150 MG/1
150 TABLET ORAL EVERY MORNING
Qty: 30 TABLET | Refills: 1 | Status: SHIPPED | OUTPATIENT
Start: 2024-04-23 | End: 2024-06-28

## 2024-04-23 ASSESSMENT — PATIENT HEALTH QUESTIONNAIRE - PHQ9
SUM OF ALL RESPONSES TO PHQ QUESTIONS 1-9: 19
SUM OF ALL RESPONSES TO PHQ QUESTIONS 1-9: 19
10. IF YOU CHECKED OFF ANY PROBLEMS, HOW DIFFICULT HAVE THESE PROBLEMS MADE IT FOR YOU TO DO YOUR WORK, TAKE CARE OF THINGS AT HOME, OR GET ALONG WITH OTHER PEOPLE: VERY DIFFICULT

## 2024-04-23 ASSESSMENT — ANXIETY QUESTIONNAIRES
1. FEELING NERVOUS, ANXIOUS, OR ON EDGE: NEARLY EVERY DAY
GAD7 TOTAL SCORE: 20
8. IF YOU CHECKED OFF ANY PROBLEMS, HOW DIFFICULT HAVE THESE MADE IT FOR YOU TO DO YOUR WORK, TAKE CARE OF THINGS AT HOME, OR GET ALONG WITH OTHER PEOPLE?: VERY DIFFICULT
7. FEELING AFRAID AS IF SOMETHING AWFUL MIGHT HAPPEN: NEARLY EVERY DAY
2. NOT BEING ABLE TO STOP OR CONTROL WORRYING: NEARLY EVERY DAY
3. WORRYING TOO MUCH ABOUT DIFFERENT THINGS: NEARLY EVERY DAY
IF YOU CHECKED OFF ANY PROBLEMS ON THIS QUESTIONNAIRE, HOW DIFFICULT HAVE THESE PROBLEMS MADE IT FOR YOU TO DO YOUR WORK, TAKE CARE OF THINGS AT HOME, OR GET ALONG WITH OTHER PEOPLE: VERY DIFFICULT
7. FEELING AFRAID AS IF SOMETHING AWFUL MIGHT HAPPEN: NEARLY EVERY DAY
6. BECOMING EASILY ANNOYED OR IRRITABLE: NEARLY EVERY DAY
4. TROUBLE RELAXING: NEARLY EVERY DAY
GAD7 TOTAL SCORE: 20
5. BEING SO RESTLESS THAT IT IS HARD TO SIT STILL: MORE THAN HALF THE DAYS
GAD7 TOTAL SCORE: 20

## 2024-04-23 ASSESSMENT — PAIN SCALES - GENERAL: PAINLEVEL: SEVERE PAIN (6)

## 2024-04-23 NOTE — PATIENT INSTRUCTIONS
"Patient Education   The Panel Psychiatry Program  What to Expect  Here's what to expect in the Panel Psychiatry Program.   About the program  You'll be meeting with a psychiatric doctor to check your mental health. A psychiatric doctor helps you deal with troubling thoughts and feelings by giving you medicine. They'll make sure you know the plan for your care. You may see them for a long time. When you're feeling better, they may refer you back to seeing your family doctor.   If you have any questions, we'll be glad to talk to you.  About visits  Be open  At your visits, please talk openly about your problems. It may feel hard, but it's the best way for us to help you.  Cancelling visits  If you can't come to your visit, please call us right away at [phone]. If you don't cancel at least 24 hours (1 full day) before your visit, that's \"late cancellation.\"  Not showing up for your visits  Being very late is the same as not showing up. You'll be a \"no show\" if:  You're more than 15 minutes late for a 30-minute (half hour) visit.  You're more than 30 minutes late for a 60-minute (full hour) visit.  If you cancel late or don't show up 2 times within 6 months, we may end your care.  Getting help between visits  If you need help between visits, you can call us Monday to Friday from 8 a.m. to 4:30 p.m. at [phone].  Emergency care  Call 911 or go to the nearest emergency department if your life or someone else's life is in danger.  Call 988 anytime to reach the national Suicide and Crisis hotline.  Medicine refills  To refill your medicine, call your pharmacy. You can also call [facility]'s Behavioral Access at [phone], Monday to Friday, 8 a.m. to 4:30 p.m. It can take 1 to 3 business days to get a refill.   Forms, letters, and tests  You may have papers to fill out, like FMLA, short-term disability, and workability. We can help you with these forms at your visits, but you must have an " appointment. You may need more than 1 visit for this, to be in an intensive therapy program, or both.  Before we can give you medicine for ADHD, we may refer you to get tested for it or confirm it another way.  We may not be able to give you an emotional support animal letter.  We don't do mental health checks ordered by the court.   We don't do mental health testing, but we can refer you to get tested.   Thank you for choosing us for your care.  For informational purposes only. Not to replace the advice of your health care provider. Copyright   2022 [facility] [employer]. All rights reserved. Gorb [id number] - 12/22.       Treatment Plan:      1.  Increase ziprasidone to 80 mg daily with food  2.  Continue methylphenidate 10 mg daily  3.  Continue lorazepam 0.5 mg daily as needed for anxiety  4.  Continue gabapentin 1200 mg 3 times daily for anxiety  5.  Continue bupropion  mg daily for depression  6.  Restart talk therapy when able to for learning skills to manage life stressors and adjustments    Continue all other medical directions per primary care provider.   Continue all other medications as reviewed per electronic medical record today.   Safety plan reviewed. To the Emergency Department as needed or call after hours crisis line at [phone] or [phone]. [place] Crisis Text Line: Text [place] to 839691  or  Suicide LifeLine Chat: suicidepreventionlifeline.org/chat/  To schedule individual or family therapy, call [facility] at [phone].   Schedule an appointment with me in 6 weeks or sooner as needed.  Call [facility] at [phone] to schedule.  Follow up with primary care provider as planned or for acute medical concerns.  Call the psychiatric nurse line with medication questions or concerns at [phone].  MyChart may be used to communicate with your provider, but this is not intended to be used for emergencies.        Crisis Resources   The [facility] is  an adults only unit located at [facility] in [place] is a short term (generally less than 23 hour stay) designed for crisis intervention and stabilization. Pts have the opportunity to meet quickly with a behavioral health team for evaluation in a calm and peaceful therapuetic environment. To be evaluated for admission pts are triaged throught the [facility] ED.      The following hotlines are for both adults and children. The and are open 24 hours a day, 7 days a week unless noted otherwise.        Crisis Lines      Crisis Text Line  Text 787890  You will be connected with a trained live crisis counselor to provide support.        [place] Hotline  1.049.285.1749 [hope]        línea de crisis española  976.632.6387        [facility] Helpline  903.535.7686        National Hope Line  1.314.200.3713 [hope]        National Suicide Prevention Lifeline  Free and confidential support  988 or 1.128.750.TALK [8255]  http://suicidepreventionlifeline.org        The [employer] Project (LGBTQ Youth Crisis Line)  5.720.799.6831  text START to 818-698        's Crisis Line  1.331.765.0315 (Press 1)  or text 830695    [facility] [place] operated by [facility] Mental Health Crisis Response  Within [place], call **CRISIS [**922499] to be connected to a mental health professional who can assist you.        [facility] Crisis  [phone]      [facility] Mobile Crisis  [phone]      [facility] Crisis  [phone]      [facility] Mobile Crisis  [phone] (adults)  [phone] (children)      [facility] Mobile Crisis  [phone] (adults)  [phone] (children)      [facility] Mobile Crisis  [phone]      [facility] Mobile Crisis  [phone]    Community Resources      Fast Tracker  Linking people to mental health and substance use disorder resources  fasttrackAutoquaken.org        [place] Mental Health Warmline  Peer to peer support  5 pm to 9 am 7 days/week  8.730.337.7029   https://mnwitw.org/danay        National [place] on Mental Illness (ADINA)  154.352.8887 or 1.888.ADINA.HELPS  https://namimn.org/        [facility] for Adult Mental [facility]  [facility]   [address]  [phone]        [facility]  Free mental health counseling  https://walkin.org/  612.870.0565 X2    Mental Health Apps      Calm Harm  https://calmharm.co.uk/      My3  https://my3app.org/      [place] Safety Plan  https://www.mysafetyplan.org/

## 2024-04-23 NOTE — NURSING NOTE
Is the patient currently in the state of [place]? YES    Visit mode:TELEPHONE    If the visit is dropped, the patient can be reconnected by: TELEPHONE VISIT: Phone number:   Telephone Information:   Mobile [phone]       Will anyone else be joining the visit? NO  (If patient encounters technical issues they should call [phone])    How would you like to obtain your AVS? MyChart    Are changes needed to the allergy or medication list? No    Are refills needed on medications prescribed by this physician? YES    Reason for visit: RECHECK    Rosaline DARBY

## 2024-04-23 NOTE — PROGRESS NOTES
"Virtual Visit Details    Type of service:  Telephone Visit   Phone call duration: 25 minutes   Originating Location (pt. Location): Home    Distant Location (provider location):  Off-site             Outpatient Psychiatric Progress Note    Name: Magnolia Gonzáles   : 1970                    Primary Care Provider: Kajal Melendez PA-C   Therapist: None    PHQ-9 scores:      10/2/2023    12:25 PM 2023     7:47 AM 2024    12:28 PM   PHQ-9 SCORE   PHQ-9 Total Score MyChart 20 (Severe depression) 20 (Severe depression) 16 (Moderately severe depression)   PHQ-9 Total Score 20 20 16       JULIEN-7 scores:      6/15/2023     8:06 AM 2023    10:29 AM 2024    12:30 PM   JULIEN-7 SCORE   Total Score  19 (severe anxiety) 21 (severe anxiety)   Total Score 20 19 21       Patient Identification:    Patient is a 54 year old year old, single  White Choose not to answer female  who presents for return visit with me.  Patient is currently disabled. Patient attended the session alone. Patient prefers to be called: \" Maryjane\".    Current medications include:   Current Outpatient Medications   Medication Sig Dispense Refill    acetaminophen (TYLENOL) 500 MG tablet Take 1-2 tablets (500-1,000 mg) by mouth every 6 hours as needed for mild pain 90 tablet 3    buPROPion (WELLBUTRIN XL) 150 MG 24 hr tablet Take 1 tablet (150 mg) by mouth every morning With 300 mg for a total of 450 mg daily 30 tablet 1    buPROPion (WELLBUTRIN XL) 300 MG 24 hr tablet Take 1 tablet (300 mg) by mouth every morning 30 tablet 1    gabapentin (NEURONTIN) 400 MG capsule Take 3 capsules (1,200 mg) by mouth 3 times daily 270 capsule 0    hydrocortisone (CORTAID) 1 % external ointment Apply topically 2 times daily Use on face and around the eyes - DO NOT get in the eye 30 g 0    ibuprofen (ADVIL/MOTRIN) 200 MG capsule Take 1-2 capsules (200-400 mg) by mouth every 4 hours as needed for fever 60 capsule 1    levothyroxine (SYNTHROID/LEVOTHROID) " 100 MCG tablet Take 1 tablet (100 mcg) by mouth daily 90 tablet 0    LORazepam (ATIVAN) 0.5 MG tablet Take 1 tablet (0.5 mg) by mouth daily as needed for anxiety 30 tablet 0    meclizine (ANTIVERT) 25 MG tablet Take 1 tablet (25 mg) by mouth daily as needed for dizziness or nausea 15 tablet 0    methylphenidate (RITALIN) 10 MG tablet Take 1 tablet (10 mg) by mouth daily 30 tablet 0    multivitamin (ONE-DAILY) tablet Take 1 tablet by mouth daily 90 tablet 3    tiZANidine (ZANAFLEX) 4 MG tablet Take 1 tablet (4 mg) by mouth 3 times daily as needed for other (pain) 60 tablet 3    triamcinolone (ARISTOCORT HP) 0.5 % external cream Apply topically 2 times daily 454 g 1    ziprasidone (GEODON) 60 MG capsule Take 1 capsule (60 mg) by mouth daily with food 30 capsule 1     No current facility-administered medications for this visit.        The [place] Prescription Monitoring Program has been reviewed and there are no concerns about diversionary activity for controlled substances at this time.      I was able to review most recent Primary Care Provider, specialty provider, and therapy visit notes that I have access to.     Today, patient reports she has been down and lonely - she had to let her dog go.  She is waiting for another dog.  Social security is being reviewed again - yearly assessment.  She spends too much time alone.  She had a panic attack on Thursday - went to ED.  [facility].  She has been having a lot of hallucinations  - one incident she had to run out of  her apartment.  She sees people and devils.  A man on a bike.  She is always scared.  She is eating and sleeping fine.  She has had big toe pain - has had to walk on the side of her foot.  Scoliosis and herniated disks have gotten worse.  She has been shrinking.  She is unable to sit for prolonged periods of time.  She has  had difficulties getting medications refilled at [facility]. It is hard to leave her apartment.  She is fearful to meet people.        has a past medical history of Anxiety, Depressive disorder, Hypothyroid, and Suicidal ideation (7/19/2015).    Social history updates:    She has a  and housing stabilization person.  She has no friends.  She wants to live in the [facility] so  that she can be more self sufficient and would have more things to do.      Substance use updates:    No alcohol use reported  Tobacco use: No    Vital Signs:   There were no vitals taken for this visit.    Labs:    Most recent laboratory results reviewed and no new labs.     Mental Status Examination:  Appearance: awake, alert and moderate distress  Attitude: cooperative  Eye Contact:   Not able to assess  Gait and Station: No dizziness or falls  Psychomotor Behavior:   Not able to assess  Oriented to:  time, person, and place  Attention Span and Concentration:  Normal  Speech:   vtspeech: pressured speech and Speaks: English  Mood:  anxious and depressed  Affect:  intensity is heightened  Associations:  no loose associations  Thought Process:  goal oriented  Thought Content:  no evidence of suicidal ideation or homicidal ideation, auditory hallucinations present, and visual hallucinations present  Recent and Remote Memory:  intact Not formally assessed. No amnesia.  Fund of Knowledge: appropriate  Insight:  good  Judgment: fair  Impulse Control:  fair    Suicide Risk Assessment:  Today Magnolia Gonzáles reports no thoughts to harm themself or others. In addition, there are notable risk factors for self-harm, including single status, anxiety, psychosis, and withdrawing. However, risk is mitigated by history of seeking help when needed, future oriented, denies suicidal intent or plan, and denies homicidal ideation, intent, or plan. Therefore, based on all available evidence including the factors cited above, Magnolia Gonzáles does not appear to be at imminent risk for self-harm, does not meet criteria for a 72-hr hold, and therefore remains appropriate for  ongoing outpatient level of care.  A thorough assessment of risk factors related to suicide and self-harm have been reviewed and are noted above. The patient convincingly denies suicidality on several occasions. Local community safety resources printed and reviewed for patient to use if needed. There was no deceit detected, and the patient presented in a manner that was believable.     DSM5 Diagnosis:  Attention-Deficit/Hyperactivity Disorder  314.01 (F90.8) Other Specified Attention-Deficit / Hyperactiity Disorder  296.89 Bipolar II Disorder With mixed features and moderate  300.02 (F41.1) Generalized Anxiety Disorder  309.81 (F43.10) Posttraumatic Stress Disorder (includes Posttraumatic Stress Disorder for Children 6 Years and Younger)  With dissociative symptoms    Medical comorbidities include:   Patient Active Problem List    Diagnosis Date Noted    Generalized anxiety disorder 07/29/2020     Priority: Medium    Major depressive disorder, recurrent episode, mild (H24) 07/19/2017     Priority: Medium    PTSD (post-traumatic stress disorder) 07/19/2017     Priority: Medium    Bipolar affective disorder, manic, moderate (H) 09/23/2016     Priority: Medium     Patient refutes this. 10/5/2020      Overdose of Valium, intentional self-harm, subsequent encounter 05/29/2016     Priority: Medium    Carpal tunnel syndrome of left wrist 01/13/2016     Priority: Medium    Adjustment disorder with mixed anxiety and depressed mood 01/06/2016     Priority: Medium    Acquired hypothyroidism 01/06/2016     Priority: Medium    Bilateral carpal tunnel syndrome 01/06/2016     Priority: Medium    Social phobia 12/26/2014     Priority: Medium    Panic disorder without agoraphobia 12/26/2014     Priority: Medium    History of alcohol dependence (H) 07/22/2014     Priority: Medium     Overview:   Binge drinking disorder. Sober since 5/2016      Nondependent alcohol abuse, episodic drinking behavior 11/17/2005     Priority: Medium        Assessment:    Magnolia Gonzáles continues to experience visual hallucinations which increase her anxiety.  I increased her dose of ziprasidone to 80 mg daily with food.  Lorazepam continues as needed for anxiety as well as the gabapentin.  She is lonely and isolated so I urged her to restart talk therapy to help her learn skills to manage life stressors and adjustments.  Bupropion continues for depression symptoms.  Methylphenidate is also added as a depression adjunct in addition to helping her be able to focus and stay organized with activity she needs to do to support herself.    Medication side effects and alternatives were reviewed. Health promotion activities recommended and reviewed today. All questions addressed. Education and counseling completed regarding risks and benefits of medications and psychotherapy options.    Treatment Plan:      1.  Increase ziprasidone to 80 mg daily with food  2.  Continue methylphenidate 10 mg daily  3.  Continue lorazepam 0.5 mg daily as needed for anxiety  4.  Continue gabapentin 1200 mg 3 times daily for anxiety  5.  Continue bupropion  mg daily for depression  6.  Restart talk therapy when able to for learning skills to manage life stressors and adjustments    Continue all other medications as reviewed per electronic medical record today.   Safety plan reviewed. To the Emergency Department as needed or call after hours crisis line at [phone] or [phone]. [place] Crisis Text [place]. Text [place] to 190470 or Suicide LifeLine Chat: suicidepreventionlifeline.org/chat/  To schedule individual or family therapy, call [facility] at [phone]  Schedule an appointment with me in 6 weeks or sooner as needed. Call [facility] at [phone] to schedule.  Follow up with primary care provider as planned or for acute medical concerns.  Call the psychiatric nurse line with medication questions or concerns at  [phone]  Kentrellhart may be used to communicate with your provider, but this is not intended to be used for emergencies.        Crisis Resources   The [facility] is an adults only unit located at [facility] in [place] is a short term (generally less than 23 hour stay) designed for crisis intervention and stabilization. Pts have the opportunity to meet quickly with a behavioral health team for evaluation in a calm and peaceful therapuetic environment. To be evaluated for admission pts are triaged throught the [facility] ED.      The following hotlines are for both adults and children. The and are open 24 hours a day, 7 days a week unless noted otherwise.        Crisis Lines      Crisis Text Line  Text 798630  You will be connected with a trained live crisis counselor to provide support.        Gambling Hotline  3.224.577.3166 [hope]        línea de crisis española  981.786.2049        [facility] [employer] Helpline  748.393.6002        National Hope Line  7.190.615.9404 [hope]        National Suicide Prevention Lifeline  Free and confidential support  988 or 1.255.782.TALK [8255]  http://suicidepreventionlifeline.org        The [employer] (LGBTQ Youth Crisis Line)  9.089.782.4599  text START to 366-339        [facility] Crisis Line  4.265.096.4593 (Press 1)  or text 539493    [facility] Mental Health Crisis Response  Within [place], call **CRISIS [**450031] to be connected to a mental health professional who can assist you.        [facility] Crisis  [phone]      [facility] Mobile Crisis  [phone]      [facility] Crisis  [phone]      [facility] Mobile Crisis  [phone] (adults)  [phone] (children)      [facility] Mobile Crisis  [phone] (adults)  [phone] (children)      [facility] Mobile Crisis  [phone]      [facility] Mobile Crisis  [phone]    Community Resources      Fast Tracker  Linking people to mental health and substance  use disorder resources  1jiajietrackermn.org        [place] Mental Health Warmline  Peer to peer support  5 pm to 9 am 7 days/week  8.730.073.5604  https://mnwitw.org/danay        [facility] (ADINA)  766.286.2831 or 1.888.ADINA.HELPS  https://namimn.org/        [facility]  [facility] residents [address]  [phone]        [facility]  Free mental health counseling  https://walkin.org/  612.870.0565 X2    Mental Health Apps      Calm Harm  https://calmharm.co.uk/      My3  https://my3app.org/      [place] Safety Plan  https://www.mysafetyplan.org/   Administrative Billing:   Time spent with patient includes counseling and coordination of care regarding above diagnoses and treatment plan.    Patient Status:  This is a continuous care patient and medications will be prescribed by the psychiatric provider until further indicated.    Signed:   HERMELINDA Vickers-BC   Psychiatry

## 2024-04-25 RX ORDER — PRAZOSIN HYDROCHLORIDE 2 MG/1
2 CAPSULE ORAL AT BEDTIME
Qty: 30 CAPSULE | Refills: 0 | Status: SHIPPED | OUTPATIENT
Start: 2024-04-25 | End: 2024-06-28

## 2024-05-10 ENCOUNTER — MYC MEDICAL ADVICE (OUTPATIENT)
Dept: PSYCHIATRY | Facility: CLINIC | Age: 54
End: 2024-05-10
Payer: COMMERCIAL

## 2024-05-10 NOTE — LETTER
May 16, 2024      To whom it may concern,   Ms. Gonzáles is a patient who has been under my care since June 2021.  She carries diagnoses including depression and PTSD. I am familiar with her history and with the functional limitations imposed by her mental health and emotional related diagnoses.  Due to this emotional disability, Maryjane has certain limitations of coping.  To help alleviate these challenges and to enhance her day-to-day functionality, I have prescribed her to obtain an emotional support animal.  The presence of this animal is necessary for the emotional and mental health of Maryjane because its presence will likely mitigate the symptoms that she continues to experience.  I have not evaluated the animal in question, only the potential benefits that Maryjane may derive from them. Should you have any additional questions, please do not hesitate to contact me.    Sincerely,    Janet Álvarez, PMHNP-BC

## 2024-05-13 ENCOUNTER — MYC MEDICAL ADVICE (OUTPATIENT)
Dept: PSYCHIATRY | Facility: CLINIC | Age: 54
End: 2024-05-13
Payer: COMMERCIAL

## 2024-05-13 DIAGNOSIS — F31.12 BIPOLAR AFFECTIVE DISORDER, MANIC, MODERATE (H): ICD-10-CM

## 2024-05-13 RX ORDER — LORAZEPAM 0.5 MG/1
0.5 TABLET ORAL DAILY PRN
Qty: 30 TABLET | Refills: 0 | Status: SHIPPED | OUTPATIENT
Start: 2024-05-13 | End: 2024-06-19

## 2024-05-13 NOTE — TELEPHONE ENCOUNTER
Date of Last Office Visit: 4/23/24  Date of Next Office Visit: 6/28/24  No shows since last visit: 0  Cancellations since last visit: 0    Medication requested: LORazepam (ATIVAN) 0.5 MG tablet  Date last ordered: 4/9/24 Qty: 30 Refills: 0     [place] of [place] ?: No, not a delegate of provider    Lapse in medication adherence greater than 5 days?: No  If yes, call patient and gather details: na  Medication refill request verified as identical to current order?: yes  Result of Last DAM, VPA, Li+ Level, CBC, or Carbamazepine Level (at or since last visit): N/A    Last visit treatment plan: 1.  Increase ziprasidone to 80 mg daily with food  2.  Continue methylphenidate 10 mg daily  3.  Continue lorazepam 0.5 mg daily as needed for anxiety  4.  Continue gabapentin 1200 mg 3 times daily for anxiety  5.  Continue bupropion  mg daily for depression  6.  Restart talk therapy when able to for learning skills to manage life stressors and adjustments    []Medication refilled per  Medication Refill in Ambulatory Care  policy.  [x]Medication unable to be refilled by RN due to criteria not met as indicated below:    []Eligibility - not seen in the last year   []Supervision - no future appointment   []Compliance - no shows, cancellations or lapse in therapy   []Verification - order discrepancy   [x]Controlled medication   []Medication not included in policy   []90-day supply request   []Other

## 2024-05-14 NOTE — TELEPHONE ENCOUNTER
MEDICATION  Focalin    LAST SEEN   12/20/17    LAST REFILL 12/20/17    OK TO REFILL   Yes, PDMP reviewed.    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  YES    Travel screening: Not Applicable

## 2024-05-16 ENCOUNTER — MYC MEDICAL ADVICE (OUTPATIENT)
Dept: PSYCHIATRY | Facility: CLINIC | Age: 54
End: 2024-05-16
Payer: COMMERCIAL

## 2024-05-16 NOTE — TELEPHONE ENCOUNTER
Letter composed and printed for patient.  Please contact her to see where she would like this sent.  Thank you.  Sheyla

## 2024-05-16 NOTE — TELEPHONE ENCOUNTER
RN received instructions from Janet Álvarez CNP asking Nursing to please contact the patient to inquire how she would like her OTILIA letter sent.       RN attempted to phone the patient.  There was no answer.  RN LVM instructing the patient to call [phone].  Awaiting call back.      2.  RN sent this patient a ii4b message.  Please refer to ii4b Medical advice note written today.      Bony Kraus RN on 5/16/2024 at 3:46 PM

## 2024-05-17 ENCOUNTER — MYC MEDICAL ADVICE (OUTPATIENT)
Dept: PSYCHIATRY | Facility: CLINIC | Age: 54
End: 2024-05-17
Payer: COMMERCIAL

## 2024-05-17 NOTE — TELEPHONE ENCOUNTER
Problem: Adult Inpatient Plan of Care  Goal: Plan of Care Review  Outcome: Ongoing, Progressing     Problem: Oral Intake Inadequate (Acute Kidney Injury/Impairment)  Goal: Optimal Nutrition Intake  Outcome: Ongoing, Progressing     Problem: Impaired Wound Healing  Goal: Optimal Wound Healing  Outcome: Ongoing, Progressing      Patient would like Sheyla's letter from 5/16/24 mailed to her home.     Address: [address]  [place] [address]     (The apt number isn't on the demographics Not sure if you can add or not)    Ijeoma Miller RN on 5/17/2024 at 1:42 PM

## 2024-05-20 ENCOUNTER — MYC REFILL (OUTPATIENT)
Dept: PSYCHIATRY | Facility: CLINIC | Age: 54
End: 2024-05-20
Payer: COMMERCIAL

## 2024-05-20 DIAGNOSIS — F90.8 ATTENTION DEFICIT HYPERACTIVITY DISORDER (ADHD), OTHER TYPE: ICD-10-CM

## 2024-05-20 RX ORDER — METHYLPHENIDATE HYDROCHLORIDE 10 MG/1
10 TABLET ORAL DAILY
Qty: 30 TABLET | Refills: 0 | Status: SHIPPED | OUTPATIENT
Start: 2024-05-24 | End: 2024-06-19

## 2024-05-20 NOTE — TELEPHONE ENCOUNTER
Date of Last Office Visit: 4/23/24  Date of Next Office Visit: 6/28/24  No shows since last visit: 0  Cancellations since last visit: 0    Medication requested: methylphenidate (RITALIN) 10 MG tablet  Date last ordered: 4/23/24 Qty: 30 Refills: 0     [place] ?: methylphenidate (RITALIN) 10 MG tablet   Medication last sold date: 4/26/24 Qty filled: 30  Other controlled substance on [place] ?: yes  If yes, is this a new medication?: no    Lapse in medication adherence greater than 5 days?: no  If yes, call patient and gather details: na  Medication refill request verified as identical to current order?: yes  Result of Last DAM, VPA, Li+ Level, CBC, or Carbamazepine Level (at or since last visit): N/A    Last visit treatment plan:     Treatment Plan:        1.  Increase ziprasidone to 80 mg daily with food  2.  Continue methylphenidate 10 mg daily  3.  Continue lorazepam 0.5 mg daily as needed for anxiety  4.  Continue gabapentin 1200 mg 3 times daily for anxiety  5.  Continue bupropion  mg daily for depression  6.  Restart talk therapy when able to for learning skills to manage life stressors and adjustments     Continue all other medications as reviewed per electronic medical record today.   Safety plan reviewed. To the Emergency Department as needed or call after hours crisis line at [phone] or [phone]. [place] Crisis Text Line. Text [place] to 576339 or Suicide LifeLine Chat: suicidepreventionlifeline.org/chat/  To schedule individual or family therapy, call [facility] at [phone]  Schedule an appointment with me in 6 weeks or sooner as needed.    []Medication refilled per  Medication Refill in Ambulatory Care  policy.  [x]Medication unable to be refilled by RN due to criteria not met as indicated below:    []Eligibility - not seen in the last year   []Supervision - no future appointment   []Compliance - no shows, cancellations or lapse in therapy   []Verification - order  discrepancy   [x]Controlled medication   []Medication not included in policy   []90-day supply request   []Other

## 2024-05-20 NOTE — TELEPHONE ENCOUNTER
See MyC refill encounter from 5/20/24. It was pended to provider.     Patient also wondering about increasing dose of ritalin.     Ijeoma Miller RN on 5/20/2024 at 3:25 PM

## 2024-05-21 ENCOUNTER — MYC MEDICAL ADVICE (OUTPATIENT)
Dept: PSYCHIATRY | Facility: CLINIC | Age: 54
End: 2024-05-21
Payer: COMMERCIAL

## 2024-05-21 DIAGNOSIS — F41.1 GENERALIZED ANXIETY DISORDER: ICD-10-CM

## 2024-05-21 DIAGNOSIS — F43.10 PTSD (POST-TRAUMATIC STRESS DISORDER): ICD-10-CM

## 2024-05-21 RX ORDER — GABAPENTIN 400 MG/1
1200 CAPSULE ORAL 3 TIMES DAILY
Qty: 270 CAPSULE | Refills: 0 | Status: SHIPPED | OUTPATIENT
Start: 2024-05-21 | End: 2024-06-19

## 2024-05-21 NOTE — TELEPHONE ENCOUNTER
Date of Last Office Visit: 4/23/24  Date of Next Office Visit: 6/28/24  No shows since last visit: 0  Cancellations since last visit: 0    Medication requested: gabapentin (NEURONTIN) 400 MG capsule  Date last ordered: 4/23/24 Qty: 270 Refills: 0     [place] of [place] ?: No, not a delegate of provider    Lapse in medication adherence greater than 5 days?: No  If yes, call patient and gather details: na  Medication refill request verified as identical to current order?: yes  Result of Last DAM, VPA, Li+ Level, CBC, or Carbamazepine Level (at or since last visit): N/A    Last visit treatment plan: 1.  Increase ziprasidone to 80 mg daily with food  2.  Continue methylphenidate 10 mg daily  3.  Continue lorazepam 0.5 mg daily as needed for anxiety  4.  Continue gabapentin 1200 mg 3 times daily for anxiety  5.  Continue bupropion  mg daily for depression  6.  Restart talk therapy when able to for learning skills to manage life stressors and adjustments    []Medication refilled per  Medication Refill in Ambulatory Care  policy.  [x]Medication unable to be refilled by RN due to criteria not met as indicated below:    []Eligibility - not seen in the last year   []Supervision - no future appointment   []Compliance - no shows, cancellations or lapse in therapy   []Verification - order discrepancy   []Controlled medication   [x]Medication not included in policy   []90-day supply request   []Other

## 2024-05-24 ENCOUNTER — MYC MEDICAL ADVICE (OUTPATIENT)
Dept: PSYCHIATRY | Facility: CLINIC | Age: 54
End: 2024-05-24
Payer: COMMERCIAL

## 2024-05-24 DIAGNOSIS — F31.12 BIPOLAR AFFECTIVE DISORDER, MANIC, MODERATE (H): ICD-10-CM

## 2024-05-24 NOTE — TELEPHONE ENCOUNTER
1) Per provider Janet Álvarez:  No increase  in methylphenidate - can increase anxiety and hallucinations that she has been having.     2) Notified patient via Pixafyt

## 2024-05-28 RX ORDER — ZIPRASIDONE HYDROCHLORIDE 60 MG/1
60 CAPSULE ORAL
Qty: 30 CAPSULE | Refills: 0 | Status: SHIPPED | OUTPATIENT
Start: 2024-05-28 | End: 2024-06-28

## 2024-06-06 ENCOUNTER — MYC MEDICAL ADVICE (OUTPATIENT)
Dept: PSYCHIATRY | Facility: CLINIC | Age: 54
End: 2024-06-06
Payer: COMMERCIAL

## 2024-06-11 ENCOUNTER — MYC MEDICAL ADVICE (OUTPATIENT)
Dept: PSYCHIATRY | Facility: CLINIC | Age: 54
End: 2024-06-11
Payer: COMMERCIAL

## 2024-06-19 ENCOUNTER — MYC MEDICAL ADVICE (OUTPATIENT)
Dept: PSYCHIATRY | Facility: CLINIC | Age: 54
End: 2024-06-19
Payer: COMMERCIAL

## 2024-06-19 DIAGNOSIS — F41.1 GENERALIZED ANXIETY DISORDER: ICD-10-CM

## 2024-06-19 DIAGNOSIS — F31.12 BIPOLAR AFFECTIVE DISORDER, MANIC, MODERATE (H): ICD-10-CM

## 2024-06-19 DIAGNOSIS — F43.10 PTSD (POST-TRAUMATIC STRESS DISORDER): ICD-10-CM

## 2024-06-19 DIAGNOSIS — F90.8 ATTENTION DEFICIT HYPERACTIVITY DISORDER (ADHD), OTHER TYPE: ICD-10-CM

## 2024-06-19 NOTE — TELEPHONE ENCOUNTER
Date of Last Office Visit: 4/23/24  Date of Next Office Visit: 6/28/24  No shows since last visit: 0  Cancellations since last visit: 0    Medication requested: gabapentin (NEURONTIN) 400 MG capsule  Date last ordered: 5/21/24 Qty: 270 Refills: 0    Medication requested: LORazepam (ATIVAN) 0.5 MG tablet  Date last ordered: 5/13/24 Qty: 30 Refills: 0    Medication requested: methylphenidate (RITALIN) 10 MG tablet  Date last ordered: 5/24/24 Qty: 30 Refills: 0     [place] of [place] ?:   Ritalin: 5/24/24 qty 30  Gabapentin: 5/24/24 qt 270  Lorazepam: 5/14/24 qty 30    Lapse in medication adherence greater than 5 days?: no  If yes, call patient and gather details: na  Medication refill request verified as identical to current order?: yes  Result of Last DAM, VPA, Li+ Level, CBC, or Carbamazepine Level (at or since last visit): N/A    Last visit treatment plan:     Treatment Plan:        1.  Increase ziprasidone to 80 mg daily with food  2.  Continue methylphenidate 10 mg daily  3.  Continue lorazepam 0.5 mg daily as needed for anxiety  4.  Continue gabapentin 1200 mg 3 times daily for anxiety  5.  Continue bupropion  mg daily for depression  6.  Restart talk therapy when able to for learning skills to manage life stressors and adjustments     Continue all other medications as reviewed per electronic medical record today.   Safety plan reviewed. To the Emergency Department as needed or call after hours crisis line at [phone] or [phone]. [place] Crisis Text Line. Text [place] to 408191 or Suicide LifeLine Chat: suicidepreventionlifeline.org/chat/  To schedule individual or family therapy, call [facility] at [phone]  Schedule an appointment with me in 6 weeks or sooner as needed. Call [facility] at [phone] to schedule.    []Medication refilled per  Medication Refill in Ambulatory Care  policy.  [x]Medication unable to be refilled by RN due to criteria not met as  indicated below:    []Eligibility - not seen in the last year   []Supervision - no future appointment   []Compliance - no shows, cancellations or lapse in therapy   []Verification - order discrepancy   [x]Controlled medication   []Medication not included in policy   []90-day supply request   []Other

## 2024-06-21 RX ORDER — METHYLPHENIDATE HYDROCHLORIDE 10 MG/1
10 TABLET ORAL DAILY
Qty: 30 TABLET | Refills: 0 | Status: SHIPPED | OUTPATIENT
Start: 2024-06-21 | End: 2024-07-15

## 2024-06-21 RX ORDER — GABAPENTIN 400 MG/1
1200 CAPSULE ORAL 3 TIMES DAILY
Qty: 270 CAPSULE | Refills: 0 | Status: SHIPPED | OUTPATIENT
Start: 2024-06-21 | End: 2024-07-15

## 2024-06-21 RX ORDER — LORAZEPAM 0.5 MG/1
0.5 TABLET ORAL DAILY PRN
Qty: 30 TABLET | Refills: 0 | Status: SHIPPED | OUTPATIENT
Start: 2024-06-21 | End: 2024-07-15

## 2024-06-24 NOTE — TELEPHONE ENCOUNTER
RN reviewed Janet Álvarez, CAIO directive -  Please have Maryjane stop the stimulant to see if  her anxiety level decreases.  Janet SHARMA sent a MyC message to the patein with Janet's recommendation.     Vani Simeon RN on 6/24/2024 at 4:20 PM

## 2024-06-26 DIAGNOSIS — L65.9 THINNING HAIR: Primary | ICD-10-CM

## 2024-06-26 DIAGNOSIS — E03.4 HYPOTHYROIDISM DUE TO ACQUIRED ATROPHY OF THYROID: ICD-10-CM

## 2024-06-26 DIAGNOSIS — E78.5 HYPERLIPEMIA: ICD-10-CM

## 2024-06-26 DIAGNOSIS — L85.3 DRY SKIN: ICD-10-CM

## 2024-06-27 ENCOUNTER — MYC MEDICAL ADVICE (OUTPATIENT)
Dept: PSYCHIATRY | Facility: CLINIC | Age: 54
End: 2024-06-27

## 2024-06-28 ENCOUNTER — VIRTUAL VISIT (OUTPATIENT)
Dept: PSYCHIATRY | Facility: CLINIC | Age: 54
End: 2024-06-28
Payer: COMMERCIAL

## 2024-06-28 DIAGNOSIS — F31.12 BIPOLAR AFFECTIVE DISORDER, MANIC, MODERATE (H): Primary | ICD-10-CM

## 2024-06-28 DIAGNOSIS — F43.10 PTSD (POST-TRAUMATIC STRESS DISORDER): ICD-10-CM

## 2024-06-28 DIAGNOSIS — F41.1 GENERALIZED ANXIETY DISORDER: ICD-10-CM

## 2024-06-28 DIAGNOSIS — F90.8 ATTENTION DEFICIT HYPERACTIVITY DISORDER (ADHD), OTHER TYPE: ICD-10-CM

## 2024-06-28 DIAGNOSIS — F43.23 ADJUSTMENT DISORDER WITH MIXED ANXIETY AND DEPRESSED MOOD: ICD-10-CM

## 2024-06-28 PROCEDURE — 99443 PR PHYSICIAN TELEPHONE EVALUATION 21-30 MIN: CPT | Mod: 93 | Performed by: NURSE PRACTITIONER

## 2024-06-28 RX ORDER — BUPROPION HYDROCHLORIDE 150 MG/1
150 TABLET ORAL EVERY MORNING
Qty: 30 TABLET | Refills: 3 | Status: SHIPPED | OUTPATIENT
Start: 2024-06-28 | End: 2024-08-16

## 2024-06-28 RX ORDER — GABAPENTIN 400 MG/1
1200 CAPSULE ORAL 3 TIMES DAILY
Qty: 270 CAPSULE | Refills: 0 | Status: CANCELLED | OUTPATIENT
Start: 2024-06-28

## 2024-06-28 RX ORDER — CLONIDINE HYDROCHLORIDE 0.1 MG/1
0.1 TABLET ORAL 2 TIMES DAILY
Qty: 60 TABLET | Refills: 2 | Status: SHIPPED | OUTPATIENT
Start: 2024-06-28 | End: 2024-08-16

## 2024-06-28 RX ORDER — BUPROPION HYDROCHLORIDE 300 MG/1
300 TABLET ORAL EVERY MORNING
Qty: 30 TABLET | Refills: 3 | Status: SHIPPED | OUTPATIENT
Start: 2024-06-28 | End: 2024-08-16

## 2024-06-28 RX ORDER — METHYLPHENIDATE HYDROCHLORIDE 10 MG/1
10 TABLET ORAL DAILY
Qty: 30 TABLET | Refills: 0 | Status: CANCELLED | OUTPATIENT
Start: 2024-06-28
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
## 2024-06-28 NOTE — NURSING NOTE
Is the patient currently in the state of [place]? YES    Visit mode:TELEPHONE    If the visit is dropped, the patient can be reconnected by: TELEPHONE VISIT: Phone number: [phone]    Will anyone else be joining the visit? NO  (If patient encounters technical issues they should call [phone])    How would you like to obtain your AVS? MyChart    Are changes needed to the allergy or medication list? Yes pt is not taking the Minipress and Pt stated no changes to allergies    Are refills needed on medications prescribed by this physician? YES    Reason for visit: PIO DARBY

## 2024-06-28 NOTE — PATIENT INSTRUCTIONS
"Patient Education   The Panel Psychiatry Program  What to Expect  Here's what to expect in the Panel Psychiatry Program.   About the program  You'll be meeting with a psychiatric doctor to check your mental health. A psychiatric doctor helps you deal with troubling thoughts and feelings by giving you medicine. They'll make sure you know the plan for your care. You may see them for a long time. When you're feeling better, they may refer you back to seeing your family doctor.   If you have any questions, we'll be glad to talk to you.  About visits  Be open  At your visits, please talk openly about your problems. It may feel hard, but it's the best way for us to help you.  Cancelling visits  If you can't come to your visit, please call us right away at [phone]. If you don't cancel at least 24 hours (1 full day) before your visit, that's \"late cancellation.\"  Not showing up for your visits  Being very late is the same as not showing up. You'll be a \"no show\" if:  You're more than 15 minutes late for a 30-minute (half hour) visit.  You're more than 30 minutes late for a 60-minute (full hour) visit.  If you cancel late or don't show up 2 times within 6 months, we may end your care.  Getting help between visits  If you need help between visits, you can call us Monday to Friday from 8 a.m. to 4:30 p.m. at [phone].  Emergency care  Call 911 or go to the nearest emergency department if your life or someone else's life is in danger.  Call 988 anytime to reach the national Suicide and Crisis hotline.  Medicine refills  To refill your medicine, call your pharmacy. You can also call [facility]'s Behavioral Access at [phone], Monday to Friday, 8 a.m. to 4:30 p.m. It can take 1 to 3 business days to get a refill.   Forms, letters, and tests  You may have papers to fill out, like FMLA, short-term disability, and workability. We can help you with these forms at your visits, but you must have an " appointment. You may need more than 1 visit for this, to be in an intensive therapy program, or both.  Before we can give you medicine for ADHD, we may refer you to get tested for it or confirm it another way.  We may not be able to give you an emotional support animal letter.  We don't do mental health checks ordered by the court.   We don't do mental health testing, but we can refer you to get tested.   Thank you for choosing us for your care.  For informational purposes only. Not to replace the advice of your health care provider. Copyright   2022 [facility] [employer]. All rights reserved. [employer] [id number] - 12/22.    Treatment Plan:      1.  Continue bupropion 450 mg daily  2.  Add clonidine 0.1 mg twice daily as needed for anxiety  3.  Continue gabapentin 1200 mg 3 times daily  4.  Discontinue prazosin  5.  Discontinue Geodon  6.  Continue lorazepam 0.5 mg daily as needed for anxiety-takes sparingly  7.  Referral made for EMDR therapy  8.  Continue methylphenidate 10 mg daily    Continue all other medical directions per primary care provider.   Continue all other medications as reviewed per electronic medical record today.   Safety plan reviewed. To the Emergency Department as needed or call after hours crisis line at [phone] or [phone]. [place] Crisis Text Line: Text [place] to 916342  or  Suicide LifeLine Chat: suicidepreventionlifeline.org/chat/  To schedule individual or family therapy, call [facility] at [phone].   Schedule an appointment with me in 6 weeks or sooner as needed.  Call [facility] at [phone] to schedule.  Follow up with primary care provider as planned or for acute medical concerns.  Call the psychiatric nurse line with medication questions or concerns at [phone].  MyChart may be used to communicate with your provider, but this is not intended to be used for emergencies.        Crisis Resources   The EmPath is an adults only unit  located at [facility] is a short term (generally less than 23 hour stay) designed for crisis intervention and stabilization. Pts have the opportunity to meet quickly with a behavioral health team for evaluation in a calm and peaceful therapuetic environment. To be evaluated for admission pts are triaged throught the [facility] ED.      The following hotlines are for both adults and children. The and are open 24 hours a day, 7 days a week unless noted otherwise.        Crisis Lines      Crisis Text Line  Text 791602  You will be connected with a trained live crisis counselor to provide support.        [place]ambling Hotline  4.905.963.9506 [hope]        línea de crisis española  421.963.1664        [facility] [address] Helpline  577.206.6799        National Hope Line  5.477.974.1797 [hope]        National Suicide Prevention Lifeline  Free and confidential support  988 or 1.981.091.TALK [8255]  http://suicidepreventionlifeline.org        The Praveen Project (LGBTQ Youth Crisis Line)  5.463.328.4362  text START to 357-828        [place]'s Crisis Line  6.624.100.6463 (Press 1)  or text 090205    [facility] Mental Health Crisis Response  Within [place], call **CRISIS [**141147] to be connected to a mental health professional who can assist you.        [facility] Crisis  [phone]      [facility] Mobile Crisis  [phone]      [facility] Crisis  [phone]      [facility] Mobile Crisis  [phone] (adults)  [phone] (children)      [facility] Mobile Crisis  [phone] (adults)  [phone] (children)      [facility] Mobile Crisis  [phone]      [facility] Mobile Crisis  [phone]    Community Resources      Fast Tracker  Linking people to mental health and substance use disorder resources  fasttrackKickAppsn.org        [place] Mental Health Warmline  Peer to peer support  5 pm to 9 am 7 days/week  4.310.817.1370   https://mnwitw.org/danay        National [place] on Mental Illness (ADINA)  212.576.2763 or 1.888.ADINA.HELPS  https://namimn.org/        [facility] Urgent Care for Adult Mental Health  [facility]   [address] [facility]  [phone]        [facility]  Free mental health counseling  https://walkin.org/  [phone] X2    Mental Health Apps      Calm Harm  https://calmharm.co.uk/      My3  https://my3app.org/      [place] Safety Plan  https://www.mysafetyplan.org/

## 2024-07-01 ENCOUNTER — LAB (OUTPATIENT)
Dept: LAB | Facility: CLINIC | Age: 54
End: 2024-07-01
Payer: COMMERCIAL

## 2024-07-01 DIAGNOSIS — E03.4 HYPOTHYROIDISM DUE TO ACQUIRED ATROPHY OF THYROID: ICD-10-CM

## 2024-07-01 DIAGNOSIS — L65.9 THINNING HAIR: ICD-10-CM

## 2024-07-01 DIAGNOSIS — E78.5 HYPERLIPEMIA: ICD-10-CM

## 2024-07-01 DIAGNOSIS — L85.3 DRY SKIN: ICD-10-CM

## 2024-07-01 LAB
CHOLEST SERPL-MCNC: 220 MG/DL
FASTING STATUS PATIENT QL REPORTED: YES
HDLC SERPL-MCNC: 84 MG/DL
LDLC SERPL CALC-MCNC: 118 MG/DL
NONHDLC SERPL-MCNC: 136 MG/DL
T4 FREE SERPL-MCNC: 1.19 NG/DL (ref 0.9–1.7)
TRIGL SERPL-MCNC: 89 MG/DL
TSH SERPL DL<=0.005 MIU/L-ACNC: 4.43 UIU/ML (ref 0.3–4.2)

## 2024-07-01 PROCEDURE — 80061 LIPID PANEL: CPT

## 2024-07-01 PROCEDURE — 84439 ASSAY OF FREE THYROXINE: CPT

## 2024-07-01 PROCEDURE — 84443 ASSAY THYROID STIM HORMONE: CPT

## 2024-07-01 PROCEDURE — 36415 COLL VENOUS BLD VENIPUNCTURE: CPT

## 2024-07-01 PROCEDURE — 86038 ANTINUCLEAR ANTIBODIES: CPT

## 2024-07-02 LAB — ANA SER QL IF: NEGATIVE

## 2024-07-03 ENCOUNTER — MYC MEDICAL ADVICE (OUTPATIENT)
Dept: PSYCHIATRY | Facility: CLINIC | Age: 54
End: 2024-07-03
Payer: COMMERCIAL

## 2024-07-03 ENCOUNTER — TELEPHONE (OUTPATIENT)
Dept: FAMILY MEDICINE | Facility: CLINIC | Age: 54
End: 2024-07-03
Payer: COMMERCIAL

## 2024-07-03 NOTE — TELEPHONE ENCOUNTER
1) Reviewed refill request(s) from [facility]     2) Any Controlled Substance(s)? yes    3) Refill(s) requested for:     - gabapentin (NEURONTIN) 400 MG capsule  Date last ordered: 6/21/24 Qty: 270 Refills: 0   Refill has been requested too soon    4) Action taken: patient contacted via [facility]   .    ABISAI VELAZQUEZ RN on 7/3/2024 at 1:33 PM

## 2024-07-03 NOTE — TELEPHONE ENCOUNTER
Test Results        Who ordered the test:  Kajal Melendez    Type of test: Lab    Date of test:  7/01/2024    Where was the test performed:  [facility]    What are your questions/concerns?:  Pt would like the results faxed over to Kajal Melendez at       Pt would like a [employer] message and a call back once the results have been sent    Could we send this information to you in [employer] or would you prefer to receive a phone call?:   Patient would prefer a phone call   Okay to leave a detailed message?: Yes at Cell number on file:    Telephone Information:   Mobile [phone]

## 2024-07-03 NOTE — TELEPHONE ENCOUNTER
Lab results already sent, and viewed by provider.  Lab is sending again although the fax numbers are the same.    Patient notified via [employer].    Magnolia Matt XRO/

## 2024-07-15 ENCOUNTER — MYC MEDICAL ADVICE (OUTPATIENT)
Dept: PSYCHIATRY | Facility: CLINIC | Age: 54
End: 2024-07-15
Payer: COMMERCIAL

## 2024-07-15 DIAGNOSIS — F90.8 ATTENTION DEFICIT HYPERACTIVITY DISORDER (ADHD), OTHER TYPE: ICD-10-CM

## 2024-07-15 DIAGNOSIS — F43.10 PTSD (POST-TRAUMATIC STRESS DISORDER): ICD-10-CM

## 2024-07-15 DIAGNOSIS — F41.1 GENERALIZED ANXIETY DISORDER: ICD-10-CM

## 2024-07-15 DIAGNOSIS — F31.12 BIPOLAR AFFECTIVE DISORDER, MANIC, MODERATE (H): ICD-10-CM

## 2024-07-15 RX ORDER — GABAPENTIN 400 MG/1
1200 CAPSULE ORAL 3 TIMES DAILY
Qty: 270 CAPSULE | Refills: 0 | Status: SHIPPED | OUTPATIENT
Start: 2024-07-15 | End: 2024-08-16

## 2024-07-15 RX ORDER — LORAZEPAM 0.5 MG/1
0.5 TABLET ORAL DAILY PRN
Qty: 30 TABLET | Refills: 0 | Status: SHIPPED | OUTPATIENT
Start: 2024-07-15 | End: 2024-08-16 | Stop reason: ALTCHOICE

## 2024-07-15 RX ORDER — METHYLPHENIDATE HYDROCHLORIDE 10 MG/1
10 TABLET ORAL DAILY
Qty: 30 TABLET | Refills: 0 | Status: SHIPPED | OUTPATIENT
Start: 2024-07-15 | End: 2024-08-16

## 2024-07-15 NOTE — TELEPHONE ENCOUNTER
Patient requesting provider to refill medications early on 7/19/2024 to coordinate transportation. RN called pharmacy. Per , all 3 scripts were filled for a 30 day supply on 6/21/24. He states they can all be released 2 days early on 7/19/24 as patient requested.     He did caution that multiple early releases can cause an accumulation of medication and insurances can  on this which may interfere with future refills due to supply accumulation.     RN notified patient via MyC that refills are being requested and all should be good to fill on 7/19/2024.      Date of Last Office Visit: 6/28/24  Date of Next Office Visit:  8/16/24  No shows since last visit: No  More than one patient-initiated cancellation (with reschedule) since last seen in clinic? No    []Medication refilled per  Medication Refill in Ambulatory Care  policy.  [x]Medication unable to be refilled by RN due to criteria not met as indicated below:    []Eligibility: has not had a provider visit within last 6 months   []Supervision: no future appointment; < 7 days before next appointment   []Compliance: no shows; cancellations; lapse in therapy   []Verification: order discrepancy; may need modification...   []90-day supply request   []Advanced refill request: > 7 days before refill date   [x]Controlled medication   []Medication not included in policy   []Review: new med; med adjusted ? 30 days; safety alert; requires lab monitoring...   []Scope of Practice: refill request processed by LPN/MA   []Other:      Medication(s) requested:     -  gabapentin (NEURONTIN) 400 MG capsule   Date last ordered: 6/21/24  Qty: 270  Refills: 0  Appropriate for refill? Yes    -  LORazepam (ATIVAN) 0.5 MG tablet   Date last ordered: 6/21/24  Qty: 30  Refills: 0  Appropriate for refill? Yes    -  methylphenidate (RITALIN) 10 MG tablet   Date last ordered: 6/21/24  Qty: 30  Refills: 0  Appropriate for refill? Yes      Any Controlled Substance(s)? Yes -  all      Requested medication(s) verified as identical to current order? Yes    Any lapse in adherence to medication(s) greater than 5 days? Yes     Additional action taken? no.      Last visit treatment plan:   Assessment:     Magnolia Gonzáles continues to experience ongoing anxiety related to not feeling safe in her current living situation.  She is hoping to move back to [place] in the future.  Today I added clonidine twice daily for her anxiety.  She will continue gabapentin and inquired about receiving a higher dose which I did declined at this point.  Bupropion is continuing at 450 mg daily despite my suggestion to decrease the dose if it might be contributing to her anxiety.  She also discounted my suggestion to decrease the methylphenidate as she feels like it is helpful to her in managing day-to-day activities and being able to read and concentrate.  She took it upon herself to discontinue her prazosin and Geodon due to uncomfortable side effects.  Lorazepam continues daily as needed for anxiety and she is encouraged to take this sparingly.  Today I made a referral for her to get talk therapy in particular, EMDR by her request.  I also informed her of my upcoming long-term and her desire to continue with [facility] psychiatry services after my departure..     Medication side effects and alternatives were reviewed. Health promotion activities recommended and reviewed today. All questions addressed. Education and counseling completed regarding risks and benefits of medications and psychotherapy options.     Treatment Plan:        1.  Continue bupropion 450 mg daily  2.  Add clonidine 0.1 mg twice daily as needed for anxiety  3.  Continue gabapentin 1200 mg 3 times daily  4.  Discontinue prazosin  5.  Discontinue Geodon  6.  Continue lorazepam 0.5 mg daily as needed for anxiety-takes sparingly  7.  Referral made for EMDR therapy  8.  Continue methylphenidate 10 mg daily     Continue all other  medications as reviewed per electronic medical record today.   Safety plan reviewed. To the Emergency Department as needed or call after hours crisis line at [phone] or [phone]. [place] Crisis Text Line. Text [place] to 873732 or Suicide LifeLine Chat: suicidepreventionlifeline.org/chat/  To schedule individual or family therapy, call [facility] at [phone]  Schedule an appointment with me in August or sooner as needed.

## 2024-07-19 ENCOUNTER — MYC MEDICAL ADVICE (OUTPATIENT)
Dept: PSYCHIATRY | Facility: CLINIC | Age: 54
End: 2024-07-19
Payer: COMMERCIAL

## 2024-07-19 ENCOUNTER — TELEPHONE (OUTPATIENT)
Dept: PSYCHIATRY | Facility: CLINIC | Age: 54
End: 2024-07-19
Payer: COMMERCIAL

## 2024-07-19 NOTE — TELEPHONE ENCOUNTER
RN received a [employer] message from this patient regarding issues she has been having with getting her Gabapentin refilled.  The patient mentioned needing a PA.       RN phoned [facility] - [place], [place] - [address] and spoke to the Pharmacist.  She explained that the patients insurance has placed a quantity cap of # 10 gabapentin (NEURONTIN) 400 MG capsules per fill.  Anything greater than # 10 will require a PA.  This is a problem for Maryjane because apparently she has transportation issues.  The Pharmacy was able to fill the # 10 but is sending a Prior Authorization request regarding this quantity limit.      2.  RN located the Prior Authorization in Right fax.  RN will complete the PA request information and forward to the PA Team for review.      3.  RN notified the patient via [employer] regarding the above information.  Please refer to [employer] Medical advice note.      4.  RN routed this note to Janet Álvarez CNP for her awareness.      Bony Kraus RN on 7/19/2024 at 11:55 AM

## 2024-07-19 NOTE — TELEPHONE ENCOUNTER
Prior Authorization Retail Medication Request    Medication/Dose: gabapentin (NEURONTIN) 400 MG capsule  Diagnosis and ICD code (if different than what is on RX):    New/renewal/insurance change PA/secondary ins. PA:  Previously Tried and Failed:    Rationale:      Insurance   Primary:   Insurance ID:      Secondary (if applicable):  Insurance ID:      Pharmacy Information (if different than what is on RX)  Name:    Phone:    Fax:         PA Team to please notify [facility] EDITH Hoffman and Janet Álvarez CNP with PA decision.      Bony Kraus RN on 7/19/2024 at 12:11 PM

## 2024-07-24 NOTE — TELEPHONE ENCOUNTER
PA Initiation    Medication: GABAPENTIN 400 MG PO CAPS  Insurance Company: [employer] Non-Specialty PA's - Phone [phone] Fax [phone]  Pharmacy Filling the Rx:    Filling Pharmacy Phone:    Filling Pharmacy Fax:    Start Date: 7/24/2024

## 2024-07-24 NOTE — TELEPHONE ENCOUNTER
Prior Authorization Approval    Medication: GABAPENTIN 400 MG PO CAPS  Authorization Effective Date: 6/24/2024  Authorization Expiration Date: 7/24/2025  Approved Dose/Quantity: 270/30  Reference #: [id number]   Insurance Company: [employer] Non-Specialty PA's - Phone [phone] Fax [phone]  Expected CoPay: $    CoPay Card Available:      Financial Assistance Needed:   Which Pharmacy is filling the prescription: [facility] [address] [place] [address] - [address] [place] [address]  Pharmacy Notified: Yes  Patient Notified: Yes

## 2024-07-26 ENCOUNTER — TELEPHONE (OUTPATIENT)
Dept: PSYCHIATRY | Facility: CLINIC | Age: 54
End: 2024-07-26
Payer: COMMERCIAL

## 2024-07-26 NOTE — TELEPHONE ENCOUNTER
"Reason for call:  Other   Patient called regarding (reason for call): call back  Additional comments: Patient requesting call back from EDITH Lora regarding recent Mychart communications. Patient did not elaborate further stating \"it would just be easier to talk to her.\"    Phone number to reach patient:  Home number on file [phone] (home)    Best Time:  ASAP    Can we leave a detailed message on this number?  YES    Travel screening: Not Applicable   "

## 2024-07-26 NOTE — TELEPHONE ENCOUNTER
1) Per 7/26/24 Telephone encounter:         2) Next RN to:    - Contact pharmacy to clarify issue, as 7/19/24 Prior Authorization encounter states that PA was authorized on 7/24/24.     - Contact pt with outcome of call.     Geno Barcenas, RN on 7/26/2024 at 1:33 PM

## 2024-07-26 NOTE — TELEPHONE ENCOUNTER
Rec'd call from Nurse. Pharmacy is not able to process drug through insurance. Per call to insurance - rep informed patient did a partial filled on 07/19 patient should be able to fill reminding of the 270. Next fill 08/12/2024. Pharmacy was able to process drug to insurance for remaining fill

## 2024-08-16 ENCOUNTER — VIRTUAL VISIT (OUTPATIENT)
Dept: PSYCHIATRY | Facility: CLINIC | Age: 54
End: 2024-08-16
Payer: COMMERCIAL

## 2024-08-16 DIAGNOSIS — F41.1 GENERALIZED ANXIETY DISORDER: ICD-10-CM

## 2024-08-16 DIAGNOSIS — F90.8 ATTENTION DEFICIT HYPERACTIVITY DISORDER (ADHD), OTHER TYPE: ICD-10-CM

## 2024-08-16 DIAGNOSIS — F43.10 PTSD (POST-TRAUMATIC STRESS DISORDER): ICD-10-CM

## 2024-08-16 DIAGNOSIS — F31.32 BIPOLAR 1 DISORDER, DEPRESSED, MODERATE (H): Primary | ICD-10-CM

## 2024-08-16 PROCEDURE — 99443 PR PHYSICIAN TELEPHONE EVALUATION 21-30 MIN: CPT | Mod: 93 | Performed by: NURSE PRACTITIONER

## 2024-08-16 RX ORDER — BUPROPION HYDROCHLORIDE 150 MG/1
150 TABLET ORAL EVERY MORNING
Qty: 30 TABLET | Refills: 5 | Status: SHIPPED | OUTPATIENT
Start: 2024-08-16

## 2024-08-16 RX ORDER — METHYLPHENIDATE HYDROCHLORIDE 10 MG/1
10 TABLET ORAL DAILY
Qty: 30 TABLET | Refills: 0 | Status: SHIPPED | OUTPATIENT
Start: 2024-08-16 | End: 2024-09-20

## 2024-08-16 RX ORDER — GABAPENTIN 400 MG/1
1200 CAPSULE ORAL 3 TIMES DAILY
Qty: 270 CAPSULE | Refills: 2 | Status: SHIPPED | OUTPATIENT
Start: 2024-08-16

## 2024-08-16 RX ORDER — DIAZEPAM 5 MG
5 TABLET ORAL DAILY PRN
Qty: 30 TABLET | Refills: 0 | Status: SHIPPED | OUTPATIENT
Start: 2024-08-16 | End: 2024-09-20

## 2024-08-16 RX ORDER — BUPROPION HYDROCHLORIDE 300 MG/1
300 TABLET ORAL EVERY MORNING
Qty: 30 TABLET | Refills: 5 | Status: SHIPPED | OUTPATIENT
Start: 2024-08-16

## 2024-08-16 ASSESSMENT — ANXIETY QUESTIONNAIRES
GAD7 TOTAL SCORE: 21
1. FEELING NERVOUS, ANXIOUS, OR ON EDGE: NEARLY EVERY DAY
GAD7 TOTAL SCORE: 21
IF YOU CHECKED OFF ANY PROBLEMS ON THIS QUESTIONNAIRE, HOW DIFFICULT HAVE THESE PROBLEMS MADE IT FOR YOU TO DO YOUR WORK, TAKE CARE OF THINGS AT HOME, OR GET ALONG WITH OTHER PEOPLE: NOT DIFFICULT AT ALL
6. BECOMING EASILY ANNOYED OR IRRITABLE: NEARLY EVERY DAY
5. BEING SO RESTLESS THAT IT IS HARD TO SIT STILL: NEARLY EVERY DAY
7. FEELING AFRAID AS IF SOMETHING AWFUL MIGHT HAPPEN: NEARLY EVERY DAY
7. FEELING AFRAID AS IF SOMETHING AWFUL MIGHT HAPPEN: NEARLY EVERY DAY
8. IF YOU CHECKED OFF ANY PROBLEMS, HOW DIFFICULT HAVE THESE MADE IT FOR YOU TO DO YOUR WORK, TAKE CARE OF THINGS AT HOME, OR GET ALONG WITH OTHER PEOPLE?: NOT DIFFICULT AT ALL
4. TROUBLE RELAXING: NEARLY EVERY DAY
2. NOT BEING ABLE TO STOP OR CONTROL WORRYING: NEARLY EVERY DAY
3. WORRYING TOO MUCH ABOUT DIFFERENT THINGS: NEARLY EVERY DAY
GAD7 TOTAL SCORE: 21

## 2024-08-16 ASSESSMENT — PATIENT HEALTH QUESTIONNAIRE - PHQ9
SUM OF ALL RESPONSES TO PHQ QUESTIONS 1-9: 20
SUM OF ALL RESPONSES TO PHQ QUESTIONS 1-9: 20
10. IF YOU CHECKED OFF ANY PROBLEMS, HOW DIFFICULT HAVE THESE PROBLEMS MADE IT FOR YOU TO DO YOUR WORK, TAKE CARE OF THINGS AT HOME, OR GET ALONG WITH OTHER PEOPLE: VERY DIFFICULT

## 2024-08-16 NOTE — PROGRESS NOTES
"Virtual Visit Details    Type of service:  Telephone Visit   Phone call duration: 30 minutes   Originating Location (pt. Location): Home    Distant Location (provider location):  Off-site             Outpatient Psychiatric Progress Note    Name: Magnolia Gonzáles   : 1970                    Primary Care Provider: Kajal Melendez PA-C   Therapist: None    PHQ-9 scores:      2024    12:28 PM 2024    11:11 AM 2024    11:05 AM   PHQ-9 SCORE   PHQ-9 Total Score MyChart 16 (Moderately severe depression) 19 (Moderately severe depression) 20 (Severe depression)   PHQ-9 Total Score 16 19 20       JULIEN-7 scores:      2024    12:30 PM 2024    11:12 AM 2024    11:06 AM   JULIEN-7 SCORE   Total Score 21 (severe anxiety) 20 (severe anxiety) 21 (severe anxiety)   Total Score 21 20 21       Patient Identification:    Patient is a 54 year old year old, single  White Choose not to answer female  who presents for return visit with me.  Patient is currently unemployed. Patient attended the session alone. Patient prefers to be called: \" Maryjane\".    Current medications include:   Current Outpatient Medications   Medication Sig Dispense Refill    acetaminophen (TYLENOL) 500 MG tablet Take 1-2 tablets (500-1,000 mg) by mouth every 6 hours as needed for mild pain 90 tablet 3    buPROPion (WELLBUTRIN XL) 150 MG 24 hr tablet Take 1 tablet (150 mg) by mouth every morning With 300 mg for a total of 450 mg daily 30 tablet 3    buPROPion (WELLBUTRIN XL) 300 MG 24 hr tablet Take 1 tablet (300 mg) by mouth every morning 30 tablet 3    cloNIDine (CATAPRES) 0.1 MG tablet Take 1 tablet (0.1 mg) by mouth 2 times daily 60 tablet 2    gabapentin (NEURONTIN) 400 MG capsule Take 3 capsules (1,200 mg) by mouth 3 times daily Fill  capsule 0    hydrocortisone (CORTAID) 1 % external ointment Apply topically 2 times daily Use on face and around the eyes - DO NOT get in the eye 30 g 0    ibuprofen (ADVIL/MOTRIN) " 200 MG capsule Take 1-2 capsules (200-400 mg) by mouth every 4 hours as needed for fever 60 capsule 1    levothyroxine (SYNTHROID/LEVOTHROID) 100 MCG tablet Take 1 tablet (100 mcg) by mouth daily 90 tablet 0    LORazepam (ATIVAN) 0.5 MG tablet Take 1 tablet (0.5 mg) by mouth daily as needed for anxiety Fill July 19 30 tablet 0    meclizine (ANTIVERT) 25 MG tablet Take 1 tablet (25 mg) by mouth daily as needed for dizziness or nausea 15 tablet 0    methylphenidate (RITALIN) 10 MG tablet Take 1 tablet (10 mg) by mouth daily Fill July 19 30 tablet 0    multivitamin (ONE-DAILY) tablet Take 1 tablet by mouth daily 90 tablet 3    tiZANidine (ZANAFLEX) 4 MG tablet Take 1 tablet (4 mg) by mouth 3 times daily as needed for other (pain) 60 tablet 3    triamcinolone (ARISTOCORT HP) 0.5 % external cream Apply topically 2 times daily 454 g 1     No current facility-administered medications for this visit.        The [place] Prescription Monitoring Program has been reviewed and there are no concerns about diversionary activity for controlled substances at this time.      I was able to review most recent Primary Care Provider, specialty provider, and therapy visit notes that I have access to.     Today, patient reports that her stimulant medication is  not giving her anxiety.  Where she lives,she is being approached by men who live there.  She has no social outlets.  She is happy with her dog. Clonidine is not helpful in manging her anxiety - .  Unable to try medical cannabis.  Rent went up.  Disability decision in review.  High deductible  with her insurance makes it difficult to pay her bills.   Hard to trust. Not SI.  On a wait list for [facility] worker.  No EMDR until January.  Insomnia - does not sleep well - normal for her.  Hot Flashes.  Bupropion helps with energy.  Ritalin keeps her fociused.      has a past medical history of Anxiety, Depressive disorder, Hypothyroid, and Suicidal ideation (7/19/2015).    Social history  updates:    Maryjane lives alone in her apartment.  She has an emotional support dog.    Substance use updates:    No alcohol use reported  Tobacco use: No    Vital Signs:   There were no vitals taken for this visit.    Labs:    Most recent laboratory results reviewed and no new labs.     Mental Status Examination:  Appearance: awake, alert and moderate distress  Attitude: cooperative  Eye Contact:   Unable to assess  Gait and Station: Dizziness  Psychomotor Behavior:   Not able to assess  Oriented to:  time, person, and place  Attention Span and Concentration:  Normal  Speech:   vtspeech: clear, coherent and Speaks: English  Mood:  anxious and depressed  Affect:  intensity is heightened  Associations:  no loose associations  Thought Process:  tangental  Thought Content:  no evidence of suicidal ideation or homicidal ideation, no auditory hallucinations present, and no visual hallucinations present  Recent and Remote Memory:  intact Not formally assessed. No amnesia.  Fund of Knowledge: appropriate  Insight:  fair  Judgment: fair  Impulse Control:  fair    Suicide Risk Assessment:  Today Magnolia Gonzáles reports no thoughts to harm themself or others. In addition, there are notable risk factors for self-harm, including single status, anxiety, and withdrawing. However, risk is mitigated by history of seeking help when needed, future oriented, denies suicidal intent or plan, and denies homicidal ideation, intent, or plan. Therefore, based on all available evidence including the factors cited above, Magnolia Gonzáles does not appear to be at imminent risk for self-harm, does not meet criteria for a 72-hr hold, and therefore remains appropriate for ongoing outpatient level of care.  A thorough assessment of risk factors related to suicide and self-harm have been reviewed and are noted above. The patient convincingly denies suicidality on several occasions. Local community safety resources printed and reviewed for  patient to use if needed. There was no deceit detected, and the patient presented in a manner that was believable.     DSM5 Diagnosis:  Attention-Deficit/Hyperactivity Disorder  314.01 (F90.8) Other Specified Attention-Deficit / Hyperactiity Disorder  296.52 Bipolar I Disorder Current or Most Recent Episode Depressed, Moderate  300.02 (F41.1) Generalized Anxiety Disorder  309.81 (F43.10) Posttraumatic Stress Disorder (includes Posttraumatic Stress Disorder for Children 6 Years and Younger)  Without dissociative symptoms    Medical comorbidities include:   Patient Active Problem List    Diagnosis Date Noted    Generalized anxiety disorder 07/29/2020     Priority: Medium    Major depressive disorder, recurrent episode, mild (H24) 07/19/2017     Priority: Medium    PTSD (post-traumatic stress disorder) 07/19/2017     Priority: Medium    Bipolar affective disorder, manic, moderate (H) 09/23/2016     Priority: Medium     Patient refutes this. 10/5/2020      Overdose of Valium, intentional self-harm, subsequent encounter 05/29/2016     Priority: Medium    Carpal tunnel syndrome of left wrist 01/13/2016     Priority: Medium    Adjustment disorder with mixed anxiety and depressed mood 01/06/2016     Priority: Medium    Acquired hypothyroidism 01/06/2016     Priority: Medium    Bilateral carpal tunnel syndrome 01/06/2016     Priority: Medium    Social phobia 12/26/2014     Priority: Medium    Panic disorder without agoraphobia 12/26/2014     Priority: Medium    History of alcohol dependence (H) 07/22/2014     Priority: Medium     Overview:   Binge drinking disorder. Sober since 5/2016      Nondependent alcohol abuse, episodic drinking behavior 11/17/2005     Priority: Medium       Assessment:    Magnolia QUAN Charlieessence continues to tell me that anxiety is unmanageable.  She does not want to stop her methylphenidate as she feels this helps her stay focused and organized.  I added diazepam and discontinued lorazepam for her to  see if this will help her function better.  She is having great difficulties in her current place of residence due to feeling unsafe and triggered by past trauma memories..    Medication side effects and alternatives were reviewed. Health promotion activities recommended and reviewed today. All questions addressed. Education and counseling completed regarding risks and benefits of medications and psychotherapy options.    Treatment Plan:      1.  Discontinue lorazepam  2.  Add diazepam 5 mg daily as needed for anxiety/muscle spasms  3.  Continue bupropion  mg daily  4.  Continue methylphenidate 10 mg daily for ADHD  5.  Continue gabapentin 1200 mg 3 times daily for anxiety and pain symptoms  6.  Clonidine discontinued  7.  Talk therapy when able to, for learning skills to process trauma history, anxiety, and life adjustments  8.  You will be contacted to schedule with a new psychiatric provider as I will be retiring September 13    Continue all other medications as reviewed per electronic medical record today.   Safety plan reviewed. To the Emergency Department as needed or call after hours crisis line at [phone] or [phone]. [place] Crisis Text Line. Text [place] to 504234 or Suicide LifeLine Chat: suicidepreventionlifeline.org/chat/  To schedule individual or family therapy, call [facility] at [phone]  Schedule an appointment in October or sooner as needed. Call [facility] at [phone] to schedule.  Follow up with primary care provider as planned or for acute medical concerns.  Call the psychiatric nurse line with medication questions or concerns at [phone]  MyChart may be used to communicate with your provider, but this is not intended to be used for emergencies.        Crisis Resources   The EmPath is an adults only unit located at [facility] is a short term (generally less than 23 hour stay) designed for crisis intervention and  stabilization. Pts have the opportunity to meet quickly with a behavioral health team for evaluation in a calm and peaceful therapuetic environment. To be evaluated for admission pts are triaged throught the [facility] ED.      The following hotlines are for both adults and children. The and are open 24 hours a day, 7 days a week unless noted otherwise.        Crisis Lines      Crisis Text Line  Text 584993  You will be connected with a trained live crisis counselor to provide support.        Gambling Hotline  0.725.611.4065 [hope]        línea de crisis española  804.250.0627        [facility] [employer] Helpline  782.027.6834        National Hope Line  3.483.179.5255 [hope]        National Suicide Prevention Lifeline  Free and confidential support  988 or 1.196.895.TALK [8255]  http://suicidepreventionlifeline.org        The [employer] Project (LGBTQ Youth Crisis Line)  8.340.444.3001  text START to 652-412        [place]'s Crisis Line  7.650.345.3733 (Press 1)  or text 459148    [facility] Mental Health Crisis Response  Within [place], call **CRISIS [**478658] to be connected to a mental health professional who can assist you.        [facility] Crisis  [phone]      [facility] Mobile Crisis  [phone]      [facility] Crisis  [phone]      [facility] [place] Mobile Crisis  412.718.6187 (adults)  [phone] (children)      [facility] Mobile Crisis  [phone] (adults)  [phone] (children)      [facility] Mobile Crisis  [phone]      [facility] Mobile Crisis  [phone]    Community Resources      Fast Tracker  Linking people to mental health and substance use disorder resources  fasttrackermn.org        [place] Mental Health Warmline  Peer to peer support  5 pm to 9 am 7 days/week  1.508.631.2072  https://mnwitw.org/danay        National [place] on Mental Illness (ADINA)  278.518.3445 or 1.888.ADINA.HELPS  https://namimn.org/        [place]  [facility]  [facility] residents only   [address] [facility]  [phone]        [facility]  Free mental health counseling  https://walkin.org/  612.870.0565 X2    Mental Health Apps      Calm Harm  https://calmharm.co.uk/      My3  https://my3app.org/      [place] Safety Plan  https://www.mysafetyplan.org/   Administrative Billing:   Time spent with patient includes counseling and coordination of care regarding above diagnoses and treatment plan.    The longitudinal plan of care for the diagnosis(es)/condition(s) as documented were addressed during this visit. Due to the added complexity in care, I will continue to support Maryjane in the subsequent management and with ongoing continuity of care.    Patient Status:  This is a continuous care patient and medications will be prescribed by the psychiatric provider until further indicated.    Signed:   HERMELINDA Vickers-BC   Psychiatry

## 2024-08-16 NOTE — NURSING NOTE
Current patient location: [address] [place] [address]  [facility] [address]    Is the patient currently in the state of [place]? YES    Visit mode:TELEPHONE    If the visit is dropped, the patient can be reconnected by: TELEPHONE VISIT: Phone number:   Telephone Information:   Mobile [phone]       Will anyone else be joining the visit? NO  (If patient encounters technical issues they should call [phone]    How would you like to obtain your AVS? MyChart    Are changes needed to the allergy or medication list? No    Are refills needed on medications prescribed by this physician? NO    Rooming Documentation:  Questionnaire(s) completed      Reason for visit: RECHECK    Sarah GOMEZF

## 2024-08-16 NOTE — PATIENT INSTRUCTIONS
"Patient Education   The [facility]  What to Expect  Here's what to expect in the Panel Psych[facility] Program.   About the program  You'll be meeting with a psychiatric doctor to check your mental health. A psychiatric doctor helps you deal with troubling thoughts and feelings by giving you medicine. They'll make sure you know the plan for your care. You may see them for a long time. When you're feeling better, they may refer you back to seeing your family doctor.   If you have any questions, we'll be glad to talk to you.  About visits  Be open  At your visits, please talk openly about your problems. It may feel hard, but it's the best way for us to help you.  Cancelling visits  If you can't come to your visit, please call us right away at [phone]. If you don't cancel at least 24 hours (1 full day) before your visit, that's \"late cancellation.\"  Not showing up for your visits  Being very late is the same as not showing up. You'll be a \"no show\" if:  You're more than 15 minutes late for a 30-minute (half hour) visit.  You're more than 30 minutes late for a 60-minute (full hour) visit.  If you cancel late or don't show up 2 times within 6 months, we may end your care.  Getting help between visits  If you need help between visits, you can call us Monday to Friday from 8 a.m. to 4:30 p.m. at [phone].  Emergency care  Call 911 or go to the nearest emergency department if your life or someone else's life is in danger.  Call 988 anytime to reach the national Suicide and Crisis hotline.  Medicine refills  To refill your medicine, call your pharmacy. You can also call [facility] at [phone], Monday to Friday, 8 a.m. to 4:30 p.m. It can take 1 to 3 business days to get a refill.   Forms, letters, and tests  You may have papers to fill out, like FMLA, short-term disability, and workability. We can help you with these forms at your visits, but you must have an " appointment. You may need more than 1 visit for this, to be in an intensive therapy program, or both.  Before we can give you medicine for ADHD, we may refer you to get tested for it or confirm it another way.  We may not be able to give you an emotional support animal letter.  We don't do mental health checks ordered by the court.   We don't do mental health testing, but we can refer you to get tested.   Thank you for choosing us for your care.  For informational purposes only. Not to replace the advice of your health care provider. Copyright   2022 [facility]. All rights reserved. [employer] 219118 - 12/22.       Treatment Plan:      1.  Discontinue lorazepam  2.  Add diazepam 5 mg daily as needed for anxiety/muscle spasms  3.  Continue bupropion  mg daily  4.  Continue methylphenidate 10 mg daily for ADHD  5.  Continue gabapentin 1200 mg 3 times daily for anxiety and pain symptoms  6.  Clonidine discontinued  7.  Talk therapy when able to, for learning skills to process trauma history, anxiety, and life adjustments  8.  You will be contacted to schedule with a new psychiatric provider as I will be retiring September 13    Continue all other medical directions per primary care provider.   Continue all other medications as reviewed per electronic medical record today.   Safety plan reviewed. To the Emergency Department as needed or call after hours crisis line at [phone] or [phone]. [place] Crisis Text Line: Text [place] to 528337  or  Suicide LifeLine Chat: suicidepreventionlifeline.org/chat/  To schedule individual or family therapy, call [facility] at [phone].   Schedule an appointment in October  or sooner as needed.  Call [facility] at [phone] to schedule.  Follow up with primary care provider as planned or for acute medical concerns.  Call the psychiatric nurse line with medication questions or concerns at [phone].  Shaina may be  used to communicate with your provider, but this is not intended to be used for emergencies.        Crisis Resources   The [facility] is an adults only unit located at [facility] in [place] is a short term (generally less than 23 hour stay) designed for crisis intervention and stabilization. Pts have the opportunity to meet quickly with a behavioral health team for evaluation in a calm and peaceful therapuetic environment. To be evaluated for admission pts are triaged throught the [facility] ED.      The following hotlines are for both adults and children. The and are open 24 hours a day, 7 days a week unless noted otherwise.        Crisis Lines      Crisis Text Line  Text 606524  You will be connected with a trained live crisis counselor to provide support.        Gambling Hotline  6.624.334.5080 [hope]        línea de crisis española  [phone]        [facility] Helpline  [phone]        National Hope Line  5.141.418.1434 [hope]        National Suicide Prevention Lifeline  Free and confidential support  988 or 1.315.808.TALK [8255]  http://suicidepreventionlifeline.org        The [employer] Project (LGBTQ Youth Crisis Line)  7.706.097.6637  text START to 306-266        [facility] Crisis Line  8.245.421.9452 (Press 1)  or text 850005    [facility] Mental Health Crisis Response  Within [place], call **CRISIS [**043992] to be connected to a mental health professional who can assist you.        [facility] Crisis  [phone]      [facility] Mobile Crisis  [phone]      [facility] Crisis  [phone]      [facility] Mobile Crisis  [phone] (adults)  [phone] (children)      [facility] Mobile Crisis  [phone] (adults)  [phone] (children)      [facility] Mobile Crisis  [phone]      [facility] Mobile Crisis  [phone]    Community Resources      Fast Tracker  Linking people to mental health and substance use disorder resources   gabbickermn.org        [place] Mental Health Warmline  Peer to peer support  5 pm to 9 am 7 days/week  6.998.662.8150  https://mnwitw.org/danay        National [place] on Mental Illness (ADINA)  198.442.3403 or 1.888.ADINA.HELPS  https://namimn.org/        [facility]  [place]   [address]  [phone]        [facility]  Free mental health counseling  https://walkin.org/  612.870.0565 X2    Mental Health Apps      Calm Harm  https://calmharm.co.uk/      My3  https://my3app.org/      [place] Safety Plan  https://www.mysafetyplan.org/

## 2024-08-21 ENCOUNTER — MYC MEDICAL ADVICE (OUTPATIENT)
Dept: PSYCHIATRY | Facility: CLINIC | Age: 54
End: 2024-08-21
Payer: COMMERCIAL

## 2024-08-22 NOTE — TELEPHONE ENCOUNTER
Reason for call:  Other   Patient called regarding (reason for call): Patient has sent multiple messages in [employer] for [employer] and nobody has responded to them.  She would like someone to follow up with about the messages she sent in [employer] about her situation as soon as possible.  She said she had something to add to the conversation she had with one of the nurses on Janet's team.     Phone number to reach patient:  Cell number on file:    Telephone Information:   Mobile [phone]       Best Time:  Anytime    Can we leave a detailed message on this number?  YES    Travel screening: Not Applicable

## 2024-08-31 ENCOUNTER — HOSPITAL ENCOUNTER (EMERGENCY)
Facility: CLINIC | Age: 54
Discharge: HOME | End: 2024-09-01
Attending: STUDENT IN AN ORGANIZED HEALTH CARE EDUCATION/TRAINING PROGRAM | Admitting: STUDENT IN AN ORGANIZED HEALTH CARE EDUCATION/TRAINING PROGRAM
Payer: COMMERCIAL

## 2024-08-31 ENCOUNTER — APPOINTMENT (OUTPATIENT)
Dept: ULTRASOUND IMAGING | Facility: CLINIC | Age: 54
End: 2024-08-31
Attending: STUDENT IN AN ORGANIZED HEALTH CARE EDUCATION/TRAINING PROGRAM
Payer: COMMERCIAL

## 2024-08-31 DIAGNOSIS — R10.11 RUQ ABDOMINAL PAIN: ICD-10-CM

## 2024-08-31 LAB
ALBUMIN SERPL BCG-MCNC: 4.4 G/DL (ref 3.5–5.2)
ALP SERPL-CCNC: 115 U/L (ref 40–150)
ALT SERPL W P-5'-P-CCNC: 38 U/L (ref 0–50)
ANION GAP SERPL CALCULATED.3IONS-SCNC: 17 MMOL/L (ref 7–15)
AST SERPL W P-5'-P-CCNC: 34 U/L (ref 0–45)
BASOPHILS # BLD AUTO: 0.1 10E3/UL (ref 0–0.2)
BASOPHILS NFR BLD AUTO: 1 %
BILIRUB SERPL-MCNC: 0.3 MG/DL
BUN SERPL-MCNC: 10.1 MG/DL (ref 6–20)
CALCIUM SERPL-MCNC: 9 MG/DL (ref 8.8–10.4)
CHLORIDE SERPL-SCNC: 102 MMOL/L (ref 98–107)
CREAT SERPL-MCNC: 0.77 MG/DL (ref 0.51–0.95)
EGFRCR SERPLBLD CKD-EPI 2021: >90 ML/MIN/1.73M2
EOSINOPHIL # BLD AUTO: 0.2 10E3/UL (ref 0–0.7)
EOSINOPHIL NFR BLD AUTO: 3 %
ERYTHROCYTE [DISTWIDTH] IN BLOOD BY AUTOMATED COUNT: 12.8 % (ref 10–15)
GLUCOSE SERPL-MCNC: 136 MG/DL (ref 70–99)
HCO3 SERPL-SCNC: 24 MMOL/L (ref 22–29)
HCT VFR BLD AUTO: 39.5 % (ref 35–47)
HGB BLD-MCNC: 13.5 G/DL (ref 11.7–15.7)
IMM GRANULOCYTES # BLD: 0 10E3/UL
IMM GRANULOCYTES NFR BLD: 0 %
LIPASE SERPL-CCNC: 25 U/L (ref 13–60)
LYMPHOCYTES # BLD AUTO: 3.4 10E3/UL (ref 0.8–5.3)
LYMPHOCYTES NFR BLD AUTO: 53 %
MCH RBC QN AUTO: 31.3 PG (ref 26.5–33)
MCHC RBC AUTO-ENTMCNC: 34.2 G/DL (ref 31.5–36.5)
MCV RBC AUTO: 92 FL (ref 78–100)
MONOCYTES # BLD AUTO: 0.8 10E3/UL (ref 0–1.3)
MONOCYTES NFR BLD AUTO: 13 %
NEUTROPHILS # BLD AUTO: 1.9 10E3/UL (ref 1.6–8.3)
NEUTROPHILS NFR BLD AUTO: 30 %
NRBC # BLD AUTO: 0 10E3/UL
NRBC BLD AUTO-RTO: 0 /100
PLATELET # BLD AUTO: 379 10E3/UL (ref 150–450)
POTASSIUM SERPL-SCNC: 3.4 MMOL/L (ref 3.4–5.3)
PROT SERPL-MCNC: 7.2 G/DL (ref 6.4–8.3)
RBC # BLD AUTO: 4.31 10E6/UL (ref 3.8–5.2)
SODIUM SERPL-SCNC: 143 MMOL/L (ref 135–145)
TSH SERPL DL<=0.005 MIU/L-ACNC: 0.38 UIU/ML (ref 0.3–4.2)
WBC # BLD AUTO: 6.4 10E3/UL (ref 4–11)

## 2024-08-31 PROCEDURE — 83690 ASSAY OF LIPASE: CPT | Performed by: STUDENT IN AN ORGANIZED HEALTH CARE EDUCATION/TRAINING PROGRAM

## 2024-08-31 PROCEDURE — 99284 EMERGENCY DEPT VISIT MOD MDM: CPT | Performed by: STUDENT IN AN ORGANIZED HEALTH CARE EDUCATION/TRAINING PROGRAM

## 2024-08-31 PROCEDURE — 85025 COMPLETE CBC W/AUTO DIFF WBC: CPT | Performed by: STUDENT IN AN ORGANIZED HEALTH CARE EDUCATION/TRAINING PROGRAM

## 2024-08-31 PROCEDURE — 82077 ASSAY SPEC XCP UR&BREATH IA: CPT | Performed by: STUDENT IN AN ORGANIZED HEALTH CARE EDUCATION/TRAINING PROGRAM

## 2024-08-31 PROCEDURE — 99284 EMERGENCY DEPT VISIT MOD MDM: CPT | Mod: 25 | Performed by: STUDENT IN AN ORGANIZED HEALTH CARE EDUCATION/TRAINING PROGRAM

## 2024-08-31 PROCEDURE — 76705 ECHO EXAM OF ABDOMEN: CPT

## 2024-08-31 PROCEDURE — 36415 COLL VENOUS BLD VENIPUNCTURE: CPT | Performed by: STUDENT IN AN ORGANIZED HEALTH CARE EDUCATION/TRAINING PROGRAM

## 2024-08-31 PROCEDURE — 84443 ASSAY THYROID STIM HORMONE: CPT | Performed by: STUDENT IN AN ORGANIZED HEALTH CARE EDUCATION/TRAINING PROGRAM

## 2024-08-31 PROCEDURE — 250N000013 HC RX MED GY IP 250 OP 250 PS 637: Performed by: STUDENT IN AN ORGANIZED HEALTH CARE EDUCATION/TRAINING PROGRAM

## 2024-08-31 PROCEDURE — 80053 COMPREHEN METABOLIC PANEL: CPT | Performed by: STUDENT IN AN ORGANIZED HEALTH CARE EDUCATION/TRAINING PROGRAM

## 2024-08-31 RX ORDER — LORAZEPAM 1 MG/1
1 TABLET ORAL ONCE
Status: COMPLETED | OUTPATIENT
Start: 2024-08-31 | End: 2024-08-31

## 2024-08-31 RX ADMIN — LORAZEPAM 1 MG: 1 TABLET ORAL at 23:29

## 2024-08-31 ASSESSMENT — ACTIVITIES OF DAILY LIVING (ADL): ADLS_ACUITY_SCORE: 34

## 2024-08-31 ASSESSMENT — COLUMBIA-SUICIDE SEVERITY RATING SCALE - C-SSRS
1. IN THE PAST MONTH, HAVE YOU WISHED YOU WERE DEAD OR WISHED YOU COULD GO TO SLEEP AND NOT WAKE UP?: NO
2. HAVE YOU ACTUALLY HAD ANY THOUGHTS OF KILLING YOURSELF IN THE PAST MONTH?: NO

## 2024-09-01 VITALS
OXYGEN SATURATION: 95 % | WEIGHT: 153.3 LBS | RESPIRATION RATE: 18 BRPM | DIASTOLIC BLOOD PRESSURE: 79 MMHG | SYSTOLIC BLOOD PRESSURE: 107 MMHG | TEMPERATURE: 98.2 F | BODY MASS INDEX: 28.97 KG/M2 | HEART RATE: 89 BPM

## 2024-09-01 LAB — ETHANOL SERPL-MCNC: 0.24 G/DL

## 2024-09-01 RX ORDER — ONDANSETRON 4 MG/1
4 TABLET, ORALLY DISINTEGRATING ORAL EVERY 8 HOURS PRN
Qty: 10 TABLET | Refills: 0 | Status: SHIPPED | OUTPATIENT
Start: 2024-09-01 | End: 2024-09-01

## 2024-09-01 RX ORDER — ONDANSETRON 4 MG/1
4 TABLET, ORALLY DISINTEGRATING ORAL EVERY 8 HOURS PRN
Qty: 10 TABLET | Refills: 0 | Status: SHIPPED | OUTPATIENT
Start: 2024-09-01

## 2024-09-01 ASSESSMENT — ENCOUNTER SYMPTOMS: ABDOMINAL PAIN: 1

## 2024-09-01 NOTE — ED PROVIDER NOTES
History     Chief Complaint   Patient presents with    Abdominal Pain     HPI  Magnolia Gonzáles is a 54 year old female who presents with right upper quadrant abdominal pain.  She explains this is worsened this evening than normal.  She has not had any trauma to the area.  She notes that she has a grandparent that his had pancreatic cancer and is considerably worried about this.  She has extensive mixed medical history including psychiatric disorders as well as opiate dependence and alcohol use disorder.  She denies any alcohol this evening.    Allergies:  Allergies   Allergen Reactions    Pregabalin Other (See Comments)     blurry vision    Serotonin Reuptake Inhibitors (Ssris) Rash    Zoloft [Sertraline] Rash       Problem List:    Patient Active Problem List    Diagnosis Date Noted    Generalized anxiety disorder 07/29/2020     Priority: Medium    Major depressive disorder, recurrent episode, mild (H24) 07/19/2017     Priority: Medium    PTSD (post-traumatic stress disorder) 07/19/2017     Priority: Medium    Bipolar affective disorder, manic, moderate (H) 09/23/2016     Priority: Medium     Patient refutes this. 10/5/2020      Overdose of Valium, intentional self-harm, subsequent encounter 05/29/2016     Priority: Medium    Carpal tunnel syndrome of left wrist 01/13/2016     Priority: Medium    Adjustment disorder with mixed anxiety and depressed mood 01/06/2016     Priority: Medium    Acquired hypothyroidism 01/06/2016     Priority: Medium    Bilateral carpal tunnel syndrome 01/06/2016     Priority: Medium    Social phobia 12/26/2014     Priority: Medium    Panic disorder without agoraphobia 12/26/2014     Priority: Medium    History of alcohol dependence (H) 07/22/2014     Priority: Medium     Overview:   Binge drinking disorder. Sober since 5/2016      Nondependent alcohol abuse, episodic drinking behavior 11/17/2005     Priority: Medium        Past Medical History:    Past Medical History:  "  Diagnosis Date    Anxiety     Depressive disorder     Hypothyroid     Suicidal ideation 7/19/2015       Past Surgical History:    Past Surgical History:   Procedure Laterality Date    ORTHOPEDIC SURGERY         Family History:    Family History   Problem Relation Age of Onset    Depression/Anxiety Mother     Alcohol/Drug Father         Pancreatic CA       Social History:  Marital Status:  Single [1]  Social History     Tobacco Use    Smoking status: Never     Passive exposure: Never    Smokeless tobacco: Never   Vaping Use    Vaping status: Never Used   Substance Use Topics    Alcohol use: Yes     Alcohol/week: 0.0 standard drinks of alcohol     Comment: \"once every three months\"    Drug use: No        Medications:    ondansetron (ZOFRAN ODT) 4 MG ODT tab  acetaminophen (TYLENOL) 500 MG tablet  buPROPion (WELLBUTRIN XL) 150 MG 24 hr tablet  buPROPion (WELLBUTRIN XL) 300 MG 24 hr tablet  diazepam (VALIUM) 5 MG tablet  gabapentin (NEURONTIN) 400 MG capsule  hydrocortisone (CORTAID) 1 % external ointment  ibuprofen (ADVIL/MOTRIN) 200 MG capsule  levothyroxine (SYNTHROID/LEVOTHROID) 100 MCG tablet  meclizine (ANTIVERT) 25 MG tablet  methylphenidate (RITALIN) 10 MG tablet  multivitamin (ONE-DAILY) tablet  tiZANidine (ZANAFLEX) 4 MG tablet  triamcinolone (ARISTOCORT HP) 0.5 % external cream          Review of Systems   Gastrointestinal:  Positive for abdominal pain.   All other systems reviewed and are negative.      Physical Exam   BP: 107/79  Pulse: 115  Temp: 98.2  F (36.8  C)  Resp: 18  Weight: 69.5 kg (153 lb 4.8 oz)  SpO2: 95 %      Physical Exam  Vitals and nursing note reviewed.   Constitutional:       General: She is not in acute distress.     Appearance: She is not ill-appearing, toxic-appearing or diaphoretic.      Comments: Patient intermittently slurring her speech and continuously repeating words history and story.   HENT:      Head: Normocephalic.      Mouth/Throat:      Mouth: Mucous membranes are moist. "   Eyes:      Extraocular Movements: Extraocular movements intact.   Cardiovascular:      Rate and Rhythm: Normal rate and regular rhythm.      Heart sounds: Normal heart sounds.   Pulmonary:      Effort: Pulmonary effort is normal. No respiratory distress.      Breath sounds: Normal breath sounds. No wheezing.   Abdominal:      General: Abdomen is flat. Bowel sounds are normal.      Palpations: Abdomen is soft.      Tenderness: There is abdominal tenderness in the right upper quadrant. There is no right CVA tenderness, left CVA tenderness or guarding. Negative signs include Chou's sign and McBurney's sign.   Skin:     General: Skin is warm.      Capillary Refill: Capillary refill takes less than 2 seconds.   Neurological:      General: No focal deficit present.      Mental Status: She is alert and oriented to person, place, and time.   Psychiatric:         Mood and Affect: Mood normal.         Behavior: Behavior normal.         ED Course        Procedures             Results for orders placed or performed during the hospital encounter of 08/31/24 (from the past 24 hour(s))   CBC with platelets differential    Narrative    The following orders were created for panel order CBC with platelets differential.  Procedure                               Abnormality         Status                     ---------                               -----------         ------                     CBC with platelets and d...[[id number]]                      Final result                 Please view results for these tests on the individual orders.   Comprehensive metabolic panel   Result Value Ref Range    Sodium 143 135 - 145 mmol/L    Potassium 3.4 3.4 - 5.3 mmol/L    Carbon Dioxide (CO2) 24 22 - 29 mmol/L    Anion Gap 17 (H) 7 - 15 mmol/L    Urea Nitrogen 10.1 6.0 - 20.0 mg/dL    Creatinine 0.77 0.51 - 0.95 mg/dL    GFR Estimate >90 >60 mL/min/1.73m2    Calcium 9.0 8.8 - 10.4 mg/dL    Chloride 102 98 - 107 mmol/L    Glucose 136 (H) 70  - 99 mg/dL    Alkaline Phosphatase 115 40 - 150 U/L    AST 34 0 - 45 U/L    ALT 38 0 - 50 U/L    Protein Total 7.2 6.4 - 8.3 g/dL    Albumin 4.4 3.5 - 5.2 g/dL    Bilirubin Total 0.3 <=1.2 mg/dL   Lipase   Result Value Ref Range    Lipase 25 13 - 60 U/L   TSH with free T4 reflex   Result Value Ref Range    TSH 0.38 0.30 - 4.20 uIU/mL   CBC with platelets and differential   Result Value Ref Range    WBC Count 6.4 4.0 - 11.0 10e3/uL    RBC Count 4.31 3.80 - 5.20 10e6/uL    Hemoglobin 13.5 11.7 - 15.7 g/dL    Hematocrit 39.5 35.0 - 47.0 %    MCV 92 78 - 100 fL    MCH 31.3 26.5 - 33.0 pg    MCHC 34.2 31.5 - 36.5 g/dL    RDW 12.8 10.0 - 15.0 %    Platelet Count 379 150 - 450 10e3/uL    % Neutrophils 30 %    % Lymphocytes 53 %    % Monocytes 13 %    % Eosinophils 3 %    % Basophils 1 %    % Immature Granulocytes 0 %    NRBCs per 100 WBC 0 <1 /100    Absolute Neutrophils 1.9 1.6 - 8.3 10e3/uL    Absolute Lymphocytes 3.4 0.8 - 5.3 10e3/uL    Absolute Monocytes 0.8 0.0 - 1.3 10e3/uL    Absolute Eosinophils 0.2 0.0 - 0.7 10e3/uL    Absolute Basophils 0.1 0.0 - 0.2 10e3/uL    Absolute Immature Granulocytes 0.0 <=0.4 10e3/uL    Absolute NRBCs 0.0 10e3/uL   Alcohol level blood   Result Value Ref Range    Alcohol ethyl 0.24 (H) <=0.01 g/dL   US Abdomen Limited    Narrative    EXAM: US ABDOMEN LIMITED  LOCATION: [facility]  DATE: 8/31/2024    INDICATION: RUQ pain.  COMPARISON: CT abdomen and pelvis 5/27/2023.  TECHNIQUE: Limited abdominal ultrasound.    FINDINGS:    GALLBLADDER: No cholelithiasis, gallbladder wall thickening or pericholecystic fluid. Sonographic Chou's sign is negative.    BILE DUCTS: No biliary dilatation. The common duct measures 4 mm.    LIVER: Normal parenchyma with smooth contour. No focal mass.    RIGHT KIDNEY: No hydronephrosis.    PANCREAS: Obscured by overlying bowel gas.    No ascites.      Impression    IMPRESSION:  1.  No sonographic findings to explain patient's  symptoms of right upper quadrant abdominal pain.           Medications   LORazepam (ATIVAN) tablet 1 mg (1 mg Oral $Given 8/31/24 2092)       Assessments & Plan (with Medical Decision Making)     I have reviewed the nursing notes.    I have reviewed the findings, diagnosis, plan and need for follow up with the patient.      Medical Decision Making  54-year-old male presenting with right upper quadrant abdominal pain starting this evening after eating pizza.  She has got some mild tenderness to the right upper quadrant with no rebound or guarding.  She has no acute abdomen on evaluation and is mildly tachycardic on presentation at 115 but is moderately anxious throughout the evaluation.  Her temperature is 98.2 blood pressure is 107/79 and oxygen saturation 95%.  Through the evaluation her heart rate improved at bedside less than 100.  With the patient's history and physical findings consistent with possible fatty liver disease versus hepatobiliary pathology.  1 mg Ativan provided for patient's anxiety.  Ultrasound ordered and was otherwise unremarkable.  Lab work was also unremarkable however due to patient's recurrent storytelling and signs of increased sleepiness to complete an alcohol level which returned positive at 0.24.  PDMP review reviewed.  With the findings consistent with likely associated anxiety/uncontrolled psychiatric pathology with acute alcohol intoxication is likely contributing symptoms.  Do not see any signs of pancreatitis electrolyte abnormalities leukocytosis signs of infection or any other acute disturbances.  Likely associating the symptoms and her presentation to her alcohol use disorder as well as uncontrolled psychiatric disorder.  Patent with their partner here and comfortable discharge plan however do not see any medical surgical emergencies present and discussed alcohol withdrawal as well as managing her symptoms with her outpatient follow-up team.      New Prescriptions    ONDANSETRON  (ZOFRAN ODT) 4 MG ODT TAB    Take 1 tablet (4 mg) by mouth every 8 hours as needed for nausea.       Final diagnoses:   RUQ abdominal pain       8/31/2024   [facility] EMERGENCY DEPT       Samy Deutsch MD  09/01/24 0028

## 2024-09-01 NOTE — ED TRIAGE NOTES
"Has had abdominal pain, shooting pains.  Has had for \"a while\"  Irregular Bms.  PT is quite anxious about being in the ED, brought in by bf.  Alert and oriented.     Triage Assessment (Adult)       Row Name 08/31/24 3247          Triage Assessment    Airway WDL WDL        Respiratory WDL    Respiratory WDL WDL        Cognitive/Neuro/Behavioral WDL    Cognitive/Neuro/Behavioral WDL WDL                     "

## 2024-09-01 NOTE — DISCHARGE INSTRUCTIONS
You are seen today for abdominal pain.  There is no acute signs of kidney abnormalities pancreatic elevations or signs of cholecystitis.  At this time the abdominal discomfort is mildly abnormal and we sent you home with some Zofran that can often aid in abdominal discomfort.  Would recommend taking her daily medications as prescribed.  Follow-up with your primary team as needed.

## 2024-09-03 ENCOUNTER — OFFICE VISIT (OUTPATIENT)
Dept: FAMILY MEDICINE | Facility: CLINIC | Age: 54
End: 2024-09-03
Payer: COMMERCIAL

## 2024-09-03 VITALS
TEMPERATURE: 97.7 F | OXYGEN SATURATION: 97 % | HEART RATE: 84 BPM | RESPIRATION RATE: 18 BRPM | WEIGHT: 153 LBS | DIASTOLIC BLOOD PRESSURE: 82 MMHG | SYSTOLIC BLOOD PRESSURE: 128 MMHG | HEIGHT: 63 IN | BODY MASS INDEX: 27.11 KG/M2

## 2024-09-03 DIAGNOSIS — F10.21 HISTORY OF ALCOHOL DEPENDENCE (H): ICD-10-CM

## 2024-09-03 DIAGNOSIS — F43.10 PTSD (POST-TRAUMATIC STRESS DISORDER): ICD-10-CM

## 2024-09-03 DIAGNOSIS — F31.12 BIPOLAR AFFECTIVE DISORDER, MANIC, MODERATE (H): ICD-10-CM

## 2024-09-03 DIAGNOSIS — Z01.818 PREOPERATIVE EXAMINATION: Primary | ICD-10-CM

## 2024-09-03 DIAGNOSIS — F41.1 GENERALIZED ANXIETY DISORDER: ICD-10-CM

## 2024-09-03 DIAGNOSIS — E03.9 ACQUIRED HYPOTHYROIDISM: Chronic | ICD-10-CM

## 2024-09-03 DIAGNOSIS — M41.9 SCOLIOSIS, UNSPECIFIED SCOLIOSIS TYPE, UNSPECIFIED SPINAL REGION: ICD-10-CM

## 2024-09-03 PROCEDURE — G2211 COMPLEX E/M VISIT ADD ON: HCPCS | Performed by: NURSE PRACTITIONER

## 2024-09-03 PROCEDURE — 99214 OFFICE O/P EST MOD 30 MIN: CPT | Performed by: NURSE PRACTITIONER

## 2024-09-03 RX ORDER — FEXOFENADINE HCL 180 MG/1
180 TABLET ORAL
COMMUNITY
Start: 2024-01-24 | End: 2024-09-21

## 2024-09-03 RX ORDER — OLANZAPINE 5 MG/1
5 TABLET ORAL AT BEDTIME
COMMUNITY
Start: 2023-09-01 | End: 2024-09-21

## 2024-09-03 RX ORDER — ATORVASTATIN CALCIUM 10 MG/1
1 TABLET, FILM COATED ORAL
COMMUNITY
Start: 2024-06-20

## 2024-09-03 ASSESSMENT — PAIN SCALES - GENERAL: PAINLEVEL: EXTREME PAIN (8)

## 2024-09-03 NOTE — PATIENT INSTRUCTIONS
How to Take Your Medication Before Surgery  Preoperative Medication Instructions   Antiplatelet or Anticoagulation Medication Instructions   - Patient is on no antiplatelet or anticoagulation medications.    Additional Medication Instructions   - Benzodiazepines: Continue without modification.   - SSRIs, SNRIs, TCAs, Antipsychotics: Continue without modification.

## 2024-09-03 NOTE — PROGRESS NOTES
Preoperative Evaluation  [address] [address]  Phone: [phone]  Fax: [phone]  Primary Provider: Kajal Melendez PA-C  Pre-op Performing Provider: Christine Senior NP  Sep 3, 2024             9/3/2024   Surgical Information   What procedure is being done? MRI   Facility or Hospital where procedure/surgery will be performed: [facility]   Who is doing the procedure / surgery?    Date of surgery / procedure: September 17, 2024   Time of surgery / procedure: 1 pm   Where do you plan to recover after surgery? at home with family        Fax number for surgical facility:     Assessment & Plan     The proposed surgical procedure is considered LOW risk.    Preoperative examination    Scoliosis, unspecified     Generalized anxiety disorder    PTSD (post-traumatic stress disorder)    Bipolar affective disorder, manic, moderate (H)    History of alcohol dependence (H)    Acquired hypothyroidism      Possible Sleep Apnea: Defer to PCP          Risks and Recommendations  The patient has the following additional risks and recommendations for perioperative complications:   - No identified additional risk factors other than previously addressed    Preoperative Medication Instructions  Antiplatelet or Anticoagulation Medication Instructions   - Patient is on no antiplatelet or anticoagulation medications.    Additional Medication Instructions   - Benzodiazepines: Continue without modification.   - SSRIs, SNRIs, TCAs, Antipsychotics: Continue without modification.     Recommendation  Approval given to proceed with proposed procedure, without further diagnostic evaluation.    The longitudinal plan of care for the diagnosis(es)/condition(s) as documented were addressed during this visit. Due to the added complexity in care, I will continue to support Maryjane in the subsequent management and with ongoing continuity of care.    Subjective   Maryjane is a 54 year old, presenting for the  following:  Pre-Op Exam (Mri  gets extremely claustrophobic )          9/3/2024     8:03 AM   Additional Questions   Roomed by Abbi CHAMBERLAIN related to upcoming procedure: Maryjane has a history of anxiety which is chronic for her. She states this causes her to become clostraphobic, specifically while getting MRI which has been ordered for further evaluation of scoliosis.     Maryjane has a marked history of anxiety, PTSD and Bipolar affective disorder. She is followed by psychiatry and sees a counselor regularly. She also has a history of alcohol dependence, she states she was recently drinking this past weekend, however had otherwise not drank any alcohol in the past year. She was seen in the ED this past weekend on 8/30/24 for concerns of right sided flank and right upper quadrant pain. She states this has been an ongoing concern of hers for the past several months. No cause of her symptoms have been identified. Lab and ultrasound work up was negative.         9/3/2024   Pre-Op Questionnaire   Have you ever had a heart attack or stroke? No   Have you ever had surgery on your heart or blood vessels, such as a stent placement, a coronary artery bypass, or surgery on an artery in your head, neck, heart, or legs? No   Do you have chest pain with activity? No   Do you have a history of heart failure? No   Do you currently have a cold, bronchitis or symptoms of other infection? No   Do you have a cough, shortness of breath, or wheezing? No   Do you or anyone in your family have previous history of blood clots? No   Do you or does anyone in your family have a serious bleeding problem such as prolonged bleeding following surgeries or cuts? No   Have you ever had problems with anemia or been told to take iron pills? No   Have you had any abnormal blood loss such as black, tarry or bloody stools, or abnormal vaginal bleeding? No   Have you ever had a blood transfusion? No   Are you willing to have a blood transfusion if it is  medically needed before, during, or after your surgery? Yes   Have you or any of your relatives ever had problems with anesthesia? No   Do you have sleep apnea, excessive snoring or daytime drowsiness? (!) YES- New diagnosis   Do you have a CPAP machine? (!) NO - Dental appliance needed    Do you have any artifical heart valves or other implanted medical devices like a pacemaker, defibrillator, or continuous glucose monitor? No   Do you have artificial joints? No   Are you allergic to latex? No        Health Care Directive  Patient does not have a Health Care Directive or Living Will: Discussed advance care planning with patient; however, patient declined at this time.    Preoperative Review of    reviewed - controlled substances reflected in medication list.          Patient Active Problem List    Diagnosis Date Noted    Generalized anxiety disorder 07/29/2020     Priority: Medium    Major depressive disorder, recurrent episode, mild (H24) 07/19/2017     Priority: Medium    PTSD (post-traumatic stress disorder) 07/19/2017     Priority: Medium    Bipolar affective disorder, manic, moderate (H) 09/23/2016     Priority: Medium     Patient refutes this. 10/5/2020      Overdose of Valium, intentional self-harm, subsequent encounter 05/29/2016     Priority: Medium    Carpal tunnel syndrome of left wrist 01/13/2016     Priority: Medium    Adjustment disorder with mixed anxiety and depressed mood 01/06/2016     Priority: Medium    Acquired hypothyroidism 01/06/2016     Priority: Medium    Bilateral carpal tunnel syndrome 01/06/2016     Priority: Medium    Social phobia 12/26/2014     Priority: Medium    Panic disorder without agoraphobia 12/26/2014     Priority: Medium    History of alcohol dependence (H) 07/22/2014     Priority: Medium     Overview:   Binge drinking disorder. Sober since 5/2016      Nondependent alcohol abuse, episodic drinking behavior 11/17/2005     Priority: Medium      Past Medical History:    Diagnosis Date    Anxiety     Depressive disorder     Hypothyroid     Suicidal ideation 7/19/2015     Past Surgical History:   Procedure Laterality Date    ORTHOPEDIC SURGERY       Current Outpatient Medications   Medication Sig Dispense Refill    acetaminophen (TYLENOL) 500 MG tablet Take 1-2 tablets (500-1,000 mg) by mouth every 6 hours as needed for mild pain 90 tablet 3    buPROPion (WELLBUTRIN XL) 150 MG 24 hr tablet Take 1 tablet (150 mg) by mouth every morning With 300 mg for a total of 450 mg daily 30 tablet 5    buPROPion (WELLBUTRIN XL) 300 MG 24 hr tablet Take 1 tablet (300 mg) by mouth every morning 30 tablet 5    diazepam (VALIUM) 5 MG tablet Take 1 tablet (5 mg) by mouth daily as needed for anxiety or muscle spasms 30 tablet 0    gabapentin (NEURONTIN) 400 MG capsule Take 3 capsules (1,200 mg) by mouth 3 times daily Fill July 19 270 capsule 2    hydrocortisone (CORTAID) 1 % external ointment Apply topically 2 times daily Use on face and around the eyes - DO NOT get in the eye 30 g 0    ibuprofen (ADVIL/MOTRIN) 200 MG capsule Take 1-2 capsules (200-400 mg) by mouth every 4 hours as needed for fever 60 capsule 1    levothyroxine (SYNTHROID/LEVOTHROID) 100 MCG tablet Take 1 tablet (100 mcg) by mouth daily 90 tablet 0    meclizine (ANTIVERT) 25 MG tablet Take 1 tablet (25 mg) by mouth daily as needed for dizziness or nausea 15 tablet 0    methylphenidate (RITALIN) 10 MG tablet Take 1 tablet (10 mg) by mouth daily 30 tablet 0    multivitamin (ONE-DAILY) tablet Take 1 tablet by mouth daily 90 tablet 3    ondansetron (ZOFRAN ODT) 4 MG ODT tab Take 1 tablet (4 mg) by mouth every 8 hours as needed for nausea. 10 tablet 0    tiZANidine (ZANAFLEX) 4 MG tablet Take 1 tablet (4 mg) by mouth 3 times daily as needed for other (pain) 60 tablet 3    triamcinolone (ARISTOCORT HP) 0.5 % external cream Apply topically 2 times daily 454 g 1       Allergies   Allergen Reactions    Pregabalin Other (See Comments)      "blurry vision    Serotonin Reuptake Inhibitors (Ssris) Rash    Zoloft [Sertraline] Rash        Social History     Tobacco Use    Smoking status: Never     Passive exposure: Never    Smokeless tobacco: Never   Substance Use Topics    Alcohol use: Yes     Alcohol/week: 0.0 standard drinks of alcohol     Comment: \"once every three months\"       History   Drug Use No         Review of Systems  Constitutional, HEENT, cardiovascular, pulmonary, gi and gu systems are negative, except as otherwise noted.    Objective    BP (!) 120/90   Pulse 84   Temp 97.7  F (36.5  C) (Temporal)   Resp 18   Ht 1.6 m (5' 3\")   Wt 69.4 kg (153 lb)   SpO2 97%   BMI 27.10 kg/m     Estimated body mass index is 27.1 kg/m  as calculated from the following:    Height as of this encounter: 1.6 m (5' 3\").    Weight as of this encounter: 69.4 kg (153 lb).  Physical Exam  GENERAL: alert and no distress  NECK: no adenopathy, no asymmetry, masses, or scars  RESP: lungs clear to auscultation - no rales, rhonchi or wheezes  CV: regular rate and rhythm, normal S1 S2, no S3 or S4, no murmur, click or rub, no peripheral edema  ABDOMEN: soft, nontender, no hepatosplenomegaly, no masses and bowel sounds normal  MS: no gross musculoskeletal defects noted, no edema  NEURO: Normal strength and tone, mentation intact and speech normal  PSYCH: mentation appears normal, affect normal/bright    Recent Labs   Lab Test 08/31/24  2320 04/18/24  0404   HGB 13.5 12.9    403    138   POTASSIUM 3.4 3.7   CR 0.77 0.85        Diagnostics  No labs were ordered during this visit.   No EKG required for low risk surgery (cataract, skin procedure, breast biopsy, etc).    Revised Cardiac Risk Index (RCRI)  The patient has the following serious cardiovascular risks for perioperative complications:   - No serious cardiac risks = 0 points     RCRI Interpretation: 0 points: Class I (very low risk - 0.4% complication rate)       Signed Electronically by: Christine" CAIO Senior  A copy of this evaluation report is provided to the requesting physician.

## 2024-09-03 NOTE — PROGRESS NOTES
"  {PROVIDER CHARTING PREFERENCE:781300}    Suly Wesley is a 54 year old, presenting for the following health issues:  Pre-Op Exam (Mri  gets extremely claustrophobic )      9/3/2024     8:03 AM   Additional Questions   Roomed by Abbi CHAMBERLAIN     {MA/LPN/RN Pre-Provider Visit Orders- hCG/UA/Strep (Optional):443535}  {SUPERLIST (Optional):783539}  {additonal problems for provider to add (Optional):925229}    {ROS Picklists (Optional):170551}      Objective    BP (!) 120/90   Pulse 84   Temp 97.7  F (36.5  C) (Temporal)   Resp 18   Ht 1.6 m (5' 3\")   Wt 69.4 kg (153 lb)   SpO2 97%   BMI 27.10 kg/m    Body mass index is 27.1 kg/m .  Physical Exam   {Exam List (Optional):026043}    {Diagnostic Test Results (Optional):816353}        Signed Electronically by: Christine Senior NP  {Email feedback regarding this note to [email or web address]   :967003}  "

## 2024-09-20 DIAGNOSIS — F90.8 ATTENTION DEFICIT HYPERACTIVITY DISORDER (ADHD), OTHER TYPE: ICD-10-CM

## 2024-09-20 DIAGNOSIS — F41.1 GENERALIZED ANXIETY DISORDER: ICD-10-CM

## 2024-09-20 RX ORDER — METHYLPHENIDATE HYDROCHLORIDE 10 MG/1
10 TABLET ORAL DAILY
Qty: 30 TABLET | Refills: 0 | Status: SHIPPED | OUTPATIENT
Start: 2024-09-20

## 2024-09-20 RX ORDER — DIAZEPAM 5 MG
5 TABLET ORAL DAILY PRN
Qty: 30 TABLET | Refills: 0 | Status: SHIPPED | OUTPATIENT
Start: 2024-09-20

## 2024-09-20 NOTE — TELEPHONE ENCOUNTER
Reason for call:  Medication   If this is a refill request, has the caller requested the refill from the pharmacy already? No  Will the patient be using a [facility]? No  Name of the pharmacy and phone number for the current request: [facility] in [place] and [phone]    Name of the medication requested: Diazepam and methylphenidate    Other request: pt is currently out of meds. And pt has an appt with a new provider on 9/25/24-Vonda Pelletier at [facility] in [place]. Pt call pt and let her know what is going on with the refills. Thank you    Phone number to reach patient:  Cell number on file:    Telephone Information:   Mobile [phone]       Best Time:  anytime    Can we leave a detailed message on this number?  YES    Travel screening: Not Applicable

## 2024-09-20 NOTE — TELEPHONE ENCOUNTER
Date of Last Office Visit: 8/16/24  Date of Next Office Visit: Transferring to long-term psychiatry. Initial appointment with Steph scheduled for 9/25.  No shows since last visit: No  More than one patient-initiated cancellation (with reschedule) since last seen in clinic? No    []Medication refilled per  Medication Refill in Ambulatory Care  policy.  [x]Medication unable to be refilled by RN due to criteria not met as indicated below:    []Eligibility: has not had a provider visit within last 6 months   [x]Supervision: no future appointment; < 7 days before next appointment   []Compliance: no shows; cancellations; lapse in therapy   []Verification: order discrepancy; may need modification...   [] > 30-day supply request   []Advanced refill request: > 7 days before refill date   [x]Controlled medication   []Medication not included in policy   []Review: new med; med adjusted ? 30 days; safety alert; requires lab monitoring...   []Scope of Practice: refill request processed by LPN/MA   []Other:      Medication(s) requested:     -  diazepam (VALIUM) 5 MG tablet   Date last ordered: 8/16/24  Qty: 30  Refills: 0  Appropriate for refill? Yes    -  methylphenidate (RITALIN) 10 MG tablet   Date last ordered: 8/16/24  Qty: 30  Refills: 0  Appropriate for refill? Yes    Any Controlled Substance(s)? Yes   [place]  checked? No; not current delegate      Requested medication(s) verified as identical to current order? Yes    Any lapse in adherence to medication(s) greater than 5 days? No      Additional action taken? routed encounter to provider for review.      Last visit treatment plan:   1.  Discontinue lorazepam  2.  Add diazepam 5 mg daily as needed for anxiety/muscle spasms  3.  Continue bupropion  mg daily  4.  Continue methylphenidate 10 mg daily for ADHD  5.  Continue gabapentin 1200 mg 3 times daily for anxiety and pain symptoms  6.  Clonidine discontinued  7.  Talk therapy when able to, for learning skills to  process trauma history, anxiety, and life adjustments  8.  You will be contacted to schedule with a new psychiatric provider as I will be retiring September 13    Any medication(s) require lab monitoring? No

## 2024-09-21 ENCOUNTER — HOSPITAL ENCOUNTER (EMERGENCY)
Facility: CLINIC | Age: 54
Discharge: HOME | End: 2024-09-21
Attending: PHYSICIAN ASSISTANT | Admitting: PHYSICIAN ASSISTANT
Payer: COMMERCIAL

## 2024-09-21 VITALS
DIASTOLIC BLOOD PRESSURE: 101 MMHG | TEMPERATURE: 97.9 F | HEIGHT: 61 IN | BODY MASS INDEX: 29.64 KG/M2 | OXYGEN SATURATION: 99 % | WEIGHT: 157 LBS | SYSTOLIC BLOOD PRESSURE: 144 MMHG | HEART RATE: 87 BPM | RESPIRATION RATE: 18 BRPM

## 2024-09-21 DIAGNOSIS — R07.81 RIB PAIN ON RIGHT SIDE: ICD-10-CM

## 2024-09-21 LAB
ALBUMIN SERPL BCG-MCNC: 4.2 G/DL (ref 3.5–5.2)
ALBUMIN UR-MCNC: NEGATIVE MG/DL
ALP SERPL-CCNC: 136 U/L (ref 40–150)
ALT SERPL W P-5'-P-CCNC: 60 U/L (ref 0–50)
ANION GAP SERPL CALCULATED.3IONS-SCNC: 14 MMOL/L (ref 7–15)
APPEARANCE UR: CLEAR
AST SERPL W P-5'-P-CCNC: 40 U/L (ref 0–45)
BACTERIA #/AREA URNS HPF: ABNORMAL /HPF
BASOPHILS # BLD AUTO: 0.1 10E3/UL (ref 0–0.2)
BASOPHILS NFR BLD AUTO: 1 %
BILIRUB SERPL-MCNC: 0.3 MG/DL
BILIRUB UR QL STRIP: NEGATIVE
BUN SERPL-MCNC: 8.5 MG/DL (ref 6–20)
CALCIUM SERPL-MCNC: 9.6 MG/DL (ref 8.8–10.4)
CHLORIDE SERPL-SCNC: 105 MMOL/L (ref 98–107)
COLOR UR AUTO: COLORLESS
CREAT SERPL-MCNC: 0.67 MG/DL (ref 0.51–0.95)
CRP SERPL-MCNC: 7.29 MG/L
EGFRCR SERPLBLD CKD-EPI 2021: >90 ML/MIN/1.73M2
EOSINOPHIL # BLD AUTO: 0.4 10E3/UL (ref 0–0.7)
EOSINOPHIL NFR BLD AUTO: 7 %
ERYTHROCYTE [DISTWIDTH] IN BLOOD BY AUTOMATED COUNT: 13.2 % (ref 10–15)
ERYTHROCYTE [SEDIMENTATION RATE] IN BLOOD BY WESTERGREN METHOD: 14 MM/HR (ref 0–30)
GLUCOSE SERPL-MCNC: 95 MG/DL (ref 70–99)
GLUCOSE UR STRIP-MCNC: NEGATIVE MG/DL
HCO3 SERPL-SCNC: 23 MMOL/L (ref 22–29)
HCT VFR BLD AUTO: 36.7 % (ref 35–47)
HGB BLD-MCNC: 12.5 G/DL (ref 11.7–15.7)
HGB UR QL STRIP: NEGATIVE
IMM GRANULOCYTES # BLD: 0 10E3/UL
IMM GRANULOCYTES NFR BLD: 0 %
KETONES UR STRIP-MCNC: NEGATIVE MG/DL
LEUKOCYTE ESTERASE UR QL STRIP: NEGATIVE
LIPASE SERPL-CCNC: 21 U/L (ref 13–60)
LYMPHOCYTES # BLD AUTO: 2.2 10E3/UL (ref 0.8–5.3)
LYMPHOCYTES NFR BLD AUTO: 40 %
MCH RBC QN AUTO: 31.8 PG (ref 26.5–33)
MCHC RBC AUTO-ENTMCNC: 34.1 G/DL (ref 31.5–36.5)
MCV RBC AUTO: 93 FL (ref 78–100)
MONOCYTES # BLD AUTO: 0.7 10E3/UL (ref 0–1.3)
MONOCYTES NFR BLD AUTO: 13 %
NEUTROPHILS # BLD AUTO: 2.2 10E3/UL (ref 1.6–8.3)
NEUTROPHILS NFR BLD AUTO: 40 %
NITRATE UR QL: NEGATIVE
NRBC # BLD AUTO: 0 10E3/UL
NRBC BLD AUTO-RTO: 0 /100
PH UR STRIP: 7 [PH] (ref 5–7)
PLATELET # BLD AUTO: 387 10E3/UL (ref 150–450)
POTASSIUM SERPL-SCNC: 4 MMOL/L (ref 3.4–5.3)
PROT SERPL-MCNC: 7 G/DL (ref 6.4–8.3)
RBC # BLD AUTO: 3.93 10E6/UL (ref 3.8–5.2)
RBC URINE: 0 /HPF
SODIUM SERPL-SCNC: 142 MMOL/L (ref 135–145)
SP GR UR STRIP: 1 (ref 1–1.03)
UROBILINOGEN UR STRIP-MCNC: NORMAL MG/DL
WBC # BLD AUTO: 5.5 10E3/UL (ref 4–11)
WBC URINE: <1 /HPF

## 2024-09-21 PROCEDURE — 99284 EMERGENCY DEPT VISIT MOD MDM: CPT | Performed by: PHYSICIAN ASSISTANT

## 2024-09-21 PROCEDURE — 250N000013 HC RX MED GY IP 250 OP 250 PS 637: Performed by: PHYSICIAN ASSISTANT

## 2024-09-21 PROCEDURE — 96374 THER/PROPH/DIAG INJ IV PUSH: CPT | Performed by: PHYSICIAN ASSISTANT

## 2024-09-21 PROCEDURE — 83690 ASSAY OF LIPASE: CPT | Performed by: PHYSICIAN ASSISTANT

## 2024-09-21 PROCEDURE — 250N000011 HC RX IP 250 OP 636: Performed by: PHYSICIAN ASSISTANT

## 2024-09-21 PROCEDURE — 36415 COLL VENOUS BLD VENIPUNCTURE: CPT | Performed by: PHYSICIAN ASSISTANT

## 2024-09-21 PROCEDURE — 81001 URINALYSIS AUTO W/SCOPE: CPT | Performed by: PHYSICIAN ASSISTANT

## 2024-09-21 PROCEDURE — 80053 COMPREHEN METABOLIC PANEL: CPT | Performed by: PHYSICIAN ASSISTANT

## 2024-09-21 PROCEDURE — 85652 RBC SED RATE AUTOMATED: CPT | Performed by: PHYSICIAN ASSISTANT

## 2024-09-21 PROCEDURE — 85025 COMPLETE CBC W/AUTO DIFF WBC: CPT | Performed by: PHYSICIAN ASSISTANT

## 2024-09-21 PROCEDURE — 96375 TX/PRO/DX INJ NEW DRUG ADDON: CPT | Performed by: PHYSICIAN ASSISTANT

## 2024-09-21 PROCEDURE — 99284 EMERGENCY DEPT VISIT MOD MDM: CPT | Mod: 25 | Performed by: PHYSICIAN ASSISTANT

## 2024-09-21 PROCEDURE — 86140 C-REACTIVE PROTEIN: CPT | Performed by: PHYSICIAN ASSISTANT

## 2024-09-21 RX ORDER — KETOROLAC TROMETHAMINE 15 MG/ML
15 INJECTION, SOLUTION INTRAMUSCULAR; INTRAVENOUS ONCE
Status: COMPLETED | OUTPATIENT
Start: 2024-09-21 | End: 2024-09-21

## 2024-09-21 RX ORDER — OXYCODONE HYDROCHLORIDE 5 MG/1
5 TABLET ORAL EVERY 6 HOURS PRN
Qty: 6 TABLET | Refills: 0 | Status: SHIPPED | OUTPATIENT
Start: 2024-09-21

## 2024-09-21 RX ORDER — ORPHENADRINE CITRATE 30 MG/ML
60 INJECTION INTRAMUSCULAR; INTRAVENOUS ONCE
Status: COMPLETED | OUTPATIENT
Start: 2024-09-21 | End: 2024-09-21

## 2024-09-21 RX ORDER — LEVOTHYROXINE SODIUM 112 UG/1
1 TABLET ORAL
COMMUNITY
Start: 2024-06-20

## 2024-09-21 RX ORDER — OXYCODONE HYDROCHLORIDE 5 MG/1
5 TABLET ORAL ONCE
Status: COMPLETED | OUTPATIENT
Start: 2024-09-21 | End: 2024-09-21

## 2024-09-21 RX ADMIN — ORPHENADRINE CITRATE 60 MG: 60 INJECTION INTRAMUSCULAR; INTRAVENOUS at 15:25

## 2024-09-21 RX ADMIN — OXYCODONE HYDROCHLORIDE 5 MG: 5 TABLET ORAL at 16:53

## 2024-09-21 RX ADMIN — KETOROLAC TROMETHAMINE 15 MG: 15 INJECTION, SOLUTION INTRAMUSCULAR; INTRAVENOUS at 15:28

## 2024-09-21 ASSESSMENT — ACTIVITIES OF DAILY LIVING (ADL)
ADLS_ACUITY_SCORE: 36
ADLS_ACUITY_SCORE: 36
ADLS_ACUITY_SCORE: 34

## 2024-09-21 ASSESSMENT — COLUMBIA-SUICIDE SEVERITY RATING SCALE - C-SSRS
6. HAVE YOU EVER DONE ANYTHING, STARTED TO DO ANYTHING, OR PREPARED TO DO ANYTHING TO END YOUR LIFE?: NO
1. IN THE PAST MONTH, HAVE YOU WISHED YOU WERE DEAD OR WISHED YOU COULD GO TO SLEEP AND NOT WAKE UP?: NO
2. HAVE YOU ACTUALLY HAD ANY THOUGHTS OF KILLING YOURSELF IN THE PAST MONTH?: NO

## 2024-09-21 NOTE — ED PROVIDER NOTES
"  History     Chief Complaint   Patient presents with    Flank Pain       HPI  Magnolia Gonzáles is a 54 year old female with a history of bipolar disorder, PTSD, anxiety, who presents to the emergency department complaining of right flank pain.  The patient states that she has been in and out of the hospital for a year now with this pain.  She was scheduled to have a \"full body MRI\" at [facility] where she would be sedated last Tuesday, 9/17.  She canceled at the last minute because she was scared to go by herself to the appointment.  This MRI was to hopefully figure out why she has been having this right-sided flank pain.  She is planning to call Monday to reschedule it.  She reports over the last week the pain has progressively gotten worse.  She states it is worse with certain movements, particularly lifting her right arm overhead.  She has not had any associated chest pain or shortness of breath.  No fevers.  She does have occasional nausea and vomiting, most recently a week ago.  She takes Zofran as needed for that but she is out.  She also has issues with chronic watery stools.  Typically they are greenish in colors but now she states they are little orangeish.  She denies urinary symptoms.  She is worried she has \"COVID toe\" because her toes feel achy.  She denies cough or congestion symptoms however and reports multiple negative tests for COVID.  She has a history of psoriasis for which she uses steroid ointments.        Allergies:  Allergies   Allergen Reactions    Pregabalin Other (See Comments)     blurry vision    Serotonin Reuptake Inhibitors (Ssris) Rash    Zoloft [Sertraline] Rash       Problem List:    Patient Active Problem List    Diagnosis Date Noted    Generalized anxiety disorder 07/29/2020     Priority: Medium    Major depressive disorder, recurrent episode, mild (H24) 07/19/2017     Priority: Medium    PTSD (post-traumatic stress disorder) 07/19/2017     Priority: " "Medium    Bipolar affective disorder, manic, moderate (H) 09/23/2016     Priority: Medium     Patient refutes this. 10/5/2020      Overdose of Valium, intentional self-harm, subsequent encounter 05/29/2016     Priority: Medium    Carpal tunnel syndrome of left wrist 01/13/2016     Priority: Medium    Adjustment disorder with mixed anxiety and depressed mood 01/06/2016     Priority: Medium    Acquired hypothyroidism 01/06/2016     Priority: Medium    Bilateral carpal tunnel syndrome 01/06/2016     Priority: Medium    Social phobia 12/26/2014     Priority: Medium    Panic disorder without agoraphobia 12/26/2014     Priority: Medium    History of alcohol dependence (H) 07/22/2014     Priority: Medium     Overview:   Binge drinking disorder. Sober since 5/2016      Nondependent alcohol abuse, episodic drinking behavior 11/17/2005     Priority: Medium        Past Medical History:    Past Medical History:   Diagnosis Date    Anxiety     Depressive disorder     Hypothyroid     Suicidal ideation 7/19/2015       Past Surgical History:    Past Surgical History:   Procedure Laterality Date    ORTHOPEDIC SURGERY         Family History:    Family History   Problem Relation Age of Onset    Depression/Anxiety Mother     Alcohol/Drug Father         Pancreatic CA       Social History:  Marital Status:  Single [1]  Social History     Tobacco Use    Smoking status: Never     Passive exposure: Never    Smokeless tobacco: Never   Vaping Use    Vaping status: Never Used   Substance Use Topics    Alcohol use: Yes     Alcohol/week: 0.0 standard drinks of alcohol     Comment: \"once every three months\"    Drug use: No        Medications:    acetaminophen (TYLENOL) 500 MG tablet  atorvastatin (LIPITOR) 10 MG tablet  buPROPion (WELLBUTRIN XL) 150 MG 24 hr tablet  buPROPion (WELLBUTRIN XL) 300 MG 24 hr tablet  diazepam (VALIUM) 5 MG tablet  gabapentin (NEURONTIN) 400 MG capsule  hydrocortisone (CORTAID) 1 % external ointment  levothyroxine " "(SYNTHROID/LEVOTHROID) 112 MCG tablet  meclizine (ANTIVERT) 25 MG tablet  methylphenidate (RITALIN) 10 MG tablet  ondansetron (ZOFRAN ODT) 4 MG ODT tab  tiZANidine (ZANAFLEX) 4 MG tablet  triamcinolone (ARISTOCORT HP) 0.5 % external cream  oxyCODONE (ROXICODONE) 5 MG tablet          Review of Systems   All other systems reviewed and are negative.          Physical Exam   BP: (!) 174/125  Pulse: 90  Temp: 98.5  F (36.9  C)  Resp: 18  Height: 154.9 cm (5' 1\")  Weight: 71.2 kg (157 lb)  SpO2: 96 %      Physical Exam  Vitals and nursing note reviewed.   Constitutional:       General: She is not in acute distress.     Appearance: Normal appearance. She is well-developed. She is not ill-appearing, toxic-appearing or diaphoretic.   HENT:      Head: Normocephalic and atraumatic.      Nose: Nose normal.      Mouth/Throat:      Mouth: Mucous membranes are moist.   Eyes:      Conjunctiva/sclera: Conjunctivae normal.      Pupils: Pupils are equal, round, and reactive to light.   Cardiovascular:      Rate and Rhythm: Normal rate and regular rhythm.      Heart sounds: Normal heart sounds.   Pulmonary:      Effort: Pulmonary effort is normal. No respiratory distress.      Breath sounds: Normal breath sounds.   Chest:          Comments: Tenderness in area of pain localized to right lower lateral rib cage, pain worsens with palpation and arm extension over the head.  Worse with flexing lateral bending to the right.  No pain with lateral bending to the left.    No midline or paraspinal tenderness to the cervical, thoracic, or lumbar spine.    No overlying skin changes noted to area of pain.  Abdominal:      General: Bowel sounds are normal. There is no distension.      Palpations: Abdomen is soft.      Tenderness: There is no abdominal tenderness. There is no right CVA tenderness or left CVA tenderness.   Musculoskeletal:         General: No deformity.      Cervical back: Neck supple.   Skin:     General: Skin is warm and dry. "   Neurological:      Mental Status: She is alert and oriented to person, place, and time.      Coordination: Coordination normal.   Psychiatric:         Mood and Affect: Mood is anxious.         Speech: Speech is rapid and pressured.             ED Course        Procedures      Results for orders placed or performed during the hospital encounter of 09/21/24 (from the past 24 hour(s))   CBC with platelets differential    Narrative    The following orders were created for panel order CBC with platelets differential.  Procedure                               Abnormality         Status                     ---------                               -----------         ------                     CBC with platelets and d...[[id number]]                      Final result                 Please view results for these tests on the individual orders.   Comprehensive metabolic panel   Result Value Ref Range    Sodium 142 135 - 145 mmol/L    Potassium 4.0 3.4 - 5.3 mmol/L    Carbon Dioxide (CO2) 23 22 - 29 mmol/L    Anion Gap 14 7 - 15 mmol/L    Urea Nitrogen 8.5 6.0 - 20.0 mg/dL    Creatinine 0.67 0.51 - 0.95 mg/dL    GFR Estimate >90 >60 mL/min/1.73m2    Calcium 9.6 8.8 - 10.4 mg/dL    Chloride 105 98 - 107 mmol/L    Glucose 95 70 - 99 mg/dL    Alkaline Phosphatase 136 40 - 150 U/L    AST 40 0 - 45 U/L    ALT 60 (H) 0 - 50 U/L    Protein Total 7.0 6.4 - 8.3 g/dL    Albumin 4.2 3.5 - 5.2 g/dL    Bilirubin Total 0.3 <=1.2 mg/dL   Lipase   Result Value Ref Range    Lipase 21 13 - 60 U/L   CRP inflammation   Result Value Ref Range    CRP Inflammation 7.29 (H) <5.00 mg/L   Erythrocyte sedimentation rate auto   Result Value Ref Range    Erythrocyte Sedimentation Rate 14 0 - 30 mm/hr   CBC with platelets and differential   Result Value Ref Range    WBC Count 5.5 4.0 - 11.0 10e3/uL    RBC Count 3.93 3.80 - 5.20 10e6/uL    Hemoglobin 12.5 11.7 - 15.7 g/dL    Hematocrit 36.7 35.0 - 47.0 %    MCV 93 78 - 100 fL    MCH 31.8 26.5 - 33.0 pg     MCHC 34.1 31.5 - 36.5 g/dL    RDW 13.2 10.0 - 15.0 %    Platelet Count 387 150 - 450 10e3/uL    % Neutrophils 40 %    % Lymphocytes 40 %    % Monocytes 13 %    % Eosinophils 7 %    % Basophils 1 %    % Immature Granulocytes 0 %    NRBCs per 100 WBC 0 <1 /100    Absolute Neutrophils 2.2 1.6 - 8.3 10e3/uL    Absolute Lymphocytes 2.2 0.8 - 5.3 10e3/uL    Absolute Monocytes 0.7 0.0 - 1.3 10e3/uL    Absolute Eosinophils 0.4 0.0 - 0.7 10e3/uL    Absolute Basophils 0.1 0.0 - 0.2 10e3/uL    Absolute Immature Granulocytes 0.0 <=0.4 10e3/uL    Absolute NRBCs 0.0 10e3/uL   UA with Microscopic reflex to Culture    Specimen: Urine, Clean Catch   Result Value Ref Range    Color Urine Colorless Colorless, Straw, Light Yellow, Yellow    Appearance Urine Clear Clear    Glucose Urine Negative Negative mg/dL    Bilirubin Urine Negative Negative    Ketones Urine Negative Negative mg/dL    Specific Gravity Urine 1.005 1.003 - 1.035    Blood Urine Negative Negative    pH Urine 7.0 5.0 - 7.0    Protein Albumin Urine Negative Negative mg/dL    Urobilinogen Urine Normal Normal, 2.0 mg/dL    Nitrite Urine Negative Negative    Leukocyte Esterase Urine Negative Negative    Bacteria Urine Few (A) None Seen /HPF    RBC Urine 0 <=2 /HPF    WBC Urine <1 <=5 /HPF    Narrative    Urine Culture not indicated       Medications   orphenadrine (NORFLEX) injection 60 mg (60 mg Intravenous $Given 9/21/24 1525)   ketorolac (TORADOL) injection 15 mg (15 mg Intravenous $Given 9/21/24 1528)   oxyCODONE (ROXICODONE) tablet 5 mg (5 mg Oral $Given 9/21/24 3033)         Assessments & Plan (with Medical Decision Making)  Magnolia Gonzáles is a 54 year old female who presented to the ED complaining of right sided flank pain.  Reports this has been ongoing for a year but it got worse in the last week.  No nausea or vomiting today, no fevers, no urinary symptoms.  She has been taking Tylenol for pain as needed.  On arrival to the ED she was hypertensive but  otherwise had normal vital signs.  Extremely anxious throughout ED stay.  Exam revealed focal tenderness to the right lower lateral chest wall.  No tenderness in the CVA region or abdomen.  No midline back tenderness.  Based on exam findings and symptoms suspect that this is likely musculoskeletal.  She was initially given Toradol and Norflex for symptom relief.  Further workup obtained with labs.  Her CBC was within normal limits.  She had a mild bump in her ALT to 60 but otherwise normal LFTs.  No right upper quadrant tenderness to suggest hepatobiliary cause for pain so I did not think imaging warranted.  Her lipase was also normal.  CRP 7.  Urinalysis without blood or signs of infection.  On reevaluation patient continued to complain of pain so oxycodone given.  This did bring her pain down some.  Asked for Valium for anxiety here but I explained mixing narcotics with benzodiazepines is not advised.  I had a long discussion with the patient regarding her symptoms.  Unclear etiology but I do not think imaging is warranted in the emergency setting given her reassuring workup, exam, and chronicity of symptoms.  She does plan to follow-up with her clinic provider on Monday for further evaluation.  She was questioning what she could take for pain at home.  Does not tolerate NSAIDs well orally due to stomach upset.  I advised continued use of Tylenol.  I was willing to give her just a few tablets of oxycodone but advised that she not take it with her Valium she is prescribed due to sedating effects.  She can also continue with ice to affected area and gentle stretching.  She was provided instructions on when to return to the ED as well.  All questions answered and patient discharged home in stable condition.     I have reviewed the nursing notes.    I have reviewed the findings, diagnosis, plan and need for follow up with the patient.      New Prescriptions    OXYCODONE (ROXICODONE) 5 MG TABLET    Take 1 tablet (5 mg)  by mouth every 6 hours as needed for severe pain.       Final diagnoses:   Rib pain on right side     Note: Chart documentation done in part with Dragon Voice Recognition software. Although reviewed after completion, some word and grammatical errors may remain.     9/21/2024   [facility] EMERGENCY DEPT       Geno Rice PA-C  09/21/24 [id number]

## 2024-09-21 NOTE — MEDICATION SCRIBE - ADMISSION MEDICATION HISTORY
Medication Scribe Admission Medication History    Admission medication history is complete. The information provided in this note is only as accurate as the sources available at the time of the update.    Information Source(s): Patient and CareEverywhere/SureScripts via in-person    Pertinent Information: n/a    Changes made to PTA medication list:  Added: None  Deleted: allegra, ibuprofen, MVI, zyprexa  Changed: lipitor to lunchtime, levothyroxine from 100mcg to 112mcg.    Allergies reviewed with patient and updates made in EHR: yes    Medication History Completed By: ROSEY BRADY 9/21/2024 4:29 PM    PTA Med List   Medication Sig Last Dose    acetaminophen (TYLENOL) 500 MG tablet Take 1-2 tablets (500-1,000 mg) by mouth every 6 hours as needed for mild pain 9/21/2024 at 1400    atorvastatin (LIPITOR) 10 MG tablet Take 1 tablet by mouth daily (with lunch). 9/20/2024 at 1200    buPROPion (WELLBUTRIN XL) 150 MG 24 hr tablet Take 1 tablet (150 mg) by mouth every morning With 300 mg for a total of 450 mg daily 9/20/2024 at am    buPROPion (WELLBUTRIN XL) 300 MG 24 hr tablet Take 1 tablet (300 mg) by mouth every morning (Patient taking differently: Take 300 mg by mouth every morning. With 150mg to = 450mg daily dose) 9/20/2024 at am    diazepam (VALIUM) 5 MG tablet Take 1 tablet (5 mg) by mouth daily as needed for anxiety or muscle spasms. 9/6/2024 at unkn    gabapentin (NEURONTIN) 400 MG capsule Take 3 capsules (1,200 mg) by mouth 3 times daily Fill July 19 9/21/2024 at am    hydrocortisone (CORTAID) 1 % external ointment Apply topically 2 times daily Use on face and around the eyes - DO NOT get in the eye Unknown at unkn    levothyroxine (SYNTHROID/LEVOTHROID) 112 MCG tablet Take 1 tablet by mouth daily at 2 pm. 9/21/2024 at am    meclizine (ANTIVERT) 25 MG tablet Take 1 tablet (25 mg) by mouth daily as needed for dizziness or nausea 9/20/2024 at pm    methylphenidate (RITALIN) 10 MG tablet Take 1 tablet (10 mg) by  mouth daily. 9/6/2024 at am    ondansetron (ZOFRAN ODT) 4 MG ODT tab Take 1 tablet (4 mg) by mouth every 8 hours as needed for nausea. 9/17/2024 at unkn    tiZANidine (ZANAFLEX) 4 MG tablet Take 1 tablet (4 mg) by mouth 3 times daily as needed for other (pain) 9/20/2024 at supper    triamcinolone (ARISTOCORT HP) 0.5 % external cream Apply topically 2 times daily Past Month

## 2024-09-21 NOTE — DISCHARGE INSTRUCTIONS
Your blood work today was very reassuring as was your exam.  I think your pain is mostly musculoskeletal.  Continue with Tylenol as needed and your tizanidine for muscle spasm relief.  Reserve the use of oxycodone for severe breakthrough pain.  Follow-up with your clinic provider on Monday for reevaluation.  If you have any new or worsening symptoms please return to the emergency department.    Thank you for choosing [facility]'s Emergency Department. It was a pleasure taking care of you today. If you have any questions, please call [phone].    Roxanne Montoya, PAPolyC

## 2024-09-26 ENCOUNTER — TRANSCRIBE ORDERS (OUTPATIENT)
Dept: OTHER | Age: 54
End: 2024-09-26

## 2024-09-26 ENCOUNTER — HOSPITAL ENCOUNTER (OUTPATIENT)
Facility: CLINIC | Age: 54
End: 2024-09-26
Attending: SURGERY | Admitting: SURGERY
Payer: MEDICAID

## 2024-09-26 ENCOUNTER — TELEPHONE (OUTPATIENT)
Dept: GASTROENTEROLOGY | Facility: CLINIC | Age: 54
End: 2024-09-26
Payer: COMMERCIAL

## 2024-09-26 DIAGNOSIS — K21.9 GASTROESOPHAGEAL REFLUX DISEASE, UNSPECIFIED WHETHER ESOPHAGITIS PRESENT: Primary | ICD-10-CM

## 2024-09-26 DIAGNOSIS — Z12.11 SCREENING FOR COLON CANCER: ICD-10-CM

## 2024-09-26 NOTE — TELEPHONE ENCOUNTER
"Endoscopy Scheduling Screen    Have you had any respiratory illness or flu-like symptoms in the last 10 days?  No    What is your communication preference for Instructions and/or Bowel Prep?   MyChart    What insurance is in the chart?  Other:  [facility]    Ordering/Referring Provider: NIKOLE GUY   (If ordering provider performs procedure, schedule with ordering provider unless otherwise instructed. )    BMI: Estimated body mass index is 29.66 kg/m  as calculated from the following:    Height as of 9/21/24: 1.549 m (5' 1\").    Weight as of 9/21/24: 71.2 kg (157 lb).     Sedation Ordered  MAC/deep sedation.   BMI<= 45 45 < BMI <= 48 48 < BMI < = 50  BMI > 50   No Restrictions No [facility]  No [facility]  [facility] with exceptions Hospital Only OR Only       Do you have a history of malignant hyperthermia?  No    (Females) Are you currently pregnant?   No     Have you been diagnosed or told you have pulmonary hypertension?   No    Do you have an LVAD?  No    Have you been told you have moderate to severe sleep apnea?  No.    Have you been told you have COPD, asthma, or any other lung disease?  No    Do you have any heart conditions?  No     Have you ever had or are you waiting for an organ transplant?  No. Continue scheduling, no site restrictions.    Have you had a stroke or transient ischemic attack (TIA aka \"mini stroke\" in the last 6 months?   No    Have you been diagnosed with or been told you have cirrhosis of the liver?   No.    Are you currently on dialysis?   No    Do you need assistance transferring?   No    BMI: Estimated body mass index is 29.66 kg/m  as calculated from the following:    Height as of 9/21/24: 1.549 m (5' 1\").    Weight as of 9/21/24: 71.2 kg (157 lb).     Is patients BMI > 40 and scheduling location [facility]?  No    Do you take an injectable or oral medication for weight loss or diabetes (excluding insulin)?  No    Do you take the medication Naltrexone?  No    Do you take blood thinners?  No "       Prep   Are you currently on dialysis or do you have chronic kidney disease?  No    Do you have a diagnosis of diabetes?  No    Do you have a diagnosis of cystic fibrosis (CF)?  No    On a regular basis do you go 3 -5 days between bowel movements?  No    BMI > 40?  No    Preferred Pharmacy:    [facility] [address] [place], [place] - [address]  [address]  [facility] [address]  Phone: [phone] Fax: [phone]    Final Scheduling Details     Procedure scheduled  Colonoscopy / Upper endoscopy (EGD)    Surgeon:  ANDREW     Date of procedure:  11/11/24     Pre-OP / PAC:   No - Not required for this site.    Location  [facility] - Per order.    Sedation   MAC/Deep Sedation - Per order.      Patient Reminders:   You will receive a call from a Nurse to review instructions and health history.  This assessment must be completed prior to your procedure.  Failure to complete the Nurse assessment may result in the procedure being cancelled.      On the day of your procedure, please designate an adult(s) who can drive you home stay with you for the next 24 hours. The medicines used in the exam will make you sleepy. You will not be able to drive.      You cannot take public transportation, ride share services, or non-medical taxi service without a responsible caregiver.  Medical transport services are allowed with the requirement that a responsible caregiver will receive you at your destination.  We require that drivers and caregivers are confirmed prior to your procedure.

## 2024-10-04 ENCOUNTER — TELEPHONE (OUTPATIENT)
Dept: PSYCHIATRY | Facility: CLINIC | Age: 54
End: 2024-10-04
Payer: MEDICAID

## 2024-10-04 NOTE — TELEPHONE ENCOUNTER
- Dialed the pt regarding lost of insurance and medication coverage.  -  LVM letting her to know that I will try to reach her out again after 3 pm.    -  As per RN report, she won't need refills until mid October

## 2024-10-04 NOTE — TELEPHONE ENCOUNTER
Attempted to call x 2 -no answer.   left VM  asking to let us know via [employer] when is a good time to contact her on Monday

## 2024-10-04 NOTE — TELEPHONE ENCOUNTER
Reason for Call:  Other call back and prescription    Detailed comments: Patient needs a call back from Care Team ASAP, regaurding medications and current circumstances needs guidance from team.     Phone Number Patient can be reached at: Cell number on file:    Telephone Information:   Mobile [phone]       Best Time: ASAP    Can we leave a detailed message on this number? YES    Call taken on 10/4/2024 at 11:33 AM by Fern Britton

## 2024-10-04 NOTE — TELEPHONE ENCOUNTER
1) Returned a call to the patient. She reports that she lost her insurance and Medicare doesn't become effective until January 1 2025. She is concerned about having to stop her medications due to cost.     2) Discussed MHDAP program with the patient. Advised that the clinic would look into the details of the MHDAP program and reach out to her with information.       ABISAI VELAZQUEZ RN on 10/4/2024 at 2:04 PM

## 2024-10-09 ENCOUNTER — MYC MEDICAL ADVICE (OUTPATIENT)
Dept: PSYCHIATRY | Facility: CLINIC | Age: 54
End: 2024-10-09
Payer: MEDICAID

## 2024-10-09 DIAGNOSIS — F41.1 GENERALIZED ANXIETY DISORDER: ICD-10-CM

## 2024-10-09 DIAGNOSIS — F31.32 BIPOLAR 1 DISORDER, DEPRESSED, MODERATE (H): ICD-10-CM

## 2024-10-09 DIAGNOSIS — F43.10 PTSD (POST-TRAUMATIC STRESS DISORDER): ICD-10-CM

## 2024-10-09 RX ORDER — DIAZEPAM 5 MG/1
5 TABLET ORAL DAILY PRN
Qty: 30 TABLET | Refills: 0 | Status: SHIPPED | OUTPATIENT
Start: 2024-10-21

## 2024-10-09 RX ORDER — BUPROPION HYDROCHLORIDE 300 MG/1
300 TABLET ORAL EVERY MORNING
Qty: 30 TABLET | Refills: 2 | Status: SHIPPED | OUTPATIENT
Start: 2024-10-09 | End: 2024-10-10

## 2024-10-09 RX ORDER — METHYLPHENIDATE HYDROCHLORIDE 10 MG/1
10 TABLET ORAL DAILY
Qty: 30 TABLET | Refills: 0 | Status: SHIPPED | OUTPATIENT
Start: 2024-10-21

## 2024-10-09 RX ORDER — BUPROPION HYDROCHLORIDE 150 MG/1
150 TABLET ORAL EVERY MORNING
Qty: 30 TABLET | Refills: 2 | Status: SHIPPED | OUTPATIENT
Start: 2024-10-09 | End: 2024-10-10

## 2024-10-09 RX ORDER — GABAPENTIN 400 MG/1
1200 CAPSULE ORAL 3 TIMES DAILY
Qty: 270 CAPSULE | Refills: 0 | Status: SHIPPED | OUTPATIENT
Start: 2024-10-09

## 2024-10-09 NOTE — TELEPHONE ENCOUNTER
Former pt of YARELIS Begum.    Made a phone call to help with medication coverage. Using DAP , 1st time    Pharmacy of choice: [facility] pharmacy in [place]. Pt reports having difficulties with transportation, said she will contact her SW to arrange her ride to the pharmacy.    DAP participant form completed over the phone. Pt is aware we will be sharing her information with the program.    Medication/doses verified with the pt over the phone     Date of Last Office Visit: 8/16/24  Date of Next Office Visit: Pt was scheduled with community provider on 9/25/24 with Vonda Pelletier at [facility] but has no insurance and asking for assistance with med coverage  No shows since last visit: No  More than one patient-initiated cancellation (with reschedule) since last seen in clinic? No    []Medication refilled per  Medication Refill in Ambulatory Care  policy.  [x]Medication unable to be refilled by RN due to criteria not met as indicated below:    []Eligibility: has not had a provider visit within last 6 months   [x]Supervision: no future appointment; < 7 days before next appointment   []Compliance: no shows; cancellations; lapse in therapy   []Verification: order discrepancy; may need modification...   [] > 30-day supply request   [x]Advanced refill request: > 7 days before refill date   [x]Controlled medication   []Medication not included in policy   []Review: new med; med adjusted ? 30 days; safety alert; requires lab monitoring...   [x]Scope of Practice: refill request processed by LPN/MA   [x]Other: Provider is no longer at this practice      Medication(s) requested:     -  diazepam (VALIUM) 5 MG tablet   Date last ordered: 9/20/24  Qty: 30  Refills: 0      methylphenidate (RITALIN) 10 MG tablet   Date last ordered: 9/20/24  Qty: 30  Refills: 0       gabapentin (NEURONTIN) 400 MG capsule   Date last ordered: 8/16/24  Qty: 270  Refills: 2     buPROPion (WELLBUTRIN XL) 150 MG 24 hr tablet   Date last ordered:  8/16/24  Qty: 30  Refills: 5      buPROPion (WELLBUTRIN XL) 300 MG 24 hr tablet  Date last ordered: 8/16/24  Qty: 30  Refills: 5        Any Controlled Substance(s)? Yes   [place]  checked? No. Provider is no longer at this practice      Requested medication(s) verified as identical to current order? Yes    Any lapse in adherence to medication(s) greater than 5 days? No      Additional action taken? routed encounter to provider for review.      Last visit treatment plan:      1.  Discontinue lorazepam  2.  Add diazepam 5 mg daily as needed for anxiety/muscle spasms  3.  Continue bupropion  mg daily  4.  Continue methylphenidate 10 mg daily for ADHD  5.  Continue gabapentin 1200 mg 3 times daily for anxiety and pain symptoms  6.  Clonidine discontinued  7.  Talk therapy when able to, for learning skills to process trauma history, anxiety, and life adjustments  8.  You will be contacted to schedule with a new psychiatric provider as I will be retiring September 13       Any medication(s) require lab monitoring? No

## 2024-10-10 RX ORDER — BUPROPION HYDROCHLORIDE 300 MG/1
300 TABLET ORAL EVERY MORNING
Qty: 30 TABLET | Refills: 0 | Status: SHIPPED | OUTPATIENT
Start: 2024-10-10

## 2024-10-10 RX ORDER — BUPROPION HYDROCHLORIDE 150 MG/1
150 TABLET ORAL EVERY MORNING
Qty: 30 TABLET | Refills: 0 | Status: SHIPPED | OUTPATIENT
Start: 2024-10-10

## 2024-10-10 NOTE — TELEPHONE ENCOUNTER
Made a phone call to [facility] pharmacist  Yanira.    Updated that Diazepam and Ritalin could be filled on 10/21/24 or after   Gave verbal order to discontinue all refills on Wellbutrin, just keep a 30 days fill on each dose.

## 2024-10-10 NOTE — TELEPHONE ENCOUNTER
RN called the patient at [phone], she did not answer, LVM indicating refills were sent to [facility] pharmacy in [place].but that she would not be able to pick the Ritalin or Valium up until 10/21/204, she has questions to call back at [phone] or sen a [facility] message.    Vani Simeon RN on 10/10/2024 at 12:40 PM

## 2024-10-10 NOTE — TELEPHONE ENCOUNTER
This is far too early of a refill for diazepam and ritalin. Understand this makes things difficult but this is much too early. Changed wellbutrin to 30 day supply with no refills.

## 2024-10-10 NOTE — TELEPHONE ENCOUNTER
Ritalin and Valium prescription approved with a starting date 10/21/24.    Spoke to pt yesterday. Since we are using [facility], her prescriptions have to go to approved pharmacy. Pt has no transportation and no ins; has to ask her SW in advance to arrange her trip to the [facility] pharmacy ( normally goes to [facility] close to her home). She said she will be running out of the Gabapentin (TDD # 3600 mg a day) around October 16 th.   As per her report, it takes some time to arrange ride according to her SW availability.    This nurse is asking provider's permission to dispense all her medications at the same time ( earlier than specified on controlled prescriptions) ?    Please, advise...

## 2024-10-23 ENCOUNTER — OFFICE VISIT (OUTPATIENT)
Dept: FAMILY MEDICINE | Facility: CLINIC | Age: 54
End: 2024-10-23
Payer: MEDICAID

## 2024-10-23 VITALS
RESPIRATION RATE: 20 BRPM | TEMPERATURE: 98.6 F | OXYGEN SATURATION: 97 % | HEART RATE: 103 BPM | SYSTOLIC BLOOD PRESSURE: 130 MMHG | HEIGHT: 63 IN | WEIGHT: 150 LBS | BODY MASS INDEX: 26.58 KG/M2 | DIASTOLIC BLOOD PRESSURE: 86 MMHG

## 2024-10-23 DIAGNOSIS — E03.9 ACQUIRED HYPOTHYROIDISM: Chronic | ICD-10-CM

## 2024-10-23 DIAGNOSIS — F43.10 PTSD (POST-TRAUMATIC STRESS DISORDER): Chronic | ICD-10-CM

## 2024-10-23 DIAGNOSIS — Z01.818 PREOPERATIVE EXAMINATION: Primary | ICD-10-CM

## 2024-10-23 DIAGNOSIS — M41.9 SCOLIOSIS, UNSPECIFIED SCOLIOSIS TYPE, UNSPECIFIED SPINAL REGION: ICD-10-CM

## 2024-10-23 DIAGNOSIS — Z28.21 IMMUNIZATION DECLINED: ICD-10-CM

## 2024-10-23 DIAGNOSIS — F41.1 GENERALIZED ANXIETY DISORDER: Chronic | ICD-10-CM

## 2024-10-23 DIAGNOSIS — F10.21 HISTORY OF ALCOHOL DEPENDENCE (H): ICD-10-CM

## 2024-10-23 DIAGNOSIS — F31.12 BIPOLAR AFFECTIVE DISORDER, MANIC, MODERATE (H): ICD-10-CM

## 2024-10-23 PROCEDURE — G2211 COMPLEX E/M VISIT ADD ON: HCPCS | Performed by: NURSE PRACTITIONER

## 2024-10-23 PROCEDURE — 99214 OFFICE O/P EST MOD 30 MIN: CPT | Performed by: NURSE PRACTITIONER

## 2024-10-23 ASSESSMENT — PAIN SCALES - GENERAL: PAINLEVEL_OUTOF10: SEVERE PAIN (6)

## 2024-10-23 ASSESSMENT — PATIENT HEALTH QUESTIONNAIRE - PHQ9
10. IF YOU CHECKED OFF ANY PROBLEMS, HOW DIFFICULT HAVE THESE PROBLEMS MADE IT FOR YOU TO DO YOUR WORK, TAKE CARE OF THINGS AT HOME, OR GET ALONG WITH OTHER PEOPLE: EXTREMELY DIFFICULT
SUM OF ALL RESPONSES TO PHQ QUESTIONS 1-9: 18
SUM OF ALL RESPONSES TO PHQ QUESTIONS 1-9: 18

## 2024-10-23 NOTE — PROGRESS NOTES
Preoperative Evaluation  [address] [address]  Phone: [phone]  Fax: [phone]  Primary Provider: Kajal Melendez PA-C  Pre-op Performing Provider: Christine Senior NP  Oct 23, 2024             10/23/2024   Surgical Information   What procedure is being done? MRI w\ sedation    Facility or Hospital where procedure/surgery will be performed: [facility]    Who is doing the procedure / surgery? I am not sure.    Date of surgery / procedure: 11/5/2024    Time of surgery / procedure: 1pm I think    Where do you plan to recover after surgery? at home alone        Patient-reported     Fax number for surgical facility: [phone]    Assessment & Plan     The proposed surgical procedure is considered LOW risk.    Preoperative examination    Scoliosis, unspecified scoliosis type, unspecified spinal region    Generalized anxiety disorder  PTSD (post-traumatic stress disorder)  Bipolar affective disorder, manic, moderate (H)  History of alcohol dependence (H)  Following with psychiatry. Encouraged to use PRN Diazepam for anxiety leading up to procedure.     Acquired hypothyroidism  Most recent TSH normal on 9/30/24    Possible Sleep Apnea: Seeing Dentist           - No identified additional risk factors other than previously addressed      Recommendation  Approval given to proceed with proposed procedure, without further diagnostic evaluation.    The longitudinal plan of care for the diagnosis(es)/condition(s) as documented were addressed during this visit. Due to the added complexity in care, I will continue to support Maryjane in the subsequent management and with ongoing continuity of care.    Subjective   Maryjane is a 54 year old, presenting for the following:  Pre-Op Exam          10/23/2024     7:54 AM   Additional Questions   Roomed by Susan SALAS LPN     HPI related to upcoming procedure: Maryjane has a history of anxiety which is chronic for her. She states this causes  her to become clostraphobic, specifically while getting MRI which has been ordered for further evaluation of scoliosis which causes her significant back pain and sciatica. She had been scheduled for the MRI last month in September, however she ended up rescheduling. She denies any changes since her last Pre-op evaluation.      Maryjane has a marked history of anxiety, PTSD and Bipolar affective disorder. She is followed by psychiatry and sees a counselor regularly. She also has a history of alcohol dependence, she states she was recently drinking this past weekend, however had otherwise not drank any alcohol in the past year. She follows with [facility] in [place], [place]. She was recently seen for follow-up. She has upcoming EGD and Colonoscopy scheduled on 11/11/24.       10/23/2024   Pre-Op Questionnaire   Have you ever had a heart attack or stroke? No    Have you ever had surgery on your heart or blood vessels, such as a stent placement, a coronary artery bypass, or surgery on an artery in your head, neck, heart, or legs? No    Do you have chest pain with activity? No    Do you have a history of heart failure? No    Do you currently have a cold, bronchitis or symptoms of other infection? No    Do you have a cough, shortness of breath, or wheezing? No    Do you or anyone in your family have previous history of blood clots? (!) UNKNOWN    Do you or does anyone in your family have a serious bleeding problem such as prolonged bleeding following surgeries or cuts? (!) UNKNOWN    Have you ever had problems with anemia or been told to take iron pills? No    Have you had any abnormal blood loss such as black, tarry or bloody stools, or abnormal vaginal bleeding? No    Have you ever had a blood transfusion? No    Are you willing to have a blood transfusion if it is medically needed before, during, or after your surgery? Yes    Have you or any of your relatives ever had problems with anesthesia? No    Do you have sleep apnea,  excessive snoring or daytime drowsiness? (!) YES    Do you have a CPAP machine? (!) NO - Needs retainer made through Dentist    Do you have any artifical heart valves or other implanted medical devices like a pacemaker, defibrillator, or continuous glucose monitor? No    Do you have artificial joints? No    Are you allergic to latex? No        Patient-reported     Health Care Directive  Patient does not have a Health Care Directive: Discussed advance care planning with patient; however, patient declined at this time.    Preoperative Review of    reviewed - controlled substances prescribed by other outside provider(s).          Patient Active Problem List    Diagnosis Date Noted    Generalized anxiety disorder 07/29/2020     Priority: Medium    Major depressive disorder, recurrent episode, mild (H) 07/19/2017     Priority: Medium    PTSD (post-traumatic stress disorder) 07/19/2017     Priority: Medium    Bipolar affective disorder, manic, moderate (H) 09/23/2016     Priority: Medium     Patient refutes this. 10/5/2020      Overdose of Valium, intentional self-harm, subsequent encounter 05/29/2016     Priority: Medium    Carpal tunnel syndrome of left wrist 01/13/2016     Priority: Medium    Adjustment disorder with mixed anxiety and depressed mood 01/06/2016     Priority: Medium    Acquired hypothyroidism 01/06/2016     Priority: Medium    Bilateral carpal tunnel syndrome 01/06/2016     Priority: Medium    Social phobia 12/26/2014     Priority: Medium    Panic disorder without agoraphobia 12/26/2014     Priority: Medium    History of alcohol dependence (H) 07/22/2014     Priority: Medium     Overview:   Binge drinking disorder. Sober since 5/2016      Nondependent alcohol abuse, episodic drinking behavior 11/17/2005     Priority: Medium      Past Medical History:   Diagnosis Date    Anxiety     Arthritis     Depressive disorder     Hypertension 8/2024    [facility]    Hypothyroid     Suicidal ideation 07/19/2015      Past Surgical History:   Procedure Laterality Date    ORTHOPEDIC SURGERY       Current Outpatient Medications   Medication Sig Dispense Refill    acetaminophen (TYLENOL) 500 MG tablet Take 1-2 tablets (500-1,000 mg) by mouth every 6 hours as needed for mild pain 90 tablet 3    atorvastatin (LIPITOR) 10 MG tablet Take 1 tablet by mouth daily (with lunch).      buPROPion (WELLBUTRIN XL) 150 MG 24 hr tablet Take 1 tablet (150 mg) by mouth every morning. With 300 mg for a total of 450 mg daily 30 tablet 0    buPROPion (WELLBUTRIN XL) 300 MG 24 hr tablet Take 1 tablet (300 mg) by mouth every morning. 30 tablet 0    diazepam (VALIUM) 5 MG tablet Take 1 tablet (5 mg) by mouth daily as needed for anxiety or muscle spasms. 30 tablet 0    gabapentin (NEURONTIN) 400 MG capsule Take 3 capsules (1,200 mg) by mouth 3 times daily. Fill July 19 270 capsule 0    hydrocortisone (CORTAID) 1 % external ointment Apply topically 2 times daily Use on face and around the eyes - DO NOT get in the eye 30 g 0    levothyroxine (SYNTHROID/LEVOTHROID) 112 MCG tablet Take 1 tablet by mouth daily at 2 pm.      meclizine (ANTIVERT) 25 MG tablet Take 1 tablet (25 mg) by mouth daily as needed for dizziness or nausea 15 tablet 0    methylphenidate (RITALIN) 10 MG tablet Take 1 tablet (10 mg) by mouth daily. 30 tablet 0    ondansetron (ZOFRAN ODT) 4 MG ODT tab Take 1 tablet (4 mg) by mouth every 8 hours as needed for nausea. 10 tablet 0    tiZANidine (ZANAFLEX) 4 MG tablet Take 1 tablet (4 mg) by mouth 3 times daily as needed for other (pain) 60 tablet 3    triamcinolone (ARISTOCORT HP) 0.5 % external cream Apply topically 2 times daily 454 g 1    oxyCODONE (ROXICODONE) 5 MG tablet Take 1 tablet (5 mg) by mouth every 6 hours as needed for severe pain. (Patient not taking: Reported on 10/23/2024) 6 tablet 0       Allergies   Allergen Reactions    Pregabalin Other (See Comments)     blurry vision    Serotonin Reuptake Inhibitors (Ssris) Rash     "Zoloft [Sertraline] Rash        Social History     Tobacco Use    Smoking status: Never     Passive exposure: Never    Smokeless tobacco: Never   Substance Use Topics    Alcohol use: Yes     Alcohol/week: 0.0 standard drinks of alcohol     Comment: \"once every three months\"       History   Drug Use Unknown         Review of Systems  Constitutional, HEENT, cardiovascular, pulmonary, gi and gu systems are negative, except as otherwise noted.    Objective    /86   Pulse 103   Temp 98.6  F (37  C) (Temporal)   Resp 20   Ht 1.6 m (5' 3\")   Wt 68 kg (150 lb)   SpO2 97%   BMI 26.57 kg/m     Estimated body mass index is 26.57 kg/m  as calculated from the following:    Height as of this encounter: 1.6 m (5' 3\").    Weight as of this encounter: 68 kg (150 lb).  Physical Exam  GENERAL: alert and no distress  EYES: Eyes grossly normal to inspection, PERRL and conjunctivae and sclerae normal  NECK: no adenopathy, no asymmetry, masses, or scars  RESP: lungs clear to auscultation - no rales, rhonchi or wheezes  CV: regular rate and rhythm, normal S1 S2, no S3 or S4, no murmur, click or rub, no peripheral edema  ABDOMEN: soft, nontender, no hepatosplenomegaly, no masses and bowel sounds normal  MS: no gross musculoskeletal defects noted, no edema  NEURO: Normal strength and tone, mentation intact and speech normal  PSYCH: mentation appears normal, affect normal/bright    Recent Labs   Lab Test 09/21/24  1522 08/31/24  2320   HGB 12.5 13.5    379    143   POTASSIUM 4.0 3.4   CR 0.67 0.77        Diagnostics  No labs were ordered during this visit.   No EKG required for low risk surgery (cataract, skin procedure, breast biopsy, etc).    Revised Cardiac Risk Index (RCRI)  The patient has the following serious cardiovascular risks for perioperative complications:   - No serious cardiac risks = 0 points     RCRI Interpretation: 0 points: Class I (very low risk - 0.4% complication rate)         Signed " Electronically by: Christine Senior NP  A copy of this evaluation report is provided to the requesting physician.        Answers submitted by the patient for this visit:  Patient Health Questionnaire (Submitted on 10/23/2024)  If you checked off any problems, how difficult have these problems made it for you to do your work, take care of things at home, or get along with other people?: Extremely difficult  PHQ9 TOTAL SCORE: 18

## 2024-10-23 NOTE — PATIENT INSTRUCTIONS
How to Take Your Medication Before Surgery  Preoperative Medication Instructions   Antiplatelet or Anticoagulation Medication Instructions   - Patient is on no antiplatelet or anticoagulation medications.    Additional Medication Instructions  Take all scheduled medications on the day of surgery

## 2024-10-29 RX ORDER — TRAMADOL HYDROCHLORIDE 50 MG/1
50 TABLET ORAL EVERY 6 HOURS PRN
COMMUNITY
Start: 2024-10-01 | End: 2024-11-08 | Stop reason: HOSPADM

## 2024-10-29 RX ORDER — MELOXICAM 15 MG/1
15 TABLET ORAL
COMMUNITY
Start: 2024-09-30 | End: 2024-11-08 | Stop reason: HOSPADM

## 2024-11-08 ENCOUNTER — TELEPHONE (OUTPATIENT)
Dept: GASTROENTEROLOGY | Facility: CLINIC | Age: 54
End: 2024-11-08
Payer: MEDICAID

## 2024-11-08 NOTE — TELEPHONE ENCOUNTER
----- Message from Earnestine PALACIO sent at 11/8/2024  8:13 AM CST -----  Regarding: cancel  Patient needs to cancel their procedure for 11/11 due to insurance coverage. She will call back to reschedule.   Thank you

## 2024-11-08 NOTE — TELEPHONE ENCOUNTER
Caller: No call    Reason for Reschedule/Cancellation   (please be detailed, any staff messages or encounters to note?): Insurance issues      Prior to reschedule please review:  Ordering Provider: Kajal Melendez  Sedation Determined: MAC  Does patient have any ASC Exclusions, please identify?: No      Notes on Cancelled Procedure:  Procedure: Upper and Lower Endoscopy [EGD and Colonoscopy]   Date: 11/11/2024  Location: [facility] [place]; [address] [place] [address] , [place] [address]  Surgeon: Malgorzata      Rescheduled: No, patient will call back to reschedule.        Did you cancel or rescheduled an EUS procedure? No.

## 2024-12-03 NOTE — TELEPHONE ENCOUNTER
As per last treatment plan Wellbutrin d/c'theron. Dialed pt and LVM asking to review MyChart messages    Treatment Plan: 6/6/23          1.  Lorazepam 0.5 mg daily as needed for anxiety    2.  Olanzapine ODT 5 mg at bedtime    3.  Bupropion discontinued    4.  Desvenlafaxine discontinued    5.  Lamotrigine discontinued    6.  Propranolol ER discontinued    7.  Gabapentin 1200 mg 3 times daily  
Reason for call:  Medication refill     If this is a refill request, has the caller requested the refill from the pharmacy already? No    Will the patient be using a [facility]? No    Name of the pharmacy and phone number for the current request: [facility] [phone]    Name of the medication requested: Wellbutrin     Phone number to reach patient:  [phone]    Best Time:  ASAP    Can we leave a detailed message on this number?  Yes         
none